# Patient Record
Sex: FEMALE | Race: WHITE | NOT HISPANIC OR LATINO | Employment: OTHER | URBAN - METROPOLITAN AREA
[De-identification: names, ages, dates, MRNs, and addresses within clinical notes are randomized per-mention and may not be internally consistent; named-entity substitution may affect disease eponyms.]

---

## 2017-01-04 RX ORDER — SULFAMETHOXAZOLE AND TRIMETHOPRIM 800; 160 MG/1; MG/1
1 TABLET ORAL 2 TIMES DAILY
Qty: 28 TAB | Refills: 0 | Status: SHIPPED | OUTPATIENT
Start: 2017-01-04 | End: 2017-02-08 | Stop reason: SDUPTHER

## 2017-01-11 RX ORDER — FLUOXETINE HYDROCHLORIDE 20 MG/1
20 CAPSULE ORAL
Qty: 90 CAP | Refills: 0 | Status: SHIPPED | OUTPATIENT
Start: 2017-01-11 | End: 2017-06-28 | Stop reason: SDUPTHER

## 2017-01-11 RX ORDER — ALPRAZOLAM 1 MG/1
TABLET ORAL
Qty: 90 TAB | Refills: 0 | Status: SHIPPED | OUTPATIENT
Start: 2017-01-11 | End: 2017-02-09 | Stop reason: SDUPTHER

## 2017-01-16 DIAGNOSIS — R21 RASH: ICD-10-CM

## 2017-01-16 RX ORDER — TRIAMCINOLONE ACETONIDE 1 MG/G
CREAM TOPICAL
Qty: 1 TUBE | OUTPATIENT
Start: 2017-01-16

## 2017-01-16 RX ORDER — NYSTATIN 100000 [USP'U]/G
100000 POWDER TOPICAL EVERY 12 HOURS
Qty: 60 G | Refills: 3 | OUTPATIENT
Start: 2017-01-16

## 2017-01-17 NOTE — TELEPHONE ENCOUNTER
Please advise patient to go to urgent care, need to be evaluated by physician before given medications.

## 2017-01-17 NOTE — TELEPHONE ENCOUNTER
Patient called still having pain in stomach and severe pain in vaginal area, feels like knives are cutting her vagina area, similar to a UTI. When bend over vomiting acid, eating only fruit and greek yogurt and coconut water.  Please advise.. Would like to know if there is any thing better to take for bowel movement than ty

## 2017-01-18 DIAGNOSIS — R21 RASH: ICD-10-CM

## 2017-01-18 RX ORDER — TRIAMCINOLONE ACETONIDE 1 MG/G
CREAM TOPICAL
Qty: 1 TUBE | Refills: 1 | Status: SHIPPED | OUTPATIENT
Start: 2017-01-18 | End: 2017-06-30 | Stop reason: SDUPTHER

## 2017-01-18 RX ORDER — KETOCONAZOLE 20 MG/G
CREAM TOPICAL
Qty: 60 G | Refills: 3 | Status: SHIPPED | OUTPATIENT
Start: 2017-01-18 | End: 2017-04-01 | Stop reason: SDUPTHER

## 2017-01-23 DIAGNOSIS — R60.9 EDEMA, UNSPECIFIED TYPE: ICD-10-CM

## 2017-01-23 RX ORDER — FUROSEMIDE 20 MG/1
20 TABLET ORAL
Qty: 90 TAB | Refills: 0 | Status: SHIPPED | OUTPATIENT
Start: 2017-01-23 | End: 2017-04-12 | Stop reason: SDUPTHER

## 2017-02-06 RX ORDER — NYSTATIN 100000 [USP'U]/G
POWDER TOPICAL
Qty: 60 G | Refills: 0 | Status: SHIPPED | OUTPATIENT
Start: 2017-02-06 | End: 2017-02-20 | Stop reason: SDUPTHER

## 2017-02-09 NOTE — TELEPHONE ENCOUNTER
scanned in to media    Was the patient seen in the last year in this department? Yes     Does patient have an active prescription for medications requested? No     Received Request Via: Pharmacy

## 2017-02-10 RX ORDER — ALPRAZOLAM 1 MG/1
TABLET ORAL
Qty: 90 TAB | Refills: 0 | Status: SHIPPED
Start: 2017-02-10 | End: 2017-02-27 | Stop reason: SDUPTHER

## 2017-02-10 RX ORDER — SULFAMETHOXAZOLE AND TRIMETHOPRIM 800; 160 MG/1; MG/1
TABLET ORAL
Qty: 28 TAB | Refills: 0 | Status: SHIPPED | OUTPATIENT
Start: 2017-02-10 | End: 2017-03-13 | Stop reason: SDUPTHER

## 2017-02-10 NOTE — TELEPHONE ENCOUNTER
Was the patient seen in the last year in this department? Yes     Does patient have an active prescription for medications requested? No     Received Request Via: Pharmacy      \

## 2017-02-22 RX ORDER — NYSTATIN 100000 [USP'U]/G
POWDER TOPICAL
Qty: 60 G | Refills: 0 | Status: SHIPPED | OUTPATIENT
Start: 2017-02-22 | End: 2017-03-13 | Stop reason: SDUPTHER

## 2017-03-01 RX ORDER — ALPRAZOLAM 1 MG/1
TABLET ORAL
Qty: 90 TAB | Refills: 0 | Status: SHIPPED | OUTPATIENT
Start: 2017-03-01 | End: 2017-04-06 | Stop reason: SDUPTHER

## 2017-03-15 RX ORDER — NYSTATIN 100000 [USP'U]/G
POWDER TOPICAL
Qty: 60 G | Refills: 2 | Status: SHIPPED | OUTPATIENT
Start: 2017-03-15 | End: 2017-04-29 | Stop reason: SDUPTHER

## 2017-03-15 RX ORDER — SULFAMETHOXAZOLE AND TRIMETHOPRIM 800; 160 MG/1; MG/1
TABLET ORAL
Qty: 28 TAB | Refills: 2 | Status: SHIPPED | OUTPATIENT
Start: 2017-03-15 | End: 2017-06-06 | Stop reason: SDUPTHER

## 2017-04-04 RX ORDER — KETOCONAZOLE 20 MG/G
CREAM TOPICAL
Qty: 60 G | Refills: 3 | Status: SHIPPED | OUTPATIENT
Start: 2017-04-04 | End: 2017-06-16 | Stop reason: SDUPTHER

## 2017-04-07 RX ORDER — ALPRAZOLAM 1 MG/1
TABLET ORAL
Qty: 90 TAB | Refills: 0 | Status: SHIPPED
Start: 2017-04-07 | End: 2017-05-05 | Stop reason: SDUPTHER

## 2017-04-12 DIAGNOSIS — R60.9 EDEMA, UNSPECIFIED TYPE: ICD-10-CM

## 2017-04-12 DIAGNOSIS — Z79.899 MEDICATION MANAGEMENT: ICD-10-CM

## 2017-04-12 RX ORDER — AMOXICILLIN 500 MG/1
500 CAPSULE ORAL 4 TIMES DAILY
Qty: 40 CAP | Refills: 0 | Status: SHIPPED | OUTPATIENT
Start: 2017-04-12 | End: 2017-04-22

## 2017-04-12 RX ORDER — FUROSEMIDE 20 MG/1
20 TABLET ORAL
Qty: 90 TAB | Refills: 0 | Status: SHIPPED | OUTPATIENT
Start: 2017-04-12 | End: 2017-06-07 | Stop reason: SDUPTHER

## 2017-04-24 DIAGNOSIS — K59.00 CONSTIPATION, UNSPECIFIED CONSTIPATION TYPE: ICD-10-CM

## 2017-04-24 RX ORDER — SENNOSIDES 8.6 MG
1 TABLET ORAL DAILY
Qty: 90 TAB | Refills: 0 | Status: SHIPPED | OUTPATIENT
Start: 2017-04-24 | End: 2017-06-19 | Stop reason: SDUPTHER

## 2017-04-29 DIAGNOSIS — L08.9 SKIN INFECTION: ICD-10-CM

## 2017-05-01 RX ORDER — NYSTATIN 100000 [USP'U]/G
POWDER TOPICAL
Qty: 60 G | Refills: 2 | Status: SHIPPED | OUTPATIENT
Start: 2017-05-01 | End: 2017-06-16 | Stop reason: SDUPTHER

## 2017-05-05 RX ORDER — ALPRAZOLAM 1 MG/1
TABLET ORAL
Qty: 90 TAB | Refills: 0 | Status: SHIPPED
Start: 2017-05-05 | End: 2017-06-02 | Stop reason: SDUPTHER

## 2017-06-02 RX ORDER — ALPRAZOLAM 1 MG/1
TABLET ORAL
Qty: 90 TAB | Refills: 0 | Status: SHIPPED | OUTPATIENT
Start: 2017-06-02 | End: 2017-06-30 | Stop reason: SDUPTHER

## 2017-06-07 DIAGNOSIS — R60.9 EDEMA, UNSPECIFIED TYPE: ICD-10-CM

## 2017-06-07 RX ORDER — FUROSEMIDE 20 MG/1
20 TABLET ORAL
Qty: 90 TAB | Refills: 0 | Status: SHIPPED | OUTPATIENT
Start: 2017-06-07 | End: 2017-09-11 | Stop reason: SDUPTHER

## 2017-06-07 RX ORDER — SULFAMETHOXAZOLE AND TRIMETHOPRIM 800; 160 MG/1; MG/1
TABLET ORAL
Qty: 28 TAB | Refills: 0 | Status: SHIPPED | OUTPATIENT
Start: 2017-06-07 | End: 2017-06-30 | Stop reason: SDUPTHER

## 2017-06-16 DIAGNOSIS — K59.00 CONSTIPATION, UNSPECIFIED CONSTIPATION TYPE: ICD-10-CM

## 2017-06-19 DIAGNOSIS — J44.9 CHRONIC OBSTRUCTIVE PULMONARY DISEASE, UNSPECIFIED COPD TYPE (HCC): ICD-10-CM

## 2017-06-19 RX ORDER — ALBUTEROL SULFATE 90 UG/1
2 AEROSOL, METERED RESPIRATORY (INHALATION) EVERY 6 HOURS PRN
Qty: 3 INHALER | Refills: 0 | Status: SHIPPED | OUTPATIENT
Start: 2017-06-19 | End: 2017-09-30

## 2017-06-19 RX ORDER — NYSTATIN 100000 [USP'U]/G
POWDER TOPICAL
Qty: 60 G | Refills: 0 | Status: SHIPPED | OUTPATIENT
Start: 2017-06-19 | End: 2017-06-27 | Stop reason: SDUPTHER

## 2017-06-19 RX ORDER — SENNOSIDES 8.6 MG
1 TABLET ORAL DAILY
Qty: 90 TAB | Refills: 0 | Status: SHIPPED | OUTPATIENT
Start: 2017-06-19 | End: 2017-08-21 | Stop reason: SDUPTHER

## 2017-06-19 RX ORDER — KETOCONAZOLE 20 MG/G
CREAM TOPICAL
Qty: 60 G | Refills: 0 | Status: SHIPPED | OUTPATIENT
Start: 2017-06-19 | End: 2017-06-30 | Stop reason: SDUPTHER

## 2017-06-28 RX ORDER — NYSTATIN 100000 [USP'U]/G
POWDER TOPICAL
Qty: 60 G | Refills: 0 | Status: SHIPPED | OUTPATIENT
Start: 2017-06-28 | End: 2017-07-12 | Stop reason: SDUPTHER

## 2017-06-28 RX ORDER — FLUOXETINE HYDROCHLORIDE 20 MG/1
20 CAPSULE ORAL
Qty: 90 CAP | Refills: 0 | Status: SHIPPED | OUTPATIENT
Start: 2017-06-28 | End: 2017-09-23 | Stop reason: SDUPTHER

## 2017-06-30 RX ORDER — TRIAMCINOLONE ACETONIDE 1 MG/G
CREAM TOPICAL
Qty: 15 G | Refills: 0 | Status: SHIPPED | OUTPATIENT
Start: 2017-06-30 | End: 2017-07-25 | Stop reason: SDUPTHER

## 2017-06-30 RX ORDER — ALPRAZOLAM 1 MG/1
TABLET ORAL
Qty: 90 TAB | Refills: 0 | Status: SHIPPED
Start: 2017-06-30 | End: 2017-07-31 | Stop reason: SDUPTHER

## 2017-06-30 RX ORDER — SULFAMETHOXAZOLE AND TRIMETHOPRIM 800; 160 MG/1; MG/1
TABLET ORAL
Qty: 28 TAB | Refills: 0 | Status: SHIPPED | OUTPATIENT
Start: 2017-06-30 | End: 2017-08-11 | Stop reason: SDUPTHER

## 2017-06-30 RX ORDER — KETOCONAZOLE 20 MG/G
CREAM TOPICAL
Qty: 60 G | Refills: 0 | Status: SHIPPED | OUTPATIENT
Start: 2017-06-30 | End: 2017-07-25 | Stop reason: SDUPTHER

## 2017-07-12 RX ORDER — NYSTATIN 100000 [USP'U]/G
100000 POWDER TOPICAL EVERY 12 HOURS PRN
Qty: 60 G | Refills: 0 | Status: SHIPPED | OUTPATIENT
Start: 2017-07-12 | End: 2017-07-25 | Stop reason: SDUPTHER

## 2017-07-26 RX ORDER — NYSTATIN 100000 [USP'U]/G
POWDER TOPICAL
Qty: 60 G | Refills: 0 | Status: SHIPPED | OUTPATIENT
Start: 2017-07-26 | End: 2017-08-21 | Stop reason: SDUPTHER

## 2017-07-26 RX ORDER — TRIAMCINOLONE ACETONIDE 1 MG/G
CREAM TOPICAL
Qty: 15 G | Refills: 0 | Status: SHIPPED | OUTPATIENT
Start: 2017-07-26 | End: 2017-09-12 | Stop reason: SDUPTHER

## 2017-07-26 RX ORDER — KETOCONAZOLE 20 MG/G
CREAM TOPICAL
Qty: 60 G | Refills: 0 | Status: SHIPPED | OUTPATIENT
Start: 2017-07-26 | End: 2017-08-21 | Stop reason: SDUPTHER

## 2017-07-31 RX ORDER — ALPRAZOLAM 1 MG/1
TABLET ORAL
Qty: 30 TAB | Refills: 0 | Status: SHIPPED | OUTPATIENT
Start: 2017-07-31 | End: 2017-08-30 | Stop reason: SDUPTHER

## 2017-08-10 ENCOUNTER — TELEPHONE (OUTPATIENT)
Dept: MEDICAL GROUP | Facility: PHYSICIAN GROUP | Age: 62
End: 2017-08-10

## 2017-08-10 NOTE — TELEPHONE ENCOUNTER
Patient called and left a 25 min message and you could not really understand what she was wanting, PLEASE CALL

## 2017-08-11 RX ORDER — SULFAMETHOXAZOLE AND TRIMETHOPRIM 800; 160 MG/1; MG/1
1 TABLET ORAL 2 TIMES DAILY
Qty: 28 TAB | Refills: 0 | Status: SHIPPED | OUTPATIENT
Start: 2017-08-11 | End: 2017-09-11 | Stop reason: SDUPTHER

## 2017-08-11 RX ORDER — SULFAMETHOXAZOLE AND TRIMETHOPRIM 800; 160 MG/1; MG/1
TABLET ORAL
Refills: 0 | OUTPATIENT
Start: 2017-08-11

## 2017-08-11 NOTE — TELEPHONE ENCOUNTER
Patient was requesting antibiotic for skin infection, already Bactrim was forwarded to her pharmacy.

## 2017-08-11 NOTE — TELEPHONE ENCOUNTER
Was the patient seen in the last year in this department? Yes     Does patient have an active prescription for medications requested? No     Received Request Via: Patient     Patient has not been seen over a year and stated she has an infection in her legs, I offered to get her in today and patient declined because had no transportation  Please advice

## 2017-08-16 ENCOUNTER — HOSPITAL ENCOUNTER (OUTPATIENT)
Dept: LAB | Facility: MEDICAL CENTER | Age: 62
End: 2017-08-16
Attending: SURGERY
Payer: MEDICARE

## 2017-08-16 LAB
BUN SERPL-MCNC: 8 MG/DL (ref 8–22)
CREAT SERPL-MCNC: 0.82 MG/DL (ref 0.5–1.4)
GFR SERPL CREATININE-BSD FRML MDRD: >60 ML/MIN/1.73 M 2

## 2017-08-16 PROCEDURE — 84520 ASSAY OF UREA NITROGEN: CPT

## 2017-08-16 PROCEDURE — 36415 COLL VENOUS BLD VENIPUNCTURE: CPT

## 2017-08-16 PROCEDURE — 82565 ASSAY OF CREATININE: CPT

## 2017-08-21 ENCOUNTER — OFFICE VISIT (OUTPATIENT)
Dept: MEDICAL GROUP | Facility: PHYSICIAN GROUP | Age: 62
End: 2017-08-21
Payer: MEDICARE

## 2017-08-21 VITALS
RESPIRATION RATE: 16 BRPM | WEIGHT: 280 LBS | OXYGEN SATURATION: 91 % | BODY MASS INDEX: 42.44 KG/M2 | DIASTOLIC BLOOD PRESSURE: 70 MMHG | HEIGHT: 68 IN | HEART RATE: 77 BPM | TEMPERATURE: 96 F | SYSTOLIC BLOOD PRESSURE: 124 MMHG

## 2017-08-21 DIAGNOSIS — B49 FUNGAL INFECTION: ICD-10-CM

## 2017-08-21 DIAGNOSIS — J01.01 ACUTE RECURRENT MAXILLARY SINUSITIS: ICD-10-CM

## 2017-08-21 DIAGNOSIS — E66.01 MORBID OBESITY DUE TO EXCESS CALORIES (HCC): ICD-10-CM

## 2017-08-21 DIAGNOSIS — K59.00 CONSTIPATION, UNSPECIFIED CONSTIPATION TYPE: ICD-10-CM

## 2017-08-21 PROCEDURE — 99213 OFFICE O/P EST LOW 20 MIN: CPT | Performed by: INTERNAL MEDICINE

## 2017-08-21 RX ORDER — SENNOSIDES 8.6 MG
1 TABLET ORAL DAILY
Qty: 60 TAB | Refills: 6 | Status: SHIPPED | OUTPATIENT
Start: 2017-08-21 | End: 2017-09-30

## 2017-08-21 RX ORDER — KETOCONAZOLE 20 MG/G
CREAM TOPICAL
Qty: 60 G | Refills: 3 | Status: SHIPPED | OUTPATIENT
Start: 2017-08-21 | End: 2017-09-30

## 2017-08-21 RX ORDER — NYSTATIN 100000 [USP'U]/G
POWDER TOPICAL
Qty: 60 G | Refills: 0 | Status: SHIPPED | OUTPATIENT
Start: 2017-08-21 | End: 2017-09-11 | Stop reason: SDUPTHER

## 2017-08-21 ASSESSMENT — PATIENT HEALTH QUESTIONNAIRE - PHQ9: CLINICAL INTERPRETATION OF PHQ2 SCORE: 0

## 2017-08-21 NOTE — MR AVS SNAPSHOT
"        Ashleigh Hidalgoworth   2017 3:20 PM   Office Visit   MRN: [id number]    Department:  [facility]   Dept Phone:  [phone]    Description:  Female : 1955   Provider:  Sudha Lam M.D.           Reason for Visit     Follow-Up sinus infection      Allergies as of 2017     Allergen Noted Reactions    Contrast Media With Iodine [Iodine] 04/15/2011   Anaphylaxis    Naproxen 2008       Asa [Aspirin] 2009       Cephalosporins 2009       Compazine 2008       Doxycycline 2008       Macrolides 2009       Other Drug 2009       ANTIBIOTICS     Phenothiazines 2009       Sympathomimetics 2009       Toradol 07/15/2013   Anaphylaxis      You were diagnosed with     Fungal infection   [159510]       Constipation, unspecified constipation type   [6760922]       Acute recurrent maxillary sinusitis   [597695]       Morbid obesity due to excess calories (CMS-[facility])   [0686734]         Vital Signs     Blood Pressure Pulse Temperature Respirations Height Weight    124/70 mmHg 77 35.6 °C (96 °F) 16 1.727 m (5' 7.99\") 127.007 kg (280 lb)    Body Mass Index Oxygen Saturation Last Menstrual Period Smoking Status          42.58 kg/m2 91% 1988 Current Every Day Smoker        Basic Information     Date Of Birth Sex Race Ethnicity Preferred Language    1955 Female White Non- English      Your appointments     Aug 24, 2017  2:30 PM   CT BODY WITH with [address]   [facility] IMAGING - CT - [address]    [address]  [place] [id number] [phone]           Some exams require specific prep instructions that would have been given to you at time of scheduling. If you have any additional questions about the prep instructions, please call Imaging Scheduling at [phone] and press #2.              Problem List              ICD-10-CM Priority Class Noted - Resolved    Sinusitis J32.9   2009 - Present    Chronic low back pain M54.5, " G89.29   Unknown - Present    Bloating symptom R14.0   1/8/2010 - Present    Dyslipidemia E78.5   1/8/2010 - Present    Hypothyroidism E03.9   1/8/2010 - Present    UTI (urinary tract infection) N39.0   2/12/2010 - Present    Constipation    2/12/2010 - Present    Abdominal pain, lower R10.30   2/12/2010 - Present    GERD (gastroesophageal reflux disease) K21.9   Unknown - Present    Trochanteric bursitis of right hip M70.61   10/4/2011 - Present    Trochanteric bursitis of left hip M70.62   10/4/2011 - Present    Leg swelling M79.89 Medium  9/15/2012 - Present    Cellulitis and abscess of leg L02.419, L03.119 High  9/15/2012 - Present    Right arm weakness R29.898   9/5/2013 - Present    SOB (shortness of breath) R06.02 Medium  6/12/2015 - Present      Health Maintenance        Date Due Completion Dates    IMM DTaP/Tdap/Td Vaccine (1 - Tdap) 6/9/1973 ---    PAP SMEAR 6/9/1975 ---    COLONOSCOPY 6/9/2004 ---    MAMMOGRAM 4/10/2014 4/10/2013    IMM ZOSTER VACCINE 6/9/2014 ---    IMM INFLUENZA (1) 9/1/2017 1/1/2007            Current Immunizations     Influenza TIV (IM) 1/1/2007    Pneumococcal Vaccine (UF)Historical Data 1/1/2007      Below and/or attached are the medications your provider expects you to take. Review all of your home medications and newly ordered medications with your provider and/or pharmacist. Follow medication instructions as directed by your provider and/or pharmacist. Please keep your medication list with you and share with your provider. Update the information when medications are discontinued, doses are changed, or new medications (including over-the-counter products) are added; and carry medication information at all times in the event of emergency situations     Allergies:  CONTRAST MEDIA WITH IODINE - Anaphylaxis     NAPROXEN - (reactions not documented)     ASA - (reactions not documented)     CEPHALOSPORINS - (reactions not documented)     COMPAZINE - (reactions not documented)      DOXYCYCLINE - (reactions not documented)     MACROLIDES - (reactions not documented)     OTHER DRUG - (reactions not documented)     PHENOTHIAZINES - (reactions not documented)     SYMPATHOMIMETICS - (reactions not documented)     TORADOL - Anaphylaxis               Medications  Valid as of: August 21, 2017 -  4:46 PM    Generic Name Brand Name Tablet Size Instructions for use    Albuterol Sulfate (Aero Soln) albuterol 108 (90 Base) MCG/ACT Inhale 2 Puffs by mouth every 6 hours as needed for Shortness of Breath.        ALPRAZolam (Tab) XANAX 1 MG TAKE 1 TABLET BY MOUTH THREE TIMES DAILY AS NEEDED        Cetirizine HCl (Tab) ZYRTEC 10 MG Take 1 Tab by mouth every day.        Clindamycin HCl (Cap) CLEOCIN 300 MG Take 1 Cap by mouth 4 times a day.        DiazePAM (Tab) VALIUM 10 MG Take 1 Tab by mouth every 8 hours as needed for Anxiety.        Estradiol (PATCH WEEKLY) CLIMARA 0.025 MG/24HR Apply 1 Patch to skin as directed every 7 days.        Estradiol (Tab) ESTRACE 0.5 MG Take 1 Tab by mouth every day.        FLUoxetine HCl (Cap) PROZAC 20 MG Take 20 mg by mouth every 48 hours.        FLUoxetine HCl (Cap) PROZAC 20 MG Take 1 Cap by mouth every day.        Furosemide (Tab) LASIX 20 MG Take 1 Tab by mouth every day.        HYDROmorphone HCl (Tab) DILAUDID 4 MG Take 4 mg by mouth every 3 hours as needed for Severe Pain.        Ketoconazole (Cream) NIZORAL 2 % APPLY TO AFFECTED AREA TWICE A DAY AS DIRECTED        Levothyroxine Sodium (Tab) SYNTHROID 75 MCG Take 1 Tab by mouth every day.        Morphine Sulfate (Tab CR) MS CONTIN 60 MG Take 120 mg by mouth every 12 hours.        Mupirocin (Ointment) BACTROBAN 2 % APPLY TO AFFECTED AREA AS DIRECTED        Nystatin (Powder) MYCOSTATIN 793628 UNIT/GM Apply to breast rash twice a day        Nystatin (Powder) MYCOSTATIN 028057 UNIT/GM Apply q 12 hours as needed        Omeprazole (CAPSULE DELAYED RELEASE) PRILOSEC 20 MG Take 1 Cap by mouth every day.        OxyCODONE HCl (Tab)  OXY-IR 30 MG Take 30 mg by mouth every four hours as needed for Moderate Pain.        OxyMORphone HCl (Tablet Extended Release 12 hour Abuse-Deterrent) OxyMORphone HCl ER 30 MG Take  by mouth.        Polyethylene Glycol 3350 (Pack) MIRALAX  Take 1 Packet by mouth every day.        Sennosides (Tab) Senna 8.6 MG Take 1 Tab by mouth every day.        Sulfamethoxazole-Trimethoprim (Tab) BACTRIM -160 MG Take 1 Tab by mouth 2 times a day. TAKE 1 TABLET BY MOUTH TWICE A DAY        Triamcinolone Acetonide (Cream) KENALOG 0.1 % APPLY TO AFFECTED AREA EVERY 12 HOURS        .                 Medicines prescribed today were sent to:     [facility]/PH[facility]Y #[id number] - [place], [place] - [address]    [address] [address]    Phone: [phone] Fax: [phone]    Open 24 Hours?: No    [facility] [address] [place] [address] [place], [place] - [address] [facility] [address]    [address] [address]    Phone: [phone] Fax: [phone]    Open 24 Hours?: No      Medication refill instructions:       If your prescription bottle indicates you have medication refills left, it is not necessary to call your provider’s office. Please contact your pharmacy and they will refill your medication.    If your prescription bottle indicates you do not have any refills left, you may request refills at any time through one of the following ways: The online [employer] system (except Urgent Care), by calling your provider’s office, or by asking your pharmacy to contact your provider’s office with a refill request. Medication refills are processed only during regular business hours and may not be available until the next business day. Your provider may request additional information or to have a follow-up visit with you prior to refilling your medication.   *Please Note: Medication refills are assigned a new Rx number when refilled electronically. Your pharmacy may indicate that no refills were authorized even though a new  prescription for the same medication is available at the pharmacy. Please request the medicine by name with the pharmacy before contacting your provider for a refill.        Instructions    Patient is instructed regarding acute on chronic issues.          PostRank Access Code: [email or web address]  Expires: 9/5/2017  4:04 AM    PostRank  A secure, online tool to manage your health information     [employer] PostRank® is a secure, online tool that connects you to your personalized health information from the privacy of your home -- day or night - making it very easy for you to manage your healthcare. Once the activation process is completed, you can even access your medical information using the PostRank yanni, which is available for free in the Apple Yanni store or Google Play store.     PostRank provides the following levels of access (as shown below):   My Chart Features   [facility] Primary [facility] Doctor [facility]  Specialists [facility]  Urgent  Care Non-[facility]  Primary Care  Doctor   Email your healthcare team securely and privately 24/7 X X X    Manage appointments: schedule your next appointment; view details of past/upcoming appointments X      Request prescription refills. X      View recent personal medical records, including lab and immunizations X X X X   View health record, including health history, allergies, medications X X X X   Read reports about your outpatient visits, procedures, consult and ER notes X X X X   See your discharge summary, which is a recap of your hospital and/or ER visit that includes your diagnosis, lab results, and care plan. X X       How to register for PostRank:  1. Go to  [email or web address].  2. Click on the Sign Up Now box, which takes you to the New Member Sign Up page. You will need to provide the following information:  a. Enter your PostRank Access Code exactly as it appears at the top of this page. (You will not need to use this code after you’ve completed the sign-up process. If you  do not sign up before the expiration date, you must request a new code.)   b. Enter your date of birth.   c. Enter your home email address.   d. Click Submit, and follow the next screen’s instructions.  3. Create a Click & Grow ID. This will be your Click & Grow login ID and cannot be changed, so think of one that is secure and easy to remember.  4. Create a Click & Grow password. You can change your password at any time.  5. Enter your Password Reset Question and Answer. This can be used at a later time if you forget your password.   6. Enter your e-mail address. This allows you to receive e-mail notifications when new information is available in Click & Grow.  7. Click Sign Up. You can now view your health information.    For assistance activating your Click & Grow account, call [phone]        Quit Tobacco Information     Do you want to quit using tobacco?    Quitting tobacco decreases risks of cancer, heart and lung disease, increases life expectancy, improves sense of taste and smell, and increases spending money, among other benefits.    If you are thinking about quitting, we can help.  • [facility] Quit Tobacco Program: 928.700.8295  o Program occurs weekly for four weeks and includes pharmacist consultation on products to support quitting smoking or chewing tobacco. A provider referral is needed for pharmacist consultation.  • Tobacco Users Help Hotline: 4-800-QUIT-NOW (404-8134) or https://[place].quitlogix.org/  o Free, confidential telephone and online coaching for [place] residents. Sessions are designed on a schedule that is convenient for you. Eligible clients receive free nicotine replacement therapy.  • Nationally: www.smokefree.gov  o Information and professional assistance to support both immediate and long-term needs as you become, and remain, a non-smoker. Smokefree.gov allows you to choose the help that best fits your needs.

## 2017-08-21 NOTE — PROGRESS NOTES
Subjective:  Ashleigh is a 62 y.o. female with the following   Past Medical History   Diagnosis Date   • Bipolar disorder (CMS-[facility])    • Sinusitis; chronic    • Obesity    • Dyslipidemia    • Migraine    • ASTHMA    • GERD (gastroesophageal reflux disease)    • Hypothyroid    • Congestive heart failure (CMS-[facility])    • Chronic low back pain    • Arthritis    • Bursitis    • Unspecified urinary incontinence    • Fall      last fall a month ago      Family History   Problem Relation Age of Onset   • Cancer Mother      unknown   • Hypertension Father    • Diabetes Father    • Stroke Father    • Diabetes Paternal Grandmother      The patient is on the following medications:   Current outpatient prescriptions:   •  ketoconazole (NIZORAL) 2 % Cream, APPLY TO AFFECTED AREA TWICE A DAY AS DIRECTED, Disp: 60 g, Rfl: 3  •  NYSTOP (MYCOSTATIN) 804524 UNIT/GM Powder, Apply q 12 hours as needed, Disp: 60 g, Rfl: 0  •  Sennosides (SENNA) 8.6 MG Tab, Take 1 Tab by mouth every day., Disp: 60 Tab, Rfl: 6  •  sulfamethoxazole-trimethoprim (BACTRIM DS) 800-160 MG tablet, Take 1 Tab by mouth 2 times a day. TAKE 1 TABLET BY MOUTH TWICE A DAY, Disp: 28 Tab, Rfl: 0  •  alprazolam (XANAX) 1 MG Tab, TAKE 1 TABLET BY MOUTH THREE TIMES DAILY AS NEEDED, Disp: 30 Tab, Rfl: 0  •  triamcinolone acetonide (KENALOG) 0.1 % Cream, APPLY TO AFFECTED AREA EVERY 12 HOURS, Disp: 15 g, Rfl: 0  •  fluoxetine (PROZAC) 20 MG Cap, Take 1 Cap by mouth every day., Disp: 90 Cap, Rfl: 0  •  mupirocin (BACTROBAN) 2 % Ointment, APPLY TO AFFECTED AREA AS DIRECTED, Disp: 22 g, Rfl: 0  •  albuterol (PROAIR HFA) 108 (90 BASE) MCG/ACT Aero Soln inhalation aerosol, Inhale 2 Puffs by mouth every 6 hours as needed for Shortness of Breath., Disp: 3 Inhaler, Rfl: 0  •  furosemide (LASIX) 20 MG Tab, Take 1 Tab by mouth every day., Disp: 90 Tab, Rfl: 0  •  omeprazole (PRILOSEC) 20 MG delayed-release capsule, Take 1 Cap by mouth every day., Disp: 90 Cap, Rfl: 3  •  estradiol  "(ESTRACE) 0.5 MG tablet, Take 1 Tab by mouth every day., Disp: 90 Tab, Rfl: 3  •  estradiol (CLIMARA) 0.025 MG/24HR PATCH WEEKLY, Apply 1 Patch to skin as directed every 7 days., Disp: 12 Patch, Rfl: 3  •  nystatin (MYCOSTATIN) powder, Apply to breast rash twice a day, Disp: 15 g, Rfl: 1  •  HYDROmorphone (DILAUDID) 4 MG Tab, Take 4 mg by mouth every 3 hours as needed for Severe Pain., Disp: , Rfl:   •  oxycodone (OXY-IR) 30 MG immediate release tablet, Take 30 mg by mouth every four hours as needed for Moderate Pain., Disp: , Rfl:   •  Oxymorphone HCl ER 30 MG Tablet Extended Release 12 hour Abuse-Deterrent, Take  by mouth., Disp: , Rfl:   •  clindamycin (CLEOCIN) 300 MG Cap, Take 1 Cap by mouth 4 times a day., Disp: 40 Cap, Rfl: 0  •  levothyroxine (SYNTHROID) 75 MCG Tab, Take 1 Tab by mouth every day., Disp: 90 Tab, Rfl: 3  •  cetirizine (ZYRTEC) 10 MG Tab, Take 1 Tab by mouth every day., Disp: 30 Tab, Rfl: 3  •  polyethylene glycol/lytes (MIRALAX) Pack, Take 1 Packet by mouth every day., Disp: 10 Each, Rfl: 3  •  diazepam (VALIUM) 10 MG tablet, Take 1 Tab by mouth every 8 hours as needed for Anxiety., Disp: 90 Tab, Rfl: 0  •  fluoxetine (PROZAC) 20 MG CAPS, Take 20 mg by mouth every 48 hours., Disp: , Rfl:   •  morphine SR (MS CONTIN) 60 MG TBCR tablet, Take 120 mg by mouth every 12 hours., Disp: , Rfl:     HPI; patient is here to take complaining of bilateral maxillary sinus pain, purulent nasal discharge, for last 2 weeks also requesting refill of topical antifungals for recurrent groin rash, senna for constipation, denies having fever, chills, abdominal pain or GI bleed..   ROS:  See HPI    Blood pressure 124/70, pulse 77, temperature 35.6 °C (96 °F), resp. rate 16, height 1.727 m (5' 7.99\"), weight 127.007 kg (280 lb), last menstrual period 01/01/1988, SpO2 91 %.on RA  Objective:  Patient is well appearing and in no acute distress.  Pharynx is clear. Bilateral maxillary sinus tenderness on percussion, Neck " is soft and supple with no cervical or supraclavicular lymphadenopathy, thyromegaly or masses, no JVD.  Lungs clear to auscultation bilaterally with normal respiratory effort. Abdomen soft, non-tender on palpation,not distended. Heart regular rate and rhythm without murmur. Extremities without any clubbing, cyanosis, or edema. Groin rash    Assessment and Plan:  1. Sinusitis ; ×2 weeks, in the past has responded to Bactrim antibiotic , prescription was sent to her pharmacy about 10 days ago, per patient medication was denied by pharmacy.     2. Recurrent skin rash/ fungal infection; response to nystatin and ketoconazole as needed.      3. Increased BMI      4. Constipation; requesting refill of senna. Denies having abdominal pain or GI bleed.       Patient is advised on preventive and supportive care, diet and lifestyle modification, daily walking ,exercise and weight loss. Refilled medications, If symptoms not improved or worsen return for evaluation.Please note that this dictation was created using voice recognition software. I have worked with consultants from the vendor as well as technical experts from [facility] [employer] to optimize the interface. I have made every reasonable attempt to correct obvious errors, but I expect that there are errors of grammar and possibly content that I did not discover before finalizing the note.

## 2017-08-25 RX ORDER — ALPRAZOLAM 1 MG/1
TABLET ORAL
Qty: 30 TAB | Refills: 0
Start: 2017-08-25

## 2017-08-25 NOTE — TELEPHONE ENCOUNTER
Was the patient seen in the last year in this department? Yes   Last appt 8/21/17    Does patient have an active prescription for medications requested? No   Last filled 7/31/17    Received Request Via: Pharmacy       states last filled #90 on 7/31/17 - too early for refill

## 2017-08-29 NOTE — TELEPHONE ENCOUNTER
"Patient has left several messages requesting a refill on her Xanax.  She states several times that she \"needs these\".  Messages are very lengthy.   States she doesn't lie about taking or getting this prescribed by other providers.   Patient had appointment with  on 8/21 however anxiety and use of Xanax was not discussed.  According to  patient has been getting Xanax from her pain management doctor Dr.Richard Santa - filled on 7/31/17 - #90.  I was not able to speak with  @ Pain Management as they are closed.    "

## 2017-08-30 RX ORDER — ALPRAZOLAM 1 MG/1
1 TABLET ORAL 3 TIMES DAILY PRN
Qty: 90 TAB | Refills: 0 | Status: SHIPPED
Start: 2017-08-30 | End: 2017-09-08 | Stop reason: SDUPTHER

## 2017-08-30 NOTE — TELEPHONE ENCOUNTER
I spoke with Nicole Michelle' office and they confirmed they will NOT be refilling patient's Xanax.  According to Nicole they filled on 7/31/17 because patient could not reach .  Patient should have an appt prior to next refill as she has not had a UDS and use of Xanax was not discussed at [facility].  I can schedule and appt with patient prior to next refill date.      Was the patient seen in the last year in this department? Yes     Does patient have an active prescription for medications requested? No     Received Request Via: Patient

## 2017-09-01 ENCOUNTER — APPOINTMENT (OUTPATIENT)
Dept: RADIOLOGY | Facility: MEDICAL CENTER | Age: 62
End: 2017-09-01
Attending: SURGERY
Payer: MEDICARE

## 2017-09-08 ENCOUNTER — HOSPITAL ENCOUNTER (OUTPATIENT)
Dept: RADIOLOGY | Facility: MEDICAL CENTER | Age: 62
End: 2017-09-08
Attending: SURGERY
Payer: MEDICARE

## 2017-09-08 DIAGNOSIS — R10.9 ABDOMINAL PAIN, UNSPECIFIED SITE: ICD-10-CM

## 2017-09-08 PROCEDURE — 74176 CT ABD & PELVIS W/O CONTRAST: CPT

## 2017-09-08 RX ORDER — ALPRAZOLAM 1 MG/1
1 TABLET ORAL 3 TIMES DAILY PRN
Qty: 90 TAB | Refills: 0 | Status: ON HOLD
Start: 2017-09-08 | End: 2017-10-11

## 2017-09-08 NOTE — TELEPHONE ENCOUNTER
Patient LVM stating that she wants us to write Rx for Xanax for #90 as she can not go to the pharmacy every 10 days.      I spoke with [facility] pharmacist and the Rx we sent them on 7/31/17 with a qty of 30 was not filled until 8/29/17 because patient received #90 from another doctor on 7/31/17.  Patient is requesting a new Rx for #90.    Last filled 8/29/17 for #30.   in media.

## 2017-09-11 DIAGNOSIS — R60.9 EDEMA, UNSPECIFIED TYPE: ICD-10-CM

## 2017-09-11 DIAGNOSIS — E03.9 HYPOTHYROIDISM: ICD-10-CM

## 2017-09-11 RX ORDER — FUROSEMIDE 20 MG/1
20 TABLET ORAL
Qty: 90 TAB | Refills: 0 | Status: SHIPPED | OUTPATIENT
Start: 2017-09-11 | End: 2017-11-10 | Stop reason: SDUPTHER

## 2017-09-13 RX ORDER — TRIAMCINOLONE ACETONIDE 1 MG/G
CREAM TOPICAL
Qty: 15 G | Refills: 0 | Status: SHIPPED | OUTPATIENT
Start: 2017-09-13 | End: 2017-09-30

## 2017-09-13 RX ORDER — SULFAMETHOXAZOLE AND TRIMETHOPRIM 800; 160 MG/1; MG/1
TABLET ORAL
Qty: 28 TAB | Refills: 0 | Status: ON HOLD | OUTPATIENT
Start: 2017-09-13 | End: 2017-10-11

## 2017-09-13 RX ORDER — NYSTATIN 100000 [USP'U]/G
POWDER TOPICAL
Qty: 60 G | Refills: 0 | Status: SHIPPED | OUTPATIENT
Start: 2017-09-13 | End: 2017-09-30

## 2017-09-13 RX ORDER — LEVOTHYROXINE SODIUM 75 MCG
TABLET ORAL
Qty: 90 TAB | Refills: 3 | Status: SHIPPED | OUTPATIENT
Start: 2017-09-13 | End: 2017-10-02

## 2017-09-25 RX ORDER — FLUOXETINE HYDROCHLORIDE 20 MG/1
CAPSULE ORAL
Qty: 90 CAP | Refills: 0 | Status: SHIPPED | OUTPATIENT
Start: 2017-09-25 | End: 2017-09-30

## 2017-09-30 ENCOUNTER — APPOINTMENT (OUTPATIENT)
Dept: RADIOLOGY | Facility: MEDICAL CENTER | Age: 62
DRG: 280 | End: 2017-09-30
Payer: MEDICARE

## 2017-09-30 ENCOUNTER — HOSPITAL ENCOUNTER (INPATIENT)
Facility: MEDICAL CENTER | Age: 62
LOS: 1 days | DRG: 280 | End: 2017-10-01
Attending: EMERGENCY MEDICINE | Admitting: INTERNAL MEDICINE
Payer: MEDICARE

## 2017-09-30 ENCOUNTER — APPOINTMENT (OUTPATIENT)
Dept: RADIOLOGY | Facility: MEDICAL CENTER | Age: 62
DRG: 280 | End: 2017-09-30
Attending: EMERGENCY MEDICINE
Payer: MEDICARE

## 2017-09-30 PROBLEM — I21.4 NSTEMI (NON-ST ELEVATED MYOCARDIAL INFARCTION) (HCC): Status: ACTIVE | Noted: 2017-09-30

## 2017-09-30 PROBLEM — J44.1 COPD EXACERBATION (HCC): Status: ACTIVE | Noted: 2017-09-30

## 2017-09-30 PROBLEM — I50.9 ACUTE EXACERBATION OF CONGESTIVE HEART FAILURE (HCC): Status: ACTIVE | Noted: 2017-09-30

## 2017-09-30 LAB
ALBUMIN SERPL BCP-MCNC: 3.7 G/DL (ref 3.2–4.9)
ALBUMIN/GLOB SERPL: 1.2 G/DL
ALP SERPL-CCNC: 87 U/L (ref 30–99)
ALT SERPL-CCNC: 54 U/L (ref 2–50)
ANION GAP SERPL CALC-SCNC: 8 MMOL/L (ref 0–11.9)
APPEARANCE UR: ABNORMAL
AST SERPL-CCNC: 73 U/L (ref 12–45)
BACTERIA #/AREA URNS HPF: ABNORMAL /HPF
BASE EXCESS BLDA CALC-SCNC: -1 MMOL/L (ref -4–3)
BASOPHILS # BLD AUTO: 0.4 % (ref 0–1.8)
BASOPHILS # BLD: 0.05 K/UL (ref 0–0.12)
BILIRUB SERPL-MCNC: 0.9 MG/DL (ref 0.1–1.5)
BILIRUB UR QL STRIP.AUTO: ABNORMAL
BNP SERPL-MCNC: 608 PG/ML (ref 0–100)
BODY TEMPERATURE: ABNORMAL CENTIGRADE
BUN SERPL-MCNC: 11 MG/DL (ref 8–22)
CALCIUM SERPL-MCNC: 8.9 MG/DL (ref 8.5–10.5)
CHLORIDE SERPL-SCNC: 103 MMOL/L (ref 96–112)
CO2 SERPL-SCNC: 24 MMOL/L (ref 20–33)
COLOR UR: ABNORMAL
CREAT SERPL-MCNC: 0.82 MG/DL (ref 0.5–1.4)
EOSINOPHIL # BLD AUTO: 0.13 K/UL (ref 0–0.51)
EOSINOPHIL NFR BLD: 0.9 % (ref 0–6.9)
EPI CELLS #/AREA URNS HPF: ABNORMAL /HPF
ERYTHROCYTE [DISTWIDTH] IN BLOOD BY AUTOMATED COUNT: 50.6 FL (ref 35.9–50)
GFR SERPL CREATININE-BSD FRML MDRD: >60 ML/MIN/1.73 M 2
GLOBULIN SER CALC-MCNC: 3 G/DL (ref 1.9–3.5)
GLUCOSE SERPL-MCNC: 120 MG/DL (ref 65–99)
GLUCOSE UR STRIP.AUTO-MCNC: NEGATIVE MG/DL
HCO3 BLDA-SCNC: 27 MMOL/L (ref 17–25)
HCT VFR BLD AUTO: 46.8 % (ref 37–47)
HGB BLD-MCNC: 14.7 G/DL (ref 12–16)
HYALINE CASTS #/AREA URNS LPF: ABNORMAL /LPF
IMM GRANULOCYTES # BLD AUTO: 0.07 K/UL (ref 0–0.11)
IMM GRANULOCYTES NFR BLD AUTO: 0.5 % (ref 0–0.9)
KETONES UR STRIP.AUTO-MCNC: NEGATIVE MG/DL
LACTATE BLD-SCNC: 0.8 MMOL/L (ref 0.5–2)
LACTATE BLD-SCNC: 0.8 MMOL/L (ref 0.5–2)
LEUKOCYTE ESTERASE UR QL STRIP.AUTO: ABNORMAL
LYMPHOCYTES # BLD AUTO: 2.38 K/UL (ref 1–4.8)
LYMPHOCYTES NFR BLD: 17.3 % (ref 22–41)
MAGNESIUM SERPL-MCNC: 2 MG/DL (ref 1.5–2.5)
MCH RBC QN AUTO: 28.5 PG (ref 27–33)
MCHC RBC AUTO-ENTMCNC: 31.4 G/DL (ref 33.6–35)
MCV RBC AUTO: 90.9 FL (ref 81.4–97.8)
MICRO URNS: ABNORMAL
MONOCYTES # BLD AUTO: 0.96 K/UL (ref 0–0.85)
MONOCYTES NFR BLD AUTO: 7 % (ref 0–13.4)
NEUTROPHILS # BLD AUTO: 10.13 K/UL (ref 2–7.15)
NEUTROPHILS NFR BLD: 73.9 % (ref 44–72)
NITRITE UR QL STRIP.AUTO: POSITIVE
NRBC # BLD AUTO: 0.04 K/UL
NRBC BLD AUTO-RTO: 0.3 /100 WBC
PCO2 BLDA: 57.9 MMHG (ref 26–37)
PH BLDA: 7.29 [PH] (ref 7.4–7.5)
PH UR STRIP.AUTO: 6 [PH]
PLATELET # BLD AUTO: 167 K/UL (ref 164–446)
PMV BLD AUTO: 10.8 FL (ref 9–12.9)
PO2 BLDA: 79 MMHG (ref 64–87)
POTASSIUM SERPL-SCNC: 4.3 MMOL/L (ref 3.6–5.5)
PROT SERPL-MCNC: 6.7 G/DL (ref 6–8.2)
PROT UR QL STRIP: 100 MG/DL
RBC # BLD AUTO: 5.15 M/UL (ref 4.2–5.4)
RBC # URNS HPF: ABNORMAL /HPF
RBC UR QL AUTO: ABNORMAL
SAO2 % BLDA: 94.1 % (ref 93–99)
SODIUM SERPL-SCNC: 135 MMOL/L (ref 135–145)
SP GR UR STRIP.AUTO: 1.02
TROPONIN I SERPL-MCNC: 0.35 NG/ML (ref 0–0.04)
TROPONIN I SERPL-MCNC: 0.4 NG/ML (ref 0–0.04)
TSH SERPL DL<=0.005 MIU/L-ACNC: 0.83 UIU/ML (ref 0.3–3.7)
UROBILINOGEN UR STRIP.AUTO-MCNC: 4 MG/DL
WBC # BLD AUTO: 13.7 K/UL (ref 4.8–10.8)
WBC #/AREA URNS HPF: ABNORMAL /HPF

## 2017-09-30 PROCEDURE — 83735 ASSAY OF MAGNESIUM: CPT

## 2017-09-30 PROCEDURE — 94760 N-INVAS EAR/PLS OXIMETRY 1: CPT

## 2017-09-30 PROCEDURE — 84443 ASSAY THYROID STIM HORMONE: CPT

## 2017-09-30 PROCEDURE — 93010 ELECTROCARDIOGRAM REPORT: CPT | Performed by: INTERNAL MEDICINE

## 2017-09-30 PROCEDURE — 700101 HCHG RX REV CODE 250: Performed by: EMERGENCY MEDICINE

## 2017-09-30 PROCEDURE — 84484 ASSAY OF TROPONIN QUANT: CPT

## 2017-09-30 PROCEDURE — 94640 AIRWAY INHALATION TREATMENT: CPT

## 2017-09-30 PROCEDURE — 87086 URINE CULTURE/COLONY COUNT: CPT

## 2017-09-30 PROCEDURE — 81001 URINALYSIS AUTO W/SCOPE: CPT

## 2017-09-30 PROCEDURE — 304562 HCHG STAT O2 MASK/CANNULA

## 2017-09-30 PROCEDURE — 83605 ASSAY OF LACTIC ACID: CPT

## 2017-09-30 PROCEDURE — 80053 COMPREHEN METABOLIC PANEL: CPT

## 2017-09-30 PROCEDURE — 96365 THER/PROPH/DIAG IV INF INIT: CPT

## 2017-09-30 PROCEDURE — 83880 ASSAY OF NATRIURETIC PEPTIDE: CPT

## 2017-09-30 PROCEDURE — 80307 DRUG TEST PRSMV CHEM ANLYZR: CPT

## 2017-09-30 PROCEDURE — 700111 HCHG RX REV CODE 636 W/ 250 OVERRIDE (IP): Performed by: EMERGENCY MEDICINE

## 2017-09-30 PROCEDURE — 87040 BLOOD CULTURE FOR BACTERIA: CPT | Mod: 91

## 2017-09-30 PROCEDURE — 93005 ELECTROCARDIOGRAM TRACING: CPT | Performed by: INTERNAL MEDICINE

## 2017-09-30 PROCEDURE — 93005 ELECTROCARDIOGRAM TRACING: CPT | Performed by: EMERGENCY MEDICINE

## 2017-09-30 PROCEDURE — 99291 CRITICAL CARE FIRST HOUR: CPT

## 2017-09-30 PROCEDURE — 96375 TX/PRO/DX INJ NEW DRUG ADDON: CPT

## 2017-09-30 PROCEDURE — 700111 HCHG RX REV CODE 636 W/ 250 OVERRIDE (IP): Performed by: INTERNAL MEDICINE

## 2017-09-30 PROCEDURE — 85025 COMPLETE CBC W/AUTO DIFF WBC: CPT

## 2017-09-30 PROCEDURE — 770022 HCHG ROOM/CARE - ICU (200)

## 2017-09-30 PROCEDURE — 71010 DX-CHEST-PORTABLE (1 VIEW): CPT

## 2017-09-30 PROCEDURE — 82803 BLOOD GASES ANY COMBINATION: CPT

## 2017-09-30 PROCEDURE — 83036 HEMOGLOBIN GLYCOSYLATED A1C: CPT

## 2017-09-30 PROCEDURE — 304561 HCHG STAT O2

## 2017-09-30 RX ORDER — PROMETHAZINE HYDROCHLORIDE 25 MG/1
12.5-25 SUPPOSITORY RECTAL EVERY 4 HOURS PRN
Status: DISCONTINUED | OUTPATIENT
Start: 2017-09-30 | End: 2017-10-01 | Stop reason: HOSPADM

## 2017-09-30 RX ORDER — AMOXICILLIN 250 MG
2 CAPSULE ORAL 2 TIMES DAILY
Status: DISCONTINUED | OUTPATIENT
Start: 2017-09-30 | End: 2017-10-01 | Stop reason: HOSPADM

## 2017-09-30 RX ORDER — ONDANSETRON 4 MG/1
4 TABLET, ORALLY DISINTEGRATING ORAL EVERY 4 HOURS PRN
Status: DISCONTINUED | OUTPATIENT
Start: 2017-09-30 | End: 2017-10-01 | Stop reason: HOSPADM

## 2017-09-30 RX ORDER — IPRATROPIUM BROMIDE AND ALBUTEROL SULFATE 2.5; .5 MG/3ML; MG/3ML
6 SOLUTION RESPIRATORY (INHALATION)
Status: COMPLETED | OUTPATIENT
Start: 2017-09-30 | End: 2017-09-30

## 2017-09-30 RX ORDER — POLYETHYLENE GLYCOL 3350 17 G/17G
1 POWDER, FOR SOLUTION ORAL DAILY
Status: DISCONTINUED | OUTPATIENT
Start: 2017-10-01 | End: 2017-09-30

## 2017-09-30 RX ORDER — POLYETHYLENE GLYCOL 3350 17 G/17G
1 POWDER, FOR SOLUTION ORAL
Status: DISCONTINUED | OUTPATIENT
Start: 2017-09-30 | End: 2017-10-01 | Stop reason: HOSPADM

## 2017-09-30 RX ORDER — ASPIRIN 300 MG/1
300 SUPPOSITORY RECTAL DAILY
Status: DISCONTINUED | OUTPATIENT
Start: 2017-10-01 | End: 2017-10-01

## 2017-09-30 RX ORDER — ONDANSETRON 2 MG/ML
4 INJECTION INTRAMUSCULAR; INTRAVENOUS EVERY 4 HOURS PRN
Status: DISCONTINUED | OUTPATIENT
Start: 2017-09-30 | End: 2017-10-01 | Stop reason: HOSPADM

## 2017-09-30 RX ORDER — METHYLPREDNISOLONE SODIUM SUCCINATE 125 MG/2ML
125 INJECTION, POWDER, LYOPHILIZED, FOR SOLUTION INTRAMUSCULAR; INTRAVENOUS ONCE
Status: COMPLETED | OUTPATIENT
Start: 2017-09-30 | End: 2017-09-30

## 2017-09-30 RX ORDER — MORPHINE SULFATE 15 MG/1
120 TABLET, FILM COATED, EXTENDED RELEASE ORAL EVERY 12 HOURS
Status: DISCONTINUED | OUTPATIENT
Start: 2017-09-30 | End: 2017-09-30

## 2017-09-30 RX ORDER — HEPARIN SODIUM 1000 [USP'U]/ML
3200 INJECTION, SOLUTION INTRAVENOUS; SUBCUTANEOUS PRN
Status: DISCONTINUED | OUTPATIENT
Start: 2017-09-30 | End: 2017-10-01 | Stop reason: HOSPADM

## 2017-09-30 RX ORDER — CETIRIZINE HYDROCHLORIDE 10 MG/1
10 TABLET ORAL DAILY
Status: DISCONTINUED | OUTPATIENT
Start: 2017-10-01 | End: 2017-10-01 | Stop reason: HOSPADM

## 2017-09-30 RX ORDER — ATORVASTATIN CALCIUM 80 MG/1
80 TABLET, FILM COATED ORAL
Status: DISCONTINUED | OUTPATIENT
Start: 2017-09-30 | End: 2017-10-01 | Stop reason: HOSPADM

## 2017-09-30 RX ORDER — PROMETHAZINE HYDROCHLORIDE 25 MG/1
12.5-25 TABLET ORAL EVERY 4 HOURS PRN
Status: DISCONTINUED | OUTPATIENT
Start: 2017-09-30 | End: 2017-10-01 | Stop reason: HOSPADM

## 2017-09-30 RX ORDER — LABETALOL HYDROCHLORIDE 5 MG/ML
10 INJECTION, SOLUTION INTRAVENOUS EVERY 4 HOURS PRN
Status: DISCONTINUED | OUTPATIENT
Start: 2017-09-30 | End: 2017-10-01 | Stop reason: HOSPADM

## 2017-09-30 RX ORDER — FLUOXETINE HYDROCHLORIDE 20 MG/1
20 CAPSULE ORAL
Status: DISCONTINUED | OUTPATIENT
Start: 2017-09-30 | End: 2017-09-30

## 2017-09-30 RX ORDER — ALBUTEROL SULFATE 90 UG/1
2 AEROSOL, METERED RESPIRATORY (INHALATION) EVERY 6 HOURS PRN
Status: DISCONTINUED | OUTPATIENT
Start: 2017-09-30 | End: 2017-10-01 | Stop reason: HOSPADM

## 2017-09-30 RX ORDER — ASPIRIN 325 MG
325 TABLET ORAL ONCE
Status: DISCONTINUED | OUTPATIENT
Start: 2017-09-30 | End: 2017-10-01 | Stop reason: HOSPADM

## 2017-09-30 RX ORDER — HEPARIN SODIUM 1000 [USP'U]/ML
3200 INJECTION, SOLUTION INTRAVENOUS; SUBCUTANEOUS PRN
Status: DISCONTINUED | OUTPATIENT
Start: 2017-09-30 | End: 2017-09-30

## 2017-09-30 RX ORDER — OMEPRAZOLE 20 MG/1
20 CAPSULE, DELAYED RELEASE ORAL DAILY
Status: DISCONTINUED | OUTPATIENT
Start: 2017-10-01 | End: 2017-10-01 | Stop reason: HOSPADM

## 2017-09-30 RX ORDER — SODIUM CHLORIDE 9 MG/ML
30 INJECTION, SOLUTION INTRAVENOUS
Status: DISCONTINUED | OUTPATIENT
Start: 2017-09-30 | End: 2017-10-01 | Stop reason: HOSPADM

## 2017-09-30 RX ORDER — OXYCODONE HYDROCHLORIDE 30 MG/1
30 TABLET ORAL EVERY 4 HOURS PRN
Status: DISCONTINUED | OUTPATIENT
Start: 2017-09-30 | End: 2017-09-30

## 2017-09-30 RX ORDER — ASPIRIN 81 MG/1
324 TABLET, CHEWABLE ORAL DAILY
Status: DISCONTINUED | OUTPATIENT
Start: 2017-10-01 | End: 2017-10-01

## 2017-09-30 RX ORDER — MORPHINE SULFATE 60 MG/1
120 TABLET, FILM COATED, EXTENDED RELEASE ORAL EVERY 12 HOURS
Status: DISCONTINUED | OUTPATIENT
Start: 2017-09-30 | End: 2017-10-01 | Stop reason: HOSPADM

## 2017-09-30 RX ORDER — BISACODYL 10 MG
10 SUPPOSITORY, RECTAL RECTAL
Status: DISCONTINUED | OUTPATIENT
Start: 2017-09-30 | End: 2017-10-01 | Stop reason: HOSPADM

## 2017-09-30 RX ORDER — LEVOFLOXACIN 5 MG/ML
750 INJECTION, SOLUTION INTRAVENOUS EVERY 24 HOURS
Status: DISCONTINUED | OUTPATIENT
Start: 2017-09-30 | End: 2017-10-01 | Stop reason: HOSPADM

## 2017-09-30 RX ORDER — HEPARIN SODIUM 1000 [USP'U]/ML
6000 INJECTION, SOLUTION INTRAVENOUS; SUBCUTANEOUS ONCE
Status: COMPLETED | OUTPATIENT
Start: 2017-09-30 | End: 2017-09-30

## 2017-09-30 RX ORDER — METHYLPREDNISOLONE SODIUM SUCCINATE 40 MG/ML
40 INJECTION, POWDER, LYOPHILIZED, FOR SOLUTION INTRAMUSCULAR; INTRAVENOUS EVERY 6 HOURS
Status: DISCONTINUED | OUTPATIENT
Start: 2017-10-01 | End: 2017-10-01 | Stop reason: HOSPADM

## 2017-09-30 RX ORDER — LEVOTHYROXINE SODIUM 0.07 MG/1
75 TABLET ORAL
Status: DISCONTINUED | OUTPATIENT
Start: 2017-10-01 | End: 2017-10-01 | Stop reason: HOSPADM

## 2017-09-30 RX ORDER — ASPIRIN 325 MG
325 TABLET ORAL DAILY
Status: DISCONTINUED | OUTPATIENT
Start: 2017-10-01 | End: 2017-10-01

## 2017-09-30 RX ADMIN — METHYLPREDNISOLONE SODIUM SUCCINATE 125 MG: 125 INJECTION, POWDER, FOR SOLUTION INTRAMUSCULAR; INTRAVENOUS at 20:15

## 2017-09-30 RX ADMIN — LEVOFLOXACIN 750 MG: 5 INJECTION, SOLUTION INTRAVENOUS at 20:57

## 2017-09-30 RX ADMIN — HEPARIN SODIUM 1200 UNITS/HR: 5000 INJECTION, SOLUTION INTRAVENOUS at 22:57

## 2017-09-30 RX ADMIN — HEPARIN SODIUM 6000 UNITS: 1000 INJECTION, SOLUTION INTRAVENOUS; SUBCUTANEOUS at 22:55

## 2017-09-30 RX ADMIN — IPRATROPIUM BROMIDE AND ALBUTEROL SULFATE 6 ML: .5; 3 SOLUTION RESPIRATORY (INHALATION) at 19:31

## 2017-09-30 ASSESSMENT — ENCOUNTER SYMPTOMS
ABDOMINAL PAIN: 1
DOUBLE VISION: 0
MYALGIAS: 0
NAUSEA: 1
BLOOD IN STOOL: 0
WEAKNESS: 1
WEIGHT LOSS: 0
CONSTIPATION: 1
NECK PAIN: 1
CLAUDICATION: 0
PALPITATIONS: 0
NECK PAIN: 0
HEARTBURN: 0
BACK PAIN: 1
SHORTNESS OF BREATH: 1
NAUSEA: 0
HEADACHES: 1
HEMATOCHEZIA: 0
HEMATEMESIS: 0
CHILLS: 0
DIZZINESS: 0
FOCAL WEAKNESS: 0
SPUTUM PRODUCTION: 1
COUGH: 1
FEVER: 0
BLURRED VISION: 0
WHEEZING: 1
RIGHT EYE: 0
VOMITING: 0
HEMOPTYSIS: 0
FEVER: 1
LEFT EYE: 0
ORTHOPNEA: 0
TINGLING: 0
DEPRESSION: 0

## 2017-09-30 ASSESSMENT — PAIN SCALES - GENERAL: PAINLEVEL_OUTOF10: 10

## 2017-10-01 ENCOUNTER — APPOINTMENT (OUTPATIENT)
Dept: RADIOLOGY | Facility: MEDICAL CENTER | Age: 62
DRG: 280 | End: 2017-10-01
Attending: INTERNAL MEDICINE
Payer: MEDICARE

## 2017-10-01 VITALS
RESPIRATION RATE: 18 BRPM | DIASTOLIC BLOOD PRESSURE: 60 MMHG | SYSTOLIC BLOOD PRESSURE: 98 MMHG | HEART RATE: 86 BPM | OXYGEN SATURATION: 88 % | HEIGHT: 69 IN | TEMPERATURE: 98.1 F | WEIGHT: 240 LBS | BODY MASS INDEX: 35.55 KG/M2

## 2017-10-01 LAB
AMPHET UR QL SCN: NEGATIVE
APTT PPP: 51.9 SEC (ref 24.7–36)
BARBITURATES UR QL SCN: NEGATIVE
BASOPHILS # BLD AUTO: 0.3 % (ref 0–1.8)
BASOPHILS # BLD: 0.03 K/UL (ref 0–0.12)
BENZODIAZ UR QL SCN: POSITIVE
BZE UR QL SCN: NEGATIVE
CANNABINOIDS UR QL SCN: NEGATIVE
EKG IMPRESSION: NORMAL
EOSINOPHIL # BLD AUTO: 0.01 K/UL (ref 0–0.51)
EOSINOPHIL NFR BLD: 0.1 % (ref 0–6.9)
ERYTHROCYTE [DISTWIDTH] IN BLOOD BY AUTOMATED COUNT: 52.1 FL (ref 35.9–50)
EST. AVERAGE GLUCOSE BLD GHB EST-MCNC: 123 MG/DL
HBA1C MFR BLD: 5.9 % (ref 0–5.6)
HCT VFR BLD AUTO: 46.4 % (ref 37–47)
HGB BLD-MCNC: 14.6 G/DL (ref 12–16)
IMM GRANULOCYTES # BLD AUTO: 0.09 K/UL (ref 0–0.11)
IMM GRANULOCYTES NFR BLD AUTO: 0.9 % (ref 0–0.9)
LV EJECT FRACT  99904: 65
LV EJECT FRACT MOD 2C 99903: 69.88
LV EJECT FRACT MOD 4C 99902: 50.22
LV EJECT FRACT MOD BP 99901: 59.97
LYMPHOCYTES # BLD AUTO: 0.94 K/UL (ref 1–4.8)
LYMPHOCYTES NFR BLD: 8.9 % (ref 22–41)
MCH RBC QN AUTO: 28.7 PG (ref 27–33)
MCHC RBC AUTO-ENTMCNC: 31.5 G/DL (ref 33.6–35)
MCV RBC AUTO: 91.3 FL (ref 81.4–97.8)
METHADONE UR QL SCN: NEGATIVE
MONOCYTES # BLD AUTO: 0.17 K/UL (ref 0–0.85)
MONOCYTES NFR BLD AUTO: 1.6 % (ref 0–13.4)
NEUTROPHILS # BLD AUTO: 9.32 K/UL (ref 2–7.15)
NEUTROPHILS NFR BLD: 88.2 % (ref 44–72)
NRBC # BLD AUTO: 0.02 K/UL
NRBC BLD AUTO-RTO: 0.2 /100 WBC
OPIATES UR QL SCN: POSITIVE
OXYCODONE UR QL SCN: POSITIVE
PCP UR QL SCN: NEGATIVE
PLATELET # BLD AUTO: 153 K/UL (ref 164–446)
PMV BLD AUTO: 11.6 FL (ref 9–12.9)
PROPOXYPH UR QL SCN: NEGATIVE
RBC # BLD AUTO: 5.08 M/UL (ref 4.2–5.4)
WBC # BLD AUTO: 10.6 K/UL (ref 4.8–10.8)

## 2017-10-01 PROCEDURE — 94002 VENT MGMT INPAT INIT DAY: CPT

## 2017-10-01 PROCEDURE — 700111 HCHG RX REV CODE 636 W/ 250 OVERRIDE (IP): Performed by: EMERGENCY MEDICINE

## 2017-10-01 PROCEDURE — 700102 HCHG RX REV CODE 250 W/ 637 OVERRIDE(OP): Performed by: INTERNAL MEDICINE

## 2017-10-01 PROCEDURE — 93306 TTE W/DOPPLER COMPLETE: CPT

## 2017-10-01 PROCEDURE — 94640 AIRWAY INHALATION TREATMENT: CPT

## 2017-10-01 PROCEDURE — 93005 ELECTROCARDIOGRAM TRACING: CPT | Performed by: INTERNAL MEDICINE

## 2017-10-01 PROCEDURE — 85025 COMPLETE CBC W/AUTO DIFF WBC: CPT

## 2017-10-01 PROCEDURE — 93306 TTE W/DOPPLER COMPLETE: CPT | Mod: 26 | Performed by: INTERNAL MEDICINE

## 2017-10-01 PROCEDURE — 700101 HCHG RX REV CODE 250: Performed by: INTERNAL MEDICINE

## 2017-10-01 PROCEDURE — A9270 NON-COVERED ITEM OR SERVICE: HCPCS | Performed by: HOSPITALIST

## 2017-10-01 PROCEDURE — 700111 HCHG RX REV CODE 636 W/ 250 OVERRIDE (IP): Performed by: INTERNAL MEDICINE

## 2017-10-01 PROCEDURE — 85730 THROMBOPLASTIN TIME PARTIAL: CPT

## 2017-10-01 PROCEDURE — 93010 ELECTROCARDIOGRAM REPORT: CPT | Mod: 76 | Performed by: INTERNAL MEDICINE

## 2017-10-01 PROCEDURE — 700102 HCHG RX REV CODE 250 W/ 637 OVERRIDE(OP): Performed by: HOSPITALIST

## 2017-10-01 PROCEDURE — A9270 NON-COVERED ITEM OR SERVICE: HCPCS | Performed by: INTERNAL MEDICINE

## 2017-10-01 RX ORDER — IPRATROPIUM BROMIDE AND ALBUTEROL SULFATE 2.5; .5 MG/3ML; MG/3ML
3 SOLUTION RESPIRATORY (INHALATION)
Status: DISCONTINUED | OUTPATIENT
Start: 2017-10-01 | End: 2017-10-01 | Stop reason: HOSPADM

## 2017-10-01 RX ORDER — MONTELUKAST SODIUM 10 MG/1
10 TABLET ORAL 2 TIMES DAILY
Status: DISCONTINUED | OUTPATIENT
Start: 2017-10-01 | End: 2017-10-01 | Stop reason: HOSPADM

## 2017-10-01 RX ORDER — MORPHINE SULFATE 15 MG/1
120 TABLET ORAL ONCE
Status: DISCONTINUED | OUTPATIENT
Start: 2017-10-01 | End: 2017-10-01

## 2017-10-01 RX ORDER — FUROSEMIDE 10 MG/ML
20 INJECTION INTRAMUSCULAR; INTRAVENOUS
Status: DISCONTINUED | OUTPATIENT
Start: 2017-10-01 | End: 2017-10-01 | Stop reason: HOSPADM

## 2017-10-01 RX ORDER — IPRATROPIUM BROMIDE AND ALBUTEROL SULFATE 2.5; .5 MG/3ML; MG/3ML
3 SOLUTION RESPIRATORY (INHALATION)
Status: DISCONTINUED | OUTPATIENT
Start: 2017-10-01 | End: 2017-10-01

## 2017-10-01 RX ORDER — FUROSEMIDE 10 MG/ML
20 INJECTION INTRAMUSCULAR; INTRAVENOUS ONCE
Status: COMPLETED | OUTPATIENT
Start: 2017-10-01 | End: 2017-10-01

## 2017-10-01 RX ORDER — CLOPIDOGREL BISULFATE 75 MG/1
75 TABLET ORAL DAILY
Status: DISCONTINUED | OUTPATIENT
Start: 2017-10-01 | End: 2017-10-01 | Stop reason: HOSPADM

## 2017-10-01 RX ORDER — LORAZEPAM 1 MG/1
0.5 TABLET ORAL EVERY 4 HOURS PRN
Status: DISCONTINUED | OUTPATIENT
Start: 2017-10-01 | End: 2017-10-01 | Stop reason: HOSPADM

## 2017-10-01 RX ORDER — OXYCODONE HYDROCHLORIDE 5 MG/1
5 TABLET ORAL EVERY 4 HOURS PRN
Status: DISCONTINUED | OUTPATIENT
Start: 2017-10-01 | End: 2017-10-01 | Stop reason: HOSPADM

## 2017-10-01 RX ADMIN — IPRATROPIUM BROMIDE AND ALBUTEROL SULFATE 3 ML: .5; 3 SOLUTION RESPIRATORY (INHALATION) at 08:36

## 2017-10-01 RX ADMIN — CLOPIDOGREL 75 MG: 75 TABLET, FILM COATED ORAL at 09:35

## 2017-10-01 RX ADMIN — METHYLPREDNISOLONE SODIUM SUCCINATE 40 MG: 40 INJECTION, POWDER, FOR SOLUTION INTRAMUSCULAR; INTRAVENOUS at 02:28

## 2017-10-01 RX ADMIN — IPRATROPIUM BROMIDE AND ALBUTEROL SULFATE 3 ML: .5; 3 SOLUTION RESPIRATORY (INHALATION) at 02:19

## 2017-10-01 RX ADMIN — IPRATROPIUM BROMIDE AND ALBUTEROL SULFATE 3 ML: .5; 3 SOLUTION RESPIRATORY (INHALATION) at 12:06

## 2017-10-01 RX ADMIN — OXYCODONE HYDROCHLORIDE 5 MG: 5 TABLET ORAL at 05:05

## 2017-10-01 RX ADMIN — MONTELUKAST SODIUM 10 MG: 10 TABLET, FILM COATED ORAL at 09:34

## 2017-10-01 RX ADMIN — HEPARIN SODIUM 3200 UNITS: 1000 INJECTION, SOLUTION INTRAVENOUS; SUBCUTANEOUS at 06:59

## 2017-10-01 RX ADMIN — METHYLPREDNISOLONE SODIUM SUCCINATE 40 MG: 40 INJECTION, POWDER, FOR SOLUTION INTRAMUSCULAR; INTRAVENOUS at 05:08

## 2017-10-01 RX ADMIN — OMEPRAZOLE 20 MG: 20 CAPSULE, DELAYED RELEASE ORAL at 09:34

## 2017-10-01 RX ADMIN — LEVOTHYROXINE SODIUM 75 MCG: 75 TABLET ORAL at 09:34

## 2017-10-01 RX ADMIN — LEVOFLOXACIN 750 MG: 5 INJECTION, SOLUTION INTRAVENOUS at 09:35

## 2017-10-01 RX ADMIN — LORAZEPAM 0.5 MG: 1 TABLET ORAL at 05:04

## 2017-10-01 RX ADMIN — MORPHINE SULFATE 60 MG: 60 TABLET, EXTENDED RELEASE ORAL at 07:48

## 2017-10-01 RX ADMIN — FUROSEMIDE 20 MG: 10 INJECTION, SOLUTION INTRAMUSCULAR; INTRAVENOUS at 02:28

## 2017-10-01 RX ADMIN — FUROSEMIDE 20 MG: 10 INJECTION, SOLUTION INTRAVENOUS at 09:35

## 2017-10-01 RX ADMIN — METHYLPREDNISOLONE SODIUM SUCCINATE 40 MG: 40 INJECTION, POWDER, FOR SOLUTION INTRAMUSCULAR; INTRAVENOUS at 12:05

## 2017-10-01 ASSESSMENT — PAIN SCALES - GENERAL
PAINLEVEL_OUTOF10: 2
PAINLEVEL_OUTOF10: 10
PAINLEVEL_OUTOF10: 8
PAINLEVEL_OUTOF10: 10
PAINLEVEL_OUTOF10: 2

## 2017-10-01 ASSESSMENT — ENCOUNTER SYMPTOMS
HEADACHES: 1
HALLUCINATIONS: 1
ABDOMINAL PAIN: 1
MYALGIAS: 1

## 2017-10-01 ASSESSMENT — PULMONARY FUNCTION TESTS
EPAP_CMH2O: 6
EPAP_CMH2O: 6

## 2017-10-01 NOTE — PROGRESS NOTES
Date of Service: 9/30/2017    [facility] Gold Team Attending Note  (see full Resident note dictated in EPIC)    Assessment:  Acute Hypoxic Hypercarbic Respiratory Failure   Encephalopathy   Urinary Tract Infection  Leukocytosis with Left shift  Elevated Transaminases  Elevated Troponin  Elevated BNP  U tox + for benzo, opiate, oxycodone  Morbid Obesity  Chronic Pain on numerous opiates  Chronic Hypothyroidism on replacement    Plan:  Bipap - if deterioration in resp status or mental status will need intubation  Echo  Serial trop  Hep gtt per cardiology  Empiric abx for uti + possible pulmonary source  Blood/Urine/Sputum Cultures  Procalcitonin   Minimize narcotics until patient more awake      I have seen and examined the patient today.  I have reviewed the medical record, laboratory data, imaging and all relevant studies.  I have discussed the plan of care with the Internal Medicine Resident and agree with the note and plan as documented.         Total critical care time, not including billable procedures 40 minutes.

## 2017-10-01 NOTE — ED NOTES
Med rec completed per pt at bedside  Allergies reviewed  Pt finished a 14 day course of Bactrim on 9/27/17

## 2017-10-01 NOTE — PROGRESS NOTES
Cardiology Progress Note               Author: Mariusz Fragoso Date & Time created: 10/1/2017  9:35 AM     ID: 62-year-old woman admitted with a COPD exacerbation found to have deep symmetric anterior lateral T-wave inversion and a mild troponin elevation consistent with likely obstructive coronary artery disease with a probable proximal LAD lesion. She is allergic to aspirin and his many other medications.    Interval History:   -No acute event since having been moved to the ICU    Review of Systems   Cardiovascular: Positive for chest pain.   Neurological: Positive for headaches.   Psychiatric/Behavioral: Positive for hallucinations.   All other systems reviewed and are negative.      Physical Exam   Constitutional: No distress.   HENT:   Head: Normocephalic and atraumatic.   Eyes: EOM are normal. Pupils are equal, round, and reactive to light.   Neck: No JVD present. No tracheal deviation present.   Cardiovascular: Normal rate and regular rhythm.  Exam reveals no gallop and no friction rub.    No murmur heard.  Pulmonary/Chest: Effort normal. No stridor. No respiratory distress. She has wheezes. She has no rales. She exhibits no tenderness.   Abdominal: Soft. Bowel sounds are normal. She exhibits no distension.   Musculoskeletal: She exhibits edema (1+ bilateral lower extremity edema).   Neurological: She is alert.   Response to questions, and difficult to interview, tangential   Skin: Skin is warm and dry. No rash noted. She is not diaphoretic. No erythema.   Psychiatric:   Tangential thinking   Nursing note and vitals reviewed.      Hemodynamics:  Temp (24hrs), Av.3 °C (97.4 °F), Min:36 °C (96.8 °F), Max:36.8 °C (98.2 °F)  Temperature: 36.7 °C (98 °F)  Pulse  Av.1  Min: 60  Max: 80Heart Rate (Monitored): 67  Blood Pressure: (!) 98/60, NIBP: (!) 98/48     Respiratory:    Respiration: 20, Pulse Oximetry: 95 %, O2 Daily Delivery Respiratory :  (BIPAP)     Given By:: Mask, Work Of Breathing / Effort:  Moderate  RUL Breath Sounds: Rhonchi, RML Breath Sounds: Rhonchi, RLL Breath Sounds: Rhonchi, CHAYO Breath Sounds: Rhonchi, LLL Breath Sounds: Rhonchi  Fluids:     Weight: 108.9 kg (240 lb)  GI/Nutrition:  Orders Placed This Encounter   Procedures   • Diet Order     Standing Status:   Standing     Number of Occurrences:   1     Order Specific Question:   Diet:     Answer:   Cardiac [6]     Order Specific Question:   Electrolyte modifications:     Answer:   1.5 g Sodium [1]     Lab Results:  Recent Labs      09/30/17   1915  10/01/17   0518   WBC  13.7*  10.6   RBC  5.15  5.08   HEMOGLOBIN  14.7  14.6   HEMATOCRIT  46.8  46.4   MCV  90.9  91.3   MCH  28.5  28.7   MCHC  31.4*  31.5*   RDW  50.6*  52.1*   PLATELETCT  167  153*   MPV  10.8  11.6     Recent Labs      09/30/17 1915   SODIUM  135   POTASSIUM  4.3   CHLORIDE  103   CO2  24   GLUCOSE  120*   BUN  11   CREATININE  0.82   CALCIUM  8.9     Recent Labs      10/01/17   0518   APTT  51.9*     Recent Labs      09/30/17 1915   BNPBTYPENAT  608*     Recent Labs      09/30/17 1915 09/30/17   2231   TROPONINI  0.40*  0.35*   BNPBTYPENAT  608*   --              Medical Decision Making, by Problem:  Active Hospital Problems    Diagnosis   • *SOB (shortness of breath) [R06.02]   • COPD exacerbation (CMS-HCC) [J44.1]   • NSTEMI (non-ST elevated myocardial infarction) (CMS-HCC) [I21.4]   • Acute exacerbation of congestive heart failure (CMS-HCC) [I50.9]   • UTI (urinary tract infection) [N39.0]   • Hypothyroidism [E03.9]   • Chronic low back pain [M54.5, G89.29]       Plan:    Non-ST elevation myocardial infarction: Given the trend of her troponins I cannot be sure that this is not demand ischemia versus a plaque rupture event. However, I do think that she very likely has an unstable proximal LAD lesion and should be treated as if she is having a plaque rupture event.   - Continue heparin drip for 48 hours    - Continue atorvastatin, the importance of this medication  was discussed with the patient   - Desensitization to aspirin initiated with Singulair 10 mg by mouth twice a day, and plans for aspirin to be given tomorrow morning at 8 AM, 40.5 mg, then 11 AM 81 mg, then at 2  mg. Desensitization therapy was discussed with pharmacy and I greatly appreciate their assistance   - Plavix held with concern for multivessel disease and given normal left ventricular ejection fraction in stable troponins with a low level elevation   - Invasive strategy likely deferred again in the setting of normal left ventricular ejection fraction, psychiatric disease, and high likelihood of nonadherence to medical therapy, we will continue to evaluate that   - Beta blocker contraindicated in the setting of lung disease   - ACE inhibitor relatively contraindicated in the setting of low blood pressures      Reviewed items::  EKG reviewed, Radiology images reviewed, Labs reviewed and Medications reviewed      Mariusz Fragoso MD  Cardiologist, [facility]

## 2017-10-01 NOTE — ASSESSMENT & PLAN NOTE
- SOB  - BNP > 600  - Crackles   - Pulmonary infiltrates on CXR  - Echo report awaited  - Lasix 40 mg IV daily  - Daily weight   - I/O  - No BB and ACEI at this time

## 2017-10-01 NOTE — DISCHARGE PLANNING
Medical Social Work    Referral: social work consult    Intervention: SW attempted to meet with pt at bedside to gather more information about pt's living arrangements etc, and she was not willing to speak with SW at this time.    Plan: Unit SW to follow up with social work consult to assess pt's living situation and substance use hx.

## 2017-10-01 NOTE — CARE PLAN
Problem: Respiratory:  Goal: Respiratory status will improve  Collaborated with RT and PMA when discussing POC for the shift, Pt switched from oxymask to BiPAP, mental status and ability to remove mask assessed.     Problem: Fluid Volume:  Goal: Will maintain balanced intake and output  Calles catheter inserted to assess and accurately document strict I&Os for sepsis patient, fluid status addressed and diuresis performed during this shift.      Problem: Skin Integrity  Goal: Risk for impaired skin integrity will decrease  Sores on pt extremities and moisture fissures documented and photographed, wound consult has been ordered.

## 2017-10-01 NOTE — ED NOTES
Lab called reporting PH of 7.29 and CO2 of 55.4. Cardiologist Dr. Moseley at BS. Informed of results.
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
## 2017-10-01 NOTE — ASSESSMENT & PLAN NOTE
- contributed by COPD exacerbation, acute on chronic CHF and pneumonia.   - At this time less likely the patient has PE, but we will consider that if there is no improvement in her symptoms, however we started with heparin drip for NSTEMI  - was on BiPAP overnight  - now on 12 Lts of oxygen via mask  - on Methyl prednisolone, inhalers, Levofloxacin, Lasix

## 2017-10-01 NOTE — PROGRESS NOTES
Pt more AOx4 with stimulation, RASS -2, Remains on Bipap, able to demonstrate how to remove mask when prompted. PMA aware.

## 2017-10-01 NOTE — H&P
[facility] Admitting History and Physical    Name Ashleigh Garces 1955   Age/Sex 62 y.o. female   MRN [id number]   Code Status FULL     After 5PM or if no immediate response to page, please call for cross-coverage  Attending/Team: Gold team  Call [phone] to page   1st Call - Day Intern (R1):    2nd Call - Day Sr. Resident (R2/R3):          Chief Complaint:  SOB and weakness     HPI:  62 year old female  with hx of hypothyroidism and chronic low back pain, came to the hospital due to weakness and SOB.  The patient is weak and is no able to give us full story, but she states she has been sick and weak more than 1 month with coughing and SOB and chest pain when she is waling or any activities, today we found sat 72% RA.   The patient has hx of chronic pain and she is on dilaudid and Morphine by mouth, she does not use O2 at home but uses inhalers  and she states her Temp was 104 at home, the patient smokes around 1 Pack a day for years and no drinking or drugs and she lives alone, walking with railing and no recent fall.     In the ED:   The patient is very lethargic and weak, Sat 98% with mask O2 15 L and she deneis any chest pain at this time.   Chest X ray infiltration on both sides with crackles and wheezing and EKG showed T inversion with trop 0.4. The patient was evaluated by cardiologist and heparin drip was started, the patient will be admitted to the ICU for NSTEMI, CHF, and COPD exacerbation due to pneumonia.         Review of Systems   Constitutional: Positive for malaise/fatigue. Negative for chills, fever and weight loss.   HENT: Negative for ear pain, hearing loss and tinnitus.    Eyes: Negative for blurred vision and double vision.   Respiratory: Positive for cough, sputum production, shortness of breath and wheezing. Negative for hemoptysis.    Cardiovascular: Positive for chest pain. Negative for palpitations, orthopnea, claudication and leg swelling.    Gastrointestinal: Positive for constipation. Negative for blood in stool, heartburn, melena, nausea and vomiting.   Genitourinary: Positive for dysuria, frequency and urgency.   Musculoskeletal: Negative for myalgias and neck pain.   Skin: Negative for rash.   Neurological: Positive for weakness and headaches. Negative for dizziness and tingling.   Psychiatric/Behavioral: Negative for depression.             Past Medical History:   Past Medical History:   Diagnosis Date   • Arthritis    • ASTHMA    • Bipolar disorder (CMS-[facility])    • Bursitis    • Chronic low back pain    • Congestive heart failure (CMS-[facility])    • Dyslipidemia    • Fall     last fall a month ago   • GERD (gastroesophageal reflux disease)    • Hypothyroid    • Migraine    • Obesity    • Sinusitis; chronic    • Unspecified urinary incontinence        Past Surgical History:  Past Surgical History:   Procedure Laterality Date   • ABDOMINAL HYSTERECTOMY TOTAL      age 19's    • APPENDECTOMY     • CHOLECYSTECTOMY     • GYN SURGERY     • OTHER ABDOMINAL SURGERY     • OTHER ORTHOPEDIC SURGERY      back surgery       Current Outpatient Medications:  Home Medications     Reviewed by Daniel Aparicio (Pharmacy Tech) on 09/30/17 at 2145  Med List Status: Complete   Medication Last Dose Status   alprazolam (XANAX) 1 MG Tab 9/30/2017 Active   estradiol (ESTRACE) 0.5 MG tablet 9/30/2017 Active   furosemide (LASIX) 20 MG Tab 9/30/2017 Active   HYDROmorphone (DILAUDID) 4 MG Tab 9/30/2017 Active   morphine SR (MS CONTIN) 60 MG TBCR tablet 9/30/2017 Active   omeprazole (PRILOSEC) 20 MG delayed-release capsule 9/30/2017 Active   sulfamethoxazole-trimethoprim (BACTRIM DS) 800-160 MG tablet 9/27/2017 Active   SYNTHROID 75 MCG Tab 9/30/2017 Active                Medication Allergy/Sensitivities:  Allergies   Allergen Reactions   • Contrast Media With Iodine [Iodine] Anaphylaxis   • Naproxen    • Asa [Aspirin]    • Cephalosporins    • Compazine    • Doxycycline    •  "Macrolides    • Phenothiazines    • Sympathomimetics    • Toradol Anaphylaxis         Family History:  Family History   Problem Relation Age of Onset   • Cancer Mother      unknown   • Hypertension Father    • Diabetes Father    • Stroke Father    • Diabetes Paternal Grandmother        Social History:  Social History     Social History   • Marital status: Single     Spouse name: N/A   • Number of children: N/A   • Years of education: N/A     Occupational History   • Not on file.     Social History Main Topics   • Smoking status: Current Every Day Smoker     Packs/day: 0.50     Years: 30.00     Types: Cigarettes   • Smokeless tobacco: Never Used      Comment: 1/2 pack per day   • Alcohol use No   • Drug use: No   • Sexual activity: Not on file     Other Topics Concern   • Not on file     Social History Narrative    ** Merged History Encounter **          Living situation: alone  PCP : Sudha Lam M.D.        Physical Exam     Vitals:    09/30/17 2300 09/30/17 2315 10/01/17 0000 10/01/17 0043   BP:       Pulse: 70 71 65 60   Resp:   19 18   Temp:  36 °C (96.8 °F) 36 °C (96.8 °F)    SpO2: 94% 94% 93% 96%   Weight:       Height:         Body mass index is 35.44 kg/m².  BP (!) 98/60   Pulse 60   Temp 36 °C (96.8 °F)   Resp 18   Ht 1.753 m (5' 9\")   Wt 108.9 kg (240 lb)   LMP 01/01/1988   SpO2 96%   BMI 35.44 kg/m²   O2 therapy: Pulse Oximetry: 96 %, O2 (LPM): 15, FIO2%: 60, O2 Delivery: BIPAP    Physical Exam   Constitutional: She is oriented to person, place, and time. She appears distressed.   Neck: Normal range of motion. No JVD present. No tracheal deviation present.   Cardiovascular: Normal rate.    No murmur heard.  Pulmonary/Chest: She has wheezes. She has rales.   Abdominal: Soft. She exhibits no distension. There is no tenderness. There is no rebound.   Musculoskeletal: She exhibits no edema.   Neurological: She is alert and oriented to person, place, and time. No cranial nerve deficit. Coordination " normal.   Skin: Skin is warm. No erythema.             Data Review       Old Records Request:   Completed  Current Records review and summary: Completed    Lab Data Review:  Recent Results (from the past 24 hour(s))   Lactic acid (lactate)    Collection Time: 09/30/17  7:15 PM   Result Value Ref Range    Lactic Acid 0.8 0.5 - 2.0 mmol/L   CBC WITH DIFFERENTIAL    Collection Time: 09/30/17  7:15 PM   Result Value Ref Range    WBC 13.7 (H) 4.8 - 10.8 K/uL    RBC 5.15 4.20 - 5.40 M/uL    Hemoglobin 14.7 12.0 - 16.0 g/dL    Hematocrit 46.8 37.0 - 47.0 %    MCV 90.9 81.4 - 97.8 fL    MCH 28.5 27.0 - 33.0 pg    MCHC 31.4 (L) 33.6 - 35.0 g/dL    RDW 50.6 (H) 35.9 - 50.0 fL    Platelet Count 167 164 - 446 K/uL    MPV 10.8 9.0 - 12.9 fL    Neutrophils-Polys 73.90 (H) 44.00 - 72.00 %    Lymphocytes 17.30 (L) 22.00 - 41.00 %    Monocytes 7.00 0.00 - 13.40 %    Eosinophils 0.90 0.00 - 6.90 %    Basophils 0.40 0.00 - 1.80 %    Immature Granulocytes 0.50 0.00 - 0.90 %    Nucleated RBC 0.30 /100 WBC    Neutrophils (Absolute) 10.13 (H) 2.00 - 7.15 K/uL    Lymphs (Absolute) 2.38 1.00 - 4.80 K/uL    Monos (Absolute) 0.96 (H) 0.00 - 0.85 K/uL    Eos (Absolute) 0.13 0.00 - 0.51 K/uL    Baso (Absolute) 0.05 0.00 - 0.12 K/uL    Immature Granulocytes (abs) 0.07 0.00 - 0.11 K/uL    NRBC (Absolute) 0.04 K/uL   COMP METABOLIC PANEL    Collection Time: 09/30/17  7:15 PM   Result Value Ref Range    Sodium 135 135 - 145 mmol/L    Potassium 4.3 3.6 - 5.5 mmol/L    Chloride 103 96 - 112 mmol/L    Co2 24 20 - 33 mmol/L    Anion Gap 8.0 0.0 - 11.9    Glucose 120 (H) 65 - 99 mg/dL    Bun 11 8 - 22 mg/dL    Creatinine 0.82 0.50 - 1.40 mg/dL    Calcium 8.9 8.5 - 10.5 mg/dL    AST(SGOT) 73 (H) 12 - 45 U/L    ALT(SGPT) 54 (H) 2 - 50 U/L    Alkaline Phosphatase 87 30 - 99 U/L    Total Bilirubin 0.9 0.1 - 1.5 mg/dL    Albumin 3.7 3.2 - 4.9 g/dL    Total Protein 6.7 6.0 - 8.2 g/dL    Globulin 3.0 1.9 - 3.5 g/dL    A-G Ratio 1.2 g/dL   Btype Natriuretic  Peptide    Collection Time: 17  7:15 PM   Result Value Ref Range    B Natriuretic Peptide 608 (H) 0 - 100 pg/mL   Troponin STAT    Collection Time: 17  7:15 PM   Result Value Ref Range    Troponin I 0.40 (H) 0.00 - 0.04 ng/mL   ESTIMATED GFR    Collection Time: 17  7:15 PM   Result Value Ref Range    GFR If African American >60 >60 mL/min/1.73 m 2    GFR If Non African American >60 >60 mL/min/1.73 m 2   TSH (Thyroid Stimulating Hormone)    Collection Time: 17  7:15 PM   Result Value Ref Range    TSH 0.830 0.300 - 3.700 uIU/mL   MAGNESIUM    Collection Time: 17  7:15 PM   Result Value Ref Range    Magnesium 2.0 1.5 - 2.5 mg/dL   EKG (NOW)    Collection Time: 17  8:07 PM   Result Value Ref Range    Report       [facility] Emergency Dept.    Test Date:  2017  Pt Name:    JESSIKA MORALES            Department: ER  MRN:        [id number]                      Room:       [facility]  Gender:     F                            Technician: [id number]  :        1955                   Requested By:SID MESSINA  Order #:    [id number]                    Reading MD:    Measurements  Intervals                                Axis  Rate:       71                           P:          41  ND:         168                          QRS:        63  QRSD:       82                           T:          -72  QT:         456  QTc:        496    Interpretive Statements  SINUS RHYTHM  ABNORMAL T, PROBABLE ISCHEMIA, WIDESPREAD  BORDERLINE PROLONGED QT INTERVAL  Compared to ECG 2015 08:32:08  Possible ischemia now present  T-wave abnormality still present     ABG - LAB    Collection Time: 17 10:31 PM   Result Value Ref Range    Ph 7.29 (LL) 7.40 - 7.50    Pco2 57.9 (HH) 26.0 - 37.0 mmHg    Po2 79.0 64.0 - 87.0 mmHg    O2 Saturation 94.1 93.0 - 99.0 %    Hco3 27 (H) 17 - 25 mmol/L    Base Excess -1 -4 - 3 mmol/L    Body Temp see below Centigrade   Troponin - STAT Once     Collection Time: 17 10:31 PM   Result Value Ref Range    Troponin I 0.35 (H) 0.00 - 0.04 ng/mL   LACTIC ACID    Collection Time: 17 10:31 PM   Result Value Ref Range    Lactic Acid 0.8 0.5 - 2.0 mmol/L   URINALYSIS    Collection Time: 17 11:36 PM   Result Value Ref Range    Micro Urine Req Microscopic     Color Marita     Character Sl Cloudy (A)     Specific Gravity 1.025 <1.035    Ph 6.0 5.0 - 8.0    Glucose Negative Negative mg/dL    Ketones Negative Negative mg/dL    Protein 100 (A) Negative mg/dL    Bilirubin Small (A) Negative    Urobilinogen, Urine 4.0 Negative    Nitrite Positive (A) Negative    Leukocyte Esterase Large (A) Negative    Occult Blood Trace (A) Negative   URINE DRUG SCREEN    Collection Time: 17 11:36 PM   Result Value Ref Range    Amphetamines Urine Negative Negative    Barbiturates Negative Negative    Benzodiazepines Positive (A) Negative    Cocaine Metabolite Negative Negative    Methadone Negative Negative    Opiates Positive (A) Negative    Oxycodone Positive (A) Negative    Phencyclidine -Pcp Negative Negative    Propoxyphene Negative Negative    Cannabinoid Metab Negative Negative   URINE MICROSCOPIC (W/UA)    Collection Time: 17 11:36 PM   Result Value Ref Range    -150 (A) /hpf    RBC 5-10 (A) /hpf    Bacteria Many (A) None /hpf    Epithelial Cells Few /hpf    Hyaline Cast 0-2 /lpf   EKG    Collection Time: 17 11:47 PM   Result Value Ref Range    Report       [facility]    Test Date:  2017  Pt Name:    JESSIKA MORALES            Department:   MRN:        [id number]                      Room:       09  Gender:     F                            Technician: SAMMIE  :        1955                   Requested By:JUVENTINO ALVES  Order #:    [id number]                    Dixon MD:    Measurements  Intervals                                Axis  Rate:       68                           P:          39  RI:         176                           QRS:        43  QRSD:       94                           T:          -29  QT:         472  QTc:        503    Interpretive Statements  SINUS RHYTHM  LATE PRECORDIAL R/S TRANSITION  ABNORMAL T, PROBABLE ISCHEMIA, WIDESPREAD  BORDERLINE PROLONGED QT INTERVAL  Compared to ECG 2017 20:07:09  No significant changes     EKG in four (4) hours    Collection Time: 10/01/17  1:25 AM   Result Value Ref Range    Report       [facility]    Test Date:  2017-10-01  Pt Name:    JESSIKA MORALES            Department: 161  MRN:        [id number]                      Room:       [facility]  Gender:     F                            Technician: SAMMIE  :        1955                   Requested By:JUVENTINO ALVES  Order #:    [id number]                    Reading MD:    Measurements  Intervals                                Axis  Rate:       64                           P:          36  OH:         176                          QRS:        45  QRSD:       94                           T:          -19  QT:         484  QTc:        500    Interpretive Statements  SINUS RHYTHM  LATE PRECORDIAL R/S TRANSITION  ABNORMAL T, PROBABLE ISCHEMIA, ANT-LAT LEADS  BORDERLINE PROLONGED QT INTERVAL  Compared to ECG 2017 23:47:03  No significant changes         Imaging/Procedures Review:    ndependant Imaging Review: Completed  DX-CHEST-PORTABLE (1 VIEW)   Final Result      Interstitial and alveolar airspace opacities may be related to edema or multifocal pneumonia.      Trace pleural effusions not excluded.      Cardiomegaly.      Mild elevation of the right hemidiaphragm.      ECHOCARDIOGRAM COMP W/O CONT    (Results Pending)   DX-CHEST-2 VIEWS    (Results Pending)            EKG:   EKG Independant Review: Completed  QTc:496, HR: 71, Normal Sinus Rhythm, ST/T changes    (X) Records reviewed and summarized in current documentation             Assessment/Plan     * SOB (shortness of breath)- (present on admission)    Assessment & Plan    contribuated to COPD, CHF and pneumonia.   At this time less likely the patient has PE, but we will consider that if there is no improvement in her symptoms, however we started with heparin drip for NSTEMI        Acute exacerbation of congestive heart failure (CMS-HCC)- (present on admission)   Assessment & Plan    SOB.   Crackles and infiltration on the x ray.  BNP: 600  Echo is pending  Lasix 40 mg IV daily.   Daily weight and I/O  No BB and ACEI at this time.           NSTEMI (non-ST elevated myocardial infarction) (CMS-HCC)- (present on admission)   Assessment & Plan    No hx of CAD.  Chest pain on exercises.   T inversion and trop: 0.4  We will start with heparin drip.  atorvastatin and ASA.  Echo and cardiology consult.   Trend trop and EKG.   Due to heart failure and crackles we will not start with BB or ACEI at this time.            COPD exacerbation (CMS-HCC)- (present on admission)   Assessment & Plan    Hx of smoking for 30 years.  On inhalers but no O2 at home.   Sat 90% on 15L mask.   Lactic acid 2  Co2: 54  Wheezing and crackles and fever and leukocytosis.   COPD exacerbation by pneumonia.  Solumedrol, albuterol and levo.   O2 therapy and CPAP.  ICU admission.  The patient is full code and accepts intubation if needed.             UTI (urinary tract infection)- (present on admission)   Assessment & Plan    UA: nitrite positive and high WBC  Lower abd pain and dysuria.   SIRS: 1/4 (leukocutosis) and qSOFA 0/3  On levofloxacin.  Urine and blood culture.           Hypothyroidism- (present on admission)   Assessment & Plan    Recheck TSH and continue home med levothyroxine 75.   Last TSH was normal.            Chronic low back pain- (present on admission)   Assessment & Plan    On morphine  mg BID and dilaudid and xanax.   UDS: pos for opioid and Benzo.   continue morphine and d/c dilaudid and xanax and monitor her mental status.   Try to use other methods to control her pain  and try to decrease morphine dose.             Anticipated Hospital stay:  >2 midnights        Quality Measures    Reviewed items::  EKG reviewed, Labs reviewed, Medications reviewed and Radiology images reviewed  Calles catheter::  No Calles  DVT prophylaxis pharmacological::  Heparin

## 2017-10-01 NOTE — PROGRESS NOTES
Pulmonary Critical Care Progress Note        DOS:  10/1/2017    History of Present Illness:   62 year old female  with hx of hypothyroidism and chronic low back pain, came to the hospital due to weakness and SOB.  The patient is weak and is no able to give us full story, but she states she has been sick and weak more than 1 month with coughing and SOB and chest pain when she is waling or any activities, today we found sat 72% RA. The patient has hx of chronic pain and she is on dilaudid and Morphine by mouth, she does not use O2 at home but uses inhalers  and she states her Temp was 104 at home, the patient smokes around 1 Pack a day for years and no drinking or drugs and she lives alone, walking with railing and no recent fall.  In the ED: The patient is very lethargic and weak, Sat 98% with mask O2 15 L and she deneis any chest pain at this time. Chest X ray infiltration on both sides with crackles and wheezing and EKG showed T inversion with trop 0.4. The patient was evaluated by cardiologist and heparin drip was started, the patient will be admitted to the ICU for NSTEMI, CHF, and COPD exacerbation due to pneumonia.     ROS:    Respiratory: positive cough, negative pleuritic chest pain, positive shortness of breath, negative sputum production and positive wheezing,   Cardiac: negative chest pain, negative palpitations, positive orthopnea and positive PND,   GI: negative nausea, negative vomiting and negative abdominal pain.      Interval Events:  24 hour interval history reviewed     Alert and follows now  Anxious  Refused Pain meds 120 mg MS - took 60 -> now wants more  Trop   Heparin per Cards - 1450  ECHO noted  SB/SR - SBp 90-110s  CXR - now film today  BiPAP 14/6 - 60%  OxyMask 12 lpm  Refusing ABGs/coag studies  Calles  Afebrile    Later in day patient screaming to leave - family - sister and grand-daughter along wht residients, RN and myself tried to reason with her to stay and complete therapy    Willing  to stay for pain meds - had refused pain meds in the AM????  Yanked off oxygen and refused to wear it    Very high risk for M&M if she left - reviewed at length with patient and family  She had capacity to make her own decisions    PFSH:  No change.    Respiratory:     Pulse Oximetry: 95 %  BiPAP last night - off now          Exam: labored breathing, mild   Yelling in full sentences   Diminished BS throughout - few bilateral exp wheezes and rales  ImagingAvailable data reviewed   Recent Labs      09/30/17   2231   [id number]  7.29*   [id number]  57.9*   [id number]  79.0   [id number]  94.1   ARTHCO3  27*   ARTBE  -1       HemoDynamics:  Pulse: 60, Heart Rate (Monitored): (!) 59  Blood Pressure: (!) 98/60, NIBP: (!) 98/48       Exam: regular rhythm (Sinus), no ectopy, sinus bradycardia, 2+ edema, ok pulses/cap refill  Imaging: Available data reviewed     ECHO 10/1  CONCLUSIONS  Normal left ventricular size, wall thickness, and systolic function.  Left ventricular ejection fraction is visually estimated to be 65%.  Normal regional wall motion.  Structurally normal aortic valve without significant stenosis or regurgitation.  Trace tricuspid regurgitation.  Estimated right ventricular systolic pressure  is 30 mmHg.  Vena cava is not well visualized.  Mildly dilated right ventricle.  Normal right ventricular systolic function.    Recent Labs      09/30/17 1915 09/30/17 2231   TROPONINI  0.40*  0.35*   BNPBTYPENAT  608*   --        Neuro:  GCS Total Manson Coma Score: 14       Exam: no focal deficits noted mental status intact, agitated  Imaging: Available data reviewed    Fluids:  Intake/Output       09/29/17 0700 - 09/30/17 0659 (Not Admitted) 09/30/17 0700 - 10/01/17 0659 10/01/17 0700 - 10/02/17 0659      [id number] 3136-2976 Total 7018-6366 2842-9600 Total 6088-7905 1685-3512 Total       Intake    P.O.  --  -- --  --  0 0  --  -- --    P.O. -- -- -- -- 0 0 -- -- --    I.V.  --  -- --  --  127.2 127.2  --  -- --     Heparin Volume -- -- -- -- 127.2 127.2 -- -- --    Total Intake -- -- -- -- 127.2 127.2 -- -- --       Output    Urine  --  -- --  --  305 305  --  -- --    Indwelling Cathether -- -- -- -- 305 305 -- -- --    Void (ml) -- -- -- -- 0 0 -- -- --    Stool/Urine  --  -- --  --  0 0  --  -- --    Measurable Stool (ml) -- -- -- -- 0 0 -- -- --    Stool  --  -- --  --  -- --  --  -- --    Number of Times Stooled -- -- -- -- 0 x 0 x -- -- --    Total Output -- -- -- -- 305 305 -- -- --       Net I/O     -- -- -- -- -177.8 -177.8 -- -- --        Weight: 108.9 kg (240 lb)  Recent Labs      17   SODIUM  135   POTASSIUM  4.3   CHLORIDE  103   CO2  24   BUN  11   CREATININE  0.82   MAGNESIUM  2.0   CALCIUM  8.9       GI/Nutrition:  Exam: obese, abdomen is soft and non-tender, normal active bowel sounds  Imaging: Available data reviewed  taking PO  Liver Function  Recent Labs      17   ALTSGPT  54*   ASTSGOT  73*   ALKPHOSPHAT  87   TBILIRUBIN  0.9   GLUCOSE  120*       Heme:  Recent Labs      17   RBC  5.15   HEMOGLOBIN  14.7   HEMATOCRIT  46.8   PLATELETCT  167       Infectious Disease:  Temp  Av.2 °C (97.2 °F)  Min: 36 °C (96.8 °F)  Max: 36.8 °C (98.2 °F)  Micro: reviewed  Recent Labs      17   WBC  13.7*   NEUTSPOLYS  73.90*   LYMPHOCYTES  17.30*   MONOCYTES  7.00   EOSINOPHILS  0.90   BASOPHILS  0.40   ASTSGOT  73*   ALTSGPT  54*   ALKPHOSPHAT  87   TBILIRUBIN  0.9     Current Facility-Administered Medications   Medication Dose Frequency Provider Last Rate Last Dose   • ipratropium-albuterol (DUONEB) nebulizer solution 3 mL  3 mL Q4HRS (RT) Venkatesh Tovar M.D.   3 mL at 10/01/17 2926   • furosemide (LASIX) injection 20 mg  20 mg Q DAY Sydney Rubalcava M.D.       • clopidogrel (PLAVIX) tablet 75 mg  75 mg DAILY Sydney Rubalcava M.D.       • lorazepam (ATIVAN) tablet 0.5 mg  0.5 mg Q4HRS PRN Dc Lees D.OYaneth   0.5 mg at 10/01/17 7600   • oxycodone  immediate-release (ROXICODONE) tablet 5 mg  5 mg Q4HRS PRN Dc Lees D.O.   5 mg at 10/01/17 0505   • levofloxacin in D5W (LEVAQUIN) IVPB 750 mg  750 mg Q24HRS Oskar Merchant M.D.   Stopped at 09/30/17 2227   • NS (BOLUS) infusion 3,267 mL  30 mL/kg Once PRN Oskar Mercahnt M.D.       • aspirin (ASA) tablet 325 mg  325 mg Once Oskar Merchant M.D.       • albuterol inhaler 2 Puff  2 Puff Q6HRS PRN Sydney Rubalcava M.D.       • cetirizine (ZYRTEC) tablet 10 mg  10 mg DAILY Sydney Rubalcava M.D.       • omeprazole (PRILOSEC) capsule 20 mg  20 mg DAILY Sydney Rubalcava M.D.       • levothyroxine (SYNTHROID) tablet 75 mcg  75 mcg QDAY Sydney Rubalcava M.D.       • Respiratory Care per Protocol   Continuous RT Sydney Rubalcava M.D.       • senna-docusate (PERICOLACE or SENOKOT S) 8.6-50 MG per tablet 2 Tab  2 Tab BID Sydney Rubalcava M.D.   Stopped at 09/30/17 2200    And   • polyethylene glycol/lytes (MIRALAX) PACKET 1 Packet  1 Packet QDAY PRN Sydney Rubalcava M.D.        And   • magnesium hydroxide (MILK OF MAGNESIA) suspension 30 mL  30 mL QDAY PRN Sydney Rubalcava M.D.        And   • bisacodyl (DULCOLAX) suppository 10 mg  10 mg QDAY PRN Sydney Rubalcava M.D.       • labetalol (NORMODYNE,TRANDATE) injection 10 mg  10 mg Q4HRS PRN Sydney Rubalcava M.D.       • ondansetron (ZOFRAN) syringe/vial injection 4 mg  4 mg Q4HRS PRN Sydney Rubalcava M.D.       • ondansetron (ZOFRAN ODT) dispertab 4 mg  4 mg Q4HRS PRN Sydney Rubalcava M.D.       • promethazine (PHENERGAN) tablet 12.5-25 mg  12.5-25 mg Q4HRS PRVANDANA Rubalcava M.D.       • promethazine (PHENERGAN) suppository 12.5-25 mg  12.5-25 mg Q4HRS PRN Sydney Rubalcava M.D.       • prochlorperazine (COMPAZINE) injection 5-10 mg  5-10 mg Q4HRS PRN Sydney Rubalcava M.D.       • methylPREDNISolone (SOLU-MEDROL) 40 MG injection 40 mg  40 mg Q6HRS Sydney Rubalcava M.D.   40 mg at 10/01/17 0508   • atorvastatin (LIPITOR) tablet 80 mg  80  mg QHS Sydney Rubalcava M.D.   Stopped at 09/30/17 2200   • morphine ER (MS CONTIN) tablet 120 mg  120 mg Q12HRS Sydney Rubalcava M.D.   Stopped at 09/30/17 2215   • heparin 1000 units/mL injection 3,200 Units  3,200 Units PRN Sydney Rubalcava M.D.        And   • heparin infusion 25,000 units in 500 ml 0.45% nacl   Continuous Sydney Rubalcava M.D. 24 mL/hr at 09/30/17 2257 1,200 Units/hr at 09/30/17 2257     Last reviewed on 9/30/2017  9:45 PM by Daniel Aparicio    Quality  Measures:  EKG reviewed, Radiology images reviewed, Labs reviewed and Medications reviewed                      Problems/plan:    Assessment:  Acute Hypoxic Hypercarbic Respiratory Failure - improved with use of BiPAP  Encephalopathy - resolved  Urinary Tract Infection  Leukocytosis with Left shift  Elevated Transaminases  Elevated Troponin - NSTEMI?  Elevated BNP  U tox + for benzo, opiate, oxycodone - denies substance abuse  Morbid Obesity - LIANNA/OHS?  Chronic Pain on numerous opiates  Chronic Hypothyroidism on replacement     Plan:  Bipap - if deterioration in resp status or mental status will need intubation  Echo noted  Serial trop - borderline positive and trending down  Hep gtt per cardiology -> convert to full dose Lovenox since she is refusing labs  Empiric abx for uti + possible pulmonary source - await cultures  Blood/Urine/Sputum Cultures  Lactic acid normal x 2  Minimize narcotics until patient more awake  Desensitize to ASA  Keep in ICU - significant risk of resp/cardiac deterioration and need for CC intervention  Pain management with caution    Attempted to reason with patient, enlisted family who agreed to try to get her to not go AMA at great length  Family-Grand-daughter removed her meds from her purse to help her  Patient adamant re calling a cab to go home since family refused to take her     Discussed patient condition and risk of morbidity and/or mortality with RN, RT, Pharmacy, [facility] resident and Charge  nurse / hot rounds.    The patient remains critically ill.  Critical care time = 60 minutes in directly providing and coordinating critical care and extensive data review.  No time overlap and excludes procedures.

## 2017-10-01 NOTE — PROGRESS NOTES
"       Internal Medicine Interval Note    Name Ashleigh Garces       1955   Age/Sex 62 y.o. female   MRN [id number]   Code Status FULL CODE     After 5PM or if no immediate response to page, please call for cross-coverage  Attending/Team: Dr. Ervin / KP Marin See Patient List for primary contact information  Call [phone] to page    1st Call - Day Intern (R1):   Dr. Garcia 2nd Call - Day Sr. Resident (R2/R3):   Dr. Moreno         Reason for interval visit  (Principal Problem)   SOB (shortness of breath)   Acute exacerbation of COPD, Pneumonia  Acute on chronic CHF HFpEF likely  UTI    Interval Problem Daily Status Update  (24 hours)     This 62 year old lady with a PMHx of COPD, bipolar disorder, congestive heart failure, dyslipidemia, chronic low back pain, recurrent UTIs ? and hypothyroidism, was admitted with worsening SOB, generalized weakness, worsening lower abdominal / vaginal pain and likely AMS. Unable to get much history from the patient, as she gave very tangential information, angry that her long-standing UTIs and vaginal pain have not been cured and has asked for surgical consultations with a view to performing surgery on her lower abdomen & vagina, and fixing the problem.     Upon requesting information to contact her PCP, initially she said she doesn't have one, later she said that she doesn't want to give any information as she wants the doctors here to investigate and find out what her medical problems and treat them.     She then talked about how she saw a patient was a dying in this hospital, and how the security guards threw her out of the hospital/shannon.    She has pain in her chest, pain in her limbs, her chest and legs have been going numb. She complained of pain in her left arm when the BP cuff was inflating and threatened to remove it.     When asked about if she lived alone / with someone else, she said \"I live with my boy and I manage just fine\". She talked about her " cat, one of which  few years ago. The nursing staff tried to establish contact with her daughter / family on the contact number provided, but it turns out that it was an incorrect number.     Medical Issues Current:     NSTEMI: CXR revealed B/L interstitial opacities. EKG revealed T wave abnormality, BNP was >600, Trop was 0.4 --> 0.35.  She was seen by Cardiology and was started on Atorvastatin, Clopidogrel, Heparin infusion and 20 mg Lasix IV once   daily for acute on chronic HF. An Echo is pending, and a repeat EKG and Trop are awaited. For aspirin desensitization.     COPD exacerbation and PNA: She needed BiPAP, is on DUONEBS and Methyl Prednisolone for acute COPD exacerbation, as well as Levofloxacin for possible Pneumonia.     UTI - Recurrent +/- Interstitial Cystitis: Her UA was positive for nitrites, leucocyte esterase with  WBC. She is being treated with Levofloxacin for a possible UTI. Will need O/P Urology evalutaion    Pain: She is on MS Contin 120 mg BID, Dilaudid and Xanax at home. UDS +ve for opiates and BZDPs. Will need to start weaning her, will need PCP to follow-up on weaning narcotics      Review of Systems   Constitutional: Positive for malaise/fatigue.        Unable to do a proper ROS, as patient would not co-operate, angry and frustrated that she had to put up with the pains and numbness for a long time, doesn't want to talk to us for further information and would like to see the surgeons to fix her   Cardiovascular: Positive for chest pain.        Long standing pain in her chest, legs. Unable to obtain any further information   Gastrointestinal: Positive for abdominal pain.   Genitourinary:        Vaginal and suprapubic pain - long standing   Musculoskeletal: Positive for myalgias.        Pain B/L LE       Consultants/Specialty  Cardiology - Dr. Wing Fragoso      Disposition  ICU    Quality Measures    Reviewed items::  EKG reviewed, Labs reviewed, Medications reviewed and Radiology  images reviewed  Calles catheter::  No Calles  DVT prophylaxis pharmacological::  Heparin (On Heparin infusion for NTEMI)  DVT prophylaxis - mechanical:  SCDs  Ulcer Prophylaxis::  Yes  Antibiotics:  Treating active infection/contamination beyond 24 hours perioperative coverage          Physical Exam       Vitals:    10/01/17 0200 10/01/17 0211 10/01/17 0400 10/01/17 0600   BP:       Pulse: 60      Resp: 12      Temp: 36.1 °C (97 °F)  36.6 °C (97.8 °F) 36.7 °C (98 °F)   SpO2: 94% 95%     Weight:       Height:         Body mass index is 35.44 kg/m². Weight: 108.9 kg (240 lb)  Oxygen Therapy:  Pulse Oximetry: 95 %, O2 (LPM): 15, FIO2%: 60, O2 Delivery: BIPAP    Physical Exam   Constitutional: She is well-developed, well-nourished, and in no distress. No distress.   Patient refused examination, she has other important things / phone calls / bills to take care of, has already been examined few times by few doctors before and hasn't given permission for me to examine her   HENT:   Head: Normocephalic and atraumatic.   Cardiovascular: Normal rate and regular rhythm.    Neurological: She is alert.   Skin: She is not diaphoretic.   Nursing note and vitals reviewed.        Lab Data Review:         10/1/2017  8:10 AM    Recent Labs      09/30/17 1915   SODIUM  135   POTASSIUM  4.3   CHLORIDE  103   CO2  24   BUN  11   CREATININE  0.82   MAGNESIUM  2.0   CALCIUM  8.9       Recent Labs      09/30/17 1915   ALTSGPT  54*   ASTSGOT  73*   ALKPHOSPHAT  87   TBILIRUBIN  0.9   GLUCOSE  120*       Recent Labs      09/30/17   1915  10/01/17   0518   RBC  5.15  5.08   HEMOGLOBIN  14.7  14.6   HEMATOCRIT  46.8  46.4   PLATELETCT  167  153*   APTT   --   51.9*       Recent Labs      09/30/17   1915  10/01/17   0518   WBC  13.7*  10.6   NEUTSPOLYS  73.90*  88.20*   LYMPHOCYTES  17.30*  8.90*   MONOCYTES  7.00  1.60   EOSINOPHILS  0.90  0.10   BASOPHILS  0.40  0.30   ASTSGOT  73*   --    ALTSGPT  54*   --    ALKPHOSPHAT  87   --     TBILIRUBIN  0.9   --            Assessment/Plan       NSTEMI (non-ST elevated myocardial infarction) (CMS-HCC)  - No hx of CAD.  - Chest pain on exercises - as per NF info  - T inversion   - Trop: 0.4 --> 0.35  - Reviwed by cardiology  - Had Echo - report awaited  Plan  - Continue Heparin drip  - Aspirin densensitization  - Clopidogrel  - Statin  - await Echo report  - Trend trop and EKG.   - Not for BB or ACEI at present due to acute on chronic HF and low BP systolic < 100  - May need cath if she has ongoing angina, but she doesn't seem to at present, difficult to elicit given she's unco-operative      Acute exacerbation of congestive heart failure (CMS-HCC)  - SOB  - BNP > 600  - Crackles   - Pulmonary infiltrates on CXR  - Echo report awaited  - Lasix 40 mg IV daily  - Daily weight   - I/O  - No BB and ACEI at this time      COPD exacerbation (CMS-HCC)  - Hx of smoking for 30 years  - On inhalers but no home oxygen    - Was on BiPAP overnight, stopped at 5.30am  - now on 12 lts oxygen via mask, sats 95-98%  - Lactic acid 2  - hypercarbic, hypoxic respiratory failure on admisison  - Wheezing, crackles, fever and leukocytosis on admission  - WBC trended down   - COPD exacerbation by pneumonia.  Plan  - IV Methyl prednisolone  - albuterol   - levofloxacin  - O2 therapy   - stay on ICU  The patient is full code and accepts intubation if needed.       SOB (shortness of breath)  - contributed by COPD exacerbation, acute on chronic CHF and pneumonia.   - At this time less likely the patient has PE, but we will consider that if there is no improvement in her symptoms, however we started with heparin drip for NSTEMI  - was on BiPAP overnight  - now on 12 Lts of oxygen via mask  - on Methyl prednisolone, inhalers, Levofloxacin, Lasix      UTI (urinary tract infection)  - suprapubic / lower abdominal pain with dysuria ++  - completed a course of antibiotics on 9/27  - UA: nitrite and leucocyte esterase positive   - high  WBC --> trending down  - SIRS: 1/4 (leukocutosis) --> trending down  - qSOFA 0/3  - Continue levofloxacin.  - Urine culture  - Blood culture if temp spike      Chronic low back pain  - On morphine  mg BID, Dilaudid and Xanax.   - UDS: positive for opioid and Benzo.   - continue morphine and d/c dilaudid and xanax   - Roxicodone  - monitor her mental status  - allergic too toradol  - Aspirin sensitivity - facial swelling   - Try to use other methods to control her pain and try to decrease morphine dose.       Dyslipidemia  - Atorvastatin      Hypothyroidism  - TSH 9/30/17 wnl  - continue home med levothyroxine 75

## 2017-10-01 NOTE — ASSESSMENT & PLAN NOTE
- No hx of CAD.  - Chest pain on exercises - as per NF info  - T inversion   - Trop: 0.4 --> 0.35  - Reviwed by cardiology  - Had Echo - report awaited  Plan  - Continue Heparin drip  - Aspirin densensitization  - Clopidogrel  - Statin  - await Echo report  - Trend trop and EKG.   - Not for BB or ACEI at present due to acute on chronic HF and low BP systolic < 100  - May need cath if she has ongoing angina, but she doesn't seem to at present, difficult to elicit given she's unco-operative

## 2017-10-01 NOTE — PROGRESS NOTES
"After extensive conversation with Dr Ervin at bedside, pt continues to refuse all medical interventions, stating \"I hate it here, let me go home\".    "

## 2017-10-01 NOTE — PROGRESS NOTES
Pt arrived on to unit 2315, pt alert and awake AOx3 disoriented to location, UA sent, Heparin gtt initiated in ED,     Pt now lethargic, on BiPAP for 15 mins, La BELL at BS. Will page PMA if pt continues to be lethargic.

## 2017-10-01 NOTE — PROGRESS NOTES
"Pt refuses lab draw from RN, lab techs called in to help, pt still refused. Pt explained the rational, pt continues to scream \"I hate this hospital, all the doctors here are killers, I 'm going home..\" Charge RN is aware and at bedside intermittently.  "

## 2017-10-01 NOTE — ASSESSMENT & PLAN NOTE
- On morphine  mg BID, Dilaudid and Xanax.   - UDS: positive for opioid and Benzo.   - continue morphine and d/c dilaudid and xanax   - Roxicodone  - monitor her mental status  - allergic too toradol  - Aspirin sensitivity - facial swelling   - Try to use other methods to control her pain and try to decrease morphine dose.

## 2017-10-01 NOTE — ED PROVIDER NOTES
ED Provider Note    ED Provider Note    Primary care provider: Sudha Lam M.D.  Means of arrival: POV  History obtained from: Patient    CHIEF COMPLAINT  Chief Complaint   Patient presents with   • Pain     Seen at 6:57 PM.   HPI  Ashleigh Garces is a 62 y.o. female who presents to the Emergency DepartmentWith generalized malaise, shortness of breath and weakness that has been progressive over the past 1.5 months. She was extricated from the car because she was extremely lethargic with a oxygen saturation of 72% on room air.    The patient is minimally ambulatory at baseline, she reports transferring and walking with railings and wall assistance for all of her activities of daily living. She notes her weakness has gradually worsened over the past few months. She also reports diffuse body pain that has been chronic in nature. She takes Dilaudid and morphine by mouth for this.    She endorses fevers at home with a T-max of 104. She does report tobacco abuse. Approximate one half pack a day.  Review of systems is overwhelmingly positive. The patient reports diffuse body pain to include headache, neck pain, back pain, chest pain, abdominal pain, leg pain. This is chronic. She reports shortness of breath, cough, nausea. She denies vomiting, diarrhea. She does report decreased by mouth intake.    REVIEW OF SYSTEMS  See HPI,   Remainder of ROS negative.     PAST MEDICAL HISTORY   has a past medical history of Arthritis; ASTHMA; Bipolar disorder (CMS-[facility]); Bursitis; Chronic low back pain; Congestive heart failure (CMS-HCC); Dyslipidemia; Fall; GERD (gastroesophageal reflux disease); Hypothyroid; Migraine; Obesity; Sinusitis; chronic; and Unspecified urinary incontinence.    SURGICAL HISTORY   has a past surgical history that includes other abdominal surgery; gyn surgery; abdominal hysterectomy total; other orthopedic surgery; appendectomy; and cholecystectomy.    SOCIAL HISTORY  Social History   Substance Use Topics  "  • Smoking status: Current Every Day Smoker     Packs/day: 0.50     Years: 30.00     Types: Cigarettes   • Smokeless tobacco: Never Used      Comment: 1/2 pack per day   • Alcohol use No      History   Drug Use No       FAMILY HISTORY  Family History   Problem Relation Age of Onset   • Cancer Mother      unknown   • Hypertension Father    • Diabetes Father    • Stroke Father    • Diabetes Paternal Grandmother        CURRENT MEDICATIONS  Reviewed.  See Encounter Summary.     ALLERGIES  Allergies   Allergen Reactions   • Contrast Media With Iodine [Iodine] Anaphylaxis   • Naproxen    • Asa [Aspirin]    • Cephalosporins    • Compazine    • Doxycycline    • Macrolides    • Phenothiazines    • Sympathomimetics    • Toradol Anaphylaxis       PHYSICAL EXAM  VITAL SIGNS: BP (!) 98/60   Pulse 60   Temp 36.8 °C (98.2 °F)   Resp 18   Ht 1.753 m (5' 9\")   Wt 108.9 kg (240 lb)   LMP 01/01/1988   SpO2 96%   BMI 35.44 kg/m²   Constitutional: Awake, alert in no apparent distress.Foul-smelling, morbidly obese and disheveled.  HENT: Normocephalic, Bilateral external ears normal. Nose normal.   Eyes: Conjunctiva normal, non-icteric, EOMI.    Thorax & Lungs: Diffuse wheezes and coarse breath sounds throughout.  Cardiovascular: Regular rate, Regular rhythm, No murmurs, rubs or gallops.  Abdomen:  Soft, mild tenderness diffusely with deep palpation., nondistended, normal active bowel sounds.   :    Skin: Visualized skin is  Dry, multiple scabbed lesions in the bilateral lower extremities, right greater than left. These also appear chronic.  Musculoskeletal:   No cyanosis, clubbing or edema.  Neurologic: Alert, Grossly non-focal. Patient gives very minimal effort with lifting legs off the bed. She appears poorly conditioned.  Psychiatric: Normal affect, Normal mood  Lymphatic:  No cervical LAD    EKG   12 lead Interpreted by me  Rhythm:  Normal sinus rhythm   Rate:71  Axis: normal  Ectopy: none  Conduction: normal  ST Segments: " No ST elevation.  T Waves:  Diffuse T-wave inversions in the precordial leads, probable Wellens sign in V2, V3   Clinical Impression: New T-wave changes compared to last EKG 2015, concerning for LAD lesion.     RADIOLOGY  DX-CHEST-PORTABLE (1 VIEW)   Final Result      Interstitial and alveolar airspace opacities may be related to edema or multifocal pneumonia.      Trace pleural effusions not excluded.      Cardiomegaly.      Mild elevation of the right hemidiaphragm.      ECHOCARDIOGRAM COMP W/O CONT    (Results Pending)   DX-CHEST-2 VIEWS    (Results Pending)     The radiologist's interpretation of all radiological studies have been reviewed by me.    COURSE & MEDICAL DECISION MAKING  Pertinent Labs & Imaging studies reviewed. (See chart for details)    Differential diagnoses include but are not limited to:Sepsis, COPD, pneumonia, CHF, deconditioning, opiate dependence, opiate overdose.    6:57 PM - Patient seen and examined at bedside.Medical record reviewed.    7:39 PM- moderate leukocytosis, chest x-ray reveals diffuse infiltrates concerning for possible infectious process. The patient has multiple drug allergies. She was written for Levaquin.    8:05 PM- troponin elevated, still awaiting EKG. ASA ordered.     8:16 PM- cardiology paged. Patient refusing ASA stating she is allergic.    8:22 PM- heparin gtt, echo per Dr. Moseley    8:58 PM- Case d/w [facility].     9:30 PM- reviewed lab results with the internal medicine team. At this time I do not advise giving Narcan to the patient. She is alert and responds to questions properly. Because Narcan is noted cause pulmonary edema do not feel the patient's respiratory status will tolerate this. We'll continue supportive care and hold any additional sedatives.    Decision Making:  This is a 62 y.o. year old female who presents withHypoxia and hypotension along with new EKG changes. The patient will be treated for sepsis, she has a leukocytosis, tachycardia and mild  hypotension. Lactic acidosis is not found. The source is presumed pulmonary, though she also has a nitrate positive urinalysis. The patient reports allergies to essentially every antibiotic. She did receive Levaquin without difficulty though the patient was also stating she is allergic to this as well. Unfortunately this is not great coverage for urinary tract infection. This will need to be broadened out as an inpatient.    In addition, EKG shows significant ST depressions with probable Wellens sign is concerning for ischemic heart disease, possible LAD occlusion. I discussed the case with cardiology who performed a 2-D echo at bedside. The patient refused aspirin therapy stating she is allergic. She will be started on a heparin drip.    The overall etiology of the patient's presentation is not entirely clear. Further workup is required to determine whether this is fluid overload/CHF versus sepsis. Because of the leukocytosis, positive nitrate and abnormalities on chest x-ray she'll be treated with broad-spectrum antibiotics. We will slowly give IV fluids and assess response to treatment. At this time a 30 mL/kg bolus would be excessive.    This is a critical patient. 40 minutes were spent evaluating the patient, reviewing the medical records, interviewed and test results, discussing with consultants.     Discharge Medications:  Current Discharge Medication List          The patient will be admitted to the ICU in critical condition.    FINAL IMPRESSION    Elevated troponin, leukocytosis, pulmonary edema, opiate dependence, nitrate positive UTI, morbid obesity

## 2017-10-01 NOTE — ASSESSMENT & PLAN NOTE
- Hx of smoking for 30 years  - On inhalers but no home oxygen    - Was on BiPAP overnight, stopped at 5.30am  - now on 12 lts oxygen via mask, sats 95-98%  - Lactic acid 2  - hypercarbic, hypoxic respiratory failure on admisison  - Wheezing, crackles, fever and leukocytosis on admission  - WBC trended down   - COPD exacerbation by pneumonia.  Plan  - IV Methyl prednisolone  - albuterol   - levofloxacin  - O2 therapy   - stay on ICU  The patient is full code and accepts intubation if needed.

## 2017-10-01 NOTE — PROGRESS NOTES
Attempted to call daughter with updates on the pt with the number that was provided. Attempts unsuccessful. Pt very agitated and upset regarding leg and abdominal pain. MD walsh paged and orders placed.

## 2017-10-01 NOTE — DISCHARGE SUMMARY
CHIEF COMPLAINT ON ADMISSION  Chief Complaint   Patient presents with   • Pain     Patient left against medical advise (AMA)    CODE STATUS  Full Code    HPI & HOSPITAL COURSE    This 62 year old lady with a PMHx of COPD, bipolar disorder, congestive heart failure, dyslipidemia and chronic low back pain, was admitted COPD exacerbation and AMS. She was managed initially with BiPAP and oxygen and RT later on. She was found to have deep symmetric anterior lateral T-wave inversion and a mild troponin elevation consistent with likely obstructive coronary artery disease with a probable proximal LAD lesion, started on heparin drip, started aspirin desensitization due history of allergy and plavix was held in anticipation of possible surgical intervention. Patient was difficult to deal with and kept asking for pain meds and refusing treatment at same time. She then decided to leave against medical advise despite unsuccessful attempt by primary team and family member to convince her to stay. Primary team educated patient about her condition and serious consequences of leaving AMA including death, patient sounded understanding but still elected to leave AMA.    She left AMA in guarded condition.    DISCHARGE PROBLEM LIST  Principal Problem:    SOB (shortness of breath) POA: Yes  Active Problems:    COPD exacerbation (CMS-[facility]) POA: Yes    NSTEMI (non-ST elevated myocardial infarction) (CMS-[facility]) POA: Yes    Acute exacerbation of congestive heart failure (CMS-[facility]) POA: Yes    UTI (urinary tract infection) POA: Yes    Chronic low back pain (Chronic) POA: Yes    Hypothyroidism (Chronic) POA: Yes    Dyslipidemia POA: Yes  Resolved Problems:    * No resolved hospital problems. *      FOLLOW UP  No future appointments.  No follow-up provider specified.    MEDICATIONS ON DISCHARGE   Ashleigh Garces   Home Medication Instructions SUNSHINE:[id number]    Printed on:10/01/17 1745   Medication Information                      alprazolam  (XANAX) 1 MG Tab  Take 1 Tab by mouth 3 times a day as needed.             estradiol (ESTRACE) 0.5 MG tablet  Take 1 Tab by mouth every day.             furosemide (LASIX) 20 MG Tab  TAKE 1 TAB BY MOUTH EVERY DAY.             HYDROmorphone (DILAUDID) 4 MG Tab  Take 4 mg by mouth 2 Times a Day.             morphine SR (MS CONTIN) 60 MG TBCR tablet  Take 120 mg by mouth every 12 hours.             omeprazole (PRILOSEC) 20 MG delayed-release capsule  Take 1 Cap by mouth every day.             sulfamethoxazole-trimethoprim (BACTRIM DS) 800-160 MG tablet  TAKE 1 TABLET BY MOUTH TWICE A DAY             SYNTHROID 75 MCG Tab  TAKE ONE TABLET BY MOUTH EVERY DAY                 DIET  Orders Placed This Encounter   Procedures   • Diet Order     Standing Status:   Standing     Number of Occurrences:   1     Order Specific Question:   Diet:     Answer:   Cardiac [6]     Order Specific Question:   Electrolyte modifications:     Answer:   1.5 g Sodium [1]     CONSULTATIONS  PMA  Cardiology     LABORATORY  Lab Results   Component Value Date/Time    SODIUM 135 09/30/2017 07:15 PM    POTASSIUM 4.3 09/30/2017 07:15 PM    CHLORIDE 103 09/30/2017 07:15 PM    CO2 24 09/30/2017 07:15 PM    GLUCOSE 120 (H) 09/30/2017 07:15 PM    BUN 11 09/30/2017 07:15 PM    CREATININE 0.82 09/30/2017 07:15 PM    CREATININE 1.0 04/27/2009 11:51 AM        Lab Results   Component Value Date/Time    WBC 10.6 10/01/2017 05:18 AM    HEMOGLOBIN 14.6 10/01/2017 05:18 AM    HEMATOCRIT 46.4 10/01/2017 05:18 AM    PLATELETCT 153 (L) 10/01/2017 05:18 AM        Total time of the discharge process exceeds 31 minutes

## 2017-10-01 NOTE — CARE PLAN
Problem: Oxygenation:  Goal: Maintain adequate oxygenation dependent on patient condition  Adult Noninvasive Ventilation    IPAP (cmH2O): 14 (10/01/17 0211)  EPAP (cm H2O): 6 (10/01/17 0211)  FiO2: 30 (10/01/17 0211)    #SVN Performed: Yes (09/30/17 1935)       Events/Summary/Plan: Patient used BIPAP 14/6 60 percent most of the night. Patient recently refused BIPAP and currently on a oxymask.

## 2017-10-01 NOTE — PROGRESS NOTES
"0800 - Report received, pt care assumed. Pt c/o pains in her back and requesting pain medications, -given. Pt states she only wanted half of that dose, - accommodated. Pt turns self.    Breakfast ordered and delivered. Pt refused stating \"I feed my dogs better food\". Pt was offered to deliver breakfast she prefers from the kitchen consistent with her cardiac 2 g NA diet, pt became increasingly agitated and stated \"I cant even eat proper food here, what kind of hospital is this?\".          "

## 2017-10-01 NOTE — PROGRESS NOTES
"SHELBIE called and aware of the situation.    Family agrees with MD and tried to convince pt to stay at least for a day. However, pt states \"you are not going to hold me here\". Pt remains AOx4.    Pt is off the floor with all present belongings in the room.      "

## 2017-10-01 NOTE — CONSULTS
Cardiology Initial Consult Note    Date of note:    9/30/2017      Consulting Physician: Oskar Merchant M.D.    Patient ID:    Name:   Ashleigh Garces     YOB: 1955  Age:   62 y.o.  female   MRN:   [id number]      Reason for Consultation: NSTEMI    HPI:  Ashleigh Garces is a 62 y.o.-year-old female with a history of COPD, bipolar disorder, congestive heart failure, dyslipidemia, and hypothyroidism, who presents with generalized malaise, shortness of breath, and weakness.  She is a poor historian secondary to altered mental status.  She does not remember much of her cardiac history, but she does state she takes lasix at home for swelling.    She reports over the last couple months, she has had progressive SOB and diffuse weakness.  More recently she has had a cough, worsening SOB, and fevers.  She has chronic pain for which she takes dilaudid and morphine.    She did report a 7/10 chest pain associated with her cough and SOB, this has since resolved.  She denies prior heart attack.  Of note, she reports facial swelling to aspirin.  She is still smoking half a pack of cigarettes/day.     Review of Systems   Constitution: Positive for fever, weakness and malaise/fatigue.   HENT: Positive for headaches.    Eyes: Negative for vision loss in left eye and vision loss in right eye.   Cardiovascular: Positive for chest pain and leg swelling.   Respiratory: Positive for cough, shortness of breath and wheezing.    Skin: Positive for rash (progressive scabs on arms and legs. ).   Musculoskeletal: Positive for back pain and neck pain.   Gastrointestinal: Positive for abdominal pain and nausea. Negative for hematemesis, hematochezia, melena and vomiting.   Neurological: Negative for focal weakness.        All others reviewed and negative.      Past Medical History:   Diagnosis Date   • Arthritis    • ASTHMA    • Bipolar disorder (CMS-HCC)    • Bursitis    • Chronic low back pain    • Congestive heart failure  (CMS-[facility])    • Dyslipidemia    • Fall     last fall a month ago   • GERD (gastroesophageal reflux disease)    • Hypothyroid    • Migraine    • Obesity    • Sinusitis; chronic    • Unspecified urinary incontinence        Past Surgical History:   Procedure Laterality Date   • ABDOMINAL HYSTERECTOMY TOTAL      age 19's    • APPENDECTOMY     • CHOLECYSTECTOMY     • GYN SURGERY     • OTHER ABDOMINAL SURGERY     • OTHER ORTHOPEDIC SURGERY      back surgery         Prescriptions Prior to Admission   Medication Sig Dispense Refill Last Dose   • SYNTHROID 75 MCG Tab TAKE ONE TABLET BY MOUTH EVERY DAY 90 Tab 3 9/30/2017 at am   • sulfamethoxazole-trimethoprim (BACTRIM DS) 800-160 MG tablet TAKE 1 TABLET BY MOUTH TWICE A DAY 28 Tab 0 9/27/2017 at FINISHED   • furosemide (LASIX) 20 MG Tab TAKE 1 TAB BY MOUTH EVERY DAY. 90 Tab 0 9/30/2017 at am   • alprazolam (XANAX) 1 MG Tab Take 1 Tab by mouth 3 times a day as needed. 90 Tab 0 9/30/2017 at am   • omeprazole (PRILOSEC) 20 MG delayed-release capsule Take 1 Cap by mouth every day. 90 Cap 3 9/30/2017 at am   • estradiol (ESTRACE) 0.5 MG tablet Take 1 Tab by mouth every day. 90 Tab 3 9/30/2017 at am   • HYDROmorphone (DILAUDID) 4 MG Tab Take 4 mg by mouth 2 Times a Day.   9/30/2017 at am   • morphine SR (MS CONTIN) 60 MG TBCR tablet Take 120 mg by mouth every 12 hours.   9/30/2017 at am         Allergies   Allergen Reactions   • Contrast Media With Iodine [Iodine] Anaphylaxis   • Naproxen    • Asa [Aspirin]    • Cephalosporins    • Compazine    • Doxycycline    • Macrolides    • Phenothiazines    • Sympathomimetics    • Toradol Anaphylaxis         Family History   Problem Relation Age of Onset   • Cancer Mother      unknown   • Hypertension Father    • Diabetes Father    • Stroke Father    • Diabetes Paternal Grandmother          Social History     Social History   • Marital status: Single     Spouse name: N/A   • Number of children: N/A   • Years of education: N/A     Occupational  "History   • Not on file.     Social History Main Topics   • Smoking status: Current Every Day Smoker     Packs/day: 0.50     Years: 30.00     Types: Cigarettes   • Smokeless tobacco: Never Used      Comment: 1/2 pack per day   • Alcohol use No   • Drug use: No   • Sexual activity: Not on file     Other Topics Concern   • Not on file     Social History Narrative    ** Merged History Encounter **              Physical Exam  Body mass index is 35.44 kg/m².  Blood pressure (!) 98/60, pulse 73, temperature 36.8 °C (98.2 °F), resp. rate 16, height 1.753 m (5' 9\"), weight 108.9 kg (240 lb), last menstrual period 01/01/1988, SpO2 94 %.  Vitals:    09/30/17 1942 09/30/17 2000 09/30/17 2100 09/30/17 2200   BP:       Pulse: 69 72 68 73   Resp:  16 16 16   Temp:       SpO2: 94% 95% 94% 94%   Weight:       Height:         Oxygen Therapy:  Pulse Oximetry: 94 %, O2 (LPM): 15, O2 Delivery: Non-Rebreather Mask    General: Drowsy in respiratory distress  Eyes: nl conjunctiva  ENT: OP clear  Neck: JVP indeterminate  Lungs: Increased WOB, diffuse end-expiratory wheezes  Heart: RRR, distant heart sounds obscured by lung sounds, but no audible murmurs, no rubs or gallops, 1+ edema bilateral lower extremities. No LV/RV heave on cardiac palpatation. 2+ bilateral radial pulses.  2+ bilateral dp pulses.   Abdomen: obese, soft, non tender, non distended, no masses, normal bowel sounds.  No HSM.  Extremities/MSK: no clubbing, no cyanosis  Neurological: No focal sensory deficits  Psychiatric: drowsy, partially alert, oriented with great effort ask questions  Skin: Warm extremities, scattered scabs over upper and lower extremities  Neuro: CN 2-12 intact, 4/5 symmetric strength with hand grasp and pateller flexion, EOMI        Labs (personally reviewed and notable for):   WBC 13.7  Trop 0.4    TSH 0.8        Cardiac Imaging and Procedures Review:    EKG dated today: My personal interpretation is NSR, diffuse T wave inversion in inferior " and anteroseptal leads, probable ischemia.  New from prior.rolonged Qtc.     Echo dated 9/2012:   FINDINGS  Left Ventricle  Normal left ventricular size, thickness, systolic function, and   diastolic function. Left ventricular ejection fraction is 65% to 70%.    Right Ventricle  Normal right ventricular size and function.    Right Atrium  Normal right atrial size.     Left Atrium  Normal left atrial size.     Mitral Valve  Structurally normal mitral valve without significant stenosis or   regurgitation.     Aortic Valve  Structurally normal aortic valve without significant flow   abnormalities. No stenosis or regurgitation seen.     Tricuspid Valve  The tricuspid valve is not well visualized. Mild tricuspid   regurgitation. Right ventricular systolic pressure is estimated to be   25-30 mmHg.    Pulmonic Valve  The pulmonic valve is not well visualized. No stenosis or regurgitation   seen.     Pericardium  No pericardial effusion seen.     Aorta  Normal aortic root for body surface area.     CONCLUSIONS  Structurally normal heart by surface echocardiogram.   No comparison available.      Radiology test Review:  CXR:   Interstitial and alveolar airspace opacities may be related to edema or multifocal pneumonia.    Trace pleural effusions not excluded.    Cardiomegaly.    Mild elevation of the right hemidiaphragm.         Impression and Medical Decision Making:  #  Acute NSTEMI with significant TW changes and elevated troponin, currently asymptomatic.  Allergic to aspirin  # COPD exacerbation, severe with hypercarbic respiratory failure  # PNA  # Acute on chronic decompensated heart failure with preserved ejection fraction (based on prior echocardiogram)    Recommendations:  # heparin gtt for NSTEMI  # avoid aspirin for now given severe allergy, if cardiac symptoms worsen, will need to dicsuss densensitization and start plavix.   # aggressive COPD/PNA treatment given impending hypercarbic respiratory failure  # gentle  diuresis as tolerated to help improve oxygenation, lactate not elevated x 2.  Would start with lasix 20mg IV x 1, and goal net negative 1L  # start lipitor 20mg daily   # trend EKG/troponin  # repeat echocardiogram given extent of TW changes.  PE and intracerebral pathology on differential.       Thank you for allowing me to participate in the care of this patient, I will continue to follow.  Please contact me with any questions.    Dale Moseley MD  Cardiologist, [facility] [place]   [phone]

## 2017-10-02 ENCOUNTER — APPOINTMENT (OUTPATIENT)
Dept: RADIOLOGY | Facility: MEDICAL CENTER | Age: 62
DRG: 190 | End: 2017-10-02
Payer: MEDICARE

## 2017-10-02 ENCOUNTER — HOSPITAL ENCOUNTER (INPATIENT)
Facility: MEDICAL CENTER | Age: 62
LOS: 9 days | DRG: 190 | End: 2017-10-11
Attending: EMERGENCY MEDICINE | Admitting: INTERNAL MEDICINE
Payer: MEDICARE

## 2017-10-02 ENCOUNTER — RESOLUTE PROFESSIONAL BILLING HOSPITAL PROF FEE (OUTPATIENT)
Dept: HOSPITALIST | Facility: MEDICAL CENTER | Age: 62
End: 2017-10-02
Payer: MEDICARE

## 2017-10-02 ENCOUNTER — TELEPHONE (OUTPATIENT)
Dept: MEDICAL GROUP | Facility: PHYSICIAN GROUP | Age: 62
End: 2017-10-02

## 2017-10-02 DIAGNOSIS — J44.1 ACUTE EXACERBATION OF CHRONIC OBSTRUCTIVE PULMONARY DISEASE (COPD) (HCC): ICD-10-CM

## 2017-10-02 DIAGNOSIS — I24.9 ACUTE CORONARY SYNDROME (HCC): ICD-10-CM

## 2017-10-02 DIAGNOSIS — G89.4 CHRONIC PAIN SYNDROME: ICD-10-CM

## 2017-10-02 DIAGNOSIS — F29 PSYCHOSIS, UNSPECIFIED PSYCHOSIS TYPE (HCC): ICD-10-CM

## 2017-10-02 DIAGNOSIS — R79.89 ELEVATED TROPONIN: ICD-10-CM

## 2017-10-02 PROBLEM — J18.9 PNEUMONIA: Status: ACTIVE | Noted: 2017-10-02

## 2017-10-02 PROBLEM — R07.9 CHEST PAIN: Status: ACTIVE | Noted: 2017-10-02

## 2017-10-02 LAB
ALBUMIN SERPL BCP-MCNC: 3.7 G/DL (ref 3.2–4.9)
ALBUMIN/GLOB SERPL: 1.2 G/DL
ALP SERPL-CCNC: 82 U/L (ref 30–99)
ALT SERPL-CCNC: 38 U/L (ref 2–50)
ANION GAP SERPL CALC-SCNC: 9 MMOL/L (ref 0–11.9)
APTT PPP: 28.1 SEC (ref 24.7–36)
AST SERPL-CCNC: 46 U/L (ref 12–45)
BASOPHILS # BLD AUTO: 0.1 % (ref 0–1.8)
BASOPHILS # BLD: 0.02 K/UL (ref 0–0.12)
BILIRUB SERPL-MCNC: 0.4 MG/DL (ref 0.1–1.5)
BNP SERPL-MCNC: 241 PG/ML (ref 0–100)
BUN SERPL-MCNC: 22 MG/DL (ref 8–22)
CALCIUM SERPL-MCNC: 9.3 MG/DL (ref 8.5–10.5)
CHLORIDE SERPL-SCNC: 104 MMOL/L (ref 96–112)
CK SERPL-CCNC: 230 U/L (ref 0–154)
CO2 SERPL-SCNC: 25 MMOL/L (ref 20–33)
CREAT SERPL-MCNC: 0.82 MG/DL (ref 0.5–1.4)
EKG IMPRESSION: NORMAL
EOSINOPHIL # BLD AUTO: 0 K/UL (ref 0–0.51)
EOSINOPHIL NFR BLD: 0 % (ref 0–6.9)
ERYTHROCYTE [DISTWIDTH] IN BLOOD BY AUTOMATED COUNT: 50.2 FL (ref 35.9–50)
GFR SERPL CREATININE-BSD FRML MDRD: >60 ML/MIN/1.73 M 2
GLOBULIN SER CALC-MCNC: 3 G/DL (ref 1.9–3.5)
GLUCOSE BLD-MCNC: 104 MG/DL (ref 65–99)
GLUCOSE SERPL-MCNC: 104 MG/DL (ref 65–99)
HCT VFR BLD AUTO: 46.6 % (ref 37–47)
HGB BLD-MCNC: 15.3 G/DL (ref 12–16)
IMM GRANULOCYTES # BLD AUTO: 0.11 K/UL (ref 0–0.11)
IMM GRANULOCYTES NFR BLD AUTO: 0.7 % (ref 0–0.9)
INR PPP: 1.01 (ref 0.87–1.13)
LACTATE BLD-SCNC: 1.3 MMOL/L (ref 0.5–2)
LIPASE SERPL-CCNC: 14 U/L (ref 11–82)
LYMPHOCYTES # BLD AUTO: 1.74 K/UL (ref 1–4.8)
LYMPHOCYTES NFR BLD: 10.3 % (ref 22–41)
MCH RBC QN AUTO: 29.5 PG (ref 27–33)
MCHC RBC AUTO-ENTMCNC: 32.8 G/DL (ref 33.6–35)
MCV RBC AUTO: 89.8 FL (ref 81.4–97.8)
MONOCYTES # BLD AUTO: 0.94 K/UL (ref 0–0.85)
MONOCYTES NFR BLD AUTO: 5.6 % (ref 0–13.4)
NEUTROPHILS # BLD AUTO: 14.05 K/UL (ref 2–7.15)
NEUTROPHILS NFR BLD: 83.3 % (ref 44–72)
NRBC # BLD AUTO: 0 K/UL
NRBC BLD AUTO-RTO: 0 /100 WBC
PLATELET # BLD AUTO: 178 K/UL (ref 164–446)
PMV BLD AUTO: 11.3 FL (ref 9–12.9)
POTASSIUM SERPL-SCNC: 4.2 MMOL/L (ref 3.6–5.5)
PROCALCITONIN SERPL-MCNC: 0.06 NG/ML
PROT SERPL-MCNC: 6.7 G/DL (ref 6–8.2)
PROTHROMBIN TIME: 13.6 SEC (ref 12–14.6)
RBC # BLD AUTO: 5.19 M/UL (ref 4.2–5.4)
SODIUM SERPL-SCNC: 138 MMOL/L (ref 135–145)
TROPONIN I SERPL-MCNC: 0.07 NG/ML (ref 0–0.04)
WBC # BLD AUTO: 16.9 K/UL (ref 4.8–10.8)

## 2017-10-02 PROCEDURE — 93005 ELECTROCARDIOGRAM TRACING: CPT | Performed by: EMERGENCY MEDICINE

## 2017-10-02 PROCEDURE — A9270 NON-COVERED ITEM OR SERVICE: HCPCS | Performed by: INTERNAL MEDICINE

## 2017-10-02 PROCEDURE — 71010 DX-CHEST-LIMITED (1 VIEW): CPT

## 2017-10-02 PROCEDURE — 80053 COMPREHEN METABOLIC PANEL: CPT

## 2017-10-02 PROCEDURE — 700102 HCHG RX REV CODE 250 W/ 637 OVERRIDE(OP): Performed by: INTERNAL MEDICINE

## 2017-10-02 PROCEDURE — 82962 GLUCOSE BLOOD TEST: CPT

## 2017-10-02 PROCEDURE — 36415 COLL VENOUS BLD VENIPUNCTURE: CPT

## 2017-10-02 PROCEDURE — 84484 ASSAY OF TROPONIN QUANT: CPT

## 2017-10-02 PROCEDURE — 87040 BLOOD CULTURE FOR BACTERIA: CPT

## 2017-10-02 PROCEDURE — 83880 ASSAY OF NATRIURETIC PEPTIDE: CPT

## 2017-10-02 PROCEDURE — 93005 ELECTROCARDIOGRAM TRACING: CPT

## 2017-10-02 PROCEDURE — 83690 ASSAY OF LIPASE: CPT

## 2017-10-02 PROCEDURE — 85730 THROMBOPLASTIN TIME PARTIAL: CPT

## 2017-10-02 PROCEDURE — 82550 ASSAY OF CK (CPK): CPT

## 2017-10-02 PROCEDURE — 99285 EMERGENCY DEPT VISIT HI MDM: CPT

## 2017-10-02 PROCEDURE — 83605 ASSAY OF LACTIC ACID: CPT

## 2017-10-02 PROCEDURE — 770020 HCHG ROOM/CARE - TELE (206)

## 2017-10-02 PROCEDURE — 85610 PROTHROMBIN TIME: CPT

## 2017-10-02 PROCEDURE — 84145 PROCALCITONIN (PCT): CPT

## 2017-10-02 PROCEDURE — 99223 1ST HOSP IP/OBS HIGH 75: CPT | Performed by: INTERNAL MEDICINE

## 2017-10-02 PROCEDURE — 96374 THER/PROPH/DIAG INJ IV PUSH: CPT

## 2017-10-02 PROCEDURE — 700111 HCHG RX REV CODE 636 W/ 250 OVERRIDE (IP): Performed by: INTERNAL MEDICINE

## 2017-10-02 PROCEDURE — 83735 ASSAY OF MAGNESIUM: CPT

## 2017-10-02 PROCEDURE — 304561 HCHG STAT O2

## 2017-10-02 PROCEDURE — 700105 HCHG RX REV CODE 258: Performed by: INTERNAL MEDICINE

## 2017-10-02 PROCEDURE — 85025 COMPLETE CBC W/AUTO DIFF WBC: CPT

## 2017-10-02 RX ORDER — DIPHENHYDRAMINE HCL 25 MG
25 TABLET ORAL EVERY 6 HOURS PRN
Status: DISCONTINUED | OUTPATIENT
Start: 2017-10-02 | End: 2017-10-11 | Stop reason: HOSPADM

## 2017-10-02 RX ORDER — PREDNISONE 10 MG/1
50 TABLET ORAL DAILY
Status: DISCONTINUED | OUTPATIENT
Start: 2017-10-03 | End: 2017-10-11 | Stop reason: HOSPADM

## 2017-10-02 RX ORDER — OXYCODONE HYDROCHLORIDE 30 MG/1
30 TABLET ORAL EVERY 4 HOURS PRN
Status: ON HOLD | COMMUNITY
End: 2017-10-11

## 2017-10-02 RX ORDER — SODIUM CHLORIDE 9 MG/ML
INJECTION, SOLUTION INTRAVENOUS CONTINUOUS
Status: DISCONTINUED | OUTPATIENT
Start: 2017-10-02 | End: 2017-10-03

## 2017-10-02 RX ORDER — NYSTATIN 100000 [USP'U]/G
1 POWDER TOPICAL
COMMUNITY
End: 2019-01-09 | Stop reason: SDUPTHER

## 2017-10-02 RX ORDER — ACETAMINOPHEN 325 MG/1
650 TABLET ORAL EVERY 6 HOURS PRN
Status: DISCONTINUED | OUTPATIENT
Start: 2017-10-02 | End: 2017-10-11 | Stop reason: HOSPADM

## 2017-10-02 RX ORDER — DIPHENHYDRAMINE HCL 25 MG
25 TABLET ORAL EVERY 6 HOURS PRN
COMMUNITY
End: 2023-08-10

## 2017-10-02 RX ORDER — OMEPRAZOLE 20 MG/1
20 CAPSULE, DELAYED RELEASE ORAL DAILY
Status: DISCONTINUED | OUTPATIENT
Start: 2017-10-03 | End: 2017-10-11 | Stop reason: HOSPADM

## 2017-10-02 RX ORDER — NICOTINE 21 MG/24HR
14 PATCH, TRANSDERMAL 24 HOURS TRANSDERMAL
Status: DISCONTINUED | OUTPATIENT
Start: 2017-10-03 | End: 2017-10-11 | Stop reason: HOSPADM

## 2017-10-02 RX ORDER — AMOXICILLIN 250 MG
2 CAPSULE ORAL 2 TIMES DAILY
Status: DISCONTINUED | OUTPATIENT
Start: 2017-10-02 | End: 2017-10-11 | Stop reason: HOSPADM

## 2017-10-02 RX ORDER — MORPHINE SULFATE 60 MG/1
120 TABLET, FILM COATED, EXTENDED RELEASE ORAL EVERY 12 HOURS
Status: DISCONTINUED | OUTPATIENT
Start: 2017-10-02 | End: 2017-10-05

## 2017-10-02 RX ORDER — ONDANSETRON 2 MG/ML
4 INJECTION INTRAMUSCULAR; INTRAVENOUS EVERY 4 HOURS PRN
Status: DISCONTINUED | OUTPATIENT
Start: 2017-10-02 | End: 2017-10-11 | Stop reason: HOSPADM

## 2017-10-02 RX ORDER — ALPRAZOLAM 0.25 MG/1
1 TABLET ORAL ONCE
Status: COMPLETED | OUTPATIENT
Start: 2017-10-02 | End: 2017-10-02

## 2017-10-02 RX ORDER — LEVOTHYROXINE SODIUM 0.07 MG/1
75 TABLET ORAL
COMMUNITY
End: 2019-01-09 | Stop reason: SDUPTHER

## 2017-10-02 RX ORDER — ATORVASTATIN CALCIUM 40 MG/1
40 TABLET, FILM COATED ORAL
Status: DISCONTINUED | OUTPATIENT
Start: 2017-10-02 | End: 2017-10-11 | Stop reason: HOSPADM

## 2017-10-02 RX ORDER — OXYCODONE HYDROCHLORIDE 30 MG/1
30 TABLET ORAL EVERY 4 HOURS PRN
Status: DISCONTINUED | OUTPATIENT
Start: 2017-10-02 | End: 2017-10-11 | Stop reason: HOSPADM

## 2017-10-02 RX ORDER — GUAIFENESIN/DEXTROMETHORPHAN 100-10MG/5
10 SYRUP ORAL EVERY 6 HOURS PRN
Status: DISCONTINUED | OUTPATIENT
Start: 2017-10-02 | End: 2017-10-07

## 2017-10-02 RX ORDER — MORPHINE SULFATE 4 MG/ML
2 INJECTION, SOLUTION INTRAMUSCULAR; INTRAVENOUS ONCE
Status: DISCONTINUED | OUTPATIENT
Start: 2017-10-02 | End: 2017-10-02

## 2017-10-02 RX ORDER — HYDROMORPHONE HYDROCHLORIDE 4 MG/1
4 TABLET ORAL 2 TIMES DAILY
Status: DISCONTINUED | OUTPATIENT
Start: 2017-10-02 | End: 2017-10-11 | Stop reason: HOSPADM

## 2017-10-02 RX ORDER — MORPHINE SULFATE 4 MG/ML
2 INJECTION, SOLUTION INTRAMUSCULAR; INTRAVENOUS ONCE
Status: COMPLETED | OUTPATIENT
Start: 2017-10-02 | End: 2017-10-02

## 2017-10-02 RX ORDER — LEVOTHYROXINE SODIUM 0.07 MG/1
75 TABLET ORAL
Status: DISCONTINUED | OUTPATIENT
Start: 2017-10-03 | End: 2017-10-11 | Stop reason: HOSPADM

## 2017-10-02 RX ORDER — ONDANSETRON 4 MG/1
4 TABLET, ORALLY DISINTEGRATING ORAL EVERY 4 HOURS PRN
Status: DISCONTINUED | OUTPATIENT
Start: 2017-10-02 | End: 2017-10-11 | Stop reason: HOSPADM

## 2017-10-02 RX ORDER — LABETALOL HYDROCHLORIDE 5 MG/ML
10 INJECTION, SOLUTION INTRAVENOUS EVERY 4 HOURS PRN
Status: DISCONTINUED | OUTPATIENT
Start: 2017-10-02 | End: 2017-10-11 | Stop reason: HOSPADM

## 2017-10-02 RX ORDER — POLYETHYLENE GLYCOL 3350 17 G/17G
1 POWDER, FOR SOLUTION ORAL
Status: DISCONTINUED | OUTPATIENT
Start: 2017-10-02 | End: 2017-10-11 | Stop reason: HOSPADM

## 2017-10-02 RX ORDER — CLOPIDOGREL BISULFATE 75 MG/1
75 TABLET ORAL DAILY
Status: DISCONTINUED | OUTPATIENT
Start: 2017-10-03 | End: 2017-10-11 | Stop reason: HOSPADM

## 2017-10-02 RX ORDER — LEVOFLOXACIN 5 MG/ML
750 INJECTION, SOLUTION INTRAVENOUS EVERY 24 HOURS
Status: DISCONTINUED | OUTPATIENT
Start: 2017-10-02 | End: 2017-10-03

## 2017-10-02 RX ORDER — BISACODYL 10 MG
10 SUPPOSITORY, RECTAL RECTAL
Status: DISCONTINUED | OUTPATIENT
Start: 2017-10-02 | End: 2017-10-11 | Stop reason: HOSPADM

## 2017-10-02 RX ORDER — ALPRAZOLAM 1 MG/1
1 TABLET ORAL 3 TIMES DAILY PRN
Status: DISCONTINUED | OUTPATIENT
Start: 2017-10-02 | End: 2017-10-10

## 2017-10-02 RX ADMIN — HYDROMORPHONE HYDROCHLORIDE 4 MG: 4 TABLET ORAL at 22:25

## 2017-10-02 RX ADMIN — ALPRAZOLAM 1 MG: 1 TABLET ORAL at 22:25

## 2017-10-02 RX ADMIN — MORPHINE SULFATE 120 MG: 60 TABLET, EXTENDED RELEASE ORAL at 22:25

## 2017-10-02 RX ADMIN — STANDARDIZED SENNA CONCENTRATE AND DOCUSATE SODIUM 2 TABLET: 8.6; 5 TABLET, FILM COATED ORAL at 22:25

## 2017-10-02 RX ADMIN — ATORVASTATIN CALCIUM 40 MG: 40 TABLET, FILM COATED ORAL at 22:39

## 2017-10-02 RX ADMIN — SODIUM CHLORIDE: 9 INJECTION, SOLUTION INTRAVENOUS at 22:26

## 2017-10-02 RX ADMIN — ALPRAZOLAM 1 MG: 0.25 TABLET ORAL at 21:41

## 2017-10-02 RX ADMIN — ENOXAPARIN SODIUM 40 MG: 100 INJECTION SUBCUTANEOUS at 22:41

## 2017-10-02 RX ADMIN — MORPHINE SULFATE 2 MG: 4 INJECTION INTRAVENOUS at 21:41

## 2017-10-02 ASSESSMENT — LIFESTYLE VARIABLES
EVER HAD A DRINK FIRST THING IN THE MORNING TO STEADY YOUR NERVES TO GET RID OF A HANGOVER: NO
ON A TYPICAL DAY WHEN YOU DRINK ALCOHOL HOW MANY DRINKS DO YOU HAVE: 1
AVERAGE NUMBER OF DAYS PER WEEK YOU HAVE A DRINK CONTAINING ALCOHOL: 1
TOTAL SCORE: 0
HOW MANY TIMES IN THE PAST YEAR HAVE YOU HAD 5 OR MORE DRINKS IN A DAY: 0
HAVE YOU EVER FELT YOU SHOULD CUT DOWN ON YOUR DRINKING: NO
ALCOHOL_USE: YES
HAVE PEOPLE ANNOYED YOU BY CRITICIZING YOUR DRINKING: NO
TOTAL SCORE: 0
EVER_SMOKED: YES
CONSUMPTION TOTAL: NEGATIVE
TOTAL SCORE: 0
EVER FELT BAD OR GUILTY ABOUT YOUR DRINKING: NO

## 2017-10-02 ASSESSMENT — PATIENT HEALTH QUESTIONNAIRE - PHQ9
2. FEELING DOWN, DEPRESSED, IRRITABLE, OR HOPELESS: NOT AT ALL
SUM OF ALL RESPONSES TO PHQ9 QUESTIONS 1 AND 2: 0
SUM OF ALL RESPONSES TO PHQ QUESTIONS 1-9: 0
1. LITTLE INTEREST OR PLEASURE IN DOING THINGS: NOT AT ALL

## 2017-10-02 ASSESSMENT — COGNITIVE AND FUNCTIONAL STATUS - GENERAL
SUGGESTED CMS G CODE MODIFIER MOBILITY: CL
DAILY ACTIVITIY SCORE: 15
MOVING TO AND FROM BED TO CHAIR: A LOT
MOVING FROM LYING ON BACK TO SITTING ON SIDE OF FLAT BED: A LOT
STANDING UP FROM CHAIR USING ARMS: A LOT
TURNING FROM BACK TO SIDE WHILE IN FLAT BAD: A LITTLE
DRESSING REGULAR UPPER BODY CLOTHING: A LOT
CLIMB 3 TO 5 STEPS WITH RAILING: A LOT
SUGGESTED CMS G CODE MODIFIER DAILY ACTIVITY: CK
WALKING IN HOSPITAL ROOM: A LOT
HELP NEEDED FOR BATHING: A LOT
DRESSING REGULAR LOWER BODY CLOTHING: A LOT
TOILETING: A LOT
MOBILITY SCORE: 13
PERSONAL GROOMING: A LITTLE

## 2017-10-02 ASSESSMENT — PAIN SCALES - GENERAL
PAINLEVEL_OUTOF10: 8
PAINLEVEL_OUTOF10: 0

## 2017-10-02 NOTE — TELEPHONE ENCOUNTER
Patient called and told the  I have been trying to reach her.  Patient requesting Xanax early.  Patient aware she is not due for Xanax and we will not be refilling this early.  Patient continued to explain that she was in the hospital recently.  I offered patient and appt and she declined.  Patient will call us back for an appt at a later time.

## 2017-10-02 NOTE — ED NOTES
To triage via w/c with report of   Chief Complaint   Patient presents with   • Other     pt AMA from this facility yesterday.  admitted for NSTEMI, CHF, COPD.  reports she promised to return if she was not feeling better today, reports not feeling better today.

## 2017-10-03 ENCOUNTER — APPOINTMENT (OUTPATIENT)
Dept: RADIOLOGY | Facility: MEDICAL CENTER | Age: 62
DRG: 190 | End: 2017-10-03
Attending: INTERNAL MEDICINE
Payer: MEDICARE

## 2017-10-03 PROBLEM — J96.01 ACUTE RESPIRATORY FAILURE WITH HYPOXEMIA (HCC): Status: ACTIVE | Noted: 2017-10-03

## 2017-10-03 LAB
ALBUMIN SERPL BCP-MCNC: 3.3 G/DL (ref 3.2–4.9)
ALBUMIN/GLOB SERPL: 1.2 G/DL
ALP SERPL-CCNC: 75 U/L (ref 30–99)
ALT SERPL-CCNC: 33 U/L (ref 2–50)
ANION GAP SERPL CALC-SCNC: 6 MMOL/L (ref 0–11.9)
APPEARANCE UR: CLEAR
AST SERPL-CCNC: 28 U/L (ref 12–45)
BACTERIA UR CULT: NORMAL
BASOPHILS # BLD AUTO: 0.1 % (ref 0–1.8)
BASOPHILS # BLD: 0.01 K/UL (ref 0–0.12)
BILIRUB SERPL-MCNC: 0.4 MG/DL (ref 0.1–1.5)
BILIRUB UR QL STRIP.AUTO: NEGATIVE
BUN SERPL-MCNC: 22 MG/DL (ref 8–22)
CALCIUM SERPL-MCNC: 8.7 MG/DL (ref 8.5–10.5)
CHLORIDE SERPL-SCNC: 105 MMOL/L (ref 96–112)
CO2 SERPL-SCNC: 27 MMOL/L (ref 20–33)
COLOR UR: YELLOW
CREAT SERPL-MCNC: 0.78 MG/DL (ref 0.5–1.4)
EOSINOPHIL # BLD AUTO: 0.04 K/UL (ref 0–0.51)
EOSINOPHIL NFR BLD: 0.4 % (ref 0–6.9)
ERYTHROCYTE [DISTWIDTH] IN BLOOD BY AUTOMATED COUNT: 52.4 FL (ref 35.9–50)
GFR SERPL CREATININE-BSD FRML MDRD: >60 ML/MIN/1.73 M 2
GLOBULIN SER CALC-MCNC: 2.7 G/DL (ref 1.9–3.5)
GLUCOSE SERPL-MCNC: 81 MG/DL (ref 65–99)
GLUCOSE UR STRIP.AUTO-MCNC: NEGATIVE MG/DL
HCT VFR BLD AUTO: 45.3 % (ref 37–47)
HGB BLD-MCNC: 14 G/DL (ref 12–16)
IMM GRANULOCYTES # BLD AUTO: 0.08 K/UL (ref 0–0.11)
IMM GRANULOCYTES NFR BLD AUTO: 0.7 % (ref 0–0.9)
KETONES UR STRIP.AUTO-MCNC: NEGATIVE MG/DL
LEUKOCYTE ESTERASE UR QL STRIP.AUTO: NEGATIVE
LYMPHOCYTES # BLD AUTO: 2.36 K/UL (ref 1–4.8)
LYMPHOCYTES NFR BLD: 21.6 % (ref 22–41)
MAGNESIUM SERPL-MCNC: 2.1 MG/DL (ref 1.5–2.5)
MCH RBC QN AUTO: 28.3 PG (ref 27–33)
MCHC RBC AUTO-ENTMCNC: 30.9 G/DL (ref 33.6–35)
MCV RBC AUTO: 91.7 FL (ref 81.4–97.8)
MICRO URNS: NORMAL
MONOCYTES # BLD AUTO: 0.71 K/UL (ref 0–0.85)
MONOCYTES NFR BLD AUTO: 6.5 % (ref 0–13.4)
NEUTROPHILS # BLD AUTO: 7.74 K/UL (ref 2–7.15)
NEUTROPHILS NFR BLD: 70.7 % (ref 44–72)
NITRITE UR QL STRIP.AUTO: NEGATIVE
NRBC # BLD AUTO: 0 K/UL
NRBC BLD AUTO-RTO: 0 /100 WBC
PH UR STRIP.AUTO: 6 [PH]
PLATELET # BLD AUTO: 170 K/UL (ref 164–446)
PMV BLD AUTO: 10.8 FL (ref 9–12.9)
POTASSIUM SERPL-SCNC: 3.9 MMOL/L (ref 3.6–5.5)
PROT SERPL-MCNC: 6 G/DL (ref 6–8.2)
PROT UR QL STRIP: NEGATIVE MG/DL
RBC # BLD AUTO: 4.94 M/UL (ref 4.2–5.4)
RBC UR QL AUTO: NEGATIVE
SIGNIFICANT IND 70042: NORMAL
SITE SITE: NORMAL
SODIUM SERPL-SCNC: 138 MMOL/L (ref 135–145)
SOURCE SOURCE: NORMAL
SP GR UR STRIP.AUTO: 1.01
TROPONIN I SERPL-MCNC: 0.04 NG/ML (ref 0–0.04)
UROBILINOGEN UR STRIP.AUTO-MCNC: 2 MG/DL
WBC # BLD AUTO: 10.9 K/UL (ref 4.8–10.8)

## 2017-10-03 PROCEDURE — 94667 MNPJ CHEST WALL 1ST: CPT

## 2017-10-03 PROCEDURE — 71250 CT THORAX DX C-: CPT

## 2017-10-03 PROCEDURE — 700102 HCHG RX REV CODE 250 W/ 637 OVERRIDE(OP): Performed by: INTERNAL MEDICINE

## 2017-10-03 PROCEDURE — 80053 COMPREHEN METABOLIC PANEL: CPT

## 2017-10-03 PROCEDURE — 84484 ASSAY OF TROPONIN QUANT: CPT

## 2017-10-03 PROCEDURE — A9270 NON-COVERED ITEM OR SERVICE: HCPCS | Performed by: INTERNAL MEDICINE

## 2017-10-03 PROCEDURE — 36415 COLL VENOUS BLD VENIPUNCTURE: CPT

## 2017-10-03 PROCEDURE — 700111 HCHG RX REV CODE 636 W/ 250 OVERRIDE (IP): Performed by: INTERNAL MEDICINE

## 2017-10-03 PROCEDURE — 99233 SBSQ HOSP IP/OBS HIGH 50: CPT | Performed by: INTERNAL MEDICINE

## 2017-10-03 PROCEDURE — A9270 NON-COVERED ITEM OR SERVICE: HCPCS

## 2017-10-03 PROCEDURE — 700101 HCHG RX REV CODE 250: Performed by: INTERNAL MEDICINE

## 2017-10-03 PROCEDURE — 94640 AIRWAY INHALATION TREATMENT: CPT

## 2017-10-03 PROCEDURE — 770020 HCHG ROOM/CARE - TELE (206)

## 2017-10-03 PROCEDURE — 81003 URINALYSIS AUTO W/O SCOPE: CPT

## 2017-10-03 PROCEDURE — 85025 COMPLETE CBC W/AUTO DIFF WBC: CPT

## 2017-10-03 PROCEDURE — 94669 MECHANICAL CHEST WALL OSCILL: CPT

## 2017-10-03 PROCEDURE — 700105 HCHG RX REV CODE 258

## 2017-10-03 PROCEDURE — 94760 N-INVAS EAR/PLS OXIMETRY 1: CPT

## 2017-10-03 PROCEDURE — 700102 HCHG RX REV CODE 250 W/ 637 OVERRIDE(OP)

## 2017-10-03 PROCEDURE — 94668 MNPJ CHEST WALL SBSQ: CPT

## 2017-10-03 RX ORDER — METRONIDAZOLE 500 MG/1
500 TABLET ORAL EVERY 8 HOURS
Status: DISCONTINUED | OUTPATIENT
Start: 2017-10-03 | End: 2017-10-11 | Stop reason: HOSPADM

## 2017-10-03 RX ORDER — SODIUM CHLORIDE 9 MG/ML
INJECTION, SOLUTION INTRAVENOUS
Status: COMPLETED
Start: 2017-10-03 | End: 2017-10-04

## 2017-10-03 RX ORDER — GUAIFENESIN 600 MG/1
1200 TABLET, EXTENDED RELEASE ORAL EVERY 12 HOURS
Status: DISCONTINUED | OUTPATIENT
Start: 2017-10-03 | End: 2017-10-04

## 2017-10-03 RX ORDER — LEVOFLOXACIN 5 MG/ML
750 INJECTION, SOLUTION INTRAVENOUS EVERY 24 HOURS
Status: DISCONTINUED | OUTPATIENT
Start: 2017-10-03 | End: 2017-10-04

## 2017-10-03 RX ORDER — NYSTATIN 100000 U/G
CREAM TOPICAL 2 TIMES DAILY
Status: DISCONTINUED | OUTPATIENT
Start: 2017-10-03 | End: 2017-10-11 | Stop reason: HOSPADM

## 2017-10-03 RX ORDER — GUAIFENESIN 600 MG/1
1200 TABLET, EXTENDED RELEASE ORAL EVERY 12 HOURS
Status: DISCONTINUED | OUTPATIENT
Start: 2017-10-03 | End: 2017-10-07

## 2017-10-03 RX ADMIN — ENOXAPARIN SODIUM 40 MG: 100 INJECTION SUBCUTANEOUS at 08:37

## 2017-10-03 RX ADMIN — HYDROMORPHONE HYDROCHLORIDE 4 MG: 4 TABLET ORAL at 20:32

## 2017-10-03 RX ADMIN — GUAIFENESIN 1200 MG: 600 TABLET, EXTENDED RELEASE ORAL at 20:37

## 2017-10-03 RX ADMIN — METRONIDAZOLE 500 MG: 500 TABLET ORAL at 20:34

## 2017-10-03 RX ADMIN — OXYCODONE HYDROCHLORIDE 30 MG: 30 TABLET ORAL at 21:40

## 2017-10-03 RX ADMIN — SODIUM CHLORIDE 10 ML: 9 INJECTION, SOLUTION INTRAVENOUS at 15:14

## 2017-10-03 RX ADMIN — ALBUTEROL SULFATE 2.5 MG: 2.5 SOLUTION RESPIRATORY (INHALATION) at 10:38

## 2017-10-03 RX ADMIN — CLOPIDOGREL 75 MG: 75 TABLET, FILM COATED ORAL at 08:36

## 2017-10-03 RX ADMIN — DIPHENHYDRAMINE HCL 25 MG: 25 TABLET ORAL at 12:54

## 2017-10-03 RX ADMIN — MORPHINE SULFATE 120 MG: 60 TABLET, EXTENDED RELEASE ORAL at 08:36

## 2017-10-03 RX ADMIN — OXYCODONE HYDROCHLORIDE 30 MG: 30 TABLET ORAL at 15:29

## 2017-10-03 RX ADMIN — LEVOTHYROXINE SODIUM 75 MCG: 75 TABLET ORAL at 06:49

## 2017-10-03 RX ADMIN — STANDARDIZED SENNA CONCENTRATE AND DOCUSATE SODIUM 2 TABLET: 8.6; 5 TABLET, FILM COATED ORAL at 08:36

## 2017-10-03 RX ADMIN — ALBUTEROL SULFATE 2.5 MG: 2.5 SOLUTION RESPIRATORY (INHALATION) at 07:17

## 2017-10-03 RX ADMIN — LEVOFLOXACIN 750 MG: 5 INJECTION, SOLUTION INTRAVENOUS at 00:29

## 2017-10-03 RX ADMIN — HYDROMORPHONE HYDROCHLORIDE 4 MG: 4 TABLET ORAL at 08:36

## 2017-10-03 RX ADMIN — OXYCODONE HYDROCHLORIDE 30 MG: 30 TABLET ORAL at 00:43

## 2017-10-03 RX ADMIN — NICOTINE 14 MG: 14 PATCH, EXTENDED RELEASE TRANSDERMAL at 04:31

## 2017-10-03 RX ADMIN — ALBUTEROL SULFATE 2.5 MG: 2.5 SOLUTION RESPIRATORY (INHALATION) at 16:01

## 2017-10-03 RX ADMIN — LEVOFLOXACIN 750 MG: 5 INJECTION, SOLUTION INTRAVENOUS at 20:46

## 2017-10-03 RX ADMIN — OXYCODONE HYDROCHLORIDE 30 MG: 30 TABLET ORAL at 04:34

## 2017-10-03 RX ADMIN — ALPRAZOLAM 1 MG: 1 TABLET ORAL at 21:40

## 2017-10-03 RX ADMIN — NYSTATIN: 100000 CREAM TOPICAL at 12:54

## 2017-10-03 RX ADMIN — STANDARDIZED SENNA CONCENTRATE AND DOCUSATE SODIUM 2 TABLET: 8.6; 5 TABLET, FILM COATED ORAL at 20:33

## 2017-10-03 RX ADMIN — ALPRAZOLAM 1 MG: 1 TABLET ORAL at 03:51

## 2017-10-03 RX ADMIN — GUAIFENESIN 1200 MG: 600 TABLET, EXTENDED RELEASE ORAL at 15:20

## 2017-10-03 RX ADMIN — NYSTATIN: 100000 CREAM TOPICAL at 20:47

## 2017-10-03 RX ADMIN — METRONIDAZOLE 500 MG: 500 INJECTION, SOLUTION INTRAVENOUS at 14:00

## 2017-10-03 RX ADMIN — GUAIFENESIN 1200 MG: 600 TABLET, EXTENDED RELEASE ORAL at 20:47

## 2017-10-03 RX ADMIN — ALPRAZOLAM 1 MG: 1 TABLET ORAL at 12:54

## 2017-10-03 RX ADMIN — OMEPRAZOLE 20 MG: 20 CAPSULE, DELAYED RELEASE ORAL at 06:49

## 2017-10-03 RX ADMIN — PREDNISONE 50 MG: 50 TABLET ORAL at 08:36

## 2017-10-03 RX ADMIN — MORPHINE SULFATE 120 MG: 60 TABLET, EXTENDED RELEASE ORAL at 20:31

## 2017-10-03 ASSESSMENT — COPD QUESTIONNAIRES
DO YOU EVER COUGH UP ANY MUCUS OR PHLEGM?: YES, EVERY DAY
COPD SCREENING SCORE: 8
DURING THE PAST 4 WEEKS HOW MUCH DID YOU FEEL SHORT OF BREATH: SOME OF THE TIME
DURING THE PAST 4 WEEKS HOW MUCH DID YOU FEEL SHORT OF BREATH: SOME OF THE TIME
HAVE YOU SMOKED AT LEAST 100 CIGARETTES IN YOUR ENTIRE LIFE: YES
COPD SCREENING SCORE: 8
DO YOU EVER COUGH UP ANY MUCUS OR PHLEGM?: YES, EVERY DAY
HAVE YOU SMOKED AT LEAST 100 CIGARETTES IN YOUR ENTIRE LIFE: YES

## 2017-10-03 ASSESSMENT — PATIENT HEALTH QUESTIONNAIRE - PHQ9
SUM OF ALL RESPONSES TO PHQ9 QUESTIONS 1 AND 2: 0
SUM OF ALL RESPONSES TO PHQ QUESTIONS 1-9: 0
1. LITTLE INTEREST OR PLEASURE IN DOING THINGS: NOT AT ALL
2. FEELING DOWN, DEPRESSED, IRRITABLE, OR HOPELESS: NOT AT ALL

## 2017-10-03 ASSESSMENT — PAIN SCALES - GENERAL
PAINLEVEL_OUTOF10: 8
PAINLEVEL_OUTOF10: 9
PAINLEVEL_OUTOF10: 10

## 2017-10-03 ASSESSMENT — ENCOUNTER SYMPTOMS
WEAKNESS: 1
WHEEZING: 1
SPUTUM PRODUCTION: 1
ABDOMINAL PAIN: 1
COUGH: 1
SHORTNESS OF BREATH: 1
DIAPHORESIS: 1
HEMOPTYSIS: 0

## 2017-10-03 ASSESSMENT — LIFESTYLE VARIABLES: EVER_SMOKED: YES

## 2017-10-03 NOTE — ASSESSMENT & PLAN NOTE
weaning her home pain medications, as per schedule  Psychiatry agrees this needs to be done  Patient refuses alternate medications aimed at neuropathy  Psychiatry to discuss with the patient's primary care provider

## 2017-10-03 NOTE — CARE PLAN
Problem: Safety  Goal: Will remain free from injury  Outcome: PROGRESSING AS EXPECTED  Patient educated about risk of falls and reasoning for use of tread socks, calling for help, and bed alarms.       Problem: Knowledge Deficit  Goal: Knowledge of disease process/condition, treatment plan, diagnostic tests, and medications will improve  Outcome: PROGRESSING AS EXPECTED  Patient educated regarding medications, procedures and plan of care.

## 2017-10-03 NOTE — PROGRESS NOTES
[facility] Hospitalist Progress Note    Date of Service: 10/3/2017    Chief Complaint  Chest pain    62 y.o. female  with past medical history of chronic pain and opiate dependence, admitted on 2017 with right-sided pneumonia, chest pain, concern for NSTEMI- her troponin peaked at 0.4. She was to undergo cardiac catheterization but then signed out AMA on 10/1.  She returns with continued complaints of chest pain which is more pleuritic than typical for angina, her troponins are decreased significantly. She has been seen by cardiology and they do not feel cardiac cath is warranted at this time.    Interval Problem Update  Patient continues to complain of pain despite very high doses of narcotics and very obvious sedation. She complains of the pain as being a burning all through her abdomen, she also endorses continued chest pain shortness of breath. She is not producing much sputum. She is seen with nursing student and her bedside RN.  She was discussed in multidisciplinary Rounds    Consultants/Specialty  Cardiology- signed off    Disposition  Probably home, PT OT evchidi pending        Review of Systems   Unable to perform ROS: Other   Constitutional: Positive for diaphoresis and malaise/fatigue.   Respiratory: Positive for cough, sputum production, shortness of breath and wheezing. Negative for hemoptysis.    Cardiovascular: Positive for chest pain.   Gastrointestinal: Positive for abdominal pain.   Genitourinary: Positive for dysuria.   Neurological: Positive for weakness.   Limited 2/2 patient's sedation/ borderline obtundation   Physical Exam  Laboratory/Imaging   Hemodynamics  Temp (24hrs), Av.5 °C (97.7 °F), Min:36.3 °C (97.3 °F), Max:37.1 °C (98.7 °F)   Temperature: 36.5 °C (97.7 °F)  Pulse  Av.2  Min: 58  Max: 86    Blood Pressure: 121/59, NIBP: 113/59      Respiratory      Respiration: 20, Pulse Oximetry: 91 %, O2 Daily Delivery Respiratory : Silicone Nasal Cannula     Given By:: Mouthpiece,  PEP/CPT Method: Positive Airway Pressure Device, Work Of Breathing / Effort: Mild  RUL Breath Sounds: Expiratory Wheezes, RML Breath Sounds: Expiratory Wheezes, RLL Breath Sounds: Expiratory Wheezes, CHAYO Breath Sounds: Expiratory Wheezes, LLL Breath Sounds: Expiratory Wheezes    Fluids    Intake/Output Summary (Last 24 hours) at 10/03/17 1207  Last data filed at 10/03/17 0958   Gross per 24 hour   Intake              240 ml   Output              200 ml   Net               40 ml       Nutrition  Orders Placed This Encounter   Procedures   • Diet Order     Standing Status:   Standing     Number of Occurrences:   1     Order Specific Question:   Diet:     Answer:   Cardiac [6]     Physical Exam   Constitutional: She appears well-developed. No distress.   Obese disheveled   HENT:   Head: Normocephalic and atraumatic.   Eyes: Pupils are equal, round, and reactive to light. Right eye exhibits no discharge. Left eye exhibits no discharge.   Neck: Neck supple.   Cardiovascular: Normal rate and regular rhythm.    Pulmonary/Chest:   Right-sided wheezes rhonchi  Poor respiratory effort   Abdominal: Soft. Bowel sounds are normal. She exhibits distension. There is tenderness. There is rebound. There is no guarding.   Neurological:   Very groggy   Skin: Skin is warm and dry. She is not diaphoretic.   Psychiatric:   too groggy to determine   Nursing note and vitals reviewed.      Recent Labs      10/01/17   0518  10/02/17   1836  10/03/17   0605   WBC  10.6  16.9*  10.9*   RBC  5.08  5.19  4.94   HEMOGLOBIN  14.6  15.3  14.0   HEMATOCRIT  46.4  46.6  45.3   MCV  91.3  89.8  91.7   MCH  28.7  29.5  28.3   MCHC  31.5*  32.8*  30.9*   RDW  52.1*  50.2*  52.4*   PLATELETCT  153*  178  170   MPV  11.6  11.3  10.8     Recent Labs      09/30/17   1915  10/02/17   1836  10/03/17   0605   SODIUM  135  138  138   POTASSIUM  4.3  4.2  3.9   CHLORIDE  103  104  105   CO2  24  25  27   GLUCOSE  120*  104*  81   BUN  11  22  22   CREATININE   0.82  0.82  0.78   CALCIUM  8.9  9.3  8.7     Recent Labs      10/01/17   0518  10/02/17   1836   APTT  51.9*  28.1   INR   --   1.01     Recent Labs      09/30/17   1915  10/02/17   1836   BNPBTYPENAT  608*  241*              Assessment/Plan     Pneumonia- (present on admission)   Assessment & Plan    CXR diffuse infiltrate- looks like right/ unilateral pulmonary edema, however pro calcitonin was 0.06, she has no fever, blood cultures negative thus far  Given her pleuritic chest pain and likely aspiration- with unilateral wheezing, will wish to rule out aspirated foreign body- reviewed CT and shows bilateral infiltrates R>>L, no FB  Continue Levaquin, add flagyl and mucinex- I suspect this is aspiration from too much narcotic medication  RT protocol  Stop fluids        NSTEMI (non-ST elevated myocardial infarction) (CMS-HCC)- (present on admission)   Assessment & Plan    No ST-T wave changes on EKG, per cardiology chest pain is atypical  Troponins are improved from prior admission, echo with EF of 60  Cardiology has signed off        COPD exacerbation (CMS-HCC)- (present on admission)   Assessment & Plan    On oxygen, RT protocol, prednisone-wheezing is secondary to pneumonia, add mucinex        Cellulitis and abscess of leg- (present on admission)   Assessment & Plan    Seems to be chronic , secondary to cat scratches          Dysuria- (present on admission)   Assessment & Plan    Chronic  Culture is negative  Stop Levaquin  Urology follow-up for symptoms        Acute respiratory failure with hypoxemia (CMS-HCC)- (present on admission)   Assessment & Plan    2/2 COPD and PNA        Chest pain- (present on admission)   Assessment & Plan    Cardiology feels it is not cardiac in origin -patient has history of opiate dependence, continue with PO  pain medicines  Because this is a relatively chronic complaint, would be unwise to transfer off telemetry, as she would require rapid response, serial enzymes for this  ongoing complaint if moved off this floor          Hypothyroidism- (present on admission)   Assessment & Plan    Continue levothyroxine        Chronic low back pain- (present on admission)   Assessment & Plan    Continue home pain medications  Needs to f/u with pcp to wean off heavy opiate regimen  Patient is borderline obtunded from her heavy narcotic doses and this may interfere with her respiratory recovery we may need to wean these off in house but likely she will again sign out AMA            Reviewed items::  Labs reviewed, Medications reviewed and Radiology images reviewed  Calles catheter::  No Calles  DVT prophylaxis pharmacological::  Enoxaparin (Lovenox)  Ulcer Prophylaxis::  Not indicated  Antibiotics:  Treating active infection/contamination beyond 24 hours perioperative coverage

## 2017-10-03 NOTE — ED NOTES
Protocol initiated. Blood sent to lab  Pt appears drowsy, per granddaughter pt always appears this way. I asked if she takes any medication that makes her drowsy, she mumbles a couple pain medications.   Pt to ekg  fsbs 103, pt still wearing old armband, removed old armband.

## 2017-10-03 NOTE — ED NOTES
Med Rec completed per patient at bedside.  Allergies reviewed   Bactrim 800/160mg  Completed 9-27-17

## 2017-10-03 NOTE — H&P
HOSPITAL MEDICINE HISTORY/ PHYSICAL    Date of Service:  10/2/2017   8:39 PM       Patient ID:   Name: Ashleigh Garces  . YOB: 1955. Age: 62 y.o. female. MRN: [id number]    Admitting Attending:  Raj Green     PCP : Sudha Lam M.D.          Chief Complaint:       Chest pain    History of Present Illness:    Dez is a 62 y.o. female w/h/o COPD, bipolar disorder, congestive heart failure, dyslipidemia and chronic low back pain, opiate dependence\, smoking,  was admitted on 9/30 with COPD exacerbation requiring BiPAP, found to have NSTEMI with a probable proximal LAD lesion, started on IV heparine started on ASA desensitization, treated with lasix, started on lipitor  and apparently was planned to do angiography, but she left AMA. She came back today, with the same complaints: mid chest pain, which she describes as dull, brought up by cough and deep breath, SOB, cough with green sputum, chills, feeling warm, wheezes, generalized weakness. Additionally, ROS positive for dysuria and chronic rash from her cats scratches, and LLQ chronic pain           Review of Systems:    Has ROS  Please see HPI, all other systems were reviewed and are negative (AMA/CMS criteria)              Past Medical/ Family / Social history (PFSH):   Past Medical History:   Diagnosis Date   • Arthritis    • ASTHMA    • Bipolar disorder (CMS-HCC)    • Bursitis    • Chronic low back pain    • Congestive heart failure (CMS-HCC)    • Dyslipidemia    • Fall     last fall a month ago   • GERD (gastroesophageal reflux disease)    • Hypothyroid    • Migraine    • Obesity    • Sinusitis; chronic    • Unspecified urinary incontinence      Past Surgical History:   Procedure Laterality Date   • ABDOMINAL HYSTERECTOMY TOTAL      age 19's    • APPENDECTOMY     • CHOLECYSTECTOMY     • GYN SURGERY     • OTHER ABDOMINAL SURGERY     • OTHER ORTHOPEDIC SURGERY      back surgery     Current Outpatient Medications:  No current  "facility-administered medications on file prior to encounter.      Current Outpatient Prescriptions on File Prior to Encounter   Medication Sig Dispense Refill   • SYNTHROID 75 MCG Tab TAKE ONE TABLET BY MOUTH EVERY DAY 90 Tab 3   • sulfamethoxazole-trimethoprim (BACTRIM DS) 800-160 MG tablet TAKE 1 TABLET BY MOUTH TWICE A DAY 28 Tab 0   • furosemide (LASIX) 20 MG Tab TAKE 1 TAB BY MOUTH EVERY DAY. 90 Tab 0   • alprazolam (XANAX) 1 MG Tab Take 1 Tab by mouth 3 times a day as needed. 90 Tab 0   • omeprazole (PRILOSEC) 20 MG delayed-release capsule Take 1 Cap by mouth every day. 90 Cap 3   • estradiol (ESTRACE) 0.5 MG tablet Take 1 Tab by mouth every day. 90 Tab 3   • HYDROmorphone (DILAUDID) 4 MG Tab Take 4 mg by mouth 2 Times a Day.     • morphine SR (MS CONTIN) 60 MG TBCR tablet Take 120 mg by mouth every 12 hours.       Medication Allergy/Sensitivities:  Allergies   Allergen Reactions   • Contrast Media With Iodine [Iodine] Anaphylaxis   • Naproxen    • Asa [Aspirin]    • Cephalosporins    • Compazine    • Doxycycline    • Macrolides    • Phenothiazines    • Sympathomimetics    • Toradol Anaphylaxis     Family History:  Family History   Problem Relation Age of Onset   • Cancer Mother      unknown   • Hypertension Father    • Diabetes Father    • Stroke Father    • Diabetes Paternal Grandmother       Social History:  Social History   Substance Use Topics   • Smoking status: Current Every Day Smoker     Packs/day: 0.50     Years: 30.00     Types: Cigarettes   • Smokeless tobacco: Never Used      Comment: 1/2 pack per day   • Alcohol use No     #################################################################  Physical Exam:   Vitals/ General Appearance:   Weight/BMI: Body mass index is 41.2 kg/m².  Blood pressure 138/114, pulse 70, temperature 37.1 °C (98.7 °F), resp. rate 16, height 1.626 m (5' 4\"), weight 108.9 kg (240 lb), last menstrual period 01/01/1988, SpO2 97 %.   Vitals:    10/02/17 1525 10/02/17 1529 " "10/02/17 1650 10/02/17 1957   BP: 130/81  138/114    Pulse: 84  70    Resp: 18  16    Temp: 37.1 °C (98.7 °F)      SpO2: 92%  91% 97%   Weight:  108.9 kg (240 lb)     Height: 1.626 m (5' 4\")       Oxygen Therapy:  Pulse Oximetry: 97 %, O2 (LPM): 5, O2 Delivery: Nasal Cannula    Constitutional:  well developed, obese debilitated  HENMT: Normocephalic, atraumatic, b/l ears normal, nose normal  Eyes:  EOMI, conjunctiva normal, no discharge  Neck: no tracheal deviation, supple  Cardiovascular: normal heart rate, normal rhythm, no murmurs, no rubs or gallops; no cyanosis, clubbing or edema  Lungs: b/l exp wheezes, rhonchi.  Abdomen: soft, tender in LLQ, no guarding or rebound  Skin: warm, dry, no erythema, multiple pustular and crusted elements throughout over the trunk and extremities    Neurologic: Alert and oriented, generalized muscle weakness 4/5 , no focal deficits, CN II-XII normal  Psychiatric: Some anxiety or depression    #################################################################  Lab Data Review:    Objective   Recent Results (from the past 24 hour(s))   ACCU-CHEK GLUCOSE    Collection Time: 10/02/17  6:32 PM   Result Value Ref Range    Glucose - Accu-Ck 104 (H) 65 - 99 mg/dL   Troponin    Collection Time: 10/02/17  6:36 PM   Result Value Ref Range    Troponin I 0.07 (H) 0.00 - 0.04 ng/mL   Btype Natriuretic Peptide    Collection Time: 10/02/17  6:36 PM   Result Value Ref Range    B Natriuretic Peptide 241 (H) 0 - 100 pg/mL   CBC with Differential    Collection Time: 10/02/17  6:36 PM   Result Value Ref Range    WBC 16.9 (H) 4.8 - 10.8 K/uL    RBC 5.19 4.20 - 5.40 M/uL    Hemoglobin 15.3 12.0 - 16.0 g/dL    Hematocrit 46.6 37.0 - 47.0 %    MCV 89.8 81.4 - 97.8 fL    MCH 29.5 27.0 - 33.0 pg    MCHC 32.8 (L) 33.6 - 35.0 g/dL    RDW 50.2 (H) 35.9 - 50.0 fL    Platelet Count 178 164 - 446 K/uL    MPV 11.3 9.0 - 12.9 fL    Neutrophils-Polys 83.30 (H) 44.00 - 72.00 %    Lymphocytes 10.30 (L) 22.00 - 41.00 % "    Monocytes 5.60 0.00 - 13.40 %    Eosinophils 0.00 0.00 - 6.90 %    Basophils 0.10 0.00 - 1.80 %    Immature Granulocytes 0.70 0.00 - 0.90 %    Nucleated RBC 0.00 /100 WBC    Neutrophils (Absolute) 14.05 (H) 2.00 - 7.15 K/uL    Lymphs (Absolute) 1.74 1.00 - 4.80 K/uL    Monos (Absolute) 0.94 (H) 0.00 - 0.85 K/uL    Eos (Absolute) 0.00 0.00 - 0.51 K/uL    Baso (Absolute) 0.02 0.00 - 0.12 K/uL    Immature Granulocytes (abs) 0.11 0.00 - 0.11 K/uL    NRBC (Absolute) 0.00 K/uL   Complete Metabolic Panel (CMP)    Collection Time: 10/02/17  6:36 PM   Result Value Ref Range    Sodium 138 135 - 145 mmol/L    Potassium 4.2 3.6 - 5.5 mmol/L    Chloride 104 96 - 112 mmol/L    Co2 25 20 - 33 mmol/L    Anion Gap 9.0 0.0 - 11.9    Glucose 104 (H) 65 - 99 mg/dL    Bun 22 8 - 22 mg/dL    Creatinine 0.82 0.50 - 1.40 mg/dL    Calcium 9.3 8.5 - 10.5 mg/dL    AST(SGOT) 46 (H) 12 - 45 U/L    ALT(SGPT) 38 2 - 50 U/L    Alkaline Phosphatase 82 30 - 99 U/L    Total Bilirubin 0.4 0.1 - 1.5 mg/dL    Albumin 3.7 3.2 - 4.9 g/dL    Total Protein 6.7 6.0 - 8.2 g/dL    Globulin 3.0 1.9 - 3.5 g/dL    A-G Ratio 1.2 g/dL   Prothrombin Time    Collection Time: 10/02/17  6:36 PM   Result Value Ref Range    PT 13.6 12.0 - 14.6 sec    INR 1.01 0.87 - 1.13   APTT    Collection Time: 10/02/17  6:36 PM   Result Value Ref Range    APTT 28.1 24.7 - 36.0 sec   Lipase    Collection Time: 10/02/17  6:36 PM   Result Value Ref Range    Lipase 14 11 - 82 U/L   ESTIMATED GFR    Collection Time: 10/02/17  6:36 PM   Result Value Ref Range    GFR If African American >60 >60 mL/min/1.73 m 2    GFR If Non African American >60 >60 mL/min/1.73 m 2   EKG (ER)    Collection Time: 10/02/17  6:39 PM   Result Value Ref Range    Report       [facility]    Test Date:  2017-10-02  Pt Name:    JESSIKA MORALES            Department: ER  MRN:        [id number]                      Room:  Gender:     F                            Technician:  [id number]  :        [id number]                   Requested By:ER TRIAGE PROTOCOL  Order #:    [id number]                    Reading MD:    Measurements  Intervals                                Axis  Rate:       62                           P:          -16  AK:         164                          QRS:        132  QRSD:       90                           T:          -29  QT:         436  QTc:        443    Interpretive Statements  SINUS RHYTHM  RIGHT AXIS DEVIATION  ABNORMAL T, PROBABLE ISCHEMIA, WIDESPREAD  Compared to ECG 10/01/2017 05:13:23  Right-axis deviation now present  T-wave abnormality still present  Possible ischemia still present         (click the triangle to expand results)  My interpretation of lab results:   White blood cells 16.9, troponin 0.07, , AST 46.    Imaging/Procedures Review:    DX-CHEST-LIMITED (1 VIEW)   Final Result         1.  Pulmonary edema and/or infiltrates are identified, which are somewhat decreased since the prior exam.   2.  Linear density at the right lung base favors atelectasis   3.  Cardiomegaly        EKG:   per my independant read:  QTc:443, HR: 63, Normal Sinus Rhythm, widespread T-wave inversion with no changes from previous EKG.  Right axis deviation    Assessment and Plan:      Pneumonia   Assessment & Plan    Shortness of breath, hypoxemia, cough w/ green sputum, white blood cells  16.9, BL infiltrates on CXR  Does not appear to be septic  High risk for resistant deidre  She was treated with levaquin in the hospital for 2 days Will restart levofloxacin IV  Sputum culture, blood culture  Respiratory protocol        NSTEMI (non-ST elevated myocardial infarction) (CMS-HCC)- (present on admission)   Assessment & Plan    Was diagnosed at previous admission 2 days ago  EKG has no changes  Troponin trends down  Admit to telemetry  Restart plavix, lipitor  Discussed with patient. , who promised to see the patient        COPD exacerbation (CMS-HCC)- (present on  admission)   Assessment & Plan    Start prednisone  levofloxacin IV  Respiratory protocol  Oxygen  smoking cessation conunselling         Cellulitis and abscess of leg- (present on admission)   Assessment & Plan    Seems to be chronic , secondary to cat scratches          UTI (urinary tract infection)- (present on admission)   Assessment & Plan    Chronic  Ucx preliminary negative  IV levaquin while in the hospital  Urology f/u as o/p        Chest pain   Assessment & Plan    Pleuritic chest pain  Patient just had NSTEMI, trop trending down, EKG did not change (widespread Tw inversion)  Allergic to aspirin, will start on plavix  We'll call cardiology for follow-up with possible angiography  Morphine IV as needed           Hypothyroidism- (present on admission)   Assessment & Plan    Continue levothyroxine        Chronic low back pain- (present on admission)   Assessment & Plan    Continue home pain medications  Needs to f/u with pcp to wean off heavy opiate regimen              1. Prophylaxis: lovenox  2. Code: Full code per patient with pt present  3. Dispo: Pt will be admitted to inpatient for management that is expected to take greater than 2 midnights

## 2017-10-03 NOTE — ASSESSMENT & PLAN NOTE
No ST-T wave changes on EKG, per cardiology chest pain is atypical  Troponins are improved from prior admission, echo with EF of 60  Cardiology has signed off

## 2017-10-03 NOTE — ASSESSMENT & PLAN NOTE
On oxygen, RT protocol, prednisone-wheezing was likely precipitated by her dysphagia, aspiration  More wheezing today likely secondary to ongoing aspiration and noncompliance with aspiration precautions

## 2017-10-03 NOTE — CONSULTS
Cardiology Consult Note:    Austin To  Date & Time note created:    10/2/2017   10:08 PM     Referring MD:  Dr. Green    Patient ID:   Name:             Ashleigh Garces     YOB: 1955  Age:                 62 y.o.  female   MRN:               [id number]                                                             Chief Complaint / Reason for consult:  Abnormal EKG in setting of clinically non-significant troponin elevation.    History of Present Illness:    61 yo woman with prior medical history of bipolar disorder, chronic back pain with chronic use of narcotics, presented to the hospital again with report of not feeling well. Of note, patient recently left AMA yesterday. She initially presented to the hospital because of pneumonia. She was found to have elevated troponin. She was empirically treated for an acute coronary syndrome. However, today her troponin has relatively reduced to 0.07. Her EKG remains unchanged with diffuse T-wave inversion. Patient continued to report of having chronic continuous chest pain since yesterday. Although she is a very poor historian.     She did have a transthoracic echocardiogram with normal left ventricular systolic function and no significant valvular disease.    She is not volume overloaded at this time.    Review of Systems:      Constitutional: Denies fevers, Denies weight changes  Eyes: Denies changes in vision, no eye pain  Ears/Nose/Throat/Mouth: Denies nasal congestion or sore throat   Cardiovascular: yes chest pain, no palpitations   Respiratory: no shortness of breath , Denies cough  Gastrointestinal/Hepatic: Denies abdominal pain, nausea, vomiting, diarrhea, constipation or GI bleeding   Genitourinary: Denies dysuria or frequency  Musculoskeletal/Rheum: Denies  joint pain and swelling   Skin: Denies rash  Neurological: Denies headache, confusion, memory loss or focal weakness/parasthesias  Psychiatric: denies mood disorder   Endocrine:  Rima thyroid problems  Heme/Oncology/Lymph Nodes: Denies enlarged lymph nodes, denies brusing or known bleeding disorder  All other systems were reviewed and are negative (AMA/CMS criteria)                Past Medical History:   Past Medical History:   Diagnosis Date   • Arthritis    • ASTHMA    • Bipolar disorder (CMS-HCC)    • Bursitis    • Chronic low back pain    • Congestive heart failure (CMS-HCC)    • Dyslipidemia    • Fall     last fall a month ago   • GERD (gastroesophageal reflux disease)    • Hypothyroid    • Migraine    • Obesity    • Sinusitis; chronic    • Unspecified urinary incontinence      Active Hospital Problems    Diagnosis   • Pneumonia [J18.9]     Priority: High   • COPD exacerbation (CMS-HCC) [J44.1]     Priority: High   • NSTEMI (non-ST elevated myocardial infarction) (CMS-HCC) [I21.4]     Priority: High   • Cellulitis and abscess of leg [L03.119, L02.419]     Priority: High   • UTI (urinary tract infection) [N39.0]     Priority: Medium   • Hypothyroidism [E03.9]     Priority: Low   • Chronic low back pain [M54.5, G89.29]     Priority: Low   • Chest pain [R07.9]       Past Surgical History:  Past Surgical History:   Procedure Laterality Date   • ABDOMINAL HYSTERECTOMY TOTAL      age 19's    • APPENDECTOMY     • CHOLECYSTECTOMY     • GYN SURGERY     • OTHER ABDOMINAL SURGERY     • OTHER ORTHOPEDIC SURGERY      back surgery       Hospital Medications:    Current Facility-Administered Medications:   •  morphine (pf) 4 mg/ml injection 2 mg, 2 mg, Intravenous, Once, Raj Green M.D.  •  senna-docusate (PERICOLACE or SENOKOT S) 8.6-50 MG per tablet 2 Tab, 2 Tab, Oral, BID **AND** polyethylene glycol/lytes (MIRALAX) PACKET 1 Packet, 1 Packet, Oral, QDAY PRN **AND** magnesium hydroxide (MILK OF MAGNESIA) suspension 30 mL, 30 mL, Oral, QDAY PRN **AND** bisacodyl (DULCOLAX) suppository 10 mg, 10 mg, Rectal, QDAY PRN, Raj Green M.D.  •  Respiratory Care per Protocol, , Nebulization,  Continuous RT, Raj Green M.D.  •  NS infusion, , Intravenous, Continuous, Raj Green M.D.  •  enoxaparin (LOVENOX) inj 30 mg, 30 mg, Subcutaneous, Q12HRS, Raj Green M.D.  •  labetalol (NORMODYNE,TRANDATE) injection 10 mg, 10 mg, Intravenous, Q4HRS PRN, Raj Green M.D.  •  guaifenesin dextromethorphan (ROBITUSSIN DM) 100-10 MG/5ML syrup 10 mL, 10 mL, Oral, Q6HRS PRN, Raj Green M.D.  •  INITIATE NICOTINE REPLACEMENT PROTOCOL , , , Once **AND** [START ON 10/3/2017] nicotine (NICODERM) 14 MG/24HR 14 mg, 14 mg, Transdermal, Daily-0600 **AND** Protocol 205 PATIENT EDUCATION MATERIALS, , , Once **AND** Protocol 205 Rotate nicotine patch application sites daily , , , CONTINUOUS **AND** nicotine polacrilex (NICORETTE) 2 MG piece 2 mg, 2 mg, Oral, Q HOUR PRN, Raj Green M.D.  •  acetaminophen (TYLENOL) tablet 650 mg, 650 mg, Oral, Q6HRS PRN, Raj Green M.D.  •  ondansetron (ZOFRAN) syringe/vial injection 4 mg, 4 mg, Intravenous, Q4HRS PRN, Raj Green M.D.  •  ondansetron (ZOFRAN ODT) dispertab 4 mg, 4 mg, Oral, Q4HRS PRN, Raj Green M.D.  •  alprazolam (XANAX) tablet 1 mg, 1 mg, Oral, TID PRN, Raj Green M.D.  •  diphenhydrAMINE (BENADRYL) tablet/capsule 25 mg, 25 mg, Oral, Q6HRS PRN, Raj Green M.D.  •  [START ON 10/3/2017] levothyroxine (SYNTHROID) tablet 75 mcg, 75 mcg, Oral, AM ES, Raj Green M.D.  •  morphine ER (MS CONTIN) tablet 120 mg, 120 mg, Oral, Q12HRS, Raj Green M.D.  •  HYDROmorphone (DILAUDID) tablet 4 mg, 4 mg, Oral, BID, Raj Green M.D.  •  [START ON 10/3/2017] omeprazole (PRILOSEC) capsule 20 mg, 20 mg, Oral, DAILY, Raj Green M.D.  •  oxycodone (OXY-IR) TABS 30 mg, 30 mg, Oral, Q4HRS PRN, Raj Green M.D.  •  [START ON 10/3/2017] levofloxacin in D5W (LEVAQUIN) IVPB 750 mg, 750 mg, Intravenous, Q24HRS, Raj Green M.D.  •  [START ON 10/3/2017] predniSONE (DELTASONE) tablet 50 mg, 50 mg,  Oral, DAILY, Raj Green M.D.  •  [START ON 10/3/2017] clopidogrel (PLAVIX) tablet 75 mg, 75 mg, Oral, DAILY, Raj Green M.D.  •  atorvastatin (LIPITOR) tablet 40 mg, 40 mg, Oral, QHS, Raj Green M.D.    Current Outpatient Medications:  Prescriptions Prior to Admission   Medication Sig Dispense Refill Last Dose   • levothyroxine (SYNTHROID) 75 MCG Tab Take 75 mcg by mouth Every morning on an empty stomach.   10/2/2017 at 1000   • nystatin (MYCOSTATIN) powder Apply 1 g to affected area(s) 1 time daily as needed.   10/1/2017 at 1000   • oxycodone (OXY-IR) 30 MG immediate release tablet Take 30 mg by mouth every four hours as needed for Moderate Pain.   10/2/2017 at 0700   • diphenhydrAMINE (BENADRYL) 25 MG Tab Take 25 mg by mouth every 6 hours as needed for Sleep.   10/1/2017 at unknown   • sulfamethoxazole-trimethoprim (BACTRIM DS) 800-160 MG tablet TAKE 1 TABLET BY MOUTH TWICE A DAY 28 Tab 0 9/27/2017 at FINISHED   • furosemide (LASIX) 20 MG Tab TAKE 1 TAB BY MOUTH EVERY DAY. 90 Tab 0 10/2/2017 at 0700   • alprazolam (XANAX) 1 MG Tab Take 1 Tab by mouth 3 times a day as needed. 90 Tab 0 10/2/2017 at 0700   • omeprazole (PRILOSEC) 20 MG delayed-release capsule Take 1 Cap by mouth every day. 90 Cap 3 10/2/2017 at 0700   • estradiol (ESTRACE) 0.5 MG tablet Take 1 Tab by mouth every day. 90 Tab 3 10/2/2017 at 0700   • HYDROmorphone (DILAUDID) 4 MG Tab Take 4 mg by mouth 2 Times a Day.   10/2/2017 at 0700   • morphine SR (MS CONTIN) 60 MG TBCR tablet Take 120 mg by mouth 3 times a day.   10/2/2017 at 0700       Medication Allergy:  Allergies   Allergen Reactions   • Contrast Media With Iodine [Iodine] Anaphylaxis   • Naproxen    • Asa [Aspirin]    • Cephalosporins    • Compazine    • Doxycycline    • Macrolides    • Phenothiazines    • Sympathomimetics    • Toradol Anaphylaxis       Family History:  Family History   Problem Relation Age of Onset   • Cancer Mother      unknown   • Hypertension Father   "  • Diabetes Father    • Stroke Father    • Diabetes Paternal Grandmother        Social History:  Social History     Social History   • Marital status: Single     Spouse name: N/A   • Number of children: N/A   • Years of education: N/A     Occupational History   • Not on file.     Social History Main Topics   • Smoking status: Current Every Day Smoker     Packs/day: 0.50     Years: 30.00     Types: Cigarettes   • Smokeless tobacco: Never Used      Comment: 1/2 pack per day   • Alcohol use No   • Drug use: No   • Sexual activity: Not on file     Other Topics Concern   • Not on file     Social History Narrative    ** Merged History Encounter **              Physical Exam:  Vitals/ General Appearance:   Weight/BMI: Body mass index is 41.2 kg/m².  Blood pressure 132/72, pulse 60, temperature 36.6 °C (97.8 °F), resp. rate 20, height 1.626 m (5' 4\"), weight 108.9 kg (240 lb), last menstrual period 01/01/1988, SpO2 95 %.  Vitals:    10/02/17 1650 10/02/17 1957 10/02/17 2138 10/02/17 2158   BP: 138/114   132/72   Pulse: 70  67 60   Resp: 16  20 20   Temp:    36.6 °C (97.8 °F)   SpO2: 91% 97% 96% 95%   Weight:       Height:         Oxygen Therapy:  Pulse Oximetry: 95 %, O2 (LPM): 4, O2 Delivery: Silicone Nasal Cannula    Constitutional:   Well developed, Well nourished, No acute distress  HENMT:  Normocephalic, Atraumatic, Oropharynx moist mucous membranes, No oral exudates, Nose normal.  No thyromegaly.  Eyes:  EOMI, Conjunctiva normal, No discharge.  Neck:  Normal range of motion, No cervical tenderness,  no JVD.  Cardiovascular:  Normal heart rate, Normal rhythm, No murmurs, No rubs, No gallops.   Extremitites with intact distal pulses, no cyanosis, or edema.  Lungs:  Normal breath sounds, breath sounds clear to auscultation bilaterally,  no rales, no rhonchi, no wheezing.   Abdomen: Bowel sounds normal, Soft, No tenderness, No guarding, No rebound, No masses, No hepatosplenomegaly.  Skin: Warm, Dry, No erythema, No rash, " no induration.  Neurologic: Alert & oriented x 3, No focal deficits noted, cranial nerves II through X are intact.  Psychiatric: Affect normal, Judgment normal, Mood normal.      MDM (Data Review):     Records reviewed and summarized in current documentation    Lab Data Review:  Recent Results (from the past 24 hour(s))   ACCU-CHEK GLUCOSE    Collection Time: 10/02/17  6:32 PM   Result Value Ref Range    Glucose - Accu-Ck 104 (H) 65 - 99 mg/dL   Troponin    Collection Time: 10/02/17  6:36 PM   Result Value Ref Range    Troponin I 0.07 (H) 0.00 - 0.04 ng/mL   Btype Natriuretic Peptide    Collection Time: 10/02/17  6:36 PM   Result Value Ref Range    B Natriuretic Peptide 241 (H) 0 - 100 pg/mL   CBC with Differential    Collection Time: 10/02/17  6:36 PM   Result Value Ref Range    WBC 16.9 (H) 4.8 - 10.8 K/uL    RBC 5.19 4.20 - 5.40 M/uL    Hemoglobin 15.3 12.0 - 16.0 g/dL    Hematocrit 46.6 37.0 - 47.0 %    MCV 89.8 81.4 - 97.8 fL    MCH 29.5 27.0 - 33.0 pg    MCHC 32.8 (L) 33.6 - 35.0 g/dL    RDW 50.2 (H) 35.9 - 50.0 fL    Platelet Count 178 164 - 446 K/uL    MPV 11.3 9.0 - 12.9 fL    Neutrophils-Polys 83.30 (H) 44.00 - 72.00 %    Lymphocytes 10.30 (L) 22.00 - 41.00 %    Monocytes 5.60 0.00 - 13.40 %    Eosinophils 0.00 0.00 - 6.90 %    Basophils 0.10 0.00 - 1.80 %    Immature Granulocytes 0.70 0.00 - 0.90 %    Nucleated RBC 0.00 /100 WBC    Neutrophils (Absolute) 14.05 (H) 2.00 - 7.15 K/uL    Lymphs (Absolute) 1.74 1.00 - 4.80 K/uL    Monos (Absolute) 0.94 (H) 0.00 - 0.85 K/uL    Eos (Absolute) 0.00 0.00 - 0.51 K/uL    Baso (Absolute) 0.02 0.00 - 0.12 K/uL    Immature Granulocytes (abs) 0.11 0.00 - 0.11 K/uL    NRBC (Absolute) 0.00 K/uL   Complete Metabolic Panel (CMP)    Collection Time: 10/02/17  6:36 PM   Result Value Ref Range    Sodium 138 135 - 145 mmol/L    Potassium 4.2 3.6 - 5.5 mmol/L    Chloride 104 96 - 112 mmol/L    Co2 25 20 - 33 mmol/L    Anion Gap 9.0 0.0 - 11.9    Glucose 104 (H) 65 - 99 mg/dL     Bun 22 8 - 22 mg/dL    Creatinine 0.82 0.50 - 1.40 mg/dL    Calcium 9.3 8.5 - 10.5 mg/dL    AST(SGOT) 46 (H) 12 - 45 U/L    ALT(SGPT) 38 2 - 50 U/L    Alkaline Phosphatase 82 30 - 99 U/L    Total Bilirubin 0.4 0.1 - 1.5 mg/dL    Albumin 3.7 3.2 - 4.9 g/dL    Total Protein 6.7 6.0 - 8.2 g/dL    Globulin 3.0 1.9 - 3.5 g/dL    A-G Ratio 1.2 g/dL   Prothrombin Time    Collection Time: 10/02/17  6:36 PM   Result Value Ref Range    PT 13.6 12.0 - 14.6 sec    INR 1.01 0.87 - 1.13   APTT    Collection Time: 10/02/17  6:36 PM   Result Value Ref Range    APTT 28.1 24.7 - 36.0 sec   Lipase    Collection Time: 10/02/17  6:36 PM   Result Value Ref Range    Lipase 14 11 - 82 U/L   ESTIMATED GFR    Collection Time: 10/02/17  6:36 PM   Result Value Ref Range    GFR If African American >60 >60 mL/min/1.73 m 2    GFR If Non African American >60 >60 mL/min/1.73 m 2   EKG (ER)    Collection Time: 10/02/17  6:39 PM   Result Value Ref Range    Report       [facility] Emergency Dept.    Test Date:  2017-10-02  Pt Name:    JESSIKA MORALES            Department: ER  MRN:        [id number]                      Room:  Gender:     F                            Technician: [id number]  :        1955                   Requested By:ER TRIAGE PROTOCOL  Order #:    [id number]                    Reading MD:    Measurements  Intervals                                Axis  Rate:       62                           P:          -16  RI:         164                          QRS:        132  QRSD:       90                           T:          -29  QT:         436  QTc:        443    Interpretive Statements  SINUS RHYTHM  RIGHT AXIS DEVIATION  ABNORMAL T, PROBABLE ISCHEMIA, WIDESPREAD  Compared to ECG 10/01/2017 05:13:23  Right-axis deviation now present  T-wave abnormality still present  Possible ischemia still present         Imaging/Procedures Review:    Chest Xray:  Reviewed    EKG:   As in HPI.     MDM (Assessment and Plan):      Active Hospital Problems    Diagnosis   • Pneumonia [J18.9]     Priority: High   • COPD exacerbation (CMS-[facility]) [J44.1]     Priority: High   • NSTEMI (non-ST elevated myocardial infarction) (CMS-HCC) [I21.4]     Priority: High   • Cellulitis and abscess of leg [L03.119, L02.419]     Priority: High   • UTI (urinary tract infection) [N39.0]     Priority: Medium   • Hypothyroidism [E03.9]     Priority: Low   • Chronic low back pain [M54.5, G89.29]     Priority: Low   • Chest pain [R07.9]       At this time, her chest pain is atypical in nature.  I have low suspicion for acute coronary syndrome at this time.  No indication for heparin drip at this time.  She might be benefited from a psychiatry consult.  In terms of her chronic pain syndrome, she might be benefited from pain management.  No further invasive cardiac testing is indicated at this time.    Will sign off at this time, please call us with further questions.  Patient will be followed in the outpatient cardiology clinic for further cardiac care.    Thank you for referring this patient to our cardiology service.    Austin Ornelas MD.  [facility].

## 2017-10-03 NOTE — RESPIRATORY CARE
COPD EDUCATION by COPD CLINICAL EDUCATOR  10/3/2017 at 11:00 AM by Kate Murray     Patient interviewed by COPD education team. Patient refused COPD program at this time.
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
## 2017-10-03 NOTE — PROGRESS NOTES
[place] Fall Risk Assessment:     Last Known Fall: Within the last month  Mobility: Dizziness/generalized weakness  Medications: Cardiovascular or central nervous system meds  Mental Status/LOC/Awareness: Awake, alert, and oriented to date, place, and person  Toileting Needs: No needs  Volume/Electrolyte Status: Use of IV fluids/tube feeds  Communication/Sensory: No deficits  Behavior: Depression/anxiety  [place] [facility] Fall Risk Total: 11  Fall Risk Level: MODERATE RISK    Universal Fall Precautions:  call light/belongings in reach, bed in low position and locked, wheelchairs and assistive devices out of sight, use non-slip footwear, siderails up x 2, adequate lighting, educate on level of risk, clutter free and spill free environment, educate to call for assistance    Fall Risk Level Interventions:    TRIAL (TELE 8, NEURO, MED ANUJA 5) Moderate Fall Risk Interventions  Place yellow fall risk ID band on patient: completed  Provide patient/family education based on risk assessment : completed  Educate patient/family to call staff for assistance when getting out of bed: completed  Place fall precaution signage outside patient door: verified  Utilize bed/chair fall alarm: verified     Patient Specific Interventions:     Medication: review medications with patient and family  Mental Status/LOC/Awareness: reinforce falls education  Toileting: provide frquent toileting  Volume/Electrolyte Status: ensure patient remains hydrated  Communication/Sensory: update plan of care on whiteboard  Behavioral: collaborate with doctor for possible psych consult  Mobility: provide comfort measures during transport

## 2017-10-03 NOTE — ASSESSMENT & PLAN NOTE
Issues during SLP eval, now on NTFL  Variations in lung exam are likely secondary to compliance with diet  I told her once her lung exam is clear we will reimage her

## 2017-10-03 NOTE — ED PROVIDER NOTES
"ED Provider Note    Scribed for Enrique Miranda M.D. by Elkin Greene. 10/2/2017  8:08 PM    Primary care provider: Sudha Lam M.D.  Means of arrival: Walk in  History obtained from: Patient  History limited by: None    CHIEF COMPLAINT  Chief Complaint   Patient presents with   • Other     pt AMA from this facility yesterday.  admitted for NSTEMI, CHF, COPD.  reports she promised to return if she was not feeling better today, reports not feeling better today.       HPI  Ashleigh Garces is a 62 y.o. female who presents to the ED complaining of chronic back pain and chest pain which initially started 3 months ago. Patient was here yesterday with chest pain and back pain and left AMA. At that time, she was thought to have acute coronary syndrome. She is here today due to persisting chest pain. Patient reports the chest pain to the left side of her chest. She rates the pain a 10/10 in severity. She states \"I am under the impression that both of my breasts are infected.\" Granddaughter notes the patient has had an infection for 4-5 months now that has spread to her breasts. Patient notes currently being on a sulfate antibiotic. She reports some associated intermittent subjective fever in which she has been feeling hot. She does not report any active fever, shortness of breath, or chills on exam. Patient notes history of high cholesterol. She denies having diabetes. She denies any family history of blood clot or aneurysms. Patient is a smoker. She is on chronic pain medications.    REVIEW OF SYSTEMS  Pertinent positives include: chest pain, back pain, subjective fever (feels hot).  Pertinent negatives include: active fevers, shortness of breath, chills.  10+ systems reviewed and negative.    C    PAST MEDICAL HISTORY  Past Medical History:   Diagnosis Date   • Arthritis    • ASTHMA    • Bipolar disorder (CMS-HCC)    • Bursitis    • Chronic low back pain    • Congestive heart failure (CMS-HCC)    • Dyslipidemia  "   • Fall     last fall a month ago   • GERD (gastroesophageal reflux disease)    • Hypothyroid    • Migraine    • Obesity    • Sinusitis; chronic    • Unspecified urinary incontinence        FAMILY HISTORY  Family History   Problem Relation Age of Onset   • Cancer Mother      unknown   • Hypertension Father    • Diabetes Father    • Stroke Father    • Diabetes Paternal Grandmother        SOCIAL HISTORY  Social History   Substance Use Topics   • Smoking status: Current Every Day Smoker     Packs/day: 0.50     Years: 30.00     Types: Cigarettes   • Smokeless tobacco: Never Used      Comment: 1/2 pack per day   • Alcohol use No     History   Drug Use No       SURGICAL HISTORY  Past Surgical History:   Procedure Laterality Date   • ABDOMINAL HYSTERECTOMY TOTAL      age 19's    • APPENDECTOMY     • CHOLECYSTECTOMY     • GYN SURGERY     • OTHER ABDOMINAL SURGERY     • OTHER ORTHOPEDIC SURGERY      back surgery       CURRENT MEDICATIONS  Home Medications     Reviewed by Daniel Phillips (Pharmacy Tech) on 10/02/17 at 2055  Med List Status: Complete   Medication Last Dose Status   alprazolam (XANAX) 1 MG Tab 10/2/2017 Active   diphenhydrAMINE (BENADRYL) 25 MG Tab 10/1/2017 Active   estradiol (ESTRACE) 0.5 MG tablet 10/2/2017 Active   furosemide (LASIX) 20 MG Tab 10/2/2017 Active   HYDROmorphone (DILAUDID) 4 MG Tab 10/2/2017 Active   levothyroxine (SYNTHROID) 75 MCG Tab 10/2/2017 Active   morphine SR (MS CONTIN) 60 MG TBCR tablet 10/2/2017 Active   nystatin (MYCOSTATIN) powder 10/1/2017 Active   omeprazole (PRILOSEC) 20 MG delayed-release capsule 10/2/2017 Active   oxycodone (OXY-IR) 30 MG immediate release tablet 10/2/2017 Active   sulfamethoxazole-trimethoprim (BACTRIM DS) 800-160 MG tablet 9/27/2017 Active                ALLERGIES  Allergies   Allergen Reactions   • Contrast Media With Iodine [Iodine] Anaphylaxis   • Naproxen    • Asa [Aspirin]    • Cephalosporins    • Compazine    • Doxycycline    • Macrolides    •  "Phenothiazines    • Sympathomimetics    • Toradol Anaphylaxis       PHYSICAL EXAM  VITAL SIGNS: /114   Pulse 70   Temp 37.1 °C (98.7 °F)   Resp 16   Ht 1.626 m (5' 4\")   Wt 108.9 kg (240 lb)   LMP 01/01/1988   SpO2 91%   BMI 41.20 kg/m²   Reviewed and hypertensive, afebrile, low-normal oxygenation on room air  Constitutional: Well developed, Well nourished.  HENT: Normocephalic, atraumatic, bilateral external ears normal, oropharynx moist, No exudates or erythema.   Eyes: PERRLA, conjunctiva pink, no scleral icterus.   Cardiovascular: Regular rate and rhythm. No murmurs, rubs or gallops.  Respiratory: Lungs clear to auscultation bilaterally. Diffuse wheezes. No rales, or rhonchi.  Gastrointestinal:  Abdomen soft, non-tender, non distended.  Skin: Excoriates all over arms and legs. Mild pink hue to breasts but otherwise no erythema, no rash.   Genitourinary: No costovertebral angle tenderness.   Neurologic: Alert & oriented x 3, cranial nerves 2-12 intact by passive exam.  No focal deficit noted.  Psychiatric: Affect normal, Judgment normal, Mood normal.     DIFFERENTIAL DIAGNOSIS:  ACS, drug seeking behavior, pulmonary edema, pneumonia, cellulitis.    EKG  Interpreted by me  Rhythm:  Normal sinus rhythm   Rate: 62  Axis: rightward at 132  Ectopy: none  Conduction: normal  ST Segments: no acute change  T Waves: Anterior and inferior T wave inversions which are old.  Q Waves: none  Clinical Impression: Sinus rhythm, right axis deviation, non specific ST change    RADIOLOGY/PROCEDURES  DX-CHEST-LIMITED (1 VIEW)   Final Result         1.  Pulmonary edema and/or infiltrates are identified, which are somewhat decreased since the prior exam.   2.  Linear density at the right lung base favors atelectasis   3.  Cardiomegaly          LABORATORY:  Results for orders placed or performed during the hospital encounter of 10/02/17   Troponin   Result Value Ref Range    Troponin I 0.07 (H) 0.00 - 0.04 ng/mL   Btype " Natriuretic Peptide   Result Value Ref Range    B Natriuretic Peptide 241 (H) 0 - 100 pg/mL   CBC with Differential   Result Value Ref Range    WBC 16.9 (H) 4.8 - 10.8 K/uL    RBC 5.19 4.20 - 5.40 M/uL    Hemoglobin 15.3 12.0 - 16.0 g/dL    Hematocrit 46.6 37.0 - 47.0 %    MCV 89.8 81.4 - 97.8 fL    MCH 29.5 27.0 - 33.0 pg    MCHC 32.8 (L) 33.6 - 35.0 g/dL    RDW 50.2 (H) 35.9 - 50.0 fL    Platelet Count 178 164 - 446 K/uL    MPV 11.3 9.0 - 12.9 fL    Neutrophils-Polys 83.30 (H) 44.00 - 72.00 %    Lymphocytes 10.30 (L) 22.00 - 41.00 %    Monocytes 5.60 0.00 - 13.40 %    Eosinophils 0.00 0.00 - 6.90 %    Basophils 0.10 0.00 - 1.80 %    Immature Granulocytes 0.70 0.00 - 0.90 %    Nucleated RBC 0.00 /100 WBC    Neutrophils (Absolute) 14.05 (H) 2.00 - 7.15 K/uL    Lymphs (Absolute) 1.74 1.00 - 4.80 K/uL    Monos (Absolute) 0.94 (H) 0.00 - 0.85 K/uL    Eos (Absolute) 0.00 0.00 - 0.51 K/uL    Baso (Absolute) 0.02 0.00 - 0.12 K/uL    Immature Granulocytes (abs) 0.11 0.00 - 0.11 K/uL    NRBC (Absolute) 0.00 K/uL   Complete Metabolic Panel (CMP)   Result Value Ref Range    Sodium 138 135 - 145 mmol/L    Potassium 4.2 3.6 - 5.5 mmol/L    Chloride 104 96 - 112 mmol/L    Co2 25 20 - 33 mmol/L    Anion Gap 9.0 0.0 - 11.9    Glucose 104 (H) 65 - 99 mg/dL    Bun 22 8 - 22 mg/dL    Creatinine 0.82 0.50 - 1.40 mg/dL    Calcium 9.3 8.5 - 10.5 mg/dL    AST(SGOT) 46 (H) 12 - 45 U/L    ALT(SGPT) 38 2 - 50 U/L    Alkaline Phosphatase 82 30 - 99 U/L    Total Bilirubin 0.4 0.1 - 1.5 mg/dL    Albumin 3.7 3.2 - 4.9 g/dL    Total Protein 6.7 6.0 - 8.2 g/dL    Globulin 3.0 1.9 - 3.5 g/dL    A-G Ratio 1.2 g/dL   Prothrombin Time   Result Value Ref Range    PT 13.6 12.0 - 14.6 sec    INR 1.01 0.87 - 1.13   APTT   Result Value Ref Range    APTT 28.1 24.7 - 36.0 sec   Lipase   Result Value Ref Range    Lipase 14 11 - 82 U/L   ESTIMATED GFR   Result Value Ref Range    GFR If African American >60 >60 mL/min/1.73 m 2    GFR If Non African American  >60 >60 mL/min/1.73 m 2   ACCU-CHEK GLUCOSE   Result Value Ref Range    Glucose - Accu-Ck 104 (H) 65 - 99 mg/dL     COURSE & MEDICAL DECISION MAKING    8:05 PM Reviewed old records which show patient left AMA yesterday and was thought to have acute coronary syndrome. She had troponin elevation and T wave inversion which was new then. Patient had an echo which was essentially normal. She is on chronic pain medications and was sedated.     8:08 PM - Patient seen and examined at bedside. Patient will be treated with morphine 2 mg, xanax 1 mg for her symptoms. Ordered DX chest, lactic acid, creatine kinase, blood culture, estimated GFR, accu-chek glucose, troponin, BNP, CBC, CMP, PT, APTT, lipase, EKG to evaluate.     8:26 PM - I discussed the patient's case and the above findings with Dr. Lees (hospitalist) who will admit the patient.     Patient presents with recent chest pain, T-wave inversions on EKG and mildly elevated troponin. Patient requires stress testing and possible coronary catheterization. She also has COPD exacerbation which is acute. She has apparent resolving pulmonary edema. Less likely she has pneumonia. She has chronic pain and excoriations. Although there is pink skin pn the breasts there is no evidence of definite cellulitis except that she has an elevated white blood cell count..    PLAN:  Admission by hospitalist for stress testing or angiogram    DISPOSITION:  Patient will be admitted to Dr. Lees in guarded condition.     FINAL IMPRESSION  1. Acute coronary syndrome (CMS-[facility])    2. Elevated troponin    3. Acute exacerbation of chronic obstructive pulmonary disease (COPD) (CMS-[facility])    4. Chronic pain syndrome    5.      Leukocytosis      Electronically signed by: Elkin Greene, 10/2/2017 8:08 PM    The note accurately reflects work and decisions made by me.  Electronically signed by: Enrique Miranda, 10/2/2017 10:32 PM

## 2017-10-03 NOTE — PROGRESS NOTES
2 RN skin check performed with Oswaldo RN.  Redness in groin, scabbing on abdomen and bilateral legs and feet. Calloused heels and dry feet.

## 2017-10-03 NOTE — DISCHARGE PLANNING
Care Transition Team Assessment    IHD met with pt at bedside. Pt currently lives alone, but states that she has family and community support to rely on. Pt does not use any home O2 or home health services at this time, but does use a cane. Pt states that she feels as though she will need home health services after discharge. Pt states that she has been unable to lift anything heavier than 3lbs, or take care of the wounds on her breasts and stomach properly.     Information Source  Orientation : Oriented x 4  Information Given By: Patient  Informant's Name: Ashleigh  Who is responsible for making decisions for patient? : Patient    Readmission Evaluation  Is this a readmission?: Yes - unplanned readmission  Why do you think you were readmitted?: Pt left AMA   Was an appointment arranged for you prior to discharge?: No  Were there new prescriptions you were supposed to fill after you were discharged?: No  Did you understand your discharge instructions?: Yes  Did you have enough support after your last discharge?: Yes    Elopement Risk  Legal Hold: No    Interdisciplinary Discharge Planning  Does Admitting Nurse Feel This Could be a Complex Discharge?: No  Lives with - Patient's Self Care Capacity: Alone and Able to Care For Self  Patient or legal guardian wants to designate a caregiver (see row info): No  Support Systems: Family Member(s)  Housing / Facility: 1 Story Apartment / Condo  Do You Take your Prescribed Medications Regularly: Yes  Able to Return to Previous ADL's: Future Time w/Therapy  Mobility Issues: No  Prior Services: None  Patient Expects to be Discharged to:: Home  Assistance Needed: Unknown at this Time  Durable Medical Equipment: Not Applicable    Discharge Preparedness  What is your plan after discharge?: Home health care  What are your discharge supports?: Other (comment) (Granddaughter/Friends)  Prior Functional Level: Ambulatory, Independent with Activities of Daily Living, Independent with  Medication Management, Uses Cane  Difficulity with ADLs: Walking  Difficulty with ADLs Comment: uses cane  Difficulity with IADLs: Driving  Difficulity with IADL Comments: Assisted by friends and family    Functional Assesment  Prior Functional Level: Ambulatory, Independent with Activities of Daily Living, Independent with Medication Management, Uses Cane    Finances  Financial Barriers to Discharge: No  Prescription Coverage: Yes ([facility] on [address] [facility] on [address])    Vision / Hearing Impairment  Vision Impairment : No  Hearing Impairment : No    Values / Beliefs / Concerns  Values / Beliefs Concerns : No         Domestic Abuse  Have you ever been the victim of abuse or violence?: No  Physical Abuse or Sexual Abuse: No  Verbal Abuse or Emotional Abuse: No  Possible Abuse Reported to:: Not Applicable    Psychological Assessment  History of Substance Abuse: None  History of Psychiatric Problems: No  Non-compliant with Treatment: No    Discharge Risks or Barriers  Discharge risks or barriers?: No    Anticipated Discharge Information  Anticipated discharge disposition: Home  Discharge Address: [address] [place] [address]  Discharge Contact Phone Number: [phone]

## 2017-10-04 PROBLEM — R13.10 DYSPHAGIA: Status: ACTIVE | Noted: 2017-10-04

## 2017-10-04 PROBLEM — F99 PSYCHIATRIC DISORDER: Status: ACTIVE | Noted: 2017-10-04

## 2017-10-04 PROBLEM — F13.20 ANXIOLYTIC DEPENDENCE (HCC): Status: ACTIVE | Noted: 2017-10-04

## 2017-10-04 PROBLEM — J69.0 ASPIRATION PNEUMONIA (HCC): Status: ACTIVE | Noted: 2017-10-02

## 2017-10-04 PROCEDURE — 700102 HCHG RX REV CODE 250 W/ 637 OVERRIDE(OP)

## 2017-10-04 PROCEDURE — 700102 HCHG RX REV CODE 250 W/ 637 OVERRIDE(OP): Performed by: INTERNAL MEDICINE

## 2017-10-04 PROCEDURE — 700111 HCHG RX REV CODE 636 W/ 250 OVERRIDE (IP): Performed by: INTERNAL MEDICINE

## 2017-10-04 PROCEDURE — 94640 AIRWAY INHALATION TREATMENT: CPT

## 2017-10-04 PROCEDURE — 94760 N-INVAS EAR/PLS OXIMETRY 1: CPT

## 2017-10-04 PROCEDURE — 700101 HCHG RX REV CODE 250: Performed by: INTERNAL MEDICINE

## 2017-10-04 PROCEDURE — A9270 NON-COVERED ITEM OR SERVICE: HCPCS

## 2017-10-04 PROCEDURE — A9270 NON-COVERED ITEM OR SERVICE: HCPCS | Performed by: INTERNAL MEDICINE

## 2017-10-04 PROCEDURE — 770020 HCHG ROOM/CARE - TELE (206)

## 2017-10-04 PROCEDURE — 92610 EVALUATE SWALLOWING FUNCTION: CPT

## 2017-10-04 PROCEDURE — G8997 SWALLOW GOAL STATUS: HCPCS | Mod: CJ

## 2017-10-04 PROCEDURE — G8996 SWALLOW CURRENT STATUS: HCPCS | Mod: CK

## 2017-10-04 PROCEDURE — 94669 MECHANICAL CHEST WALL OSCILL: CPT

## 2017-10-04 PROCEDURE — 94668 MNPJ CHEST WALL SBSQ: CPT

## 2017-10-04 PROCEDURE — 99233 SBSQ HOSP IP/OBS HIGH 50: CPT | Performed by: INTERNAL MEDICINE

## 2017-10-04 RX ORDER — LEVOFLOXACIN 750 MG/1
750 TABLET, FILM COATED ORAL DAILY
Status: DISCONTINUED | OUTPATIENT
Start: 2017-10-05 | End: 2017-10-11 | Stop reason: HOSPADM

## 2017-10-04 RX ADMIN — METRONIDAZOLE 500 MG: 500 TABLET ORAL at 20:58

## 2017-10-04 RX ADMIN — ALBUTEROL SULFATE 2.5 MG: 2.5 SOLUTION RESPIRATORY (INHALATION) at 10:42

## 2017-10-04 RX ADMIN — ENOXAPARIN SODIUM 40 MG: 100 INJECTION SUBCUTANEOUS at 09:00

## 2017-10-04 RX ADMIN — GUAIFENESIN 1200 MG: 600 TABLET, EXTENDED RELEASE ORAL at 08:37

## 2017-10-04 RX ADMIN — DIPHENHYDRAMINE HCL 25 MG: 25 TABLET ORAL at 04:27

## 2017-10-04 RX ADMIN — ALPRAZOLAM 1 MG: 1 TABLET ORAL at 13:43

## 2017-10-04 RX ADMIN — STANDARDIZED SENNA CONCENTRATE AND DOCUSATE SODIUM 2 TABLET: 8.6; 5 TABLET, FILM COATED ORAL at 08:36

## 2017-10-04 RX ADMIN — PREDNISONE 50 MG: 50 TABLET ORAL at 08:36

## 2017-10-04 RX ADMIN — HYDROMORPHONE HYDROCHLORIDE 4 MG: 4 TABLET ORAL at 08:35

## 2017-10-04 RX ADMIN — GUAIFENESIN 1200 MG: 600 TABLET, EXTENDED RELEASE ORAL at 08:36

## 2017-10-04 RX ADMIN — OXYCODONE HYDROCHLORIDE 30 MG: 30 TABLET ORAL at 13:44

## 2017-10-04 RX ADMIN — ALBUTEROL SULFATE 2.5 MG: 2.5 SOLUTION RESPIRATORY (INHALATION) at 22:45

## 2017-10-04 RX ADMIN — OMEPRAZOLE 20 MG: 20 CAPSULE, DELAYED RELEASE ORAL at 08:36

## 2017-10-04 RX ADMIN — NYSTATIN: 100000 CREAM TOPICAL at 20:58

## 2017-10-04 RX ADMIN — NYSTATIN: 100000 CREAM TOPICAL at 08:40

## 2017-10-04 RX ADMIN — ONDANSETRON 4 MG: 4 TABLET, ORALLY DISINTEGRATING ORAL at 02:54

## 2017-10-04 RX ADMIN — MORPHINE SULFATE 120 MG: 60 TABLET, EXTENDED RELEASE ORAL at 08:35

## 2017-10-04 RX ADMIN — ALPRAZOLAM 1 MG: 1 TABLET ORAL at 05:27

## 2017-10-04 RX ADMIN — STANDARDIZED SENNA CONCENTRATE AND DOCUSATE SODIUM 2 TABLET: 8.6; 5 TABLET, FILM COATED ORAL at 20:58

## 2017-10-04 RX ADMIN — METRONIDAZOLE 500 MG: 500 TABLET ORAL at 13:43

## 2017-10-04 RX ADMIN — METRONIDAZOLE 500 MG: 500 TABLET ORAL at 06:37

## 2017-10-04 RX ADMIN — GUAIFENESIN 1200 MG: 600 TABLET, EXTENDED RELEASE ORAL at 20:58

## 2017-10-04 RX ADMIN — NICOTINE 14 MG: 14 PATCH, EXTENDED RELEASE TRANSDERMAL at 06:37

## 2017-10-04 RX ADMIN — MORPHINE SULFATE 120 MG: 60 TABLET, EXTENDED RELEASE ORAL at 21:11

## 2017-10-04 RX ADMIN — ATORVASTATIN CALCIUM 40 MG: 40 TABLET, FILM COATED ORAL at 20:57

## 2017-10-04 RX ADMIN — CLOPIDOGREL 75 MG: 75 TABLET, FILM COATED ORAL at 08:36

## 2017-10-04 RX ADMIN — OXYCODONE HYDROCHLORIDE 30 MG: 30 TABLET ORAL at 02:54

## 2017-10-04 RX ADMIN — LEVOFLOXACIN 750 MG: 5 INJECTION, SOLUTION INTRAVENOUS at 08:40

## 2017-10-04 RX ADMIN — LEVOTHYROXINE SODIUM 75 MCG: 75 TABLET ORAL at 06:37

## 2017-10-04 RX ADMIN — ALBUTEROL SULFATE 2.5 MG: 2.5 SOLUTION RESPIRATORY (INHALATION) at 15:10

## 2017-10-04 ASSESSMENT — ENCOUNTER SYMPTOMS
WEAKNESS: 1
WHEEZING: 1
COUGH: 1
HEMOPTYSIS: 0
SPUTUM PRODUCTION: 1
NERVOUS/ANXIOUS: 1
DIAPHORESIS: 0
SHORTNESS OF BREATH: 1

## 2017-10-04 ASSESSMENT — PAIN SCALES - GENERAL
PAINLEVEL_OUTOF10: 10

## 2017-10-04 NOTE — CARE PLAN
Problem: Pain Management  Goal: Pain level will decrease to patient's comfort goal  Outcome: NOT MET  Pt states pain is 20/10, pt educated on pain scale and still rates pain as 20/10.  +

## 2017-10-04 NOTE — THERAPY
"Speech Language Therapy Clinical Swallow Evaluation completed.    Functional Status: Pt was seen for a clinical swallow evaluation on this date. She was AAOx4 and agreeable to CSE. Pt very verbose and tangential. Pt required max cues to be redirected to task. She consistently asks for pain medications and describes each area of her body that is experiencing pain. Pt not able to give good insight into her PLOF vs CLOF related to swallowing, as she always referred back to what might be causing her pain/infections/fungus/etc. She completed an oral mech exam with no gross motor deficits noted. She consumed PO trials of single ice chips, thins, NTL, puree, and soft solids. Pt exhibited wet/gurgly voice and audible breathing post trials of single ice chips and thins via side of cup. This via straw resulted in immediate coughing. Pt consumed NTL, puree, and single piece of fruit with no overt s/sx of aspiration. Prolonged chewing was noted d/t edentulous state. Recommend pt be placed on a NTFL diet, NO STRAWS. SLP following closely for dysphagia therapy. Spoke with RN and MD. MD requested a FEES be completed to r/o silent aspiration. FEES scheduled for 10/5/17. Please hold PO with any difficulty or concern for aspiration. Thank you for the consult.     Recommendations - Diet: Diet / Liquid Recommendation: Nectar Thick Full Liquid                           Strategies: No Straws and Head of Bed at 90 Degrees                          Medication Administration: Medication Administration : Float Whole with Puree, Crush all Medications in Puree    Plan of Care: Will benefit from Speech Therapy 3 times per week  Post-Acute Therapy: Discharge to a transitional care facility for continued skilled therapy services. OR discharge to home with outpatient or home health for additional skilled therapy services.    See \"Rehab Therapy-Acute\" Patient Summary Report for complete documentation.   "

## 2017-10-04 NOTE — PROGRESS NOTES
Pt lying in bed, eyes closed, no signs of distress. Bedside shift report completed, discussed pt POC. Bed low and locked, call light in reach, no needs at this time

## 2017-10-04 NOTE — PROGRESS NOTES
[place] Fall Risk Assessment:     Last Known Fall: Within the last month  Mobility: Dizziness/generalized weakness  Medications: Cardiovascular or central nervous system meds  Mental Status/LOC/Awareness: Awake, alert, and oriented to date, place, and person  Toileting Needs: No needs  Volume/Electrolyte Status: Use of IV fluids/tube feeds  Communication/Sensory: No deficits  Behavior: Depression/anxiety  [place] Fall Risk Total: 11  Fall Risk Level: MODERATE RISK    Universal Fall Precautions:  call light/belongings in reach, bed in low position and locked, wheelchairs and assistive devices out of sight, siderails up x 2, use non-slip footwear, adequate lighting, clutter free and spill free environment, educate on level of risk, educate to call for assistance    Fall Risk Level Interventions:    TRIAL (TELE 8, NEURO, MED ANUJA 5) Moderate Fall Risk Interventions  Place yellow fall risk ID band on patient: verified  Provide patient/family education based on risk assessment : verified  Educate patient/family to call staff for assistance when getting out of bed: verified  Place fall precaution signage outside patient door: verified  Utilize bed/chair fall alarm: verified     Patient Specific Interventions:     Medication: review medications with patient and family, assess for medications that can be discontinued or dosage decreased and limit combination of prn medications  Mental Status/LOC/Awareness: reorient patient, reinforce falls education, encourage family to stay with patient, check on patient hourly, utilize bed/chair fall alarm, reinforce the use of call light and provide activity  Toileting: consider obtaining elevated toilet seat or bedside commode (BSC), provide frquent toileting, monitor intake and output/use of appropriate interventions, instruct patient/family on the use of grab bars and instruct patient/family on the need to call for assistance when toileting  Volume/Electrolyte Status:  ensure patient remains hydrated, administer medications as ordered for nausea and vomiting and monitor abnormal lab values  Communication/Sensory: update plan of care on whiteboard, ensure proper positioning when transferrng/ambulating and ensure patient has glasses/contacts and hearing aids/dentures  Behavioral: encourage patient to voice feelings, engage patient in daily activities, administer medication as ordered and instruct/reinforce fall program rationale  Mobility: schedule physical activity throughout the day, provide comfort measures during transport, dangle prior to standing, utilize bed/chair fall alarm, ensure bed is locked and in lowest position, provide appropriate assistive device and instruct patient to exit bed on their strongest side

## 2017-10-05 PROBLEM — F39 MOOD DISORDER (HCC): Status: ACTIVE | Noted: 2017-10-04

## 2017-10-05 LAB
BACTERIA BLD CULT: NORMAL
BACTERIA BLD CULT: NORMAL
SIGNIFICANT IND 70042: NORMAL
SIGNIFICANT IND 70042: NORMAL
SITE SITE: NORMAL
SITE SITE: NORMAL
SOURCE SOURCE: NORMAL
SOURCE SOURCE: NORMAL

## 2017-10-05 PROCEDURE — A9270 NON-COVERED ITEM OR SERVICE: HCPCS

## 2017-10-05 PROCEDURE — 700102 HCHG RX REV CODE 250 W/ 637 OVERRIDE(OP): Performed by: INTERNAL MEDICINE

## 2017-10-05 PROCEDURE — 700111 HCHG RX REV CODE 636 W/ 250 OVERRIDE (IP): Performed by: INTERNAL MEDICINE

## 2017-10-05 PROCEDURE — 700102 HCHG RX REV CODE 250 W/ 637 OVERRIDE(OP)

## 2017-10-05 PROCEDURE — 770020 HCHG ROOM/CARE - TELE (206)

## 2017-10-05 PROCEDURE — A9270 NON-COVERED ITEM OR SERVICE: HCPCS | Performed by: INTERNAL MEDICINE

## 2017-10-05 PROCEDURE — 94668 MNPJ CHEST WALL SBSQ: CPT

## 2017-10-05 PROCEDURE — G8997 SWALLOW GOAL STATUS: HCPCS | Mod: CK

## 2017-10-05 PROCEDURE — 94640 AIRWAY INHALATION TREATMENT: CPT

## 2017-10-05 PROCEDURE — A9270 NON-COVERED ITEM OR SERVICE: HCPCS | Performed by: FAMILY MEDICINE

## 2017-10-05 PROCEDURE — 92612 ENDOSCOPY SWALLOW (FEES) VID: CPT

## 2017-10-05 PROCEDURE — 700102 HCHG RX REV CODE 250 W/ 637 OVERRIDE(OP): Performed by: FAMILY MEDICINE

## 2017-10-05 PROCEDURE — 700101 HCHG RX REV CODE 250: Performed by: INTERNAL MEDICINE

## 2017-10-05 PROCEDURE — 94760 N-INVAS EAR/PLS OXIMETRY 1: CPT

## 2017-10-05 PROCEDURE — 99233 SBSQ HOSP IP/OBS HIGH 50: CPT | Performed by: INTERNAL MEDICINE

## 2017-10-05 PROCEDURE — G8996 SWALLOW CURRENT STATUS: HCPCS | Mod: CK

## 2017-10-05 PROCEDURE — 94669 MECHANICAL CHEST WALL OSCILL: CPT

## 2017-10-05 RX ORDER — MORPHINE SULFATE 60 MG/1
120 TABLET, FILM COATED, EXTENDED RELEASE ORAL EVERY 12 HOURS
Status: DISCONTINUED | OUTPATIENT
Start: 2017-10-05 | End: 2017-10-05

## 2017-10-05 RX ORDER — MORPHINE SULFATE 100 MG/1
100 TABLET ORAL EVERY 12 HOURS
Status: DISCONTINUED | OUTPATIENT
Start: 2017-10-07 | End: 2017-10-05

## 2017-10-05 RX ORDER — MORPHINE SULFATE 30 MG/1
90 TABLET, FILM COATED, EXTENDED RELEASE ORAL EVERY 12 HOURS
Status: DISCONTINUED | OUTPATIENT
Start: 2017-10-10 | End: 2017-10-05

## 2017-10-05 RX ORDER — ALUMINA, MAGNESIA, AND SIMETHICONE 2400; 2400; 240 MG/30ML; MG/30ML; MG/30ML
30 SUSPENSION ORAL ONCE
Status: COMPLETED | OUTPATIENT
Start: 2017-10-05 | End: 2017-10-05

## 2017-10-05 RX ORDER — MORPHINE SULFATE 60 MG/1
60 TABLET, FILM COATED, EXTENDED RELEASE ORAL EVERY 12 HOURS
Status: DISCONTINUED | OUTPATIENT
Start: 2017-10-13 | End: 2017-10-11 | Stop reason: HOSPADM

## 2017-10-05 RX ORDER — MORPHINE SULFATE 60 MG/1
60 TABLET, FILM COATED, EXTENDED RELEASE ORAL EVERY 12 HOURS
Status: DISCONTINUED | OUTPATIENT
Start: 2017-10-13 | End: 2017-10-05

## 2017-10-05 RX ORDER — MORPHINE SULFATE 30 MG/1
30 TABLET, FILM COATED, EXTENDED RELEASE ORAL EVERY 12 HOURS
Status: DISCONTINUED | OUTPATIENT
Start: 2017-10-20 | End: 2017-10-05

## 2017-10-05 RX ORDER — MORPHINE SULFATE 30 MG/1
90 TABLET, FILM COATED, EXTENDED RELEASE ORAL EVERY 12 HOURS
Status: DISCONTINUED | OUTPATIENT
Start: 2017-10-10 | End: 2017-10-11 | Stop reason: HOSPADM

## 2017-10-05 RX ORDER — MORPHINE SULFATE 60 MG/1
120 TABLET, FILM COATED, EXTENDED RELEASE ORAL EVERY 12 HOURS
Status: COMPLETED | OUTPATIENT
Start: 2017-10-05 | End: 2017-10-07

## 2017-10-05 RX ORDER — MORPHINE SULFATE 30 MG/1
30 TABLET, FILM COATED, EXTENDED RELEASE ORAL EVERY 12 HOURS
Status: DISCONTINUED | OUTPATIENT
Start: 2017-10-20 | End: 2017-10-11 | Stop reason: HOSPADM

## 2017-10-05 RX ORDER — MORPHINE SULFATE 100 MG/1
100 TABLET ORAL EVERY 12 HOURS
Status: DISPENSED | OUTPATIENT
Start: 2017-10-07 | End: 2017-10-10

## 2017-10-05 RX ADMIN — MORPHINE SULFATE 60 MG: 60 TABLET, EXTENDED RELEASE ORAL at 21:51

## 2017-10-05 RX ADMIN — ENOXAPARIN SODIUM 40 MG: 100 INJECTION SUBCUTANEOUS at 09:13

## 2017-10-05 RX ADMIN — HYDROMORPHONE HYDROCHLORIDE 4 MG: 4 TABLET ORAL at 21:47

## 2017-10-05 RX ADMIN — DIPHENHYDRAMINE HCL 25 MG: 25 TABLET ORAL at 22:01

## 2017-10-05 RX ADMIN — ALUMINUM HYDROXIDE, MAGNESIUM HYDROXIDE,SIMETHICONE 30 ML: 400; 400; 40 LIQUID ORAL at 01:51

## 2017-10-05 RX ADMIN — ALPRAZOLAM 1 MG: 1 TABLET ORAL at 18:19

## 2017-10-05 RX ADMIN — ALBUTEROL SULFATE 2.5 MG: 2.5 SOLUTION RESPIRATORY (INHALATION) at 07:57

## 2017-10-05 RX ADMIN — LEVOFLOXACIN 750 MG: 750 TABLET, FILM COATED ORAL at 09:14

## 2017-10-05 RX ADMIN — HYDROMORPHONE HYDROCHLORIDE 4 MG: 4 TABLET ORAL at 09:14

## 2017-10-05 RX ADMIN — PREDNISONE 50 MG: 50 TABLET ORAL at 09:15

## 2017-10-05 RX ADMIN — GUAIFENESIN 1200 MG: 600 TABLET, EXTENDED RELEASE ORAL at 21:50

## 2017-10-05 RX ADMIN — OXYCODONE HYDROCHLORIDE 30 MG: 30 TABLET ORAL at 01:11

## 2017-10-05 RX ADMIN — STANDARDIZED SENNA CONCENTRATE AND DOCUSATE SODIUM 2 TABLET: 8.6; 5 TABLET, FILM COATED ORAL at 09:14

## 2017-10-05 RX ADMIN — ALPRAZOLAM 1 MG: 1 TABLET ORAL at 09:14

## 2017-10-05 RX ADMIN — STANDARDIZED SENNA CONCENTRATE AND DOCUSATE SODIUM 2 TABLET: 8.6; 5 TABLET, FILM COATED ORAL at 21:47

## 2017-10-05 RX ADMIN — METRONIDAZOLE 500 MG: 500 TABLET ORAL at 14:55

## 2017-10-05 RX ADMIN — NYSTATIN: 100000 CREAM TOPICAL at 22:07

## 2017-10-05 RX ADMIN — METRONIDAZOLE 500 MG: 500 TABLET ORAL at 21:47

## 2017-10-05 RX ADMIN — DIPHENHYDRAMINE HCL 25 MG: 25 TABLET ORAL at 14:57

## 2017-10-05 RX ADMIN — ALBUTEROL SULFATE 2.5 MG: 2.5 SOLUTION RESPIRATORY (INHALATION) at 11:48

## 2017-10-05 RX ADMIN — ALBUTEROL SULFATE 2.5 MG: 2.5 SOLUTION RESPIRATORY (INHALATION) at 21:00

## 2017-10-05 RX ADMIN — MORPHINE SULFATE 120 MG: 60 TABLET, EXTENDED RELEASE ORAL at 09:14

## 2017-10-05 RX ADMIN — OXYCODONE HYDROCHLORIDE 30 MG: 30 TABLET ORAL at 06:03

## 2017-10-05 RX ADMIN — CLOPIDOGREL 75 MG: 75 TABLET, FILM COATED ORAL at 09:14

## 2017-10-05 RX ADMIN — NYSTATIN: 100000 CREAM TOPICAL at 09:00

## 2017-10-05 RX ADMIN — OMEPRAZOLE 20 MG: 20 CAPSULE, DELAYED RELEASE ORAL at 09:15

## 2017-10-05 RX ADMIN — LEVOTHYROXINE SODIUM 75 MCG: 75 TABLET ORAL at 06:00

## 2017-10-05 RX ADMIN — OXYCODONE HYDROCHLORIDE 30 MG: 30 TABLET ORAL at 14:55

## 2017-10-05 RX ADMIN — NICOTINE 14 MG: 14 PATCH, EXTENDED RELEASE TRANSDERMAL at 06:00

## 2017-10-05 RX ADMIN — ALBUTEROL SULFATE 2.5 MG: 2.5 SOLUTION RESPIRATORY (INHALATION) at 15:42

## 2017-10-05 RX ADMIN — ALPRAZOLAM 1 MG: 1 TABLET ORAL at 01:11

## 2017-10-05 RX ADMIN — GUAIFENESIN 1200 MG: 600 TABLET, EXTENDED RELEASE ORAL at 09:14

## 2017-10-05 RX ADMIN — METRONIDAZOLE 500 MG: 500 TABLET ORAL at 06:00

## 2017-10-05 ASSESSMENT — PAIN SCALES - GENERAL
PAINLEVEL_OUTOF10: 10

## 2017-10-05 ASSESSMENT — COPD QUESTIONNAIRES
COPD SCREENING SCORE: 8
DO YOU EVER COUGH UP ANY MUCUS OR PHLEGM?: YES, EVERY DAY
DURING THE PAST 4 WEEKS HOW MUCH DID YOU FEEL SHORT OF BREATH: SOME OF THE TIME
HAVE YOU SMOKED AT LEAST 100 CIGARETTES IN YOUR ENTIRE LIFE: YES

## 2017-10-05 NOTE — ASSESSMENT & PLAN NOTE
likely multifactorial  Over sedation  Edentulous  Her lung exam is worse on the left side today suspect she is eating and a semi-recumbant position  She is in denial about her issues-I told her she needs to be compliant if she wishes to be eligible for discharge

## 2017-10-05 NOTE — PROGRESS NOTES
[facility]ist Progress Note    Date of Service: 10/5/2017    Chief Complaint  Chest pain    62 y.o. female  with past medical history of chronic pain and opiate dependence, admitted on 2017 with right-sided pneumonia, chest pain, concern for NSTEMI- her troponin peaked at 0.4. She was to undergo cardiac catheterization but then signed out AMA on 10/1.  She returns with continued complaints of chest pain which is more pleuritic than typical for angina, her troponins are decreased significantly. She has been seen by cardiology and they do not feel cardiac cath is warranted at this time.    Interval Problem Update  She continues to complain of pain. She is upset about her nectar thick liquids, insists that this is not necessary but failed her fees study. A piece of a peach was resting on her vocal cords and she was unaware of it until the speech therapist told her to clear it. She continues to be very tangential very labile. Seen with RN, discussed with speech therapist, discussed with psychiatry      PATIENT IS INCAPACITATED for medical decisions and AMA  Verified with Dr Wood that Legal Hold is not needed for incapacity  Patient seen by Dr. Wood this AM concurs with incapacity and underlying psychiatric illness and agrees meds need to be weaned    Consultants/Specialty  Cardiology- signed off  psychiatry    Disposition  TB D        Review of Systems   Unable to perform ROS: Psychiatric disorder     Patient unreliable historian- changes story to try and get more narcotics, anxiolytics  States she feels well in order to try and get discharged   Physical Exam  Laboratory/Imaging   Hemodynamics  Temp (24hrs), Av.6 °C (97.9 °F), Min:36.1 °C (97 °F), Max:37.2 °C (99 °F)   Temperature: 37.2 °C (99 °F)  Pulse  Av.2  Min: 54  Max: 86    Blood Pressure: 120/69      Respiratory      Respiration: 20, Pulse Oximetry: 93 %, O2 Daily Delivery Respiratory : Silicone Nasal Cannula     Given By:: Mouthpiece,  PEP/CPT Method: Positive Airway Pressure Device, Work Of Breathing / Effort: Mild  RUL Breath Sounds: Expiratory Wheezes, RML Breath Sounds: Expiratory Wheezes, RLL Breath Sounds: Expiratory Wheezes, CHAYO Breath Sounds: Expiratory Wheezes, LLL Breath Sounds: Expiratory Wheezes    Fluids    Intake/Output Summary (Last 24 hours) at 10/05/17 1637  Last data filed at 10/05/17 0956   Gross per 24 hour   Intake             1170 ml   Output              500 ml   Net              670 ml       Nutrition  Orders Placed This Encounter   Procedures   • DIET ORDER     Standing Status:   Standing     Number of Occurrences:   1     Order Specific Question:   Diet:     Answer:   Cardiac [6]     Order Specific Question:   Texture/Fiber modifications:     Answer:   Dysphagia 1(Pureed)specify fluid consistency(question 6) [1]     Order Specific Question:   Consistency/Fluid modifications:     Answer:   Nectar Thick [2]     Physical Exam   Constitutional: She appears well-developed. No distress.   Obese disheveled   HENT:   Head: Normocephalic and atraumatic.   edentulous   Eyes: Pupils are equal, round, and reactive to light. Right eye exhibits no discharge. Left eye exhibits no discharge.   Neck: Neck supple.   Cardiovascular: Normal rate and regular rhythm.    Pulmonary/Chest: No respiratory distress.   She has upper airway wheezes and rhonchi-however her lower airway on the right is much improved. Her left lung remains relatively clear.       Abdominal: Soft. Bowel sounds are normal. She exhibits no distension. There is no tenderness. There is no rebound and no guarding.   Musculoskeletal: She exhibits no edema or tenderness.   Neurological:   More alert, still slurring words   Skin: Skin is warm and dry. She is not diaphoretic.   Scattered eschars but no cellulitis noted   Psychiatric: Her mood appears anxious. Her affect is labile and inappropriate. Her speech is tangential and slurred. Thought content is paranoid and delusional.  Cognition and memory are impaired. She expresses impulsivity and inappropriate judgment.   Nursing note and vitals reviewed.      Recent Labs      10/02/17   1836  10/03/17   0605   WBC  16.9*  10.9*   RBC  5.19  4.94   HEMOGLOBIN  15.3  14.0   HEMATOCRIT  46.6  45.3   MCV  89.8  91.7   MCH  29.5  28.3   MCHC  32.8*  30.9*   RDW  50.2*  52.4*   PLATELETCT  178  170   MPV  11.3  10.8     Recent Labs      10/02/17   1836  10/03/17   0605   SODIUM  138  138   POTASSIUM  4.2  3.9   CHLORIDE  104  105   CO2  25  27   GLUCOSE  104*  81   BUN  22  22   CREATININE  0.82  0.78   CALCIUM  9.3  8.7     Recent Labs      10/02/17   1836   APTT  28.1   INR  1.01     Recent Labs      10/02/17   1836   BNPBTYPENAT  241*              Assessment/Plan     Aspiration pneumonia (CMS-HCC)- (present on admission)   Assessment & Plan    Issues during SLP eval, now on NTFL  I doubt the antibiotics have had this much effect in 48 hours and attribute her lung exam clearing to the change in her diet made by speech yesterday  Of course patient continues to deny that there is any issues with her swallowing        NSTEMI (non-ST elevated myocardial infarction) (CMS-HCC)- (present on admission)   Assessment & Plan    No ST-T wave changes on EKG, per cardiology chest pain is atypical  Troponins are improved from prior admission, echo with EF of 60  Cardiology has signed off        COPD exacerbation (CMS-HCC)- (present on admission)   Assessment & Plan    On oxygen, RT protocol, prednisone-wheezing was likely precipitated by her dysphagia, aspiration  She still exhibits some upper airway wheezing with forced expiration        Cellulitis and abscess of leg- (present on admission)   Assessment & Plan    I did not see patient on admission  There are scratches and scabs but no signs infection        Dysuria- (present on admission)   Assessment & Plan    Chronic  Culture is negative  Urology follow-up for symptoms        Dysphagia- (present on admission)    Assessment & Plan    likely multifactorial  Over sedation  Edentulous  Patient's lung exam is much better on nectar thick full liquid diet  She is in denial about her issues        Anxiolytic dependence (CMS-HCC)   Assessment & Plan    Benzodiazepines will be weaned after narcotic doses have been weaned back          Mood disorder (CMS-HCC)- (present on admission)   Assessment & Plan    Psychiatry agrees that patient likely has underlying mood disorder, psychotic features-unclear the contribution of her medications to her symptomatology and her psychosis. Patient at this time is refusing psychiatric medication  SHE IS INCAPACITATED FOR MEDICAL DECISIONS - including AMA        Acute respiratory failure with hypoxemia (CMS-HCC)- (present on admission)   Assessment & Plan    2/2 COPD and PNA        Chest pain- (present on admission)   Assessment & Plan    Noncardiac-per cardiology, signed off  Possibly just drug-seeking          Hypothyroidism- (present on admission)   Assessment & Plan    Continue levothyroxine        Opiate dependence (CMS-HCC)- (present on admission)   Assessment & Plan    Beginning to wean her home pain medications  Psychiatry agrees this needs to be done  Patient refuses alternate medications aimed at neuropathy  Will initiate wean on her MS Contin- discussed with pharmacy, discussed with nursing and patient at bedside  Psychiatry to discuss with the patient's primary care provider            Reviewed items::  Labs reviewed, Medications reviewed and Radiology images reviewed  Calles catheter::  No Calles  DVT prophylaxis pharmacological::  Enoxaparin (Lovenox)  Ulcer Prophylaxis::  Not indicated  Antibiotics:  Treating active infection/contamination beyond 24 hours perioperative coverage

## 2017-10-05 NOTE — CARE PLAN
Problem: Pain Management  Goal: Pain level will decrease to patient's comfort goal  Outcome: PROGRESSING AS EXPECTED  Educated patient regarding pain scale assessment, reviewed with patient plan to keep pain under control and not oversedate. Reviewed pharmacological and non-pharmacological options for pain management. Patient verbalized understanding with reinforcement needed.

## 2017-10-05 NOTE — CARE PLAN
Problem: Bronchoconstriction:  Goal: Improve in air movement and diminished wheezing  Respiratory Therapy Update    Interdisciplinary Plan of Care-Goals (Indications)  Obstructive Ventilatory Defect or Pulmonary Disease without Obvious Obstruction: History / Diagnosis (10/04/17 2245)  Interdisciplinary Plan of Care-Outcomes   Bronchodilator Outcome: Diminished Wheezing and Volume of Air Movement Increased (10/04/17 1511)  Hyperinflation Protocol Goals/Outcome: Greater Than 60% of Predicted I.S. Volume x 24 hrs (10/04/17 2245)    #PEP/CPT (Manual) Initial: Initial (10/03/17 0721)     #SVN Performed: Yes (10/04/17 2245)    Cough: Non Productive (10/04/17 2245)  Sputum Amount: Unable to Evaluate (10/04/17 2245)                        O2 (LPM): 4.5 (10/05/17 0034)  O2 Daily Delivery Respiratory : Silicone Nasal Cannula (10/04/17 2245)    Breath Sounds  Pre/Post Intervention: Pre Intervention Assessment (10/04/17 2245)  RUL Breath Sounds: Expiratory Wheezes (10/04/17 2245)  RML Breath Sounds: Expiratory Wheezes (10/04/17 2245)  RLL Breath Sounds: Expiratory Wheezes (10/04/17 2245)  CHAYO Breath Sounds: Expiratory Wheezes (10/04/17 2245)  LLL Breath Sounds: Expiratory Wheezes (10/04/17 2245)    Therapy changed to   Events/Summary/Plan: No changes overnight. Patient refused tx's at times.

## 2017-10-05 NOTE — PROGRESS NOTES
Received report from day shift nurse at bedside. Patient is drowsy/sedated arousable to voice oriented X 3. Safety measures in place, bed is locked and at lowest position, bed alarm active and working, call light within reach. Hourly rounding in place and will continue to monitor the patient.

## 2017-10-05 NOTE — PROGRESS NOTES
[facility]    Date of Service: 10/4/2017    Chief Complaint  Chest pain    62 y.o. female  with past medical history of chronic pain and opiate dependence, admitted on 9/30/2017 with right-sided pneumonia, chest pain, concern for NSTEMI- her troponin peaked at 0.4. She was to undergo cardiac catheterization but then signed out AMA on 10/1.  She returns with continued complaints of chest pain which is more pleuritic than typical for angina, her troponins are decreased significantly. She has been seen by cardiology and they do not feel cardiac cath is warranted at this time.    Interval Problem Update  Patient initially states she is having terrible day with more pain, more SOB, wheezing etc.  Starts to ask for IV pain meds  I advised she is overmedicated, that that is why she aspirated and got PNA, she said  \"I dont have PNA\" I feel fine now and I'm ready to go home.  I said she is not medically ready, wasn't ready when she left AMA a few days ago.  Patient has poor insight, remains with slurring of words, noted to have poor thought processes w/ ? Delusions.    Discussed w/ RN, Charge RN, SW    PATIENT IS INCAPACITATED for medical decisions and AMA  Verified with Dr Wood that Legal Hold is not needed for incapacity  She will eval patient tomorrow    Consultants/Specialty  Cardiology- signed off  Pending psychiatry    Disposition  Probably home, PT OT evals pending        Review of Systems   Unable to perform ROS: Psychiatric disorder   Constitutional: Positive for malaise/fatigue. Negative for diaphoresis.   Respiratory: Positive for cough, sputum production, shortness of breath and wheezing. Negative for hemoptysis.    Cardiovascular: Positive for chest pain.   Neurological: Positive for weakness.   Psychiatric/Behavioral: The patient is nervous/anxious.      Patient unreliable historian- changes story to try and get more narcotics, anxiolytics  States she feels well in order to try and get " discharged   Physical Exam  Laboratory/Imaging   Hemodynamics  Temp (24hrs), Av.5 °C (97.7 °F), Min:36.1 °C (97 °F), Max:36.9 °C (98.4 °F)   Temperature: 36.7 °C (98.1 °F)  Pulse  Av.7  Min: 54  Max: 86    Blood Pressure: 105/64      Respiratory      Respiration: 16, Pulse Oximetry: 90 %, O2 Daily Delivery Respiratory : Silicone Nasal Cannula     Given By:: Mouthpiece, PEP/CPT Method: Positive Airway Pressure Device, Work Of Breathing / Effort: Mild  RUL Breath Sounds: Expiratory Wheezes, RML Breath Sounds: Expiratory Wheezes, RLL Breath Sounds: Expiratory Wheezes, CHAYO Breath Sounds: Expiratory Wheezes, LLL Breath Sounds: Expiratory Wheezes    Fluids  No intake or output data in the 24 hours ending 10/04/17 1837    Nutrition  Orders Placed This Encounter   Procedures   • DIET ORDER     Standing Status:   Standing     Number of Occurrences:   1     Order Specific Question:   Diet:     Answer:   Cardiac [6]     Order Specific Question:   Diet:     Answer:   Full Liquid [11]     Order Specific Question:   Consistency/Fluid modifications:     Answer:   Nectar Thick [2]     Physical Exam   Constitutional: She appears well-developed. No distress.   Obese disheveled   HENT:   Head: Normocephalic and atraumatic.   edentulous   Eyes: Pupils are equal, round, and reactive to light. Right eye exhibits no discharge. Left eye exhibits no discharge.   Neck: Neck supple.   Cardiovascular: Normal rate and regular rhythm.    Pulmonary/Chest: No respiratory distress.   Right-sided wheezes rhonchi  Effort slightly better  Weak wheezy cough   Abdominal: Soft. Bowel sounds are normal. She exhibits no distension. There is tenderness. There is no rebound and no guarding.   Musculoskeletal: She exhibits no edema or tenderness.   Neurological:   More alert, still slurring words   Skin: Skin is warm and dry. She is not diaphoretic.   Scattered eschars but no cellulitis noted   Psychiatric: Her mood appears anxious. Her affect is  "labile and inappropriate. Her speech is tangential and slurred. Thought content is paranoid and delusional. Cognition and memory are impaired. She expresses impulsivity and inappropriate judgment.   Nursing note and vitals reviewed.      Recent Labs      10/02/17   1836  10/03/17   0605   WBC  16.9*  10.9*   RBC  5.19  4.94   HEMOGLOBIN  15.3  14.0   HEMATOCRIT  46.6  45.3   MCV  89.8  91.7   MCH  29.5  28.3   MCHC  32.8*  30.9*   RDW  50.2*  52.4*   PLATELETCT  178  170   MPV  11.3  10.8     Recent Labs      10/02/17   1836  10/03/17   0605   SODIUM  138  138   POTASSIUM  4.2  3.9   CHLORIDE  104  105   CO2  25  27   GLUCOSE  104*  81   BUN  22  22   CREATININE  0.82  0.78   CALCIUM  9.3  8.7     Recent Labs      10/02/17   1836   APTT  28.1   INR  1.01     Recent Labs      10/02/17   1836   BNPBTYPENAT  241*              Assessment/Plan     Aspiration pneumonia (CMS-HCC)- (present on admission)   Assessment & Plan    Issues during SLP eval, now on NTFL  Patient states \"I dont think I have pneumonia, I feel better\"        NSTEMI (non-ST elevated myocardial infarction) (CMS-HCC)- (present on admission)   Assessment & Plan    No ST-T wave changes on EKG, per cardiology chest pain is atypical  Troponins are improved from prior admission, echo with EF of 60  Cardiology has signed off        COPD exacerbation (CMS-HCC)- (present on admission)   Assessment & Plan    On oxygen, RT protocol, prednisone-wheezing is secondary to pneumonia, add mucinex        Cellulitis and abscess of leg- (present on admission)   Assessment & Plan    I did not see patient on admission  There are scratches and scabs but no signs infection        Dysuria- (present on admission)   Assessment & Plan    Chronic  Culture is negative  Urology follow-up for symptoms        Dysphagia- (present on admission)   Assessment & Plan    likely multifactorial  Over sedation  Edentulous  ? GERD        Anxiolytic dependence (CMS-HCC)   Assessment & Plan    " "Says its because she recently had to put her pet down (due to incompetence/inexperience of vet)  \"he was 15 but he still had 4-5 good years left in him\"   records indicate her Ativan Rx goes back further than this          Psychiatric disorder- (present on admission)   Assessment & Plan    Poor insight and fanciful thoughts/ ? paranoia  Consult pending  Between her poor reasoning and her heavy mind altering medications (Ativan/ Narcotics)  SHE IS INCAPACITATED FOR MEDICAL DECISIONS - including AMA        Acute respiratory failure with hypoxemia (CMS-HCC)- (present on admission)   Assessment & Plan    2/2 COPD and PNA        Chest pain- (present on admission)   Assessment & Plan    Claimed was worse this AM until it became obvious that I wasn't increasing her meds- then she suddenly said she felt better          Hypothyroidism- (present on admission)   Assessment & Plan    Continue levothyroxine        Chronic low back pain- (present on admission)   Assessment & Plan    Continue home pain medications   report verified  Unless she is diverting, her home doses are like what she's on here  She doesn't buy in to that her doses are high            Reviewed items::  Labs reviewed, Medications reviewed and Radiology images reviewed  Calles catheter::  No Calles  DVT prophylaxis pharmacological::  Enoxaparin (Lovenox)  Ulcer Prophylaxis::  Not indicated  Antibiotics:  Treating active infection/contamination beyond 24 hours perioperative coverage        "

## 2017-10-05 NOTE — PROGRESS NOTES
[facility] Fall Risk Assessment:     Last Known Fall: Within the last month  Mobility: Dizziness/generalized weakness, Immobilized/requires assist of one person  Medications: Cardiovascular or central nervous system meds  Mental Status/LOC/Awareness: Oriented to person and place  Toileting Needs: No needs  Volume/Electrolyte Status: No problems  Communication/Sensory: No deficits  Behavior: Depression/anxiety  [place] Fall Risk Total: 12  Fall Risk Level: MODERATE RISK    Universal Fall Precautions:  call light/belongings in reach, bed in low position and locked, wheelchairs and assistive devices out of sight, siderails up x 2, use non-slip footwear, adequate lighting, clutter free and spill free environment, educate on level of risk, educate to call for assistance    Fall Risk Level Interventions:    TRIAL (TELE 8, NEURO, MED ANUJA 5) Moderate Fall Risk Interventions  Place yellow fall risk ID band on patient: verified  Provide patient/family education based on risk assessment : verified  Educate patient/family to call staff for assistance when getting out of bed: verified  Place fall precaution signage outside patient door: verified  Utilize bed/chair fall alarm: verified     Patient Specific Interventions:     Medication: review medications with patient and family, assess for medications that can be discontinued or dosage decreased and limit combination of prn medications  Mental Status/LOC/Awareness: reorient patient, reinforce falls education, check on patient hourly, utilize bed/chair fall alarm and reinforce the use of call light  Toileting: monitor intake and output/use of appropriate interventions, instruct patient/family on the need to call for assistance when toileting and do not leave patient unattended in bathroom/refer to toileting scripting  Volume/Electrolyte Status: ensure patient remains hydrated and monitor abnormal lab values  Communication/Sensory: update plan of care on whiteboard and  ensure proper positioning when transferrng/ambulating  Behavioral: collaborate with doctor for possible psych consult, administer medication as ordered and instruct/reinforce fall program rationale  Mobility: dangle prior to standing, utilize bed/chair fall alarm and ensure bed is locked and in lowest position

## 2017-10-05 NOTE — PSYCHIATRY
"PSYCHIATRIC CONSULTATION:  Reason for admission:   COPD exacerbation   Reason for consult: capacity   Requesting Physician:Peter    Legal status:    No hold required     Chief Complaint:    HPI:   Pt is a 61 y/o woman with COPD, BAD, CHF, opiate dependence, here for COPD exacerbation and NSTEMI. Pt was to undergo cardiac cath and then left AMA, now readmitted. Cardiology doesn't feel cath is warranted at this time.she had a skin infection for 4-5 months; has spread to breasts. Also appears to have chronic cellulitis on legs.    She is utilizing high doses of narcotics and appears sedated to team. In ED chapincito reported she always appears this way. Of note, after she left AMA she called her outpatient provider for early xanax fill which was declined. On 10/1 she was screaming at staff, calling doctors \"killers\". Family tried to convince her to stay.       She still complains of severe abdominal and chest pain. She has been borderline obtunded from pain med doses, but if weaned may attempt to sign out AMA again according to team. She has aspirated due to overmedication. Team finds patient incapacitated. She takes anxiety medication, has reported it is due to having to put down her pets. She has been paranoid per staff. She was verbose and tangential during speech appointment, asking for pain medications to speech therapist. She was placed on a full liquid diet, no straws.       Regarding recent behaviors around meds: several very lengthy messages left on 8/29 requesting xanax refills, stating she \"needs them\". Not discussing anxiety at appointments.   Left 25 minute appointment that was not understandable on 8/10.     She is very insistent that she needs xanax, but can't tell me what disorder she is taking it for. She is relatively demeaning in her description of [facility] inpatient staff and says her outpatient physicians, who she states are \"experts\" and \"the best around\" can manage her meds. She is resitant " "to even talking about medication changes. She appears tangential and sedate. She is grandiose.  She clearly has some pathology that might indicated some manic symptoms while oversedated, but unclear how much of her tangentiality is also to divert away from discussing pathology and treatment as she is so resistant to medication changes. She states she needs the xanax so she won't die.       From [facility]:     Xanax 1mg - mostly from Dr.Hamid Lam  9/8 90 pills  8/29 30 pills  7/31 90 pills  6/30 90 pills  6/2 90 pills  5/5 90 pills  4/7 90 pills  3/10 90 pills  2/10 90 pills  1/11 90 pills    Hydromorphone 4mg from Dr. Sudha Lam  9/21 60 pills  8/24 60 pills  7/27 60 pills  6/29 60 pills  5/31 60 pills  5/3 60 pills  4/5 60 pills  3/10 20 pills  2/13 20 pills  1/12 20 pills    Morphine Sulfate ER 60mg from Dr. Enrrique Santa  9/21 120 pills  8/24 120 pills   7/27 120 pills  6/21 120 pills  5/31 120 pills  5/3 120 pills  4/5 120 pills  3/10 120 pills  2/13 120 pills   1/12 120 pills     Oxycodone 30 mg from Dr. Enrrique Santa   9/21 180 pills   8/24 180 pills   7/27 180 pills   6/29 180 pills   5/31 180 pills   5/3 180 pills   4/5 180 pills   3/10 180 pills   2/13 180 pills   1/12 180 pills     Psychiatric Review of   Systems:current symptoms as reported by pt.  Depression:        Brenda:  Anxiety/Panic Attacks   PTSD symptom:   Psychosis:  Other:       Medical Review of Systems: as reported by pt. All systems reviewed. Only those found to be + are noted below. All others are negative.    Pan positive on ROS. She is reporting \"all over body pain\". She reports that she has broken  Her back and almost all her bones.          Psychiatric Examination: observed phenomenon:  Vitals:   Vitals:    10/08/17 1505 10/08/17 1851 10/09/17 0402 10/09/17 0700   BP: 138/70 135/79 143/66 139/71   Pulse: (!) 56 (!) 57 68 (!) 54   Resp: 17 18 18 18   Temp: 35.9 °C (96.6 °F) 36.5 °C (97.7 °F) 36.3 °C (97.4 °F) 36.7 °C (98 °F) " "  SpO2: 98% 97% 96% 98%   Weight:       Height:           Musculoskeletal(abnormal movements, gait, etc):psychomotor retardation   Appearance:obese.  discheveled , hair unbrushed   Thoughts: tangential. Paranoia.   Speech:pressured . Slurred.   Mood:          \"fine\"   Affect:         Mildly labile   SI/HI:   Denies   Attention/Alertness:   Poor   Memory:    Poor   Orientation:      Fund of Knowledge:    decreased  Insight/Judgement into symptoms:  Very poor   Neurological Testing:( ie clock, cube drawing, MMSE, MOCA,etc.)    Past Psychiatric Hx:   Outpatient treatment for an unknown disorder. Appears that she is receiving medication from her PCP. Denies being treated by psychiatry.   Family Psychiatric Hx:    Social Hx:  Social History     Social History   • Marital status: Single     Spouse name: N/A   • Number of children: N/A   • Years of education: N/A     Occupational History   • Not on file.     Social History Main Topics   • Smoking status: Current Every Day Smoker     Packs/day: 0.50     Years: 30.00     Types: Cigarettes   • Smokeless tobacco: Never Used      Comment: 1/2 pack per day   • Alcohol use No   • Drug use: No   • Sexual activity: Not on file     Other Topics Concern   • Not on file     Social History Narrative    ** Merged History Encounter **          Lives alone. Doesn't use home O2 or health services. Uses a cane. Cannot lift things. Cannot take care of her wounds appropriately. She reports she has supports. Lives in 1 story apartment.   Drug/Alcohol/Tobacco Hx:   Drugs:   Alcohol:   Tobacco:    Medical Hx: labs, MARS, medications, etc were reviewed. Only those findings of potential interest to psychiatry are noted below:  Medical Conditions:     Past Medical History:   Diagnosis Date   • Arthritis    • ASTHMA    • Bipolar disorder (CMS-[facility])    • Bursitis    • Chronic low back pain    • Congestive heart failure (CMS-[facility])    • Dyslipidemia    • Fall     last fall a month ago   • GERD " (gastroesophageal reflux disease)    • Hypothyroid    • Migraine    • Obesity    • Sinusitis; chronic    • Unspecified urinary incontinence        Allergies: Contrast media with iodine [iodine]; Naproxen; Asa [aspirin]; Cephalosporins; Compazine; Doxycycline; Macrolides; Phenothiazines; Sympathomimetics; and Toradol    Medications     Xanax 1mg   Benadryl  Estradiol  Lasix  Dilaudid 4mg   synthoid  Morphine SR 60mg   Nystatin powder  prilosec  Oxycodone  Bactrim     Labs:  Recent Results (from the past 76 hour(s))   ACCU-CHEK GLUCOSE    Collection Time: 10/02/17  6:32 PM   Result Value Ref Range    Glucose - Accu-Ck 104 (H) 65 - 99 mg/dL   Troponin    Collection Time: 10/02/17  6:36 PM   Result Value Ref Range    Troponin I 0.07 (H) 0.00 - 0.04 ng/mL   Btype Natriuretic Peptide    Collection Time: 10/02/17  6:36 PM   Result Value Ref Range    B Natriuretic Peptide 241 (H) 0 - 100 pg/mL   CBC with Differential    Collection Time: 10/02/17  6:36 PM   Result Value Ref Range    WBC 16.9 (H) 4.8 - 10.8 K/uL    RBC 5.19 4.20 - 5.40 M/uL    Hemoglobin 15.3 12.0 - 16.0 g/dL    Hematocrit 46.6 37.0 - 47.0 %    MCV 89.8 81.4 - 97.8 fL    MCH 29.5 27.0 - 33.0 pg    MCHC 32.8 (L) 33.6 - 35.0 g/dL    RDW 50.2 (H) 35.9 - 50.0 fL    Platelet Count 178 164 - 446 K/uL    MPV 11.3 9.0 - 12.9 fL    Neutrophils-Polys 83.30 (H) 44.00 - 72.00 %    Lymphocytes 10.30 (L) 22.00 - 41.00 %    Monocytes 5.60 0.00 - 13.40 %    Eosinophils 0.00 0.00 - 6.90 %    Basophils 0.10 0.00 - 1.80 %    Immature Granulocytes 0.70 0.00 - 0.90 %    Nucleated RBC 0.00 /100 WBC    Neutrophils (Absolute) 14.05 (H) 2.00 - 7.15 K/uL    Lymphs (Absolute) 1.74 1.00 - 4.80 K/uL    Monos (Absolute) 0.94 (H) 0.00 - 0.85 K/uL    Eos (Absolute) 0.00 0.00 - 0.51 K/uL    Baso (Absolute) 0.02 0.00 - 0.12 K/uL    Immature Granulocytes (abs) 0.11 0.00 - 0.11 K/uL    NRBC (Absolute) 0.00 K/uL   Complete Metabolic Panel (CMP)    Collection Time: 10/02/17  6:36 PM   Result  Value Ref Range    Sodium 138 135 - 145 mmol/L    Potassium 4.2 3.6 - 5.5 mmol/L    Chloride 104 96 - 112 mmol/L    Co2 25 20 - 33 mmol/L    Anion Gap 9.0 0.0 - 11.9    Glucose 104 (H) 65 - 99 mg/dL    Bun 22 8 - 22 mg/dL    Creatinine 0.82 0.50 - 1.40 mg/dL    Calcium 9.3 8.5 - 10.5 mg/dL    AST(SGOT) 46 (H) 12 - 45 U/L    ALT(SGPT) 38 2 - 50 U/L    Alkaline Phosphatase 82 30 - 99 U/L    Total Bilirubin 0.4 0.1 - 1.5 mg/dL    Albumin 3.7 3.2 - 4.9 g/dL    Total Protein 6.7 6.0 - 8.2 g/dL    Globulin 3.0 1.9 - 3.5 g/dL    A-G Ratio 1.2 g/dL   Prothrombin Time    Collection Time: 10/02/17  6:36 PM   Result Value Ref Range    PT 13.6 12.0 - 14.6 sec    INR 1.01 0.87 - 1.13   APTT    Collection Time: 10/02/17  6:36 PM   Result Value Ref Range    APTT 28.1 24.7 - 36.0 sec   Lipase    Collection Time: 10/02/17  6:36 PM   Result Value Ref Range    Lipase 14 11 - 82 U/L   BLOOD CULTURE x2    Collection Time: 10/02/17  6:36 PM   Result Value Ref Range    Significant Indicator NEG     Source BLD     Site PERIPHERAL     Blood Culture       No Growth    Note: Blood cultures are incubated for 5 days and  are monitored continuously.Positive blood cultures  are called to the RN and reported as soon as  they are identified.     ESTIMATED GFR    Collection Time: 10/02/17  6:36 PM   Result Value Ref Range    GFR If African American >60 >60 mL/min/1.73 m 2    GFR If Non African American >60 >60 mL/min/1.73 m 2   EKG (ER)    Collection Time: 10/02/17  6:39 PM   Result Value Ref Range    Report       [facility] Emergency Dept.    Test Date:  2017-10-02  Pt Name:    JESSIKA MORALES            Department: ER  MRN:        [id number]                      Room:  Gender:     F                            Technician: [id number]  :        [id number]                   Requested By:ER TRIAGE PROTOCOL  Order #:    [id number]                    Reading MD:    Measurements  Intervals                                Axis  Rate:        62                           P:          -16  AK:         164                          QRS:        132  QRSD:       90                           T:          -29  QT:         436  QTc:        443    Interpretive Statements  SINUS RHYTHM  RIGHT AXIS DEVIATION  ABNORMAL T, PROBABLE ISCHEMIA, WIDESPREAD  Compared to ECG 10/01/2017 05:13:23  Right-axis deviation now present  T-wave abnormality still present  Possible ischemia still present     BLOOD CULTURE x2    Collection Time: 10/02/17  8:36 PM   Result Value Ref Range    Significant Indicator NEG     Source BLD     Site PERIPHERAL     Blood Culture       No Growth    Note: Blood cultures are incubated for 5 days and  are monitored continuously.Positive blood cultures  are called to the RN and reported as soon as  they are identified.     LACTIC ACID    Collection Time: 10/02/17 10:48 PM   Result Value Ref Range    Lactic Acid 1.3 0.5 - 2.0 mmol/L   CREATINE KINASE    Collection Time: 10/02/17 10:48 PM   Result Value Ref Range    CPK Total 230 (H) 0 - 154 U/L   Procalcitonin    Collection Time: 10/02/17 10:48 PM   Result Value Ref Range    Procalcitonin 0.06 <0.25 ng/mL   MAGNESIUM    Collection Time: 10/02/17 10:48 PM   Result Value Ref Range    Magnesium 2.1 1.5 - 2.5 mg/dL   Urinalysis    Collection Time: 10/03/17  3:44 AM   Result Value Ref Range    Color Yellow     Character Clear     Specific Gravity 1.015 <1.035    Ph 6.0 5.0 - 8.0    Glucose Negative Negative mg/dL    Ketones Negative Negative mg/dL    Protein Negative Negative mg/dL    Bilirubin Negative Negative    Urobilinogen, Urine 2.0 Negative    Nitrite Negative Negative    Leukocyte Esterase Negative Negative    Occult Blood Negative Negative    Micro Urine Req see below    TROPONIN    Collection Time: 10/03/17  6:05 AM   Result Value Ref Range    Troponin I 0.04 0.00 - 0.04 ng/mL   Comp Metabolic Panel (CMP)    Collection Time: 10/03/17  6:05 AM   Result Value Ref Range    Sodium 138 135 - 145 mmol/L     Potassium 3.9 3.6 - 5.5 mmol/L    Chloride 105 96 - 112 mmol/L    Co2 27 20 - 33 mmol/L    Anion Gap 6.0 0.0 - 11.9    Glucose 81 65 - 99 mg/dL    Bun 22 8 - 22 mg/dL    Creatinine 0.78 0.50 - 1.40 mg/dL    Calcium 8.7 8.5 - 10.5 mg/dL    AST(SGOT) 28 12 - 45 U/L    ALT(SGPT) 33 2 - 50 U/L    Alkaline Phosphatase 75 30 - 99 U/L    Total Bilirubin 0.4 0.1 - 1.5 mg/dL    Albumin 3.3 3.2 - 4.9 g/dL    Total Protein 6.0 6.0 - 8.2 g/dL    Globulin 2.7 1.9 - 3.5 g/dL    A-G Ratio 1.2 g/dL   CBC with Differential    Collection Time: 10/03/17  6:05 AM   Result Value Ref Range    WBC 10.9 (H) 4.8 - 10.8 K/uL    RBC 4.94 4.20 - 5.40 M/uL    Hemoglobin 14.0 12.0 - 16.0 g/dL    Hematocrit 45.3 37.0 - 47.0 %    MCV 91.7 81.4 - 97.8 fL    MCH 28.3 27.0 - 33.0 pg    MCHC 30.9 (L) 33.6 - 35.0 g/dL    RDW 52.4 (H) 35.9 - 50.0 fL    Platelet Count 170 164 - 446 K/uL    MPV 10.8 9.0 - 12.9 fL    Neutrophils-Polys 70.70 44.00 - 72.00 %    Lymphocytes 21.60 (L) 22.00 - 41.00 %    Monocytes 6.50 0.00 - 13.40 %    Eosinophils 0.40 0.00 - 6.90 %    Basophils 0.10 0.00 - 1.80 %    Immature Granulocytes 0.70 0.00 - 0.90 %    Nucleated RBC 0.00 /100 WBC    Neutrophils (Absolute) 7.74 (H) 2.00 - 7.15 K/uL    Lymphs (Absolute) 2.36 1.00 - 4.80 K/uL    Monos (Absolute) 0.71 0.00 - 0.85 K/uL    Eos (Absolute) 0.04 0.00 - 0.51 K/uL    Baso (Absolute) 0.01 0.00 - 0.12 K/uL    Immature Granulocytes (abs) 0.08 0.00 - 0.11 K/uL    NRBC (Absolute) 0.00 K/uL   ESTIMATED GFR    Collection Time: 10/03/17  6:05 AM   Result Value Ref Range    GFR If African American >60 >60 mL/min/1.73 m 2    GFR If Non African American >60 >60 mL/min/1.73 m 2     CT-CHEST (THORAX) W/O   Final Result      7.3 mm dense endobronchial lesion in the right lower lobe.      Right lower lobe atelectasis versus consolidation. Mild bronchiectasis is seen in the right lower lobe.      Bilateral groundglass opacities, right greater than left may be infectious or inflammatory.       Trace right pleural effusion.      Subsegmental right middle lobe and lingular atelectasis.      Prominence of the main pulmonary artery compatible with pulmonary arterial hypertension.      Mildly prominent subcarinal lymph node is nonspecific.      Atherosclerotic plaque.      Status post cholecystectomy.      5.1 x 3.9 cm right pericardial cyst is increased in size compared to prior.      DX-CHEST-LIMITED (1 VIEW)   Final Result         1.  Pulmonary edema and/or infiltrates are identified, which are somewhat decreased since the prior exam.   2.  Linear density at the right lung base favors atelectasis   3.  Cardiomegaly              ASSESSMENT: (new dx, acuity level)  Psychiatric:  Opiate use disorder   Benzodiazepine use disorder   Psychosis unspecified  Mood disorder unspecified     Medical:   Pneumonia  NSTEMI  COPD  Cellulitis, chronic, secondary to cat scratches  UTI  Hypothyroidism     PLAN:    She has a serious addiction to substances. Highly recommend taper of opiates and of xanax. 1mg tid is too high of a dose when she is aspirating.     Recommend alternative treatment for psychiatric symptoms, however it is unclear what she is even experiencing because she is so sedated.     Mood stabilizer like depakote might just further sedate her. Recommend something less sedating like abilify, although she is declining at this point. If she agrees recommend abilify 5mg po daily.     She is not going to agree to taper of substances, because she has a considerable use disorder. Her psychiatric symtpoms and inappropriate behaviors around benzos and opiates are well described in the outpatient notes and have been going on for quite a long time.  Recommend physicians decrease dose of benzo and opiates anyway because it is clearly doing her harm.     Will follow 1-2 x per week if she is being tapered.     Thank you for the consult.

## 2017-10-05 NOTE — ASSESSMENT & PLAN NOTE
Psychiatry agrees that patient likely has underlying mood disorder, psychotic features-unclear the contribution of her medications to her symptomatology and her psychosis. Patient at this time is refusing psychiatric medication  SHE IS INCAPACITATED FOR MEDICAL DECISIONS - including AMA    Past late between tearful episodes to laughing-continue to monitor

## 2017-10-05 NOTE — PROGRESS NOTES
Received bedside report from NYDIA Jerry and assumed care of patient.  Patient is awake and complaining about her coffee being thickened, states she does not need it that way.  She claims that she does not have aspiration pneumonia.  Re-educated her the reasons why we have to thicken her coffee and that she will not be given thin liquids.

## 2017-10-05 NOTE — THERAPY
"  Speech Language Therapy FEES completed.    Functional Status: Pt presenting with mild-moderate oropharyngeal dysphagia. She had asymmetry of the pharynx with her right pharyngeal wall protruding medially and approximating the R edge of the epiglottis. Epiglottis appeared to have complete inversion however thins noted to waterfall over this area prior to the swallow. She had trace aspiration on thins x1 and on one occasion a piece of fruit sitting on arytenoids and vocal folds for 1-2 seconds prior to swallow trigger. Safest diet would pureed and ntl, which she is on. She is adamant about drinking unthickened coffee and demos poor insight into deficits and consequences of aspiration.     Recommendations - Diet: Nectar Thick Liquid, Dysphagia I                          Strategies: Monitor during meals and Head of Bed at 90 Degrees                          Medication Administration: Crush all Medications in Puree  Plan of Care: Will benefit from Speech Therapy 3 times per week  Post-Acute Therapy: Discharge to home with outpatient or home health for additional skilled therapy services.    See \"Rehab Therapy-Acute\" Patient Summary Report for complete documentation.   "

## 2017-10-06 PROBLEM — T17.800A ASPIRATION INTO LOWER RESPIRATORY TRACT: Status: ACTIVE | Noted: 2017-10-06

## 2017-10-06 PROBLEM — F29 PSYCHOSIS (HCC): Status: ACTIVE | Noted: 2017-10-06

## 2017-10-06 PROBLEM — R63.2 POLYPHAGIA: Status: ACTIVE | Noted: 2017-10-06

## 2017-10-06 PROCEDURE — 94640 AIRWAY INHALATION TREATMENT: CPT

## 2017-10-06 PROCEDURE — A9270 NON-COVERED ITEM OR SERVICE: HCPCS | Performed by: INTERNAL MEDICINE

## 2017-10-06 PROCEDURE — 700111 HCHG RX REV CODE 636 W/ 250 OVERRIDE (IP): Performed by: INTERNAL MEDICINE

## 2017-10-06 PROCEDURE — A9270 NON-COVERED ITEM OR SERVICE: HCPCS

## 2017-10-06 PROCEDURE — 94669 MECHANICAL CHEST WALL OSCILL: CPT

## 2017-10-06 PROCEDURE — 94668 MNPJ CHEST WALL SBSQ: CPT

## 2017-10-06 PROCEDURE — 700102 HCHG RX REV CODE 250 W/ 637 OVERRIDE(OP): Performed by: INTERNAL MEDICINE

## 2017-10-06 PROCEDURE — A6250 SKIN SEAL PROTECT MOISTURIZR: HCPCS | Performed by: INTERNAL MEDICINE

## 2017-10-06 PROCEDURE — 700102 HCHG RX REV CODE 250 W/ 637 OVERRIDE(OP)

## 2017-10-06 PROCEDURE — 99232 SBSQ HOSP IP/OBS MODERATE 35: CPT | Performed by: INTERNAL MEDICINE

## 2017-10-06 PROCEDURE — 770006 HCHG ROOM/CARE - MED/SURG/GYN SEMI*

## 2017-10-06 PROCEDURE — 94760 N-INVAS EAR/PLS OXIMETRY 1: CPT

## 2017-10-06 PROCEDURE — 700101 HCHG RX REV CODE 250: Performed by: INTERNAL MEDICINE

## 2017-10-06 RX ORDER — ESTRADIOL 1 MG/1
0.5 TABLET ORAL DAILY
Status: DISCONTINUED | OUTPATIENT
Start: 2017-10-06 | End: 2017-10-10

## 2017-10-06 RX ADMIN — NICOTINE 14 MG: 14 PATCH, EXTENDED RELEASE TRANSDERMAL at 05:36

## 2017-10-06 RX ADMIN — ESTRADIOL 0.5 MG: 1 TABLET ORAL at 18:50

## 2017-10-06 RX ADMIN — OXYCODONE HYDROCHLORIDE 30 MG: 30 TABLET ORAL at 18:50

## 2017-10-06 RX ADMIN — LEVOFLOXACIN 750 MG: 750 TABLET, FILM COATED ORAL at 08:37

## 2017-10-06 RX ADMIN — CLOPIDOGREL 75 MG: 75 TABLET, FILM COATED ORAL at 08:37

## 2017-10-06 RX ADMIN — ALBUTEROL SULFATE 2.5 MG: 2.5 SOLUTION RESPIRATORY (INHALATION) at 15:18

## 2017-10-06 RX ADMIN — HYDROMORPHONE HYDROCHLORIDE 4 MG: 4 TABLET ORAL at 08:37

## 2017-10-06 RX ADMIN — NYSTATIN: 100000 CREAM TOPICAL at 13:23

## 2017-10-06 RX ADMIN — MORPHINE SULFATE 120 MG: 60 TABLET, EXTENDED RELEASE ORAL at 08:37

## 2017-10-06 RX ADMIN — OXYCODONE HYDROCHLORIDE 30 MG: 30 TABLET ORAL at 13:23

## 2017-10-06 RX ADMIN — STANDARDIZED SENNA CONCENTRATE AND DOCUSATE SODIUM 2 TABLET: 8.6; 5 TABLET, FILM COATED ORAL at 21:16

## 2017-10-06 RX ADMIN — GUAIFENESIN 1200 MG: 600 TABLET, EXTENDED RELEASE ORAL at 21:15

## 2017-10-06 RX ADMIN — LEVOTHYROXINE SODIUM 75 MCG: 75 TABLET ORAL at 05:36

## 2017-10-06 RX ADMIN — METRONIDAZOLE 500 MG: 500 TABLET ORAL at 13:23

## 2017-10-06 RX ADMIN — ALPRAZOLAM 1 MG: 1 TABLET ORAL at 08:48

## 2017-10-06 RX ADMIN — ALBUTEROL SULFATE 2.5 MG: 2.5 SOLUTION RESPIRATORY (INHALATION) at 19:18

## 2017-10-06 RX ADMIN — ALBUTEROL SULFATE 2.5 MG: 2.5 SOLUTION RESPIRATORY (INHALATION) at 08:05

## 2017-10-06 RX ADMIN — OXYCODONE HYDROCHLORIDE 30 MG: 30 TABLET ORAL at 00:09

## 2017-10-06 RX ADMIN — METRONIDAZOLE 500 MG: 500 TABLET ORAL at 05:36

## 2017-10-06 RX ADMIN — MORPHINE SULFATE 120 MG: 60 TABLET, EXTENDED RELEASE ORAL at 21:15

## 2017-10-06 RX ADMIN — OXYCODONE HYDROCHLORIDE 30 MG: 30 TABLET ORAL at 05:36

## 2017-10-06 RX ADMIN — ALBUTEROL SULFATE 2.5 MG: 2.5 SOLUTION RESPIRATORY (INHALATION) at 02:14

## 2017-10-06 RX ADMIN — ALPRAZOLAM 1 MG: 1 TABLET ORAL at 13:22

## 2017-10-06 RX ADMIN — ALPRAZOLAM 1 MG: 1 TABLET ORAL at 23:21

## 2017-10-06 RX ADMIN — DIPHENHYDRAMINE HCL 25 MG: 25 TABLET ORAL at 18:50

## 2017-10-06 RX ADMIN — OMEPRAZOLE 20 MG: 20 CAPSULE, DELAYED RELEASE ORAL at 08:37

## 2017-10-06 RX ADMIN — HYDROMORPHONE HYDROCHLORIDE 4 MG: 4 TABLET ORAL at 21:15

## 2017-10-06 RX ADMIN — ALPRAZOLAM 1 MG: 1 TABLET ORAL at 00:09

## 2017-10-06 RX ADMIN — METRONIDAZOLE 500 MG: 500 TABLET ORAL at 21:16

## 2017-10-06 RX ADMIN — ENOXAPARIN SODIUM 40 MG: 100 INJECTION SUBCUTANEOUS at 08:38

## 2017-10-06 RX ADMIN — ALBUTEROL SULFATE 2.5 MG: 2.5 SOLUTION RESPIRATORY (INHALATION) at 10:44

## 2017-10-06 RX ADMIN — PREDNISONE 50 MG: 50 TABLET ORAL at 08:37

## 2017-10-06 RX ADMIN — NYSTATIN: 100000 CREAM TOPICAL at 22:23

## 2017-10-06 ASSESSMENT — PAIN SCALES - GENERAL
PAINLEVEL_OUTOF10: 9
PAINLEVEL_OUTOF10: 6
PAINLEVEL_OUTOF10: 5
PAINLEVEL_OUTOF10: 7
PAINLEVEL_OUTOF10: 8
PAINLEVEL_OUTOF10: 7
PAINLEVEL_OUTOF10: 8
PAINLEVEL_OUTOF10: 6
PAINLEVEL_OUTOF10: 7

## 2017-10-06 ASSESSMENT — LIFESTYLE VARIABLES: EVER_SMOKED: YES

## 2017-10-06 NOTE — PROGRESS NOTES
[place] Fall Risk Assessment:     Last Known Fall: Within the last month  Mobility: Immobilized/requires assist of one person  Medications: Cardiovascular or central nervous system meds  Mental Status/LOC/Awareness: Awake, alert, and oriented to date, place, and person  Toileting Needs: No needs  Volume/Electrolyte Status: No problems  Communication/Sensory: No deficits  Behavior: Depression/anxiety  [place] Fall Risk Total: 10  Fall Risk Level: LOW RISK    Universal Fall Precautions:  call light/belongings in reach, bed in low position and locked, siderails up x 2, adequate lighting, use non-slip footwear, clutter free and spill free environment, educate on level of risk, educate to call for assistance    Fall Risk Level Interventions:   TRIAL ([employer] 8, NEURO, MED ANUJA 5) Low Fall Risk Interventions  Place yellow fall risk ID band on patient: verified  Provide patient/family education based on risk assessment: completed  Educate patient/family to call staff for assistance when getting out of bed: completed  Place fall precaution signage outside patient door: verifiedTRIAL ([employer] 8, NEURO, MED ANUJA 5) Moderate Fall Risk Interventions  Place yellow fall risk ID band on patient: verified  Provide patient/family education based on risk assessment : verified  Educate patient/family to call staff for assistance when getting out of bed: verified  Place fall precaution signage outside patient door: verified  Utilize bed/chair fall alarm: verified     Patient Specific Interventions:     Medication:   Mental Status/LOC/Awareness: reinforce falls education, check on patient hourly and reinforce the use of call light  Toileting: provide frquent toileting and instruct patient/family on the need to call for assistance when toileting  Volume/Electrolyte Status: ensure patient remains hydrated, advance diet as tolerated and monitor abnormal lab values  Communication/Sensory: update plan of care on whiteboard and ensure  patient has glasses/contacts and hearing aids/dentures  Behavioral: engage patient in daily activities, administer medication as ordered and instruct/reinforce fall program rationale  Mobility: schedule physical activity throughout the day, dangle prior to standing and ensure bed is locked and in lowest position

## 2017-10-06 NOTE — CARE PLAN
Problem: Bronchoconstriction:  Goal: Improve in air movement and diminished wheezing  Respiratory Therapy Update    Interdisciplinary Plan of Care-Goals (Indications)  Obstructive Ventilatory Defect or Pulmonary Disease without Obvious Obstruction: History / Diagnosis (10/06/17 0214)  Interdisciplinary Plan of Care-Outcomes   Bronchodilator Outcome: Diminished Wheezing and Volume of Air Movement Increased (10/06/17 0214)  Hyperinflation Protocol Goals/Outcome: Greater Than 60% of Predicted I.S. Volume x 24 hrs (10/06/17 0214)    #PEP/CPT (Manual) Initial: Initial (10/03/17 0721)     #SVN Performed: Yes (10/06/17 0214)    Cough: Non Productive (10/06/17 0214)  Sputum Amount: Unable to Evaluate (10/06/17 0214)       O2 (LPM): 4.5 (10/06/17 0214)  O2 Daily Delivery Respiratory : Silicone Nasal Cannula (10/06/17 0214)    Breath Sounds  Pre/Post Intervention: Pre Intervention Assessment (10/06/17 0214)  RUL Breath Sounds: Clear;Diminished (10/06/17 0214)  RML Breath Sounds: Clear;Diminished (10/06/17 0214)  RLL Breath Sounds: Clear;Diminished (10/06/17 0214)  CHAYO Breath Sounds: Clear;Diminished (10/06/17 0214)  LLL Breath Sounds: Clear;Diminished (10/06/17 0214)    Therapy changed to   Events/Summary/Plan: SVN/PEP/IS (10/06/17 0214)

## 2017-10-06 NOTE — CARE PLAN
Problem: Safety  Goal: Will remain free from injury  Outcome: PROGRESSING AS EXPECTED  Pt educated on the importance fall prevention methods, such as treaded sock and the bed alarm. Pt stated they will use the call light prior to any attempts of ambulation. Ambulatory ability assessed, treaded socks in place, bed locked and in low position, frequent trips to bathroom offered, and call light and phone within reach.

## 2017-10-06 NOTE — PROGRESS NOTES
Assumed care at 0700. Bedside report received from Guero. Patient's chart and MAR reviewed. Pt complains of chronic low back pain at this time, was recently medicated per MAR. Pt is A & O 4, but tangential in her communication. Patient was updated on plan of care for the day. Questions answered and concerns addressed.  Pt denies any additional needs at this time. White board updated. Call light, phone and personal belongings within reach.

## 2017-10-06 NOTE — PROGRESS NOTES
[place]ia Price Fall Risk Assessment:     Last Known Fall: Within the last month  Mobility: Immobilized/requires assist of one person  Medications: Cardiovascular or central nervous system meds  Mental Status/LOC/Awareness: Awake, alert, and oriented to date, place, and person  Toileting Needs: No needs  Volume/Electrolyte Status: No problems  Communication/Sensory: No deficits  Behavior: Depression/anxiety  [place] Fall Risk Total: 10  Fall Risk Level: LOW RISK    Universal Fall Precautions:  call light/belongings in reach, wheelchairs and assistive devices out of sight, bed in low position and locked, siderails up x 2, use non-slip footwear, adequate lighting, clutter free and spill free environment, educate on level of risk, educate to call for assistance    Fall Risk Level Interventions:   TRIAL ([employer], NEURO, MED ANUJA 5) Low Fall Risk Interventions  Place yellow fall risk ID band on patient: verified  Provide patient/family education based on risk assessment: completed  Educate patient/family to call staff for assistance when getting out of bed: completed  Place fall precaution signage outside patient door: verifiedTRIAL ([employer], NEURO, MED ANUJA 5) Moderate Fall Risk Interventions  Place yellow fall risk ID band on patient: verified  Provide patient/family education based on risk assessment : verified  Educate patient/family to call staff for assistance when getting out of bed: verified  Place fall precaution signage outside patient door: verified  Utilize bed/chair fall alarm: verified     Patient Specific Interventions:     Medication: review medications with patient and family, assess for medications that can be discontinued or dosage decreased and limit combination of prn medications  Mental Status/LOC/Awareness: reinforce falls education, check on patient hourly and reinforce the use of call light  Toileting: instruct patient/family on the need to call for assistance when toileting, do not leave  patient unattended in bathroom/refer to toileting scripting and toilet prior to giving pain medications  Volume/Electrolyte Status: monitor abnormal lab values  Communication/Sensory: update plan of care on whiteboard and ensure proper positioning when transferrng/ambulating  Behavioral: administer medication as ordered and instruct/reinforce fall program rationale  Mobility: ensure bed is locked and in lowest position and provide appropriate assistive device

## 2017-10-06 NOTE — PROGRESS NOTES
Assumed care of patient. Bedside report received from NYDIA Borrego. Updated on POC, call light within reach and fall precautions in place. Bed locked and in lowest position. Patient instructed to call for assistance before getting out of bed. All questions answered, no other needs at this time. MAR, labs, orders reviewed.

## 2017-10-07 PROCEDURE — 700102 HCHG RX REV CODE 250 W/ 637 OVERRIDE(OP): Performed by: INTERNAL MEDICINE

## 2017-10-07 PROCEDURE — A9270 NON-COVERED ITEM OR SERVICE: HCPCS | Performed by: INTERNAL MEDICINE

## 2017-10-07 PROCEDURE — 700111 HCHG RX REV CODE 636 W/ 250 OVERRIDE (IP): Performed by: INTERNAL MEDICINE

## 2017-10-07 PROCEDURE — 700101 HCHG RX REV CODE 250: Performed by: INTERNAL MEDICINE

## 2017-10-07 PROCEDURE — A9270 NON-COVERED ITEM OR SERVICE: HCPCS

## 2017-10-07 PROCEDURE — 770006 HCHG ROOM/CARE - MED/SURG/GYN SEMI*

## 2017-10-07 PROCEDURE — 94640 AIRWAY INHALATION TREATMENT: CPT

## 2017-10-07 PROCEDURE — 700102 HCHG RX REV CODE 250 W/ 637 OVERRIDE(OP)

## 2017-10-07 PROCEDURE — 99233 SBSQ HOSP IP/OBS HIGH 50: CPT | Performed by: INTERNAL MEDICINE

## 2017-10-07 PROCEDURE — 307059 PAD,EAR PROTECTOR: Performed by: INTERNAL MEDICINE

## 2017-10-07 RX ORDER — MORPHINE SULFATE 60 MG/1
60 TABLET, FILM COATED, EXTENDED RELEASE ORAL
Status: COMPLETED | OUTPATIENT
Start: 2017-10-08 | End: 2017-10-08

## 2017-10-07 RX ADMIN — HYDROMORPHONE HYDROCHLORIDE 4 MG: 4 TABLET ORAL at 21:14

## 2017-10-07 RX ADMIN — METRONIDAZOLE 500 MG: 500 TABLET ORAL at 21:15

## 2017-10-07 RX ADMIN — METRONIDAZOLE 500 MG: 500 TABLET ORAL at 05:40

## 2017-10-07 RX ADMIN — METRONIDAZOLE 500 MG: 500 TABLET ORAL at 14:10

## 2017-10-07 RX ADMIN — MORPHINE SULFATE 60 MG: 60 TABLET, EXTENDED RELEASE ORAL at 16:42

## 2017-10-07 RX ADMIN — NYSTATIN: 100000 CREAM TOPICAL at 10:08

## 2017-10-07 RX ADMIN — HYDROMORPHONE HYDROCHLORIDE 4 MG: 4 TABLET ORAL at 10:12

## 2017-10-07 RX ADMIN — ALPRAZOLAM 1 MG: 1 TABLET ORAL at 10:39

## 2017-10-07 RX ADMIN — ALPRAZOLAM 1 MG: 1 TABLET ORAL at 16:41

## 2017-10-07 RX ADMIN — NYSTATIN: 100000 CREAM TOPICAL at 21:26

## 2017-10-07 RX ADMIN — ESTRADIOL 0.5 MG: 1 TABLET ORAL at 11:10

## 2017-10-07 RX ADMIN — LEVOFLOXACIN 750 MG: 750 TABLET, FILM COATED ORAL at 10:05

## 2017-10-07 RX ADMIN — LEVOTHYROXINE SODIUM 75 MCG: 75 TABLET ORAL at 05:40

## 2017-10-07 RX ADMIN — OXYCODONE HYDROCHLORIDE 30 MG: 30 TABLET ORAL at 14:23

## 2017-10-07 RX ADMIN — CLOPIDOGREL 75 MG: 75 TABLET, FILM COATED ORAL at 10:05

## 2017-10-07 RX ADMIN — ENOXAPARIN SODIUM 40 MG: 100 INJECTION SUBCUTANEOUS at 10:09

## 2017-10-07 RX ADMIN — PREDNISONE 50 MG: 50 TABLET ORAL at 10:04

## 2017-10-07 RX ADMIN — ALBUTEROL SULFATE 2.5 MG: 2.5 SOLUTION RESPIRATORY (INHALATION) at 07:03

## 2017-10-07 RX ADMIN — OXYCODONE HYDROCHLORIDE 30 MG: 30 TABLET ORAL at 04:29

## 2017-10-07 RX ADMIN — NICOTINE 14 MG: 14 PATCH, EXTENDED RELEASE TRANSDERMAL at 05:40

## 2017-10-07 RX ADMIN — ALPRAZOLAM 1 MG: 1 TABLET ORAL at 21:15

## 2017-10-07 RX ADMIN — OXYCODONE HYDROCHLORIDE 30 MG: 30 TABLET ORAL at 21:14

## 2017-10-07 RX ADMIN — OMEPRAZOLE 20 MG: 20 CAPSULE, DELAYED RELEASE ORAL at 11:09

## 2017-10-07 RX ADMIN — MORPHINE SULFATE 60 MG: 60 TABLET, EXTENDED RELEASE ORAL at 10:06

## 2017-10-07 RX ADMIN — STANDARDIZED SENNA CONCENTRATE AND DOCUSATE SODIUM 2 TABLET: 8.6; 5 TABLET, FILM COATED ORAL at 21:14

## 2017-10-07 ASSESSMENT — ENCOUNTER SYMPTOMS
NERVOUS/ANXIOUS: 1
MEMORY LOSS: 1
DEPRESSION: 1

## 2017-10-07 ASSESSMENT — PAIN SCALES - GENERAL
PAINLEVEL_OUTOF10: 8
PAINLEVEL_OUTOF10: 5
PAINLEVEL_OUTOF10: 8
PAINLEVEL_OUTOF10: 6

## 2017-10-07 NOTE — CONSULTS
DATE OF SERVICE:  10/06/2017    CHIEF COMPLAINT:  Chest pain.    HISTORY OF PRESENT ILLNESS:  A very pleasant woman walking her dog one-half to   mile a day with breathlessness over the past month.  She has angina and   tightness in her chest relieved with rest, lasts for about half hour.    She has had a family history of coronary artery disease in her father and   brother, but they had this in age 60s, not premature.  She has a history of   hypertension, has a history of acid reflux, GERD and Resendez's esophagus.  She   has had breast cancer with high-dose radiation therapy, both for rib   involvement and left breast cancer in .  She also has a squamous cell   carcinoma of the skin.  She has adenoma of the small bowel.  She has had a   past surgical history of low anterior resection, appendectomy, lumpectomies,   colonoscopies, colectomy, ERCP, and cholecystotomy.    She stopped smoking 17 years ago.  She does not use alcohol.  She does not use   drugs.    Her echo shows that she has mild aortic stenosis and mitral regurgitation.    Her pullback on the cath for the aortic valve was only 10 mm.  Her aortic   valve gradient by echo was approximately 20.  Her mitral valve shows mild   regurgitation with a heavily calcified mitral ring.  Her EF is thought to be   about 35%.    Heart catheterization shows that she has significant left main and thin LAD   and _____ lesions of 90%, heavily calcified arteries in left main as well as a   heavily calcified right with a 80-90% mid right lesion with a large patent   ductus arteriosus takeoff.  Her targets are calcified, but noticeable.  She   also has a diagonal that is heavily .    SOCIAL HISTORY:  Daughter at bedside.    FAMILY HISTORY:  Positive for older members of her family having coronary   artery disease.    REVIEW OF SYSTEMS:  A 14-point review of systems is negative except for that   mentioned above.    ALLERGIES:  None.    MEDICATIONS:  Per medication  chart.    PHYSICAL EXAMINATION:  GENERAL:  No acute distress, alert, appearance appropriate, oriented x3, awake   and alert.  VITAL SIGNS:  Pulse 80, blood pressure 135/85, and respirations 12.  HEENT:  PERRLA. EOM normal.  NEUROLOGIC:  Cranial nerves II-XII are intact.  Four extremities motor   function equal, reflex normal.  Motor equal.  CARDIAC:  S1, S2.  No S3, no S4.  I hear no murmur.  PULMONARY:  Clear.  DERMATOLOGIC:  Clear.  ABDOMEN:  Benign.  EXTREMITIES:  Normal range of motion.  Saphenous vein is good for harvesting   bilaterally.  VASCULAR:  Femoral, carotids, radials 1+.  GYNECOLOGIC:  Deferred.  RECTAL: Deferred.  GENITOURINARY:  Deferred.    IMPRESSION AND PLAN:  1.  She has significant coronary artery disease that we will treat by coronary   bypass surgery.  2.  She has significant valve disease, which at this point is nonsurgical,   could be treated with TAVR in the future should she develop an increasing   aortic valve gradient.  If her aortic valve was approached most likely what   little mitral regurgitation she has would be most likely lessened.  3.  She has hypertension and multiple other medical problems, which we will   not treat other than with the cardiologist and the hospitalist team postop.    She has a significant history of cancer.  The radiation therapy to her left   breast may prohibit us from using a left MARIE.  She undoubtably had a boost to   her radiation therapy for her rib involvement.  This may preclude us from   using a left MARIE.  In addition, it may preclude us when we were in surgery to   doing surgeon exposures of the heart including that of the circumflex should   this area be heavily calcified or scar to the point where we cannot rotate the   heart.    She has a STS 3% mortality, and a mortality and morbidity combination of 22%.    I believe her mortality is 5% with a 30% chance of problems, especially   considering the amount of radiation she has had and the ability to  expose or   not to expose the heart to do coronary bypass surgery.  She has a [place]point   classification IV.  She has a New York Heart Classification IV.    I explained the risk of surgery to the patient and her daughter.  These risks   to include mortality in addition to the risk of infection, bleeding, heart   attack, stroke, blood transfusion risk, liver, lung, kidney failure,   diaphragmatic paralysis, paresis, ulnar neuropathy, chest wall paresthesia,   leg paresthesia, reoperation, bleeding, reoperation for infection,   tracheostomy, ventilation, AIDS, hepatitis, missed counts of surgical items.    I explained the alternatives, intent, and expectation of procedure to the   patient and daughter.  They understand and accept the above, understand and   accept the risks of the planned surgery as scheduled and told this may be an   extensive surgery.  She should be in the hospital for most likely 10 days   because of the possibility of severe adhesive and calcified heart and   surrounding tissue.    She understands and accepts the risks and desires to proceed with surgery.  I   have discussed this case with Dr. Laina Fragoso, reviewed the films with   Dr. Wells, planned surgery on Monday.  Preop orders are being written.       ____________________________________     MD ERICKA MORTON / DIONI    DD:  10/06/2017 16:52:32  DT:  10/06/2017 20:39:06    D#:  [id number]  Job#:  [id number]

## 2017-10-07 NOTE — FLOWSHEET NOTE
Respiratory Care     10/07/17 1203   Events/Summary/Plan   Events/Summary/Plan Pt AAO and now refusing allowed assessment but declined due to nausea   Therapy Not Performed   Type of Therapy Not Performed SVN   Reason Therapy Not Performed Refused   Respiratory WDL   Respiratory (WDL) X   Chest Exam   Respiration 16   Pulse 64   Breath Sounds   LLL Breath Sounds Diminished

## 2017-10-07 NOTE — PROGRESS NOTES
[facility] Hospitalist Progress Note    Date of Service: 10/6/2017    Chief Complaint  Chest pain    62 y.o. female  with past medical history of chronic pain and opiate dependence, admitted on 2017 with right-sided pneumonia, chest pain, concern for NSTEMI- her troponin peaked at 0.4. She was to undergo cardiac catheterization but then signed out AMA on 10/1.  She returns with continued complaints of chest pain which is more pleuritic than typical for angina, her troponins are decreased significantly. She has been seen by cardiology and they do not feel cardiac cath is warranted at this time.    Interval Problem Update  She continues to say that she is fine and wants to go home. She does not process the answers she is given, she tries to be manipulative, assuring that she has good family support and a very good doctor on the outside.   Due to her behaviors, she is very taxing upon the nursing staff.    PATIENT IS INCAPACITATED for medical decisions and AMA  Verified with Dr Wood that Legal Hold is not needed for incapacity    Consultants/Specialty  Cardiology- signed off  psychiatry    Disposition  TB D        Review of Systems   Unable to perform ROS: Psychiatric disorder     Patient unreliable historian- changes story to try and get more narcotics, anxiolytics  States she feels well in order to try and get discharged   Physical Exam  Laboratory/Imaging   Hemodynamics  Temp (24hrs), Av.6 °C (97.9 °F), Min:36.1 °C (97 °F), Max:37.2 °C (99 °F)   Temperature: 36.1 °C (97 °F)  Pulse  Av.6  Min: 54  Max: 86    Blood Pressure: 127/74      Respiratory      Respiration: 20, Pulse Oximetry: 92 %, O2 Daily Delivery Respiratory : Silicone Nasal Cannula     Given By:: Mouthpiece, PEP/CPT Method: Positive Airway Pressure Device, Work Of Breathing / Effort: Mild  RUL Breath Sounds: Clear;Diminished, RML Breath Sounds: Clear;Diminished, RLL Breath Sounds: Clear;Diminished, CHAYO Breath Sounds: Clear;Diminished, LLL  Breath Sounds: Clear;Diminished    Fluids    Intake/Output Summary (Last 24 hours) at 10/06/17 1902  Last data filed at 10/06/17 1400   Gross per 24 hour   Intake              420 ml   Output                0 ml   Net              420 ml       Nutrition  Orders Placed This Encounter   Procedures   • DIET ORDER     Standing Status:   Standing     Number of Occurrences:   1     Order Specific Question:   Diet:     Answer:   Cardiac [6]     Order Specific Question:   Texture/Fiber modifications:     Answer:   Dysphagia 1(Pureed)specify fluid consistency(question 6) [1]     Order Specific Question:   Consistency/Fluid modifications:     Answer:   Nectar Thick [2]     Physical Exam   Constitutional: She appears well-developed. No distress.   Obese disheveled   HENT:   Head: Normocephalic and atraumatic.   edentulous   Eyes: Pupils are equal, round, and reactive to light. Right eye exhibits no discharge. Left eye exhibits no discharge.   Neck: Neck supple.   Cardiovascular: Normal rate and regular rhythm.    Pulmonary/Chest: No respiratory distress.   Wheezes and rhonchi predominantly on the left now       Abdominal: Soft. Bowel sounds are normal. She exhibits no distension. There is no tenderness. There is no rebound and no guarding.   Musculoskeletal: She exhibits no edema or tenderness.   Neurological: She is alert.   still slurring words   Skin: Skin is warm and dry. She is not diaphoretic.   Scattered eschars but no cellulitis noted   Psychiatric: Her mood appears anxious. Her affect is labile and inappropriate. Her speech is tangential and slurred. Thought content is paranoid and delusional (differing delusions every day). Cognition and memory are impaired. She expresses impulsivity and inappropriate judgment. She is inattentive.   Nursing note and vitals reviewed.                               Assessment/Plan     Aspiration pneumonia (CMS-HCC)- (present on admission)   Assessment & Plan    Issues during SLP eval,  now on NTFL  Variations in lung exam are likely secondary to compliance with diet  I told her once her lung exam is clear we will reimage her          NSTEMI (non-ST elevated myocardial infarction) (CMS-HCC)- (present on admission)   Assessment & Plan    No ST-T wave changes on EKG, per cardiology chest pain is atypical  Troponins are improved from prior admission, echo with EF of 60  Cardiology has signed off        COPD exacerbation (CMS-HCC)- (present on admission)   Assessment & Plan    On oxygen, RT protocol, prednisone-wheezing was likely precipitated by her dysphagia, aspiration  More wheezing today likely secondary to ongoing aspiration and noncompliance with aspiration precautions        Cellulitis and abscess of leg- (present on admission)   Assessment & Plan    It is noted there is a lot of asymmetry and she has more eschars on her right leg than her left  She says that because a doctor 3 years ago stuck her with a bunch of needles, and gave her an infection/        Dysuria- (present on admission)   Assessment & Plan    Chronic  Culture is negative  Urology follow-up for symptoms        Aspiration into lower respiratory tract   Assessment & Plan    Suspect ongoing        Psychosis   Assessment & Plan    Ongoing  Refuses psychiatric medications  Has little insight into behavior  Clear cognitive impairments  Remains incapacitated        Dysphagia- (present on admission)   Assessment & Plan    likely multifactorial  Over sedation  Edentulous  Her lung exam is worse on the left side today suspect she is eating and a semi-recumbant position  She is in denial about her issues-I told her she needs to be compliant if she wishes to be eligible for discharge        Anxiolytic dependence (CMS-HCC)- (present on admission)   Assessment & Plan    Benzodiazepines will be weaned after narcotic doses have been weaned back          Mood disorder (CMS-HCC)- (present on admission)   Assessment & Plan    Psychiatry agrees  that patient likely has underlying mood disorder, psychotic features-unclear the contribution of her medications to her symptomatology and her psychosis. Patient at this time is refusing psychiatric medication  SHE IS INCAPACITATED FOR MEDICAL DECISIONS - including AMA        Acute respiratory failure with hypoxemia (CMS-[facility])- (present on admission)   Assessment & Plan    2/2 COPD and PNA        Chest pain- (present on admission)   Assessment & Plan    Noncardiac-per cardiology, signed off  Possibly just drug-seeking          Hypothyroidism- (present on admission)   Assessment & Plan    Continue levothyroxine        Opiate dependence (CMS-HCC)- (present on admission)   Assessment & Plan    weaning her home pain medications  Psychiatry agrees this needs to be done  Patient refuses alternate medications aimed at neuropathy  Psychiatry to discuss with the patient's primary care provider              Reviewed items::  Labs reviewed, Medications reviewed and Radiology images reviewed  Calles catheter::  No Calles  DVT prophylaxis pharmacological::  Enoxaparin (Lovenox)  Ulcer Prophylaxis::  Not indicated  Antibiotics:  Treating active infection/contamination beyond 24 hours perioperative coverage

## 2017-10-07 NOTE — PROGRESS NOTES
[facility] Hospitalist Progress Note    Date of Service: 10/7/2017    Chief Complaint  Chest pain    62 y.o. female  with past medical history of chronic pain and opiate dependence, admitted on 2017 with right-sided pneumonia, chest pain, concern for NSTEMI- her troponin peaked at 0.4. She was to undergo cardiac catheterization but then signed out AMA on 10/1.  She returns with continued complaints of chest pain which is more pleuritic than typical for angina, her troponins are decreased significantly. She has been seen by cardiology and they do not feel cardiac cath is warranted at this time.    Interval Problem Update  10/6Shmilind continues to say that she is fine and wants to go home. She does not process the answers she is given, she tries to be manipulative, assuring that she has good family support and a very good doctor on the outside.   Due to her behaviors, she is very taxing upon the nursing staff.        PATIENT IS INCAPACITATED for medical decisions and AMA  Verified with Dr Wood that Legal Hold is not needed for incapacity    10/7 denies lower extremity weakness, denies lower extremity pain  Patient tearful during exam, states that she lives with her  who can support her  States that her dog recently passed, tearful during exam, easily consolable    Consultants/Specialty  Cardiology- signed off  psychiatry    Disposition  TB D  Lack of capacity for medical decision making  continue to monitor        Review of Systems   Unable to perform ROS: Psychiatric disorder   Psychiatric/Behavioral: Positive for depression and memory loss. The patient is nervous/anxious.      Patient unreliable historian- changes story to try and get more narcotics, anxiolytics  States she feels well in order to try and get discharged   Physical Exam  Laboratory/Imaging   Hemodynamics  Temp (24hrs), Av.3 °C (97.3 °F), Min:36.1 °C (97 °F), Max:36.4 °C (97.6 °F)   Temperature: 36.3 °C (97.4 °F)  Pulse  Av.3  Min: 54   Max: 86 Heart Rate (Monitored): 88  Blood Pressure: 120/54      Respiratory      Respiration: 16, Pulse Oximetry: 93 %, O2 Daily Delivery Respiratory : Room Air with O2 Available (RN notified od RA)     Given By:: Mouthpiece, PEP/CPT Method: Positive Airway Pressure Device, Work Of Breathing / Effort: Mild  RUL Breath Sounds: Expiratory Wheezes;Diminished, RML Breath Sounds: Diminished, RLL Breath Sounds: Clear, CHAYO Breath Sounds: Expiratory Wheezes;Diminished, LLL Breath Sounds: Diminished    Fluids    Intake/Output Summary (Last 24 hours) at 10/07/17 1318  Last data filed at 10/06/17 1400   Gross per 24 hour   Intake              360 ml   Output                0 ml   Net              360 ml       Nutrition  Orders Placed This Encounter   Procedures   • DIET ORDER     Standing Status:   Standing     Number of Occurrences:   1     Order Specific Question:   Diet:     Answer:   Cardiac [6]     Order Specific Question:   Texture/Fiber modifications:     Answer:   Dysphagia 1(Pureed)specify fluid consistency(question 6) [1]     Order Specific Question:   Consistency/Fluid modifications:     Answer:   Nectar Thick [2]     Physical Exam   Constitutional: She appears well-developed. No distress.   Obese disheveled   HENT:   Head: Normocephalic and atraumatic.   edentulous   Eyes: Pupils are equal, round, and reactive to light.   Neck: Normal range of motion. Neck supple.   Cardiovascular: Normal rate, regular rhythm and intact distal pulses.    Pulmonary/Chest: Effort normal. No respiratory distress. She has no wheezes.   Wheezes and rhonchi predominantly on the left now       Abdominal: Soft. Bowel sounds are normal. She exhibits no distension and no mass. There is no tenderness.   Musculoskeletal: She exhibits edema. She exhibits no tenderness.   Diffuse eschars on bilateral lower extremities right side greater than left, minimal erythema, nontender to palpation   Neurological: She is alert. No cranial nerve deficit.  Coordination normal.   Skin: Skin is warm and dry.   Scattered eschars but no cellulitis noted   Psychiatric: Her mood appears anxious. Her affect is labile and inappropriate. Her speech is tangential and slurred. Thought content is paranoid and delusional (differing delusions every day). Cognition and memory are impaired. She expresses impulsivity and inappropriate judgment.   Tearful She is inattentive.   Nursing note and vitals reviewed.                               Assessment/Plan     Aspiration pneumonia (CMS-HCC)- (present on admission)   Assessment & Plan    Issues during SLP eval, now on NTFL  Variations in lung exam are likely secondary to compliance with diet  I told her once her lung exam is clear we will reimage her          NSTEMI (non-ST elevated myocardial infarction) (CMS-HCC)- (present on admission)   Assessment & Plan    No ST-T wave changes on EKG, per cardiology chest pain is atypical  Troponins are improved from prior admission, echo with EF of 60  Cardiology has signed off        COPD exacerbation (CMS-HCC)- (present on admission)   Assessment & Plan    On oxygen, RT protocol, prednisone-wheezing was likely precipitated by her dysphagia, aspiration  More wheezing today likely secondary to ongoing aspiration and noncompliance with aspiration precautions        Cellulitis and abscess of leg- (present on admission)   Assessment & Plan    It is noted there is a lot of asymmetry and she has more eschars on her right leg than her left  She says that because a doctor 3 years ago stuck her with a bunch of needles, and gave her an infection/  Minimal erythema  Continue oral antibiotics        Dysuria- (present on admission)   Assessment & Plan    Chronic  Culture is negative  Urology follow-up for symptoms        Aspiration into lower respiratory tract   Assessment & Plan    Suspect ongoing        Psychosis   Assessment & Plan    Easily reoriented  Refuses psychiatric medications  Has little insight  into behavior  Clear cognitive impairments  Remains incapacitated        Dysphagia- (present on admission)   Assessment & Plan    likely multifactorial  Over sedation  Edentulous  Her lung exam is worse on the left side today suspect she is eating and a semi-recumbant position  She is in denial about her issues-I told her she needs to be compliant if she wishes to be eligible for discharge        Anxiolytic dependence (CMS-HCC)- (present on admission)   Assessment & Plan    Benzodiazepines will be weaned after narcotic doses have been weaned back          Mood disorder (CMS-HCC)- (present on admission)   Assessment & Plan    Psychiatry agrees that patient likely has underlying mood disorder, psychotic features-unclear the contribution of her medications to her symptomatology and her psychosis. Patient at this time is refusing psychiatric medication  SHE IS INCAPACITATED FOR MEDICAL DECISIONS - including AMA    Past late between tearful episodes to laughing-continue to monitor        Acute respiratory failure with hypoxemia (CMS-HCC)- (present on admission)   Assessment & Plan    2/2 COPD and PNA        Chest pain- (present on admission)   Assessment & Plan    Noncardiac-per cardiology, signed off  Possibly just drug-seeking          Hypothyroidism- (present on admission)   Assessment & Plan    Continue levothyroxine        Opiate dependence (CMS-HCC)- (present on admission)   Assessment & Plan    weaning her home pain medications  Psychiatry agrees this needs to be done  Patient refuses alternate medications aimed at neuropathy  Psychiatry to discuss with the patient's primary care provider              Reviewed items::  Labs reviewed, Medications reviewed and Radiology images reviewed  Calles catheter::  No Calles  DVT prophylaxis pharmacological::  Enoxaparin (Lovenox)  Ulcer Prophylaxis::  Not indicated  Antibiotics:  Treating active infection/contamination beyond 24 hours perioperative coverage

## 2017-10-07 NOTE — FLOWSHEET NOTE
Respiratory  Stopped PEP therapy - Goals met willre-evaluated SVN need     10/07/17 0706   Events/Summary/Plan   Events/Summary/Plan PT WANTING TO STOP tx CJ Dc'd over predicted IS values and understands use   Interdisciplinary Plan of Care-Goals (Indications)   Obstructive Ventilatory Defect or Pulmonary Disease without Obvious Obstruction History / Diagnosis   Interdisciplinary Plan of Care-Outcomes    Bronchodilator Outcome Patient at Stable Baseline   RT Assessment of Delivered Medications   Evaluation of Medication Delivery Daily Yes-- Pt /Family has been Instructed in use of Respiratory Medications and Adverse Reactions   SVN Group   #SVN Performed Yes   Given By: Mouthpiece   Date SVN Last Changed 10/07/17   Date SVN Next Change Due (Q 7 Days) 10/14/17   Incentive Spirometry Group   Incentive Spirometry Instruction Yes   Breathing Exercises Yes   Incentive Spirometer Volume 2000 mL   Respiratory WDL   Respiratory (WDL) X   Chest Exam   Respiration 16   Pulse 82  (post 65)   Breath Sounds   RLL Breath Sounds Clear   LLL Breath Sounds Clear   Oxygen   Pulse Oximetry 96 %   O2 (LPM) 1   O2 Daily Delivery Respiratory  Silicone Nasal Cannula  (PT placed on RA    RN notified of RA)

## 2017-10-07 NOTE — PROGRESS NOTES
[place]ia Price Fall Risk Assessment:     Last Known Fall: Within the last six months  Mobility: Dizziness/generalized weakness  Medications: Cardiovascular and central nervous system meds  Mental Status/LOC/Awareness: Awake, alert, and oriented to date, place, and person  Toileting Needs: No needs  Volume/Electrolyte Status: No problems  Communication/Sensory: No deficits  Behavior: Depression/anxiety  [place]ia [facility] Fall Risk Total: 9  Fall Risk Level: LOW RISK    Universal Fall Precautions:  bed in low position and locked, call light/belongings in reach, siderails up x 2, use non-slip footwear, adequate lighting, clutter free and spill free environment, educate to call for assistance, educate on level of risk    Fall Risk Level Interventions:   TRIAL ([employer] 8, NEURO, MED ANUJA 5) Low Fall Risk Interventions  Place yellow fall risk ID band on patient: verified  Provide patient/family education based on risk assessment: verified  Educate patient/family to call staff for assistance when getting out of bed: verified  Place fall precaution signage outside patient door: verifiedTRIAL (TELE 8, NEURO, MED ANUJA 5) Moderate Fall Risk Interventions  Place yellow fall risk ID band on patient: verified  Provide patient/family education based on risk assessment : verified  Educate patient/family to call staff for assistance when getting out of bed: verified  Place fall precaution signage outside patient door: verified  Utilize bed/chair fall alarm: verified     Patient Specific Interventions:     Medication: review medications with patient and family, assess for medications that can be discontinued or dosage decreased and limit combination of prn medications  Mental Status/LOC/Awareness: not applicable  Toileting: instruct patient/family on the use of grab bars, instruct patient/family on the need to call for assistance when toileting and do not leave patient unattended in bathroom/refer to toileting scripting  Volume/Electrolyte  Status: ensure patient remains hydrated  Communication/Sensory: update plan of care on whiteboard  Behavioral: administer medication as ordered  Mobility: ensure bed is locked and in lowest position and instruct patient to exit bed on their strongest side

## 2017-10-07 NOTE — PROGRESS NOTES
Pt arrived to unit w/ transport and all personal belongings. Pt AAOx4 with no s/s of acute distress. Pt oriented to room and unit. All needs met at this time. Bed in low and locked position. Call light w/in reach.

## 2017-10-07 NOTE — ASSESSMENT & PLAN NOTE
Easily reoriented  Refuses psychiatric medications  Has little insight into behavior  Clear cognitive impairments  Remains incapacitated      10/9 patient appears to lack insight into her multiple medical condition  Patient is now requesting that her POA P her boyfriend of over 10 years, who apparently is back in town  [place]   to assist with possible POA assignment    10/10  has identified next of kin, daughter Maria E  At this time is requesting psychiatry reevaluation   to assist with arranging for 24 hour assistance,/cost, if this is feasible discharge for patient and daughter  At this time patient is not safe for discharge to home without 24-hour support  Nicole states that she will consider having patient lived with her if needed

## 2017-10-07 NOTE — CARE PLAN
Problem: Respiratory:  Goal: Respiratory status will improve  Outcome: PROGRESSING AS EXPECTED  Pt placed on . Oxygen titrated from 4L down to 1.5L. SaO2 remaining above 91%.     Problem: Psychosocial Needs:  Goal: Level of anxiety will decrease  Outcome: PROGRESSING SLOWER THAN EXPECTED  Pt medicated per MAR. Emotional support and education provided to help alleviate anxiety.

## 2017-10-07 NOTE — FLOWSHEET NOTE
10/06/17 1919   Type of Assessment   Reassessment Yes   Patient History   Pulmonary Diagnosis copd ex, pna   Home O2 Use Prior To Admission? No   Home Treatments/Frequency No   COPD Risk Screening   Do you have a history of COPD? Yes   Do you have a Pulmonologist? Yes   Smoking History   Have you Ever Smoked Yes   Have you Smoked in the Last 12 Mos Yes   Have you Quit Smoking No    Smoking Cessation Offered Patient Counseled   Level Of Consciousness   Level of Consciousness Alert   Respiratory WDL   Respiratory (WDL) X   Chest Exam   Work Of Breathing / Effort Mild   Respiration 18   Pulse 86   Heart Rate (Monitored) 88   Breath Sounds   Pre/Post Intervention Pre Intervention Assessment   RUL Breath Sounds Clear;Coarse Crackles   RML Breath Sounds Clear;Coarse Crackles   RLL Breath Sounds Diminished   CHAYO Breath Sounds Clear   LLL Breath Sounds Diminished   Secretions   Cough Non Productive   Sputum Amount Unable to Evaluate   Sputum Color Unable to Evaluate   Sputum Consistency Unable to Evaluate   Incentive Spirometer Instruct   Predicted (ml) 2100   60% (ml) 1260   40% (ml) 840   Actual (ml) 2000   Bronchodilator Protocol   Med Order With RCP Yes - Initial Order by MD for Therapy - Follow Order for 24 Hours, Reassess Patient   Is this an exacerbation of COPD/Asthma? Yes - Reassessment, QID for 24 hours   Obstructive Ventilatory Defect or Pulmonary Disease without Obvious Obstruction History / Diagnosis   Breath Sounds Criteria 1    Benefit Criteria 0    Pulse Criteria 0    Respiratory Rate Criteria 1    S.O.B. Criteria 0    Criteria Total 2   Point Values 4-6 - QID and PRN   Bronchodilator Goals/Outcome Improved Patient Appearance with Decreased use of Accessory Muscles   Hyperinflation Protocol   Hyperinflation Protocol Indications Pneumonia   I.S. Greater than 60% of Predicted Breathing exercises QID x 24 hours then BID x 24 hours.  (Chest Trauma or Open Heart Surgery)   Hyperinflation Protocol Goals/Outcome  Greater Than 60% of Predicted I.S. Volume x 24 hrs   O2 Protocol   O2 (LPM) 4   Pulse Oximetry 96 %

## 2017-10-08 LAB
ALBUMIN SERPL BCP-MCNC: 3.2 G/DL (ref 3.2–4.9)
ALBUMIN/GLOB SERPL: 1.2 G/DL
ALP SERPL-CCNC: 63 U/L (ref 30–99)
ALT SERPL-CCNC: 16 U/L (ref 2–50)
ANION GAP SERPL CALC-SCNC: 8 MMOL/L (ref 0–11.9)
AST SERPL-CCNC: 11 U/L (ref 12–45)
BILIRUB SERPL-MCNC: 0.4 MG/DL (ref 0.1–1.5)
BUN SERPL-MCNC: 15 MG/DL (ref 8–22)
CALCIUM SERPL-MCNC: 8.9 MG/DL (ref 8.5–10.5)
CHLORIDE SERPL-SCNC: 102 MMOL/L (ref 96–112)
CO2 SERPL-SCNC: 28 MMOL/L (ref 20–33)
CREAT SERPL-MCNC: 0.55 MG/DL (ref 0.5–1.4)
GFR SERPL CREATININE-BSD FRML MDRD: >60 ML/MIN/1.73 M 2
GLOBULIN SER CALC-MCNC: 2.7 G/DL (ref 1.9–3.5)
GLUCOSE SERPL-MCNC: 85 MG/DL (ref 65–99)
POTASSIUM SERPL-SCNC: 4 MMOL/L (ref 3.6–5.5)
PROT SERPL-MCNC: 5.9 G/DL (ref 6–8.2)
SODIUM SERPL-SCNC: 138 MMOL/L (ref 135–145)

## 2017-10-08 PROCEDURE — 36415 COLL VENOUS BLD VENIPUNCTURE: CPT

## 2017-10-08 PROCEDURE — 700111 HCHG RX REV CODE 636 W/ 250 OVERRIDE (IP): Performed by: INTERNAL MEDICINE

## 2017-10-08 PROCEDURE — 99233 SBSQ HOSP IP/OBS HIGH 50: CPT | Performed by: INTERNAL MEDICINE

## 2017-10-08 PROCEDURE — A9270 NON-COVERED ITEM OR SERVICE: HCPCS | Performed by: INTERNAL MEDICINE

## 2017-10-08 PROCEDURE — 307059 PAD,EAR PROTECTOR: Performed by: INTERNAL MEDICINE

## 2017-10-08 PROCEDURE — 700102 HCHG RX REV CODE 250 W/ 637 OVERRIDE(OP): Performed by: INTERNAL MEDICINE

## 2017-10-08 PROCEDURE — 700102 HCHG RX REV CODE 250 W/ 637 OVERRIDE(OP)

## 2017-10-08 PROCEDURE — 770006 HCHG ROOM/CARE - MED/SURG/GYN SEMI*

## 2017-10-08 PROCEDURE — 700102 HCHG RX REV CODE 250 W/ 637 OVERRIDE(OP): Performed by: FAMILY MEDICINE

## 2017-10-08 PROCEDURE — A9270 NON-COVERED ITEM OR SERVICE: HCPCS

## 2017-10-08 PROCEDURE — 80053 COMPREHEN METABOLIC PANEL: CPT

## 2017-10-08 PROCEDURE — A9270 NON-COVERED ITEM OR SERVICE: HCPCS | Performed by: FAMILY MEDICINE

## 2017-10-08 RX ADMIN — ENOXAPARIN SODIUM 40 MG: 100 INJECTION SUBCUTANEOUS at 10:17

## 2017-10-08 RX ADMIN — ALPRAZOLAM 1 MG: 1 TABLET ORAL at 15:25

## 2017-10-08 RX ADMIN — OXYCODONE HYDROCHLORIDE 30 MG: 30 TABLET ORAL at 04:39

## 2017-10-08 RX ADMIN — NYSTATIN: 100000 CREAM TOPICAL at 10:17

## 2017-10-08 RX ADMIN — OXYCODONE HYDROCHLORIDE 30 MG: 30 TABLET ORAL at 11:40

## 2017-10-08 RX ADMIN — METRONIDAZOLE 500 MG: 500 TABLET ORAL at 15:18

## 2017-10-08 RX ADMIN — METRONIDAZOLE 500 MG: 500 TABLET ORAL at 05:43

## 2017-10-08 RX ADMIN — MORPHINE SULFATE 60 MG: 60 TABLET, EXTENDED RELEASE ORAL at 00:24

## 2017-10-08 RX ADMIN — MORPHINE SULFATE 100 MG: 100 TABLET, EXTENDED RELEASE ORAL at 10:13

## 2017-10-08 RX ADMIN — STANDARDIZED SENNA CONCENTRATE AND DOCUSATE SODIUM 2 TABLET: 8.6; 5 TABLET, FILM COATED ORAL at 20:08

## 2017-10-08 RX ADMIN — HYDROMORPHONE HYDROCHLORIDE 4 MG: 4 TABLET ORAL at 10:12

## 2017-10-08 RX ADMIN — DIPHENHYDRAMINE HCL 25 MG: 25 TABLET ORAL at 22:57

## 2017-10-08 RX ADMIN — OMEPRAZOLE 20 MG: 20 CAPSULE, DELAYED RELEASE ORAL at 10:16

## 2017-10-08 RX ADMIN — PREDNISONE 50 MG: 50 TABLET ORAL at 10:13

## 2017-10-08 RX ADMIN — LEVOTHYROXINE SODIUM 75 MCG: 75 TABLET ORAL at 05:43

## 2017-10-08 RX ADMIN — MORPHINE SULFATE 100 MG: 100 TABLET, EXTENDED RELEASE ORAL at 20:08

## 2017-10-08 RX ADMIN — ALPRAZOLAM 1 MG: 1 TABLET ORAL at 21:55

## 2017-10-08 RX ADMIN — HYDROMORPHONE HYDROCHLORIDE 4 MG: 4 TABLET ORAL at 20:08

## 2017-10-08 RX ADMIN — ATORVASTATIN CALCIUM 40 MG: 40 TABLET, FILM COATED ORAL at 20:08

## 2017-10-08 RX ADMIN — NICOTINE 14 MG: 14 PATCH, EXTENDED RELEASE TRANSDERMAL at 05:43

## 2017-10-08 RX ADMIN — ESTRADIOL 0.5 MG: 1 TABLET ORAL at 10:15

## 2017-10-08 RX ADMIN — METRONIDAZOLE 500 MG: 500 TABLET ORAL at 21:55

## 2017-10-08 RX ADMIN — ALPRAZOLAM 1 MG: 1 TABLET ORAL at 05:51

## 2017-10-08 RX ADMIN — CLOPIDOGREL 75 MG: 75 TABLET, FILM COATED ORAL at 10:12

## 2017-10-08 RX ADMIN — OXYCODONE HYDROCHLORIDE 30 MG: 30 TABLET ORAL at 16:58

## 2017-10-08 RX ADMIN — OXYCODONE HYDROCHLORIDE 30 MG: 30 TABLET ORAL at 21:55

## 2017-10-08 RX ADMIN — LEVOFLOXACIN 750 MG: 750 TABLET, FILM COATED ORAL at 10:17

## 2017-10-08 RX ADMIN — NYSTATIN: 100000 CREAM TOPICAL at 22:57

## 2017-10-08 RX ADMIN — ATORVASTATIN CALCIUM 40 MG: 40 TABLET, FILM COATED ORAL at 00:24

## 2017-10-08 ASSESSMENT — ENCOUNTER SYMPTOMS
PALPITATIONS: 0
SHORTNESS OF BREATH: 0
FLANK PAIN: 0
COUGH: 1
ABDOMINAL PAIN: 0
SPUTUM PRODUCTION: 0
BACK PAIN: 0
MEMORY LOSS: 1
NERVOUS/ANXIOUS: 1
DIAPHORESIS: 0
HEADACHES: 0
DEPRESSION: 1
SENSORY CHANGE: 0
SPEECH CHANGE: 0
FOCAL WEAKNESS: 0
MYALGIAS: 0
DIZZINESS: 0
WEAKNESS: 0
CHILLS: 0
NAUSEA: 0
FEVER: 0

## 2017-10-08 ASSESSMENT — PAIN SCALES - GENERAL
PAINLEVEL_OUTOF10: 10
PAINLEVEL_OUTOF10: 10
PAINLEVEL_OUTOF10: 8

## 2017-10-08 NOTE — CARE PLAN
Problem: Mobility  Goal: Risk for activity intolerance will decrease  Pt instructed on ambulation, increased mobility, increase strength needed for home safety.
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
## 2017-10-08 NOTE — CARE PLAN
Problem: Infection  Goal: Will remain free from infection  Outcome: PROGRESSING AS EXPECTED      Problem: Respiratory:  Goal: Respiratory status will improve  Outcome: PROGRESSING AS EXPECTED

## 2017-10-08 NOTE — PROGRESS NOTES
Pt requesting evening dose of MS contin. According to chart pt refused full 120mg dose in am and RN administered partial dose of 60mg at 1006, then another 60mg dose was administered at 1642. According to the chart she was to be tapered down but with the schedule given during the day evening dose calculation needs MD clarification. Paged on call MD

## 2017-10-08 NOTE — PROGRESS NOTES
[facility]ia Price Fall Risk Assessment:     Last Known Fall: Within the last six months  Mobility: Dizziness/generalized weakness  Medications: Cardiovascular and central nervous system meds  Mental Status/LOC/Awareness: Awake, alert, and oriented to date, place, and person  Toileting Needs: No needs  Volume/Electrolyte Status: No problems  Communication/Sensory: No deficits  Behavior: Depression/anxiety  [place] Price Fall Risk Total: 9  Fall Risk Level: LOW RISK    Universal Fall Precautions:  bed in low position and locked, call light/belongings in reach    Fall Risk Level Interventions:   TRIAL ([employer] 8, NEURO, MED ANUJA 5) Low Fall Risk Interventions  Place yellow fall risk ID band on patient: verified  Provide patient/family education based on risk assessment: verified  Educate patient/family to call staff for assistance when getting out of bed: verified  Place fall precaution signage outside patient door: verifiedTRIAL (TELE 8, NEURO, MED ANUJA 5) Moderate Fall Risk Interventions  Place yellow fall risk ID band on patient: verified  Provide patient/family education based on risk assessment : verified  Educate patient/family to call staff for assistance when getting out of bed: verified  Place fall precaution signage outside patient door: verified  Utilize bed/chair fall alarm: verified     Patient Specific Interventions:     Medication: review medications with patient and family and assess for medications that can be discontinued or dosage decreased  Mental Status/LOC/Awareness: not applicable  Toileting: instruct patient/family on the use of grab bars and instruct patient/family on the need to call for assistance when toileting  Volume/Electrolyte Status: ensure patient remains hydrated  Communication/Sensory: update plan of care on whiteboard  Behavioral: administer medication as ordered  Mobility: ensure bed is locked and in lowest position

## 2017-10-08 NOTE — PROGRESS NOTES
RN spoke to Dr. Patel, he stated to give one 60mg PO MS contin dose at 0000 and then resume tapering schedule.

## 2017-10-09 PROCEDURE — 97165 OT EVAL LOW COMPLEX 30 MIN: CPT

## 2017-10-09 PROCEDURE — G8988 SELF CARE GOAL STATUS: HCPCS | Mod: CI

## 2017-10-09 PROCEDURE — G8980 MOBILITY D/C STATUS: HCPCS | Mod: CI

## 2017-10-09 PROCEDURE — A9270 NON-COVERED ITEM OR SERVICE: HCPCS | Performed by: INTERNAL MEDICINE

## 2017-10-09 PROCEDURE — G8987 SELF CARE CURRENT STATUS: HCPCS | Mod: CI

## 2017-10-09 PROCEDURE — G8979 MOBILITY GOAL STATUS: HCPCS | Mod: CI

## 2017-10-09 PROCEDURE — 97161 PT EVAL LOW COMPLEX 20 MIN: CPT

## 2017-10-09 PROCEDURE — A9270 NON-COVERED ITEM OR SERVICE: HCPCS

## 2017-10-09 PROCEDURE — G8978 MOBILITY CURRENT STATUS: HCPCS | Mod: CI

## 2017-10-09 PROCEDURE — 770006 HCHG ROOM/CARE - MED/SURG/GYN SEMI*

## 2017-10-09 PROCEDURE — 700102 HCHG RX REV CODE 250 W/ 637 OVERRIDE(OP): Performed by: INTERNAL MEDICINE

## 2017-10-09 PROCEDURE — 700111 HCHG RX REV CODE 636 W/ 250 OVERRIDE (IP): Performed by: INTERNAL MEDICINE

## 2017-10-09 PROCEDURE — 700102 HCHG RX REV CODE 250 W/ 637 OVERRIDE(OP)

## 2017-10-09 PROCEDURE — G8989 SELF CARE D/C STATUS: HCPCS | Mod: CI

## 2017-10-09 PROCEDURE — 99233 SBSQ HOSP IP/OBS HIGH 50: CPT | Performed by: INTERNAL MEDICINE

## 2017-10-09 RX ADMIN — NYSTATIN: 100000 CREAM TOPICAL at 21:37

## 2017-10-09 RX ADMIN — ATORVASTATIN CALCIUM 40 MG: 40 TABLET, FILM COATED ORAL at 21:36

## 2017-10-09 RX ADMIN — STANDARDIZED SENNA CONCENTRATE AND DOCUSATE SODIUM 2 TABLET: 8.6; 5 TABLET, FILM COATED ORAL at 09:37

## 2017-10-09 RX ADMIN — OXYCODONE HYDROCHLORIDE 30 MG: 30 TABLET ORAL at 04:21

## 2017-10-09 RX ADMIN — METRONIDAZOLE 500 MG: 500 TABLET ORAL at 14:04

## 2017-10-09 RX ADMIN — LEVOTHYROXINE SODIUM 75 MCG: 75 TABLET ORAL at 06:03

## 2017-10-09 RX ADMIN — OXYCODONE HYDROCHLORIDE 30 MG: 30 TABLET ORAL at 14:04

## 2017-10-09 RX ADMIN — METRONIDAZOLE 500 MG: 500 TABLET ORAL at 06:02

## 2017-10-09 RX ADMIN — OXYCODONE HYDROCHLORIDE 30 MG: 30 TABLET ORAL at 19:41

## 2017-10-09 RX ADMIN — MORPHINE SULFATE 100 MG: 100 TABLET, EXTENDED RELEASE ORAL at 21:36

## 2017-10-09 RX ADMIN — OMEPRAZOLE 20 MG: 20 CAPSULE, DELAYED RELEASE ORAL at 09:37

## 2017-10-09 RX ADMIN — METRONIDAZOLE 500 MG: 500 TABLET ORAL at 21:36

## 2017-10-09 RX ADMIN — ALPRAZOLAM 1 MG: 1 TABLET ORAL at 14:04

## 2017-10-09 RX ADMIN — ESTRADIOL 0.5 MG: 1 TABLET ORAL at 09:38

## 2017-10-09 RX ADMIN — ENOXAPARIN SODIUM 40 MG: 100 INJECTION SUBCUTANEOUS at 09:37

## 2017-10-09 RX ADMIN — LEVOFLOXACIN 750 MG: 750 TABLET, FILM COATED ORAL at 09:38

## 2017-10-09 RX ADMIN — ALPRAZOLAM 1 MG: 1 TABLET ORAL at 06:03

## 2017-10-09 RX ADMIN — HYDROMORPHONE HYDROCHLORIDE 4 MG: 4 TABLET ORAL at 21:36

## 2017-10-09 RX ADMIN — HYDROMORPHONE HYDROCHLORIDE 4 MG: 4 TABLET ORAL at 09:39

## 2017-10-09 RX ADMIN — NYSTATIN: 100000 CREAM TOPICAL at 09:39

## 2017-10-09 RX ADMIN — CLOPIDOGREL 75 MG: 75 TABLET, FILM COATED ORAL at 09:38

## 2017-10-09 RX ADMIN — PREDNISONE 50 MG: 50 TABLET ORAL at 09:37

## 2017-10-09 RX ADMIN — NICOTINE 14 MG: 14 PATCH, EXTENDED RELEASE TRANSDERMAL at 06:02

## 2017-10-09 RX ADMIN — MORPHINE SULFATE 100 MG: 100 TABLET, EXTENDED RELEASE ORAL at 09:38

## 2017-10-09 ASSESSMENT — COGNITIVE AND FUNCTIONAL STATUS - GENERAL
MOVING FROM LYING ON BACK TO SITTING ON SIDE OF FLAT BED: A LITTLE
MOBILITY SCORE: 18
SUGGESTED CMS G CODE MODIFIER MOBILITY: CK
WALKING IN HOSPITAL ROOM: A LITTLE
DAILY ACTIVITIY SCORE: 24
CLIMB 3 TO 5 STEPS WITH RAILING: A LITTLE
STANDING UP FROM CHAIR USING ARMS: A LITTLE
MOVING TO AND FROM BED TO CHAIR: A LITTLE
TURNING FROM BACK TO SIDE WHILE IN FLAT BAD: A LITTLE
SUGGESTED CMS G CODE MODIFIER DAILY ACTIVITY: CH

## 2017-10-09 ASSESSMENT — ENCOUNTER SYMPTOMS
FOCAL WEAKNESS: 0
DIAPHORESIS: 0
BACK PAIN: 0
MEMORY LOSS: 1
NERVOUS/ANXIOUS: 0
WEAKNESS: 0
FLANK PAIN: 0
DEPRESSION: 1
SHORTNESS OF BREATH: 0
COUGH: 1
PALPITATIONS: 0
TREMORS: 0
DIZZINESS: 0
MYALGIAS: 0
ABDOMINAL PAIN: 0
SPUTUM PRODUCTION: 0

## 2017-10-09 ASSESSMENT — ACTIVITIES OF DAILY LIVING (ADL): TOILETING: INDEPENDENT

## 2017-10-09 ASSESSMENT — PAIN SCALES - GENERAL
PAINLEVEL_OUTOF10: 10
PAINLEVEL_OUTOF10: 6
PAINLEVEL_OUTOF10: 6

## 2017-10-09 ASSESSMENT — GAIT ASSESSMENTS
GAIT LEVEL OF ASSIST: STAND BY ASSIST
DISTANCE (FEET): 150
DEVIATION: INCREASED BASE OF SUPPORT;SHUFFLED GAIT

## 2017-10-09 NOTE — PROGRESS NOTES
[facility] Hospitalist Progress Note    Date of Service: 10/9/2017    Chief Complaint  Chest pain    62 y.o. female  with past medical history of chronic pain and opiate dependence, admitted on 9/30/2017 with right-sided pneumonia, chest pain, concern for NSTEMI- her troponin peaked at 0.4. She was to undergo cardiac catheterization but then signed out AMA on 10/1.  She returns with continued complaints of chest pain which is more pleuritic than typical for angina, her troponins are decreased significantly. She has been seen by cardiology and they do not feel cardiac cath is warranted at this time.    Interval Problem Update  10/6She continues to say that she is fine and wants to go home. She does not process the answers she is given, she tries to be manipulative, assuring that she has good family support and a very good doctor on the outside.   Due to her behaviors, she is very taxing upon the nursing staff.        PATIENT IS INCAPACITATED for medical decisions and AMA  Verified with Dr Wood that Legal Hold is not needed for incapacity    10/7 denies lower extremity weakness, denies lower extremity pain  Patient tearful during exam, states that she lives with her  who can support her  States that her dog recently passed, tearful during exam, easily consolable    10/8 patient is sitting at the nursing station, ambulating with no assistance  Patient states that she feels great  No new complaints  She is tangential in thought  Requesting discharge to home to self  Discussed with patient regarding need for further assistance at home  Patient continues to state that she has a partner named Ton who was out of town who can help her  Patient does not appear to have insight into her condition  Patient states that she has a granddaughter, who is able to help her, however her granddaughter goes to school and work and will not be with her for 24 hours.  As per nursing staff granddaughter is visiting patient      10/9   patient is ambulatory to nursing station  Patient does not report any new complaints  Patient clearly lacks insight into her medical condition  Patient is tangential in thought, unable to clearly state her medical condition, however states that she agrees with the need for assistance at home  Patient states that her boyfriend Ton is back in town and can help her  Patient is alert and oriented ×3     Consultants/Specialty  Cardiology- signed off  psychiatry    Disposition  TBD  Lack of capacity for medical decision making   to assist with establishing power of  possibly that of her niece Lois versus boyfriend, Ton, next of kin for medical decision making  continue to monitor        Review of Systems   Unable to perform ROS: Psychiatric disorder   Constitutional: Negative for diaphoresis and malaise/fatigue.   HENT: Negative for congestion and hearing loss.    Respiratory: Positive for cough. Negative for sputum production and shortness of breath.    Cardiovascular: Negative for palpitations and leg swelling.   Gastrointestinal: Negative for abdominal pain.   Genitourinary: Negative for dysuria and flank pain.   Musculoskeletal: Negative for back pain, joint pain and myalgias.   Neurological: Negative for dizziness, tremors, focal weakness and weakness.   Psychiatric/Behavioral: Positive for depression and memory loss. The patient is not nervous/anxious.      Patient unreliable historian- changes story to try and get more narcotics, anxiolytics  States she feels well in order to try and get discharged   Physical Exam  Laboratory/Imaging   Hemodynamics  Temp (24hrs), Av.3 °C (97.4 °F), Min:35.9 °C (96.6 °F), Max:36.7 °C (98 °F)   Temperature: 36.7 °C (98 °F)  Pulse  Av.3  Min: 54  Max: 86    Blood Pressure: 139/71      Respiratory      Respiration: 18, Pulse Oximetry: 98 %        RUL Breath Sounds: Diminished, RML Breath Sounds: Diminished, RLL Breath Sounds: Diminished, CHAYO Breath  Sounds: Diminished, LLL Breath Sounds: Diminished    Fluids    Intake/Output Summary (Last 24 hours) at 10/09/17 1122  Last data filed at 10/09/17 0855   Gross per 24 hour   Intake              236 ml   Output                0 ml   Net              236 ml       Nutrition  Orders Placed This Encounter   Procedures   • DIET ORDER     Standing Status:   Standing     Number of Occurrences:   1     Order Specific Question:   Diet:     Answer:   Cardiac [6]     Order Specific Question:   Texture/Fiber modifications:     Answer:   Dysphagia 1(Pureed)specify fluid consistency(question 6) [1]     Order Specific Question:   Consistency/Fluid modifications:     Answer:   Nectar Thick [2]     Physical Exam   Constitutional: She is oriented to person, place, and time. She appears well-developed and well-nourished. No distress.   Obese disheveled  Slurred speech   HENT:   Head: Normocephalic and atraumatic.   Nose: Nose normal.   edentulous   Eyes: EOM are normal. Pupils are equal, round, and reactive to light. Right eye exhibits no discharge. Left eye exhibits no discharge.   Neck: Normal range of motion. No thyromegaly present.   Cardiovascular: Normal rate, regular rhythm and intact distal pulses.    Pulmonary/Chest: Effort normal. No respiratory distress.   Wheezes and rhonchi predominantly on the left now       Abdominal: Soft. Bowel sounds are normal. She exhibits no distension. There is no tenderness.   Musculoskeletal: She exhibits edema.   Diffuse eschars on bilateral lower extremities right side greater than left, minimal erythema, nontender to palpation   Neurological: She is alert and oriented to person, place, and time. No cranial nerve deficit. Coordination normal.   Skin: Skin is warm and dry. No rash noted. She is not diaphoretic. No erythema.   Scattered eschars but no cellulitis noted   Psychiatric: Her mood appears anxious. Her affect is labile and inappropriate. Her speech is tangential and slurred. Thought  content is paranoid and delusional (differing delusions every day). Cognition and memory are impaired. She expresses impulsivity and inappropriate judgment.   Tangential She is inattentive.   Nursing note and vitals reviewed.          Recent Labs      10/08/17   0310   SODIUM  138   POTASSIUM  4.0   CHLORIDE  102   CO2  28   GLUCOSE  85   BUN  15   CREATININE  0.55   CALCIUM  8.9                      Assessment/Plan     Aspiration pneumonia (CMS-HCC)- (present on admission)   Assessment & Plan    Issues during SLP eval, now on NTFL  Variations in lung exam are likely secondary to compliance with diet  I told her once her lung exam is clear we will reimage her          NSTEMI (non-ST elevated myocardial infarction) (CMS-HCC)- (present on admission)   Assessment & Plan    No ST-T wave changes on EKG, per cardiology chest pain is atypical  Troponins are improved from prior admission, echo with EF of 60  Cardiology has signed off        COPD exacerbation (CMS-HCC)- (present on admission)   Assessment & Plan    On oxygen, RT protocol, prednisone-wheezing was likely precipitated by her dysphagia, aspiration  More wheezing today likely secondary to ongoing aspiration and noncompliance with aspiration precautions        Cellulitis and abscess of leg- (present on admission)   Assessment & Plan    It is noted there is a lot of asymmetry and she has more eschars on her right leg than her left  She says that because a doctor 3 years ago stuck her with a bunch of needles, and gave her an infection/  Minimal erythema  Continue oral antibiotics        Dysuria- (present on admission)   Assessment & Plan    Chronic  Culture is negative  Urology follow-up for symptoms        Aspiration into lower respiratory tract   Assessment & Plan    Suspect ongoing        Psychosis   Assessment & Plan    Easily reoriented  Refuses psychiatric medications  Has little insight into behavior  Clear cognitive impairments  Remains incapacitated  SW may  need to pursue guardianship vs designation of POA/family  Psychiatry to assist    10/9 patient appears to lack insight into her multiple medical condition  Patient is now requesting that her POA P her boyfriend of over 10 years, who apparently is back in town  Ton   to assist with possible POA assignment        Dysphagia- (present on admission)   Assessment & Plan    likely multifactorial  Over sedation  Edentulous  Her lung exam is worse on the left side today suspect she is eating and a semi-recumbant position  She is in denial about her issues-I told her she needs to be compliant if she wishes to be eligible for discharge        Anxiolytic dependence (CMS-HCC)- (present on admission)   Assessment & Plan    Benzodiazepines will be weaned after narcotic doses have been weaned back          Mood disorder (CMS-HCC)- (present on admission)   Assessment & Plan    Psychiatry agrees that patient likely has underlying mood disorder, psychotic features-unclear the contribution of her medications to her symptomatology and her psychosis. Patient at this time is refusing psychiatric medication  SHE IS INCAPACITATED FOR MEDICAL DECISIONS - including AMA    Past late between tearful episodes to laughing-continue to monitor        Acute respiratory failure with hypoxemia (CMS-HCC)- (present on admission)   Assessment & Plan    2/2 COPD and PNA        Chest pain- (present on admission)   Assessment & Plan    Noncardiac-per cardiology, signed off  Possibly just drug-seeking          Hypothyroidism- (present on admission)   Assessment & Plan    Continue levothyroxine        Opiate dependence (CMS-HCC)- (present on admission)   Assessment & Plan    weaning her home pain medications, as per schedule  Psychiatry agrees this needs to be done  Patient refuses alternate medications aimed at neuropathy  Psychiatry to discuss with the patient's primary care provider              Reviewed items::  Labs reviewed, Medications  reviewed and Radiology images reviewed  Calles catheter::  No Calles  DVT prophylaxis pharmacological::  Enoxaparin (Lovenox)  Ulcer Prophylaxis::  Not indicated  Antibiotics:  Treating active infection/contamination beyond 24 hours perioperative coverage

## 2017-10-09 NOTE — CARE PLAN
Problem: Knowledge Deficit  Goal: Knowledge of disease process/condition, treatment plan, diagnostic tests, and medications will improve  POC: keep pt safe, pt lacks medical decision capacity, pt not able to leave AMA    Problem: Discharge Barriers/Planning  Goal: Patient's continuum of care needs will be met  SW consult placed to determine if pt has home caregiver

## 2017-10-09 NOTE — DISCHARGE PLANNING
This SW and Charge RN met with pt and pt's daughter Maria E at bedside. Pt's granddaughter Bhargavi was also present.   Staff explained that 24 hour care is being recommended for pt at this time due to pt's inability to understand medical needs (per Psych). Maria E adamantly refused transferring pt to 24 hour facility stating she does not want her mother to feel trapped and lose her independence. Maria E feels that options for in home care should be exhausted before exploring other options. If this fails, Maria E states she will bring her mom home with her in [place] to help care for her.   Staff will request Psychiatry to re-evaluate this pt, however staff explained that at this time pt is incapacitated and unable to make medical decisions and therefore pt would not be able to leave AMA. Pt appears to continue to lack clear insight into her medical needs based on this conversation, but continues to state that she wants to go home and wants to take her medications.   Per Maria E's request, TALISHA completed referral for Cassia SINGH and faxed to [facility]. TALISHA will make referral to [facility] for Homemaker and other supportive services. TALISHA provided Maria E information on PCA through Medicaid as well.

## 2017-10-09 NOTE — PROGRESS NOTES
Received report, assumed pt care. Pt a&o 4, VSS, assessment completed. Resting comfortably in bed with call light, bedside table in reach. No c/o at this time. Side rails up 2. Instructed to use call light when needing to get OOB, verbalized understanding. Bed in low position. Will continue to monitor.

## 2017-10-09 NOTE — DISCHARGE PLANNING
Per rounds, pt requires 24 hour care and would be appropriate for GH, assisted living, or LTC at [facility]. Pt's daughter is currently on her way from [place] and will become this pt's decision maker as she is NOK and pt is incapacitated to make medical decisions. SW will discuss options for placement with pt and daughter, Maria E.

## 2017-10-09 NOTE — DISCHARGE PLANNING
SW completed application for referral to [facility] for Homemaker services, case management, meals on wheels.

## 2017-10-09 NOTE — CARE PLAN
Problem: Respiratory:  Goal: Respiratory status will improve  Outcome: PROGRESSING AS EXPECTED  Pt on 1.5L oxygen via nasal cannula    Problem: Pain Management  Goal: Pain level will decrease to patient's comfort goal  Outcome: PROGRESSING SLOWER THAN EXPECTED  Pt c/o pain 10/10. Pt receiving scheduled and prn pain medications.

## 2017-10-10 PROBLEM — I10 ESSENTIAL HYPERTENSION: Status: ACTIVE | Noted: 2017-10-10

## 2017-10-10 LAB
ALBUMIN SERPL BCP-MCNC: 3.9 G/DL (ref 3.2–4.9)
ALBUMIN/GLOB SERPL: 1.2 G/DL
ALP SERPL-CCNC: 74 U/L (ref 30–99)
ALT SERPL-CCNC: 18 U/L (ref 2–50)
ANION GAP SERPL CALC-SCNC: 6 MMOL/L (ref 0–11.9)
AST SERPL-CCNC: 17 U/L (ref 12–45)
BASOPHILS # BLD AUTO: 0.2 % (ref 0–1.8)
BASOPHILS # BLD: 0.03 K/UL (ref 0–0.12)
BILIRUB SERPL-MCNC: 0.5 MG/DL (ref 0.1–1.5)
BUN SERPL-MCNC: 15 MG/DL (ref 8–22)
CALCIUM SERPL-MCNC: 9.2 MG/DL (ref 8.5–10.5)
CHLORIDE SERPL-SCNC: 102 MMOL/L (ref 96–112)
CO2 SERPL-SCNC: 29 MMOL/L (ref 20–33)
CREAT SERPL-MCNC: 0.74 MG/DL (ref 0.5–1.4)
EOSINOPHIL # BLD AUTO: 0.08 K/UL (ref 0–0.51)
EOSINOPHIL NFR BLD: 0.6 % (ref 0–6.9)
ERYTHROCYTE [DISTWIDTH] IN BLOOD BY AUTOMATED COUNT: 47.2 FL (ref 35.9–50)
GFR SERPL CREATININE-BSD FRML MDRD: >60 ML/MIN/1.73 M 2
GLOBULIN SER CALC-MCNC: 3.2 G/DL (ref 1.9–3.5)
GLUCOSE SERPL-MCNC: 103 MG/DL (ref 65–99)
HCT VFR BLD AUTO: 54.1 % (ref 37–47)
HGB BLD-MCNC: 17 G/DL (ref 12–16)
IMM GRANULOCYTES # BLD AUTO: 0.12 K/UL (ref 0–0.11)
IMM GRANULOCYTES NFR BLD AUTO: 0.9 % (ref 0–0.9)
LYMPHOCYTES # BLD AUTO: 2.22 K/UL (ref 1–4.8)
LYMPHOCYTES NFR BLD: 17.2 % (ref 22–41)
MCH RBC QN AUTO: 27.7 PG (ref 27–33)
MCHC RBC AUTO-ENTMCNC: 31.4 G/DL (ref 33.6–35)
MCV RBC AUTO: 88.1 FL (ref 81.4–97.8)
MONOCYTES # BLD AUTO: 0.57 K/UL (ref 0–0.85)
MONOCYTES NFR BLD AUTO: 4.4 % (ref 0–13.4)
NEUTROPHILS # BLD AUTO: 9.86 K/UL (ref 2–7.15)
NEUTROPHILS NFR BLD: 76.7 % (ref 44–72)
NRBC # BLD AUTO: 0 K/UL
NRBC BLD AUTO-RTO: 0 /100 WBC
PLATELET # BLD AUTO: 253 K/UL (ref 164–446)
PMV BLD AUTO: 10.3 FL (ref 9–12.9)
POTASSIUM SERPL-SCNC: 3.7 MMOL/L (ref 3.6–5.5)
PROT SERPL-MCNC: 7.1 G/DL (ref 6–8.2)
RBC # BLD AUTO: 6.14 M/UL (ref 4.2–5.4)
SODIUM SERPL-SCNC: 137 MMOL/L (ref 135–145)
WBC # BLD AUTO: 12.9 K/UL (ref 4.8–10.8)

## 2017-10-10 PROCEDURE — 80053 COMPREHEN METABOLIC PANEL: CPT

## 2017-10-10 PROCEDURE — 700102 HCHG RX REV CODE 250 W/ 637 OVERRIDE(OP): Performed by: INTERNAL MEDICINE

## 2017-10-10 PROCEDURE — 700102 HCHG RX REV CODE 250 W/ 637 OVERRIDE(OP)

## 2017-10-10 PROCEDURE — 36415 COLL VENOUS BLD VENIPUNCTURE: CPT

## 2017-10-10 PROCEDURE — A9270 NON-COVERED ITEM OR SERVICE: HCPCS

## 2017-10-10 PROCEDURE — 85025 COMPLETE CBC W/AUTO DIFF WBC: CPT

## 2017-10-10 PROCEDURE — 99233 SBSQ HOSP IP/OBS HIGH 50: CPT | Performed by: INTERNAL MEDICINE

## 2017-10-10 PROCEDURE — A9270 NON-COVERED ITEM OR SERVICE: HCPCS | Performed by: INTERNAL MEDICINE

## 2017-10-10 PROCEDURE — 700111 HCHG RX REV CODE 636 W/ 250 OVERRIDE (IP): Performed by: INTERNAL MEDICINE

## 2017-10-10 PROCEDURE — 92526 ORAL FUNCTION THERAPY: CPT

## 2017-10-10 PROCEDURE — 770006 HCHG ROOM/CARE - MED/SURG/GYN SEMI*

## 2017-10-10 RX ORDER — ARIPIPRAZOLE 10 MG/1
5 TABLET ORAL DAILY
Status: DISCONTINUED | OUTPATIENT
Start: 2017-10-10 | End: 2017-10-11 | Stop reason: HOSPADM

## 2017-10-10 RX ORDER — LISINOPRIL 10 MG/1
10 TABLET ORAL
Status: DISCONTINUED | OUTPATIENT
Start: 2017-10-10 | End: 2017-10-11 | Stop reason: HOSPADM

## 2017-10-10 RX ORDER — ALPRAZOLAM 0.25 MG/1
0.75 TABLET ORAL 3 TIMES DAILY PRN
Status: DISCONTINUED | OUTPATIENT
Start: 2017-10-10 | End: 2017-10-11 | Stop reason: HOSPADM

## 2017-10-10 RX ADMIN — PREDNISONE 50 MG: 50 TABLET ORAL at 07:35

## 2017-10-10 RX ADMIN — NICOTINE 14 MG: 14 PATCH, EXTENDED RELEASE TRANSDERMAL at 05:15

## 2017-10-10 RX ADMIN — METRONIDAZOLE 500 MG: 500 TABLET ORAL at 05:14

## 2017-10-10 RX ADMIN — CLOPIDOGREL 75 MG: 75 TABLET, FILM COATED ORAL at 07:34

## 2017-10-10 RX ADMIN — ALPRAZOLAM 1 MG: 1 TABLET ORAL at 00:25

## 2017-10-10 RX ADMIN — ESTRADIOL 0.5 MG: 1 TABLET ORAL at 07:35

## 2017-10-10 RX ADMIN — METRONIDAZOLE 500 MG: 500 TABLET ORAL at 13:49

## 2017-10-10 RX ADMIN — OXYCODONE HYDROCHLORIDE 30 MG: 30 TABLET ORAL at 00:25

## 2017-10-10 RX ADMIN — NYSTATIN: 100000 CREAM TOPICAL at 07:44

## 2017-10-10 RX ADMIN — OXYCODONE HYDROCHLORIDE 30 MG: 30 TABLET ORAL at 13:49

## 2017-10-10 RX ADMIN — MORPHINE SULFATE 90 MG: 30 TABLET, EXTENDED RELEASE ORAL at 20:11

## 2017-10-10 RX ADMIN — NYSTATIN: 100000 CREAM TOPICAL at 21:00

## 2017-10-10 RX ADMIN — LISINOPRIL 10 MG: 10 TABLET ORAL at 09:47

## 2017-10-10 RX ADMIN — ARIPIPRAZOLE 5 MG: 10 TABLET ORAL at 17:38

## 2017-10-10 RX ADMIN — MORPHINE SULFATE 100 MG: 100 TABLET, EXTENDED RELEASE ORAL at 07:35

## 2017-10-10 RX ADMIN — METRONIDAZOLE 500 MG: 500 TABLET ORAL at 20:12

## 2017-10-10 RX ADMIN — DIPHENHYDRAMINE HCL 25 MG: 25 TABLET ORAL at 20:10

## 2017-10-10 RX ADMIN — ALPRAZOLAM 1 MG: 1 TABLET ORAL at 13:49

## 2017-10-10 RX ADMIN — HYDROMORPHONE HYDROCHLORIDE 4 MG: 4 TABLET ORAL at 07:36

## 2017-10-10 RX ADMIN — ATORVASTATIN CALCIUM 40 MG: 40 TABLET, FILM COATED ORAL at 20:11

## 2017-10-10 RX ADMIN — HYDROMORPHONE HYDROCHLORIDE 4 MG: 4 TABLET ORAL at 20:11

## 2017-10-10 RX ADMIN — ENOXAPARIN SODIUM 40 MG: 100 INJECTION SUBCUTANEOUS at 07:36

## 2017-10-10 RX ADMIN — STANDARDIZED SENNA CONCENTRATE AND DOCUSATE SODIUM 2 TABLET: 8.6; 5 TABLET, FILM COATED ORAL at 20:11

## 2017-10-10 RX ADMIN — OMEPRAZOLE 20 MG: 20 CAPSULE, DELAYED RELEASE ORAL at 07:35

## 2017-10-10 RX ADMIN — ENOXAPARIN SODIUM 40 MG: 100 INJECTION SUBCUTANEOUS at 20:10

## 2017-10-10 RX ADMIN — LEVOTHYROXINE SODIUM 75 MCG: 75 TABLET ORAL at 05:15

## 2017-10-10 RX ADMIN — LEVOFLOXACIN 750 MG: 750 TABLET, FILM COATED ORAL at 07:35

## 2017-10-10 ASSESSMENT — ENCOUNTER SYMPTOMS
HEADACHES: 0
NAUSEA: 0
FEVER: 0
NERVOUS/ANXIOUS: 0
FOCAL WEAKNESS: 0
DIZZINESS: 0
WEAKNESS: 0
ABDOMINAL PAIN: 0
DEPRESSION: 1
MEMORY LOSS: 1
MYALGIAS: 0
TREMORS: 0
PALPITATIONS: 0
COUGH: 1
SHORTNESS OF BREATH: 0

## 2017-10-10 ASSESSMENT — PAIN SCALES - GENERAL
PAINLEVEL_OUTOF10: 6
PAINLEVEL_OUTOF10: 9
PAINLEVEL_OUTOF10: 6
PAINLEVEL_OUTOF10: 6
PAINLEVEL_OUTOF10: 7
PAINLEVEL_OUTOF10: 9

## 2017-10-10 NOTE — CARE PLAN
Problem: Discharge Barriers/Planning  Goal: Patient's continuum of care needs will be met  Awaiting OT and wound evaluation. Possible discharge 10/10.     Problem: Pain Management  Goal: Pain level will decrease to patient's comfort goal  Will medicate for pain PRN see MAR

## 2017-10-10 NOTE — DIETARY
Brief Nutrition Note:  Patient does not like pureed texture, but this remains necessary per Speech therapy.  Per CNA, patient is drinking nectar thick liquids well and asks for more liquids.  She is receiving smoothies and protein containing items on trays.  No wt loss noted since admission.  Rec:  Encourage PO intake.  Advance diet as appropriate per Speech therapy.  Consider multivitamin.    RD following.

## 2017-10-10 NOTE — PROGRESS NOTES
[place] Fall Risk Assessment:     Last Known Fall: Within the last six months  Mobility: Dizziness/generalized weakness  Medications: Cardiovascular and central nervous system meds  Mental Status/LOC/Awareness: Awake, alert, and oriented to date, place, and person  Toileting Needs: No needs  Volume/Electrolyte Status: No problems  Communication/Sensory: No deficits  Behavior: Depression/anxiety  [place] Fall Risk Total: 9  Fall Risk Level: LOW RISK     Universal Fall Precautions:  call light/belongings in reach, wheelchairs and assistive devices out of sight, bed in low position and locked, siderails up x 2, clutter free and spill free environment, educate on level of risk, educate to call for assistance, use non-slip footwear, adequate lighting     Fall Risk Level Interventions:   TRIAL ([employer], NEURO, MED ANUJA 5) Low Fall Risk Interventions  Place yellow fall risk ID band on patient: verified  Provide patient/family education based on risk assessment: verified  Educate patient/family to call staff for assistance when getting out of bed: verified  Place fall precaution signage outside patient door: verifiedTRIAL ([employer], NEURO, MED ANUJA 5) Moderate Fall Risk Interventions  Place yellow fall risk ID band on patient: verified  Provide patient/family education based on risk assessment : verified  Educate patient/family to call staff for assistance when getting out of bed: verified  Place fall precaution signage outside patient door: verified  Utilize bed/chair fall alarm: verified      Patient Specific Interventions:      Medication: review medications with patient and family  Mental Status/LOC/Awareness: reinforce falls education, check on patient hourly, utilize bed/chair fall alarm, reinforce the use of call light and provide activity  Toileting: instruct patient/family on the use of grab bars, instruct patient/family on the need to call for assistance when toileting and toilet prior to giving pain  medications  Volume/Electrolyte Status: ensure patient remains hydrated and advance diet as tolerated  Communication/Sensory: update plan of care on whiteboard and ensure proper positioning when transferrng/ambulating  Behavioral: encourage patient to voice feelings, engage patient in daily activities and instruct/reinforce fall program rationale  Mobility: dangle prior to standing, ensure bed is locked and in lowest position and instruct patient to exit bed on their strongest side

## 2017-10-10 NOTE — PROGRESS NOTES
Ethics consult received and forwarded to the Ethics Team for evaluation. Appreciate the thorough explanation of the situation to best assist the team.    Please call [phone] with any questions or concerns.

## 2017-10-10 NOTE — CARE PLAN
Problem: Discharge Barriers/Planning  Goal: Patient's continuum of care needs will be met  Pt reinforced on discharge plans, coordination between MDs and SW and family. Pt reinforced prn and with any changes.

## 2017-10-10 NOTE — DISCHARGE PLANNING
Patient discussed during rounds. Ethics referral requested by care team.   Referral faxed to Palliative Care.

## 2017-10-10 NOTE — FACE TO FACE
Face to Face Supporting Documentation - Home Health    The encounter with this patient was in whole or in part the primary reason for home health admission.    Date of encounter:   Patient:                    MRN:                       YOB: 2018  Ashleigh Garces  [id number]  1955     Home health to see patient for:  Skilled Nursing care for assessment, interventions & education, Physical Therapy evaluation and treatment and Occupational therapy evaluation and treatment    Skilled need for:  Exacerbation of Chronic Disease State aspiration pna, chronic pain syndrome    Skilled nursing interventions to include:  Comment: pt/ot    Homebound status evidenced by:  Need the aid of supportive devices such as crutches, canes, wheelchairs or walkers or Needs the assistance of another person in order to leave the home. Leaving home requires a considerable and taxing effort. There is a normal inability to leave the home.    Community Physician to provide follow up care: Sudha Lam M.D.     Optional Interventions? No      I certify the face to face encounter for this home health care referral meets the CMS requirements and the encounter/clinical assessment with the patient was, in whole, or in part, for the medical condition(s) listed above, which is the primary reason for home health care. Based on my clinical findings: the service(s) are medically necessary, support the need for home health care, and the homebound criteria are met.  I certify that this patient has had a face to face encounter by myself.  Zakiya Lugo D.O. - NPI: [id number]

## 2017-10-10 NOTE — PSYCHIATRY
"PSYCHIATRIC FOLLOW UP:    Reason for Admission: Pneumonia  Chest pain  Psychosis  Aspiration pneumonia (CMS-HCC)  Polyphagia  Legal hold status:  n/a  Psychiatric Supervising Attending:   Lily Wood MD        HPI:    Patient seen this AM and was noted to have pressured speech with repeated statements that she is well enough to go home. The conversation was rather tangential and patient was verbose. She stated she can care for herself, pay her bills, and keep herself safe. However, she believes she was admitted for pneumonia and ended up with an infection requiring hospitalization. She states she went AMA because she needed to get home for her cat as one had recently . She does not understand that it was not appropriate to go AMA from a cardiac cath procedure and cannot state the consequences of such. She does not admit to an issue with Xanax and states she takes it keep her anxiety at bay. She reports an income through SSD of $975/mo and pays rent of $152/mo. She initially states she lives in a 2 bedroom apartment on the first floor, then at the end of the conversation says she is going to be moving into a 2 bedroom apartment. She changes her story quite a bit and becomes defensive when pointing out discrepancies or questioning her ability to care for herself.       Psychiatric Examination: observed phenomenon:  Vitals: Blood pressure 144/77, pulse 63, temperature 36.3 °C (97.4 °F), resp. rate 18, height 1.626 m (5' 4\"), weight (!) 134.2 kg (295 lb 13.7 oz), last menstrual period 1988, SpO2 93 %.  Musculoskeletal: normal psychomotor activity, no tics or unusual mannerisms noted  Appearance: WDWN, appropriate dress and grooming. Behavior is calm, cooperative, somewhat intense eye contact  Thoughts:  Linear, logical, no blocking of thought noted, no delusional content noted, tangential  Speech: Spontaneous, pressured speech with variable volume. Some stuttering noted  Mood: \"good - I can go home\"       "     Affect: Euphoric      SI/HI: denies, no evidence of active SI/HI noted   Attention/Alertness: Alert  Memory: Recent and remote memory grossly intact     Orientation: A&Ox3     Fund of Knowledge: Fair     Insight/Judgement into symptoms: poor/poor  Neurological Testing: Not formally tested    Medical systems reviewed:       Lab results/tests:   Recent Results (from the past 24 hour(s))   CBC WITH DIFFERENTIAL    Collection Time: 10/10/17  9:36 AM   Result Value Ref Range    WBC 12.9 (H) 4.8 - 10.8 K/uL    RBC 6.14 (H) 4.20 - 5.40 M/uL    Hemoglobin 17.0 (H) 12.0 - 16.0 g/dL    Hematocrit 54.1 (H) 37.0 - 47.0 %    MCV 88.1 81.4 - 97.8 fL    MCH 27.7 27.0 - 33.0 pg    MCHC 31.4 (L) 33.6 - 35.0 g/dL    RDW 47.2 35.9 - 50.0 fL    Platelet Count 253 164 - 446 K/uL    MPV 10.3 9.0 - 12.9 fL    Neutrophils-Polys 76.70 (H) 44.00 - 72.00 %    Lymphocytes 17.20 (L) 22.00 - 41.00 %    Monocytes 4.40 0.00 - 13.40 %    Eosinophils 0.60 0.00 - 6.90 %    Basophils 0.20 0.00 - 1.80 %    Immature Granulocytes 0.90 0.00 - 0.90 %    Nucleated RBC 0.00 /100 WBC    Neutrophils (Absolute) 9.86 (H) 2.00 - 7.15 K/uL    Lymphs (Absolute) 2.22 1.00 - 4.80 K/uL    Monos (Absolute) 0.57 0.00 - 0.85 K/uL    Eos (Absolute) 0.08 0.00 - 0.51 K/uL    Baso (Absolute) 0.03 0.00 - 0.12 K/uL    Immature Granulocytes (abs) 0.12 (H) 0.00 - 0.11 K/uL    NRBC (Absolute) 0.00 K/uL   COMP METABOLIC PANEL    Collection Time: 10/10/17  9:36 AM   Result Value Ref Range    Sodium 137 135 - 145 mmol/L    Potassium 3.7 3.6 - 5.5 mmol/L    Chloride 102 96 - 112 mmol/L    Co2 29 20 - 33 mmol/L    Anion Gap 6.0 0.0 - 11.9    Glucose 103 (H) 65 - 99 mg/dL    Bun 15 8 - 22 mg/dL    Creatinine 0.74 0.50 - 1.40 mg/dL    Calcium 9.2 8.5 - 10.5 mg/dL    AST(SGOT) 17 12 - 45 U/L    ALT(SGPT) 18 2 - 50 U/L    Alkaline Phosphatase 74 30 - 99 U/L    Total Bilirubin 0.5 0.1 - 1.5 mg/dL    Albumin 3.9 3.2 - 4.9 g/dL    Total Protein 7.1 6.0 - 8.2 g/dL    Globulin 3.2 1.9 -  3.5 g/dL    A-G Ratio 1.2 g/dL   ESTIMATED GFR    Collection Time: 10/10/17  9:36 AM   Result Value Ref Range    GFR If African American >60 >60 mL/min/1.73 m 2    GFR If Non African American >60 >60 mL/min/1.73 m 2          Assessment:(acuity level)  Mood disorder unspecified  Opiate use disorder   Benzodiazepine use disorder           Plan:  Recommend primary team to taper Xanax to 0.75mg PO TID to further taper  Recommend starting Abilify 5mg PO Qdaily for mood  Patient has no capacity to live alone.     legal hold: n/a  Anticipated F/U: within 48 hours.  Will follow

## 2017-10-10 NOTE — PROGRESS NOTES
Assumed care of patient at 1915, received report from day RN at that time. Communication board updated and POC reviewed with pt. Pt has refused the bed alarm and has been educated on it's importance, pt has demonstrated using the call light appropriately.  Assessment complete. Pt is A&O x4.  No other needs at this time. Call light and personal belongings within reach.

## 2017-10-10 NOTE — THERAPY
"Occupational Therapy Evaluation completed.   Functional Status:  Sup[ervised with ADLs and txfs  Plan of Care: Patient with no further skilled OT needs in the acute care setting at this time  Discharge Recommendations: Post-acute therapy Currently anticipate no further skilled therapy needs once patient is discharged from the inpatient setting.    See \"Rehab Therapy-Acute\" Patient Summary Report for complete documentation.    "

## 2017-10-10 NOTE — PROGRESS NOTES
[facility] Hospitalist Progress Note    Date of Service: 10/10/2017    Chief Complaint  Chest pain    62 y.o. female  with past medical history of chronic pain and opiate dependence, admitted on 9/30/2017 with right-sided pneumonia, chest pain, concern for NSTEMI- her troponin peaked at 0.4. She was to undergo cardiac catheterization but then signed out AMA on 10/1.  She returns with continued complaints of chest pain which is more pleuritic than typical for angina, her troponins are decreased significantly. She has been seen by cardiology and they do not feel cardiac cath is warranted at this time.    Interval Problem Update  10/6She continues to say that she is fine and wants to go home. She does not process the answers she is given, she tries to be manipulative, assuring that she has good family support and a very good doctor on the outside.   Due to her behaviors, she is very taxing upon the nursing staff.        PATIENT IS INCAPACITATED for medical decisions and AMA  Verified with Dr Wood that Legal Hold is not needed for incapacity    10/7 denies lower extremity weakness, denies lower extremity pain  Patient tearful during exam, states that she lives with her  who can support her  States that her dog recently passed, tearful during exam, easily consolable    10/8 patient is sitting at the nursing station, ambulating with no assistance  Patient states that she feels great  No new complaints  She is tangential in thought  Requesting discharge to home to self  Discussed with patient regarding need for further assistance at home  Patient continues to state that she has a partner named Ton who was out of town who can help her  Patient does not appear to have insight into her condition  Patient states that she has a granddaughter, who is able to help her, however her granddaughter goes to school and work and will not be with her for 24 hours.  As per nursing staff granddaughter is visiting  patient      10/9  patient is ambulatory to nursing station  Patient does not report any new complaints  Patient clearly lacks insight into her medical condition  Patient is tangential in thought, unable to clearly state her medical condition, however states that she agrees with the need for assistance at home  Patient states that her boyfriend Ton is back in town and can help her  Patient is alert and oriented ×3     10/10 patient continues to ask to go home  Patient is oriented ×3, however upon further questioning is not able to clearly define her condition, lacks insight  Tangential in thought, has persisting thoughts    Consultants/Specialty  Cardiology- signed off  psychiatry    Disposition  TBD  Lack of capacity for medical decision making  Psychiatry following   discussed with patient's daughter regarding need for 24-hour monitoring    D/w pt dtr, Maria E, who lives in [place], plans to take mother home with her.  Will try to arrange home health and 24 hour caregiver support, SW to assist.    The total time spent face to face with this patient and dtr , was 65 mins of which 60% of time was spent on counseling and coordinating care.  Specifically speaking w/ rn, team and pt at length re poc.        Review of Systems   Unable to perform ROS: Psychiatric disorder   Constitutional: Negative for fever.   HENT: Negative for congestion and hearing loss.    Respiratory: Positive for cough. Negative for shortness of breath.    Cardiovascular: Negative for palpitations and leg swelling.   Gastrointestinal: Negative for abdominal pain and nausea.   Genitourinary: Negative for dysuria.   Musculoskeletal: Negative for joint pain and myalgias.   Neurological: Negative for dizziness, tremors, focal weakness, weakness and headaches.   Psychiatric/Behavioral: Positive for depression and memory loss. The patient is not nervous/anxious.      Patient unreliable historian- changes story to try and get more  narcotics, anxiolytics  States she feels well in order to try and get discharged   Physical Exam  Laboratory/Imaging   Hemodynamics  Temp (24hrs), Av.4 °C (97.5 °F), Min:36.2 °C (97.2 °F), Max:36.6 °C (97.8 °F)   Temperature: 36.3 °C (97.4 °F)  Pulse  Av.2  Min: 54  Max: 86    Blood Pressure: 144/77      Respiratory      Respiration: 18, Pulse Oximetry: 98 %        RUL Breath Sounds: Diminished, RML Breath Sounds: Diminished, RLL Breath Sounds: Diminished, CHAYO Breath Sounds: Diminished, LLL Breath Sounds: Diminished    Fluids    Intake/Output Summary (Last 24 hours) at 10/10/17 0916  Last data filed at 10/09/17 1300   Gross per 24 hour   Intake              118 ml   Output                0 ml   Net              118 ml       Nutrition  Orders Placed This Encounter   Procedures   • DIET ORDER     Standing Status:   Standing     Number of Occurrences:   1     Order Specific Question:   Diet:     Answer:   Cardiac [6]     Order Specific Question:   Texture/Fiber modifications:     Answer:   Dysphagia 1(Pureed)specify fluid consistency(question 6) [1]     Order Specific Question:   Consistency/Fluid modifications:     Answer:   Nectar Thick [2]     Physical Exam   Constitutional: She is oriented to person, place, and time. She appears well-developed and well-nourished. No distress.   Obese disheveled  Slurred speech   HENT:   Head: Normocephalic and atraumatic.   Mouth/Throat: No oropharyngeal exudate.   edentulous   Eyes: EOM are normal. Pupils are equal, round, and reactive to light. Right eye exhibits no discharge. Left eye exhibits no discharge.   Neck: Normal range of motion.   Cardiovascular: Normal rate, regular rhythm and intact distal pulses.    Pulmonary/Chest: Effort normal and breath sounds normal. No respiratory distress.          Abdominal: Soft. Bowel sounds are normal. She exhibits no distension.   Musculoskeletal: She exhibits edema. She exhibits no tenderness.   Diffuse eschars on bilateral  lower extremities right side greater than left, minimal erythema, nontender to palpation   Neurological: She is alert and oriented to person, place, and time. No cranial nerve deficit. Coordination normal.   Skin: Skin is warm and dry.   Scattered eschars but no cellulitis noted   Psychiatric: Her mood appears anxious. Her affect is labile and inappropriate. Her speech is tangential and slurred. Thought content is paranoid and delusional (differing delusions every day). Cognition and memory are impaired. She expresses impulsivity and inappropriate judgment.   Tangential  Repetitive   She is inattentive.   Nursing note and vitals reviewed.          Recent Labs      10/08/17   0310   SODIUM  138   POTASSIUM  4.0   CHLORIDE  102   CO2  28   GLUCOSE  85   BUN  15   CREATININE  0.55   CALCIUM  8.9                      Assessment/Plan     Essential hypertension   Assessment & Plan    Add lisinopril        Aspiration pneumonia (CMS-HCC)- (present on admission)   Assessment & Plan    Issues during SLP eval, now on NTFL  Variations in lung exam are likely secondary to compliance with diet  I told her once her lung exam is clear we will reimage her          NSTEMI (non-ST elevated myocardial infarction) (CMS-HCC)- (present on admission)   Assessment & Plan    No ST-T wave changes on EKG, per cardiology chest pain is atypical  Troponins are improved from prior admission, echo with EF of 60  Cardiology has signed off        COPD exacerbation (CMS-HCC)- (present on admission)   Assessment & Plan    On oxygen, RT protocol, prednisone-wheezing was likely precipitated by her dysphagia, aspiration  More wheezing today likely secondary to ongoing aspiration and noncompliance with aspiration precautions        Cellulitis and abscess of leg- (present on admission)   Assessment & Plan    It is noted there is a lot of asymmetry and she has more eschars on her right leg than her left  She says that because a doctor 3 years ago stuck her  with a bunch of needles, and gave her an infection/  Minimal erythema  Continue oral antibiotics        Dysuria- (present on admission)   Assessment & Plan    Chronic  Culture is negative  Urology follow-up for symptoms        Aspiration into lower respiratory tract   Assessment & Plan    Suspect ongoing        Psychosis   Assessment & Plan    Easily reoriented  Refuses psychiatric medications  Has little insight into behavior  Clear cognitive impairments  Remains incapacitated      10/9 patient appears to lack insight into her multiple medical condition  Patient is now requesting that her POA P her boyfriend of over 10 years, who apparently is back in town  [place]   to assist with possible POA assignment    10/10  has identified next of kin, daughter Maria E  At this time is requesting psychiatry reevaluation   to assist with arranging for 24 hour assistance,/cost, if this is feasible discharge for patient and daughter  At this time patient is not safe for discharge to home without 24-hour support  Nicole states that she will consider having patient lived with her if needed        Dysphagia- (present on admission)   Assessment & Plan    likely multifactorial  Over sedation  Edentulous  Her lung exam is worse on the left side today suspect she is eating and a semi-recumbant position  She is in denial about her issues-I told her she needs to be compliant if she wishes to be eligible for discharge        Anxiolytic dependence (CMS-[facility])- (present on admission)   Assessment & Plan    Benzodiazepines will be weaned after narcotic doses have been weaned back          Mood disorder (CMS-[facility])- (present on admission)   Assessment & Plan    Psychiatry agrees that patient likely has underlying mood disorder, psychotic features-unclear the contribution of her medications to her symptomatology and her psychosis. Patient at this time is refusing psychiatric medication  SHE IS INCAPACITATED FOR  MEDICAL DECISIONS - including AMA    Past late between tearful episodes to laughing-continue to monitor        Acute respiratory failure with hypoxemia (CMS-[facility])- (present on admission)   Assessment & Plan    2/2 COPD and PNA        Chest pain- (present on admission)   Assessment & Plan    Noncardiac-per cardiology, signed off  Possibly just drug-seeking          Hypothyroidism- (present on admission)   Assessment & Plan    Continue levothyroxine        Opiate dependence (CMS-HCC)- (present on admission)   Assessment & Plan    weaning her home pain medications, as per schedule  Psychiatry agrees this needs to be done  Patient refuses alternate medications aimed at neuropathy  Psychiatry to discuss with the patient's primary care provider              Reviewed items::  Labs reviewed, Medications reviewed and Radiology images reviewed  Calles catheter::  No Calles  DVT prophylaxis pharmacological::  Enoxaparin (Lovenox)  Ulcer Prophylaxis::  Not indicated  Antibiotics:  Treating active infection/contamination beyond 24 hours perioperative coverage

## 2017-10-10 NOTE — THERAPY
"Speech Language Therapy dysphagia treatment completed.   Functional Status:  Dysphagia therapy completed today. Patient awake sitting up in bed. She had a very dificult time focusing on task and following simple directions. She was very verbose and tangential, talking in a continuous stream and repeating herself multiple times. Patient needed constant redirection to complete single task (i.e. take a sip). Patient consumed pureed solids and nectar thick liquids with good oral phase and no overt signs of aspiration. She was unable to perform a double swallow or take sip after each bite, despite verbal cues. Patient educated on dysphagia exercises. Specifically, pharyngeal strengthening and laryngeal elevation exercises. She was able to complete pharyngeal exercise (Stephanie) in 3/10 attempts with MAX verbal and visual cues. Patient given packet of dysphagia exercises and instructed to complete exercises daily. Recommendations:  Due to high risk of aspiration, recommend continue Dysphagia 1/nectar thick liquid diet. Continue ST services for soft chopped solid PO trials and dysphagia exercises.  Plan of Care: Will benefit from Speech Therapy 3 times per week  Post-Acute Therapy: Discharge to home with outpatient or home health for additional skilled therapy services.    See \"Rehab Therapy-Acute\" Patient Summary Report for complete documentation.     "

## 2017-10-10 NOTE — PROGRESS NOTES
Hospital Medicine Progress Note, Adult, Complex               Author: Ray Combs Date & Time created: 10/10/2017  4:16 PM     Interval History:  ***    Review of Systems:  ROS    Physical Exam:  Physical Exam    Labs:        Invalid input(s): [id number]      Recent Labs      10/08/17   0310  10/10/17   0936   SODIUM  138  137   POTASSIUM  4.0  3.7   CHLORIDE  102  102   CO2  28  29   BUN  15  15   CREATININE  0.55  0.74   CALCIUM  8.9  9.2     Recent Labs      10/08/17   0310  10/10/17   0936   ALTSGPT  16  18   ASTSGOT  11*  17   ALKPHOSPHAT  63  74   TBILIRUBIN  0.4  0.5   GLUCOSE  85  103*     Recent Labs      10/10/17   0936   RBC  6.14*   HEMOGLOBIN  17.0*   HEMATOCRIT  54.1*   PLATELETCT  253     Recent Labs      10/08/17   0310  10/10/17   0936   WBC   --   12.9*   NEUTSPOLYS   --   76.70*   LYMPHOCYTES   --   17.20*   MONOCYTES   --   4.40   EOSINOPHILS   --   0.60   BASOPHILS   --   0.20   ASTSGOT  11*  17   ALTSGPT  16  18   ALKPHOSPHAT  63  74   TBILIRUBIN  0.4  0.5           Hemodynamics:  Temp (24hrs), Av.5 °C (97.7 °F), Min:36.2 °C (97.2 °F), Max:37.1 °C (98.7 °F)  Temperature: 37.1 °C (98.7 °F)  Pulse  Av.4  Min: 54  Max: 86   Blood Pressure: 131/72     Respiratory:    Respiration: 17, Pulse Oximetry: 95 %        RUL Breath Sounds: Diminished, RML Breath Sounds: Diminished, RLL Breath Sounds: Diminished, CHAYO Breath Sounds: Diminished, LLL Breath Sounds: Diminished  Fluids:    Intake/Output Summary (Last 24 hours) at 10/10/17 1616  Last data filed at 10/10/17 0900   Gross per 24 hour   Intake              118 ml   Output                0 ml   Net              118 ml       GI/Nutrition:  Orders Placed This Encounter   Procedures   • DIET ORDER     Standing Status:   Standing     Number of Occurrences:   1     Order Specific Question:   Diet:     Answer:   Cardiac [6]     Order Specific Question:   Texture/Fiber modifications:     Answer:   Dysphagia 1(Pureed)specify fluid  consistency(question 6) [1]     Order Specific Question:   Consistency/Fluid modifications:     Answer:   Nectar Thick [2]     Medical Decision Making, by Problem:  Active Hospital Problems    Diagnosis   • Essential hypertension [I10]   • Aspiration pneumonia (CMS-HCC) [J69.0]   • COPD exacerbation (CMS-HCC) [J44.1]   • NSTEMI (non-ST elevated myocardial infarction) (CMS-HCC) [I21.4]   • Cellulitis and abscess of leg [L03.119, L02.419]   • Dysuria [R30.0]   • Hypothyroidism [E03.9]   • Opiate dependence (CMS-HCC) [F11.20]   • Psychosis [F29]   • Aspiration into lower respiratory tract [T17.800A]   • Mood disorder (CMS-HCC) [F39]   • Anxiolytic dependence (CMS-HCC) [F13.20]   • Dysphagia [R13.10]   • Acute respiratory failure with hypoxemia (CMS-HCC) [J96.01]   • Chest pain [R07.9]       Quality-Core Measures

## 2017-10-10 NOTE — PROGRESS NOTES
OT IRP Treatment      Primary Rehabilitation Diagnosis: SDH, BI  Expected Discharge Date: 10/26/17  Planned Discharge Destination: Home    SUBJECTIVE: Subjective: Pt attempting to expalin information from eye doctor appointment but expressive language does not allow this (10/10/17 1431)       OBJECTIVE:  Precautions  Seizure Precautions: Yes (10/07/17 1500)  Other Precautions: swedish knee cage and R AFO on while up (10/05/17 0830)  Precautions Comments: fall risk, pt can be impulsive, decreased postural control (09/28/17 0820)    See below for current functional status overview.  See OT flowsheet for full details regarding the OT therapy provided.    ASSESSMENT:   Pt requires increase in verbal cues to sustain attention to tasks and for motor planning each aspect of transitional movements and activities.  Appears to be more easily distracted by internal stimuli this date both in quiet room setting and in gym setting.  Increase in perseverative motor patterns noted (As part of attention).    Increase in right distal finger flexor tone observed.  Anticipate need for wrist/hand splint - may benefit from paddle type splint for optimal composite wrist/digit extension - plan to fabricate 10-13-17    OT Identified Barriers to Discharge: postural control, ataxia, midline, balance, activity tolerane, strength, vision changes, ADLs, extensive family etaching needs, DME review     EDUCATION:   On this date, education was provided to patient regarding  self-cares and household mobility  The response to education was/were: Needs reinforcement    PLAN:   Continue skilled OT, including the following Treatment Interventions: ADL retraining;Functional transfer training (10/09/17 0701)   OT Frequency: 7 days/week (10/09/17 0701), Frequency Comments: 60 min at least 5x/wk (10/09/17 0701)    Treatment Plan for Next Session: AM: oral cares in stance at sink (stand from WC), dresing seated in wide armchiar in room  - gives better TYLER.   [place]ia [place] Fall Risk Assessment:     Last Known Fall: Within the last six months  Mobility: Dizziness/generalized weakness  Medications: Cardiovascular and central nervous system meds  Mental Status/LOC/Awareness: Awake, alert, and oriented to date, place, and person  Toileting Needs: No needs  Volume/Electrolyte Status: No problems  Communication/Sensory: No deficits  Behavior: Depression/anxiety  [place]ia Price Fall Risk Total: 9  Fall Risk Level: LOW RISK     Universal Fall Precautions:  bed in low position and locked, call light/belongings in reach     Fall Risk Level Interventions:   TRIAL (QuanDx 8, NEURO, MED ANUJA 5) Low Fall Risk Interventions  Place yellow fall risk ID band on patient: verified  Provide patient/family education based on risk assessment: verified  Educate patient/family to call staff for assistance when getting out of bed: verified  Place fall precaution signage outside patient door: verifiedTRIAL (TELE 8, NEURO, MED ANUJA 5) Moderate Fall Risk Interventions  Place yellow fall risk ID band on patient: verified  Provide patient/family education based on risk assessment : verified  Educate patient/family to call staff for assistance when getting out of bed: verified  Place fall precaution signage outside patient door: verified  Utilize bed/chair fall alarm: verified      Patient Specific Interventions:      Medication: review medications with patient and family and assess for medications that can be discontinued or dosage decreased  Mental Status/LOC/Awareness: not applicable  Toileting: instruct patient/family on the use of grab bars and instruct patient/family on the need to call for assistance when toileting  Volume/Electrolyte Status: ensure patient remains hydrated  Communication/Sensory: update plan of care on whiteboard  Behavioral: administer medication as ordered  Mobility: ensure bed is locked and in lowest position   Emphasis on sustained attention, motor planning for lower body,  clothes orientation for upper body  Additional Plan Considerations: PM: unsupported sit, postural conrol, R scap mobilization, weight bearing through open hand, vibration for motor re-ed facilitation, gross grasp/release into gravity eliminated planes - hand over hand   Plan Comments: plan for splint fabrication RUE Friday    RECOMMENDATIONS FOR DISCHARGE:  Recommendations for Discharge: OT: CBRF, OP therapy    PT/OT Mobility Equipment for Discharge: will continue to assess  (10/10/17 1100)  PT/OT ADL Equipment for Discharge: pt owns tub/shower transfer bench-no needs anticipated (10/07/17 1500)      FUNCTIONAL DATA OVERVIEW LAST 24 HOURS  ADLs   Self Cares/ADL's  Grooming Assistance: Minimal Assist (Min) (10/10/17 0717)  Oral Hygiene Assistance: Minimal Assist (Min);Standing at sink (10/10/17 0717)  Upper Body Dressing Assistance: Moderate Assist (Mod) (10/10/17 0717)  Upper Body Dressing Deficit: Thread RUE;Thread LUE (10/10/17 0717)  Lower Body Clothing Assistance: Maximal Assist (Max) (10/10/17 0717)  Footwear Assistance: Maximal Assist (Max);Chair (10/10/17 0717)  Lower Body Dressing Deficit: Don/doff R sock;Don/doff L sock;Thread LLE into pants;Don/doff R shoe;Don/doff L shoe (10/10/17 0717)  Toileting Assistance: Maximal Assist (Max) (10/10/17 0717)  Toileting Deficit: Clothing management up;Clothing management down (10/10/17 0717)  Self Cares/ADL's Comments #1: Grooming - requires set up to wash face, cues to bring task to completion.  Washes hands with max assist-hand over hand for RUE and for mtoor planning needs/integration of both hands together. Oral cares with min for balnce in stance.    (10/10/17 0717)    Household mobility  Household Mobility  Supine to Sit: Moderate Assist (Mod) (transition to right side, upper trunk initiation, ) (10/10/17 0717)  Sit to Stand: Minimal Assist (Min) (10/10/17 1431)  Stand to Sit: Minimal Assist (Min)  (10/10/17 1431)  Stand Pivot Transfers: Moderate Assist (Mod) (10/10/17 1431)  Toilet Transfers: Maximal Assist (Max) (10/10/17 0717)  Sitting - Static: Touching/Steadying Assistance;Supervision (10/10/17 1431)  Standing - Static: Moderate Assist (Mod) (facilitate abdominal activation, prevent posterior lean) (10/10/17 1431)  Household Mobility Comments #1: Initial stance at sink for oral cares with min to light min asist; with fatigue and increase in activity demands then mod assist for subsequenct standing and noted if stance is in open room vs. in front of surface that may help to define his space.  Max step by step verbal cues for motor sequencing.  (10/10/17 0717)    Home Management       Tolerance  OT Activity Tolerance  Activity Tolerance: 1:1 Activity to rest (10/10/17 1431)    Cognition  Communication/Cognition  Communication: Expressive aphasia;Receptive aphasia (10/10/17 1431)  Overall Cognitive Status: Impaired (10/10/17 1431)  Attention: Impaired sustained attention;Impaired selective attention (10/10/17 1431)  Executive Functions: Impaired initiation;Impaired planning;Impaired organization;Impaired self monitoring/self awareness (max cues) (10/10/17 1431)  Following Verbal Directions: Follows one step directions with repetition (10/10/17 0717)  Safety Judgement: Decreased awareness of need for assistance;Decreased awareness of need for safety (10/10/17 1431)  Awareness of Deficits: Decreased awareness of deficits (10/10/17 1431)  Problem Solving: Assistance required to identify errors made;Assistance required to generate solutions (mod-max cues) (10/10/17 0717)  Cognition Comments: EAsily distracted internally;  requires step by step cues for motor planning of all tasks/movements, cues for redirection and to reengage pt including in conversation, easily perseverative (10/10/17 1431)    Interventions  Other Interventions 1: Unsupported sit, facilitation of lumbar/thoracic extension wtih mod manual cues  to achieve and sustain - noted increase in trunk ataxia with extension, therefore pt tends to pull into flexino pattern for stabilzation.  Right scap mobilization followed by passive shouler flexion wtih scap assist through near full rnage - no pain complaints.  Active shoulder through scaption plane to 80 degrees.  Increase in R digit flexor tone - anticipate need for splint.  (10/10/17 1431)  Other Interventions 2: Trial of shoulder/elbow coupling in low plane reaching with relaxation of flexion pattern to initaite release - max cues, hand over hand, passive digit extension with wrist flexion pattern.   (10/10/17 1431)

## 2017-10-10 NOTE — PROGRESS NOTES
Virginia Price Fall Risk Assessment:     Last Known Fall: Within the last six months  Mobility: Dizziness/generalized weakness  Medications: Cardiovascular and central nervous system meds  Mental Status/LOC/Awareness: Awake, alert, and oriented to date, place, and person  Toileting Needs: No needs  Volume/Electrolyte Status: No problems  Communication/Sensory: No deficits  Behavior: Depression/anxiety  Virginia Price Fall Risk Total: 9  Fall Risk Level: LOW RISK    Universal Fall Precautions:  call light/belongings in reach, wheelchairs and assistive devices out of sight, bed in low position and locked, siderails up x 2, clutter free and spill free environment, educate on level of risk, educate to call for assistance, use non-slip footwear, adequate lighting    Fall Risk Level Interventions:   TRIAL (Kuli Kuli, NEURO, MED ANUJA 5) Low Fall Risk Interventions  Place yellow fall risk ID band on patient: verified  Provide patient/family education based on risk assessment: verified  Educate patient/family to call staff for assistance when getting out of bed: verified  Place fall precaution signage outside patient door: verifiedTRIAL ([employer] 8, NEURO, MED ANUJA 5) Moderate Fall Risk Interventions  Place yellow fall risk ID band on patient: verified  Provide patient/family education based on risk assessment : verified  Educate patient/family to call staff for assistance when getting out of bed: verified  Place fall precaution signage outside patient door: verified  Utilize bed/chair fall alarm: verified     Patient Specific Interventions:     Medication: review medications with patient and family  Mental Status/LOC/Awareness: reinforce falls education, check on patient hourly, utilize bed/chair fall alarm, reinforce the use of call light and provide activity  Toileting: instruct patient/family on the use of grab bars, instruct patient/family on the need to call for assistance when toileting and toilet prior to giving pain  medications  Volume/Electrolyte Status: ensure patient remains hydrated and advance diet as tolerated  Communication/Sensory: update plan of care on whiteboard and ensure proper positioning when transferrng/ambulating  Behavioral: encourage patient to voice feelings, engage patient in daily activities and instruct/reinforce fall program rationale  Mobility: dangle prior to standing, ensure bed is locked and in lowest position and instruct patient to exit bed on their strongest side

## 2017-10-11 VITALS
HEART RATE: 78 BPM | HEIGHT: 64 IN | DIASTOLIC BLOOD PRESSURE: 61 MMHG | TEMPERATURE: 97.4 F | BODY MASS INDEX: 50.02 KG/M2 | RESPIRATION RATE: 18 BRPM | WEIGHT: 293 LBS | OXYGEN SATURATION: 93 % | SYSTOLIC BLOOD PRESSURE: 131 MMHG

## 2017-10-11 PROCEDURE — 700111 HCHG RX REV CODE 636 W/ 250 OVERRIDE (IP): Performed by: INTERNAL MEDICINE

## 2017-10-11 PROCEDURE — A9270 NON-COVERED ITEM OR SERVICE: HCPCS | Performed by: INTERNAL MEDICINE

## 2017-10-11 PROCEDURE — 700102 HCHG RX REV CODE 250 W/ 637 OVERRIDE(OP): Performed by: INTERNAL MEDICINE

## 2017-10-11 PROCEDURE — 99239 HOSP IP/OBS DSCHRG MGMT >30: CPT | Performed by: INTERNAL MEDICINE

## 2017-10-11 RX ORDER — LISINOPRIL 10 MG/1
10 TABLET ORAL DAILY
Qty: 30 TAB | Refills: 0 | Status: SHIPPED | OUTPATIENT
Start: 2017-10-11 | End: 2018-01-03

## 2017-10-11 RX ORDER — MORPHINE SULFATE 30 MG/1
90 TABLET, FILM COATED, EXTENDED RELEASE ORAL EVERY 12 HOURS
Qty: 60 TAB | Refills: 0 | Status: SHIPPED | OUTPATIENT
Start: 2017-10-11 | End: 2019-06-11

## 2017-10-11 RX ORDER — ALPRAZOLAM 0.25 MG/1
0.75 TABLET ORAL 3 TIMES DAILY PRN
Qty: 30 TAB | Refills: 0 | Status: SHIPPED | OUTPATIENT
Start: 2017-10-11 | End: 2017-10-16 | Stop reason: SDUPTHER

## 2017-10-11 RX ORDER — CLOPIDOGREL BISULFATE 75 MG/1
75 TABLET ORAL DAILY
Qty: 30 TAB | Refills: 0 | Status: SHIPPED | OUTPATIENT
Start: 2017-10-11 | End: 2019-01-09

## 2017-10-11 RX ORDER — LEVOFLOXACIN 750 MG/1
750 TABLET, FILM COATED ORAL DAILY
Qty: 1 TAB | Refills: 0 | Status: SHIPPED | OUTPATIENT
Start: 2017-10-11 | End: 2018-01-03

## 2017-10-11 RX ORDER — ARIPIPRAZOLE 5 MG/1
5 TABLET ORAL DAILY
Qty: 30 TAB | Refills: 0 | Status: SHIPPED | OUTPATIENT
Start: 2017-10-11 | End: 2019-01-09

## 2017-10-11 RX ORDER — METRONIDAZOLE 500 MG/1
500 TABLET ORAL EVERY 8 HOURS
Qty: 3 TAB | Refills: 0 | Status: SHIPPED | OUTPATIENT
Start: 2017-10-11 | End: 2018-01-03

## 2017-10-11 RX ORDER — ATORVASTATIN CALCIUM 40 MG/1
40 TABLET, FILM COATED ORAL
Qty: 30 TAB | Refills: 0 | Status: SHIPPED | OUTPATIENT
Start: 2017-10-11 | End: 2017-11-10 | Stop reason: SINTOL

## 2017-10-11 RX ORDER — NICOTINE 21 MG/24HR
1 PATCH, TRANSDERMAL 24 HOURS TRANSDERMAL EVERY 24 HOURS
Qty: 30 PATCH | Refills: 0 | Status: SHIPPED | OUTPATIENT
Start: 2017-10-11 | End: 2018-01-03

## 2017-10-11 RX ORDER — AMOXICILLIN 250 MG
2 CAPSULE ORAL 2 TIMES DAILY
Qty: 30 TAB | Refills: 0 | Status: SHIPPED | OUTPATIENT
Start: 2017-10-11 | End: 2019-01-09

## 2017-10-11 RX ORDER — METHYLPREDNISOLONE 4 MG/1
TABLET ORAL
Qty: 1 KIT | Refills: 0 | Status: SHIPPED | OUTPATIENT
Start: 2017-10-11 | End: 2018-01-03

## 2017-10-11 RX ADMIN — MORPHINE SULFATE 90 MG: 30 TABLET, EXTENDED RELEASE ORAL at 09:14

## 2017-10-11 RX ADMIN — OXYCODONE HYDROCHLORIDE 30 MG: 30 TABLET ORAL at 18:20

## 2017-10-11 RX ADMIN — HYDROMORPHONE HYDROCHLORIDE 4 MG: 4 TABLET ORAL at 09:13

## 2017-10-11 RX ADMIN — OMEPRAZOLE 20 MG: 20 CAPSULE, DELAYED RELEASE ORAL at 09:11

## 2017-10-11 RX ADMIN — METRONIDAZOLE 500 MG: 500 TABLET ORAL at 14:11

## 2017-10-11 RX ADMIN — CLOPIDOGREL 75 MG: 75 TABLET, FILM COATED ORAL at 09:10

## 2017-10-11 RX ADMIN — LEVOTHYROXINE SODIUM 75 MCG: 75 TABLET ORAL at 05:51

## 2017-10-11 RX ADMIN — STANDARDIZED SENNA CONCENTRATE AND DOCUSATE SODIUM 2 TABLET: 8.6; 5 TABLET, FILM COATED ORAL at 09:10

## 2017-10-11 RX ADMIN — METRONIDAZOLE 500 MG: 500 TABLET ORAL at 05:51

## 2017-10-11 RX ADMIN — ALPRAZOLAM 0.75 MG: 0.25 TABLET ORAL at 12:23

## 2017-10-11 RX ADMIN — OXYCODONE HYDROCHLORIDE 30 MG: 30 TABLET ORAL at 05:54

## 2017-10-11 RX ADMIN — NYSTATIN: 100000 CREAM TOPICAL at 09:13

## 2017-10-11 RX ADMIN — LISINOPRIL 10 MG: 10 TABLET ORAL at 09:11

## 2017-10-11 RX ADMIN — LEVOFLOXACIN 750 MG: 750 TABLET, FILM COATED ORAL at 09:10

## 2017-10-11 RX ADMIN — OXYCODONE HYDROCHLORIDE 30 MG: 30 TABLET ORAL at 13:29

## 2017-10-11 RX ADMIN — NICOTINE 14 MG: 14 PATCH, EXTENDED RELEASE TRANSDERMAL at 05:51

## 2017-10-11 RX ADMIN — ENOXAPARIN SODIUM 40 MG: 100 INJECTION SUBCUTANEOUS at 09:10

## 2017-10-11 RX ADMIN — PREDNISONE 40 MG: 50 TABLET ORAL at 09:10

## 2017-10-11 RX ADMIN — ARIPIPRAZOLE 5 MG: 10 TABLET ORAL at 09:11

## 2017-10-11 RX ADMIN — ALPRAZOLAM 0.75 MG: 0.25 TABLET ORAL at 02:05

## 2017-10-11 ASSESSMENT — PAIN SCALES - GENERAL
PAINLEVEL_OUTOF10: 7
PAINLEVEL_OUTOF10: 8
PAINLEVEL_OUTOF10: 8
PAINLEVEL_OUTOF10: 6
PAINLEVEL_OUTOF10: 8

## 2017-10-11 NOTE — WOUND TEAM
Patient seen by wound team per consult order. Thorough head to toe assessment completed. Patient has healed scabs in place to bilateral legs and arms, no open wounds. Bilateral heels are cracked, dry and fissured. Sween cream applied to bilateral heels, arms and legs, treaded socks in place. No areas of breakdown noted to lower abdomen, beneath pannus. Discussed findings and plan of care with patient and staff RN. Patient given sween cream to apply daily. Patient verbalized understanding.

## 2017-10-11 NOTE — PROGRESS NOTES
Pt does not have bed alarm on because she refuses it. Pt has been educated on fall precautions and safety concerns. Pt is able to mosley & fc. Pt is able to answer questions appropriately, but has moments of confusion. Pt has a labile affect and rants intermittently. Pt denies numbness and tingling sensation. Call light is in reach. Pt has continuous chronic pain. Pt stable in room.

## 2017-10-11 NOTE — DISCHARGE PLANNING
Faxed pts Rx to ([phone]) the 24 hour [facility] on [address]. Requested fill be completed by 6693.

## 2017-10-11 NOTE — DISCHARGE SUMMARY
CHIEF COMPLAINT ON ADMISSION  Chief Complaint   Patient presents with   • Other     pt AMA from this facility yesterday.  admitted for NSTEMI, CHF, COPD.  reports she promised to return if she was not feeling better today, reports not feeling better today.       CODE STATUS  Full Code    HPI & HOSPITAL COURSE  This is a 62 y.o. female here with past medical history of chronic pain and opiate dependence, admitted on 9/30/2017 with right-sided pneumonia, chest pain, concern for NSTEMI- her troponin peaked at 0.4. She was to undergo cardiac catheterization but then signed out AMA on 10/1.  She returns with continued complaints of chest pain which is more pleuritic than typical for angina, her troponins are decreased significantly. She has been seen by cardiology and they do not feel cardiac cath is warranted at this time.     Patient was admitted for COPD exacerbation as well as aspiration pneumonia. Patient will finish a course of antibiotics with 1 additional day at home. She was also noted to have cellulitis of the leg which was covered with above antibiotics. The lower extremity right appears worse than the left. Residual eschars noted. No surrounding erythema. Nontender to palpation.    Patient did have intermittent bouts of increasing agitation and psychosis during this admission which may be multifactorial. Patient does have an underlying mood disorder and is  chronically narcotic dependent at high doses with multiple sedative prescriptions on board. Including MS Contin of 120 mg twice a day, which she is on at home.    Patient is also on multiple sedative prescriptions including anti-anxiety medication with above narcotics. Patient admission is likely multifactorial including possible accidental sedative overdose with resultant aspiration pneumonia and confusion. During the course of her stay there were concerns regarding patient's orientation and capacity for this decision-making. Patient was seen by  psychiatry and deemed incapacitated for medical decision as she has lack of insight into her condition. Patient is deemed unsafe to be discharged to home alone. We did discuss this information with patient as well as patient's daughter who are reluctant to above recommendations. However patient's daughter states that she will be able to provide 24-hour assistance for the patient and patient will be discharged to home with her daughter. Patient's mentation has improved during this admission with appropriate orientation however continues to lack insight. Again we have reiterated to reiterated to patient and her daughter regarding further narcotic tapering., The process has been started in this admission. Patient continues to have slurred speech. Patient is edentulous. There is high concern for further aspiration as patient does have a mild to's degree of dysphasia. Patient has no other physical limitations and will be discharged home with her daughter. We have discussed the case with geriatric consultants who are recommending possible outpatient follow-up with the [facility], specializing in geriatric/dementia care. Patient is advised of the above and to follow up with primary care provider for referral. At this time patient's infections appear to be improved as well as her physical function. Patient does have intermittent bouts of confusion, which may be her new baseline.    We have advised patient and her daughter regarding medication administration. Patient's daughter is to administer medications patient has she is on multiple sedative prescriptions and may be forgetful, resulting in accidental overdose. Again we have advised patient further tapering off narcotics. To be continued by her primary care provider.    Apparently during the course of her stay patient has been seen by cardiothoracic surgery Dr. Sam with noted multi-vessel coronary artery disease requiring coronary artery bypass graft. At this  point patient is chest pain-free. Patient was seen by cardiology, however due to atypical nature of the pain patient is advised to follow up with outpatient cardiology for further cardiac monitoring and risk stratification. We have advised patient to follow-up with cardiothoracic surgery as an outpatient for further plans for bypass as needed.    Therefore, she is discharged in good and stable condition with close outpatient follow-up.    SPECIFIC OUTPATIENT FOLLOW-UP  Primary care provider/discharge clinic in one week  Antibiotics ×1 day    DISCHARGE PROBLEM LIST  Active Problems:    Cellulitis and abscess of leg POA: Yes    COPD exacerbation (CMS-HCC) POA: Yes    NSTEMI (non-ST elevated myocardial infarction) (CMS-HCC) POA: Yes    Aspiration pneumonia (CMS-HCC) POA: Yes    Essential hypertension POA: Unknown    Dysuria POA: Yes    Opiate dependence (CMS-HCC) POA: Yes    Hypothyroidism (Chronic) POA: Yes    Chest pain POA: Yes    Acute respiratory failure with hypoxemia (CMS-HCC) POA: Yes    Mood disorder (CMS-HCC) POA: Yes    Anxiolytic dependence (CMS-HCC) POA: Yes    Dysphagia POA: Yes    Psychosis POA: Unknown    Aspiration into lower respiratory tract POA: Unknown  Resolved Problems:    * No resolved hospital problems. *      FOLLOW UP  Future Appointments  Date Time Provider Department Center   10/12/2017 9:30 AM CARE MANAGER [facility]   10/17/2017 1:00 PM SIVAN [facility] MEDICAL SERVICES ([facility] POS)  [address] [place] [id number]  [phone]          MEDICATIONS ON DISCHARGE   Ashleigh Garces   Home Medication Instructions SUNSHINE:[id number]    Printed on:10/11/17 1616   Medication Information                      alprazolam (XANAX) 0.25 MG Tab  Take 3 Tabs by mouth 3 times a day as needed for Anxiety.             aripiprazole (ABILIFY) 5 MG tablet  Take 1 Tab by mouth every day.             atorvastatin (LIPITOR) 40 MG Tab  Take 1 Tab by mouth every bedtime.              clopidogrel (PLAVIX) 75 MG Tab  Take 1 Tab by mouth every day.             diphenhydrAMINE (BENADRYL) 25 MG Tab  Take 25 mg by mouth every 6 hours as needed for Sleep.             furosemide (LASIX) 20 MG Tab  TAKE 1 TAB BY MOUTH EVERY DAY.             HYDROmorphone (DILAUDID) 4 MG Tab  Take 4 mg by mouth 2 Times a Day.             levofloxacin (LEVAQUIN) 750 MG tablet  Take 1 Tab by mouth every day.             levothyroxine (SYNTHROID) 75 MCG Tab  Take 75 mcg by mouth Every morning on an empty stomach.             lisinopril (PRINIVIL) 10 MG Tab  Take 1 Tab by mouth every day.             MethylPREDNISolone (MEDROL DOSEPAK) 4 MG Tablet Therapy Pack  Use as directed             metronidazole (FLAGYL) 500 MG Tab  Take 1 Tab by mouth every 8 hours.             morphine ER (MS CONTIN) 30 MG Tab CR tablet  Take 3 Tabs by mouth every 12 hours.             nicotine (NICODERM) 14 MG/24HR PATCH 24 HR  Apply 1 Patch to skin as directed every 24 hours.             nystatin (MYCOSTATIN) powder  Apply 1 g to affected area(s) 1 time daily as needed.             omeprazole (PRILOSEC) 20 MG delayed-release capsule  Take 1 Cap by mouth every day.             senna-docusate (PERICOLACE OR SENOKOT S) 8.6-50 MG Tab  Take 2 Tabs by mouth 2 Times a Day.                 DIET  Orders Placed This Encounter   Procedures   • DIET ORDER     Standing Status:   Standing     Number of Occurrences:   1     Order Specific Question:   Diet:     Answer:   Cardiac [6]     Order Specific Question:   Texture/Fiber modifications:     Answer:   Dysphagia 1(Pureed)specify fluid consistency(question 6) [1]     Order Specific Question:   Consistency/Fluid modifications:     Answer:   Nectar Thick [2]   • DISCONTINUE DIET TRAY     Standing Status:   Standing     Number of Occurrences:   1       ACTIVITY  As tolerated.  Weight bearing as tolerated      CONSULTATIONS  Psychiatry  Cardiothoracic surgery  Cardiology    PROCEDURES  None    LABORATORY  Lab  Results   Component Value Date/Time    SODIUM 137 10/10/2017 09:36 AM    POTASSIUM 3.7 10/10/2017 09:36 AM    CHLORIDE 102 10/10/2017 09:36 AM    CO2 29 10/10/2017 09:36 AM    GLUCOSE 103 (H) 10/10/2017 09:36 AM    BUN 15 10/10/2017 09:36 AM    CREATININE 0.74 10/10/2017 09:36 AM    CREATININE 1.0 04/27/2009 11:51 AM        Lab Results   Component Value Date/Time    WBC 12.9 (H) 10/10/2017 09:36 AM    HEMOGLOBIN 17.0 (H) 10/10/2017 09:36 AM    HEMATOCRIT 54.1 (H) 10/10/2017 09:36 AM    PLATELETCT 253 10/10/2017 09:36 AM        Total time of the discharge process exceeds 45 minutes

## 2017-10-11 NOTE — DISCHARGE PLANNING
SW met with pt at bedside, per request. Patient stated that she would like to move into a two bedroom apartment and that she lives in [place]. Patient states she spoke with her daughter and is confused about where services are being set up. SW explained that services are being set up at Maria E's house as that is where pt will d/c. Patient requested that SW f/u with Maria E as patient wants to discharge home. SW reiterated that pt will d/c to Maria E's home.     SW placed call to Maria E and left generic message requesting a call back. Per earlier conversation with Maria E, she will be here at 1700 today to  pt. HH services have been confirmed.       TALISHA spoke with pt's daughter who voiced concern about pt's medical needs and medication needs for d/c. TALISHA explained that [facility] at [address] (map provided for NYDIA [place]). TALISHA explained that Constanza will go over medications and d/c instructions when Maria E arrives. TALISHA also explained that the main concern from medical staff is medication management and general safety of the patient.

## 2017-10-11 NOTE — DISCHARGE PLANNING
"TALISHA spoke with pt's daughter Maria E over the phone. Maria E gave permission to send referral to [facility] however she is concerned about the cost of this as Medicaid will not  the copay amount due to her address in [place]. TALISHA will request [facility]  call Maria E to explain insurance copayment amounts.    Maria E works at the Welfare office in [place] however and can assist patient in obtaining medi-shyanne and further care-giving supports ([employer]). Maria E verbalizes that she knows how to do these things.    Maria E continues to talk about \"when she goes home\" and TALISHA reiterated that patient requires 24 hour care and this is a chronic problem. It is unlikely that patient would ever be safe to go back to her home alone. Maria E states she will determine this once she brings her to [place].     [facility] for [facility]  faxed to [facility].   "

## 2017-10-11 NOTE — PROGRESS NOTES
Virginia Price Fall Risk Assessment:     Last Known Fall: Within the last six months  Mobility: Dizziness/generalized weakness  Medications: Cardiovascular and central nervous system meds  Mental Status/LOC/Awareness: Awake, alert, and oriented to date, place, and person  Toileting Needs: No needs  Volume/Electrolyte Status: No problems  Communication/Sensory: No deficits  Behavior: Depression/anxiety  Virginia Price Fall Risk Total: 9  Fall Risk Level: LOW RISK    Universal Fall Precautions:  call light/belongings in reach, bed in low position and locked, siderails up x 2, use non-slip footwear, adequate lighting, educate to call for assistance, clutter free and spill free environment    Fall Risk Level Interventions:   TRIAL ([employer] 8, NEURO, MED ANUJA 5) Low Fall Risk Interventions  Place yellow fall risk ID band on patient: verified  Provide patient/family education based on risk assessment: completed  Educate patient/family to call staff for assistance when getting out of bed: completed  Place fall precaution signage outside patient door: verifiedTRIAL ([employer] 8, NEURO, MED ANUJA 5) Moderate Fall Risk Interventions  Place yellow fall risk ID band on patient: verified  Provide patient/family education based on risk assessment : verified  Educate patient/family to call staff for assistance when getting out of bed: verified  Place fall precaution signage outside patient door: verified  Utilize bed/chair fall alarm: verified     Patient Specific Interventions:     Medication: review medications with patient and family and assess for medications that can be discontinued or dosage decreased  Mental Status/LOC/Awareness: reinforce falls education, check on patient hourly, utilize bed/chair fall alarm and reinforce the use of call light  Toileting: instruct patient/family on the need to call for assistance when toileting  Volume/Electrolyte Status: ensure patient remains hydrated and monitor abnormal lab  values  Communication/Sensory: update plan of care on whiteboard and ensure proper positioning when transferrng/ambulating  Behavioral: encourage patient to voice feelings, administer medication as ordered and instruct/reinforce fall program rationale  Mobility: provide comfort measures during transport, utilize bed/chair fall alarm, ensure bed is locked and in lowest position, provide appropriate assistive device and instruct patient to exit bed on their strongest side

## 2017-10-11 NOTE — DISCHARGE PLANNING
Received choice form from TALISHA Roldan for patients home health services, referral has been sent to [facility] per patient request. A request for [facility] to call patients daughter Maria E @[phone] regarding patient co-pay cost was sent with referral per direction from TALISHA Roldan.

## 2017-10-11 NOTE — DISCHARGE PLANNING
Address for  services is [phone] [place].      will not be able to see pt until Tuesday 10/17. Dr. Lugo notified.

## 2017-10-11 NOTE — DISCHARGE INSTRUCTIONS
Discharge Instructions    Discharged to home by car with relative. Discharged via wheelchair, hospital escort: Yes.  Special equipment needed: Not Applicable    Be sure to schedule a follow-up appointment with your primary care doctor or any specialists as instructed.   -Discharged to home with home health and 24-hour monitoring, with patient's daughter/family   Follow up with primary care provider/discharge clinic in one week   Levaquin/Flagyl ×1 day   Continue MS Contin taper, to follow up with her primary care provider    Discharge Plan:   Diet Plan: Discussed  Activity Level: Discussed  Smoking Cessation Offered: Patient Counseled  Confirmed Symptoms Management: Discussed  Medication Reconciliation Updated: Yes  Influenza Vaccine Indication: Patient Refuses    I understand that a diet low in cholesterol, fat, and sodium is recommended for good health. Unless I have been given specific instructions below for another diet, I accept this instruction as my diet prescription.     Other diet:   Dysphagia Diet Level 1, Pureed  The dysphasia level 1 diet includes foods that are completely pureed and smooth. The foods have a pudding-like texture, such as the texture of pureed pancakes, mashed potatoes, and yogurt. The diet does not include foods with lumps or coarse textures. Liquids should be smooth and may either be thin, nectar-thick, honey-like, or spoon-thick.  This diet is helpful for people with moderate to severe swallowing problems. It reduces the risk of food getting caught in the windpipe, trachea, or lungs. You may need help or supervision during meals while following this diet.  WHAT DO I NEED TO KNOW ABOUT THIS DIET?  Foods  · You may eat foods that are soft and have a pudding-like texture. If a food does not have this texture, you may be able to eat the food after:  ¨ Pureeing it. This can be done with a  or whisk.  ¨ Moistening it with liquid. For example, you may have bread if you soak it in milk  "or syrup.  · Avoid foods that are hard, dry, sticky, chunky, lumpy, or stringy. Also avoid foods with nuts, seeds, raisins, skins, and pulp.  · Do not eat foods that you have to chew. If you have to chew the food, then you cannot eat it.  · Eat a variety of foods to get all the nutrients you need.  Liquids  · You may drink liquids that are smooth. Your health care provider will tell you if you should drink thin or thickened liquids.  · To thicken a liquid, use a food and beverage thickener or a thickening food. Thickened liquids are usually a \"pudding-like\" consistency.  · Thin liquids include fruit juices, milk, coffee, tea, yogurts, shakes, and similar foods that melt to thin liquid at room temperature.  · Avoid liquids with seeds, pulp, or chunks.  See your dietitian or health care provider regularly for help with your dietary changes.  WHAT FOODS CAN I EAT?  Grains  Store-bought soft breads, pancakes, and [place] toast that have a smooth, moist texture and do not have nuts or seeds (you will need to moisten the food with liquid). Cooked cereals that have a pudding-like consistency, such as cream of wheat or farina (no oatmeal). Pureed, well-cooked pasta, rice, and plain bread stuffing.  Vegetables  Pureed vegetables. Soft avocado. Smooth tomato paste or sauce. Strained or pureed soups (these may need to be thickened as directed). Mashed or pureed potatoes without skin (can be seasoned with butter, smooth gravy, margarine, or sour cream).  Fruits  Pureed fruits such as melons and apples without seeds or pulp. Mashed bananas. Smooth tomato paste or sauce. Fruit juices without pulp or seeds. Strained or pureed soups.  Meat and Other Protein Sources  Pureed meat. Smooth spence or liverwurst. Smooth souffles. Pureed beans (such as lentils). Pureed eggs.  Dairy  Yogurt. Smooth cheese sauces. Milk (may need to be thickened). Nutritional dairy drinks or shakes. Ask your health care provider whether you can have ice " cream.  Condiments  Finely ground salt, pepper, and other ground spices.  Sweets/Desserts  Smooth puddings and custards. Pureed desserts. Souffles. Whipped topping. Ask your health care provider whether you can have frozen desserts.  Fats and Oils  Butter. Margarine. Smooth and strained gravy. Sour cream. Mayonnaise. Cream cheese. Whipped topping. Smooth sauces (such as white sauce, cheese sauce, or hollandaise sauce).  The items listed above may not be a complete list of recommended foods or beverages. Contact your dietitian for more options.  WHAT FOODS ARE NOT RECOMMENDED?  Grains  Oatmeal. Dry cereals. Hard breads.  Vegetables  Whole vegetables. Stringy vegetables (such as celery). Thin tomato sauce.  Fruits  Whole fresh, frozen, canned, or dried fruits that have not been pureed. Stringy fruits (such as pineapple).  Meat and Other Protein Sources  Whole or ground meat, fish, or poultry. Dried or cooked lentils or legumes that have been cooked but not mashed or pureed. Non-pureed eggs. Nuts and seeds. Peanut butter.  Dairy  Non-pureed cheese. Dairy products with lumps or chunks. Ask your health care provider whether you can have ice cream.  Condiments  Coarse or seeded herbs and spices.  Sweets/Desserts  [place] preserves. Jams with seeds. Solid desserts. Sticky, chewy sweets (such as licorice and caramel). Ask your health care provider whether you can have frozen desserts.  Fats and Oils  Sauces of fats with lumps or chunks.  The items listed above may not be a complete list of foods and beverages to avoid. Contact your dietitian for more information.     This information is not intended to replace advice given to you by your health care provider. Make sure you discuss any questions you have with your health care provider.     Document Released: 12/18/2006 Document Revised: 01/08/2016 Document Reviewed: 12/01/2014  Food[employer] Interactive Patient Education ©2016 [employer]   1 Diet   Heart-Healthy Eating  "Plan  Heart-healthy meal planning includes:  · Limiting unhealthy fats.  · Increasing healthy fats.  · Making other small dietary changes.  You may need to talk with your doctor or a diet specialist (dietitian) to create an eating plan that is right for you.  WHAT TYPES OF FAT SHOULD I CHOOSE?  · Choose healthy fats. These include olive oil and canola oil, flaxseeds, walnuts, almonds, and seeds.  · Eat more omega-3 fats. These include salmon, mackerel, sardines, tuna, flaxseed oil, and ground flaxseeds. Try to eat fish at least twice each week.  · Limit saturated fats.  ¨ Saturated fats are often found in animal products, such as meats, butter, and cream.  ¨ Plant sources of saturated fats include palm oil, palm kernel oil, and coconut oil.  · Avoid foods with partially hydrogenated oils in them. These include stick margarine, some tub margarines, cookies, crackers, and other baked goods. These contain trans fats.  WHAT GENERAL GUIDELINES DO I NEED TO FOLLOW?  · Check food labels carefully. Identify foods with trans fats or high amounts of saturated fat.  · Fill one half of your plate with vegetables and green salads. Eat 4-5 servings of vegetables per day. A serving of vegetables is:  ¨ 1 cup of raw leafy vegetables.  ¨ ½ cup of raw or cooked cut-up vegetables.  ¨ ½ cup of vegetable juice.  · Fill one fourth of your plate with whole grains. Look for the word \"whole\" as the first word in the ingredient list.  · Fill one fourth of your plate with lean protein foods.  · Eat 4-5 servings of fruit per day. A serving of fruit is:  ¨ One medium whole fruit.  ¨ ¼ cup of dried fruit.  ¨ ½ cup of fresh, frozen, or canned fruit.  ¨ ½ cup of 100% fruit juice.  · Eat more foods that contain soluble fiber. These include apples, broccoli, carrots, beans, peas, and barley. Try to get 20-30 g of fiber per day.  · Eat more home-cooked food. Eat less restaurant, buffet, and fast food.  · Limit or avoid alcohol.  · Limit foods high in " starch and sugar.  · Avoid fried foods.  · Avoid frying your food. Try baking, boiling, grilling, or broiling it instead. You can also reduce fat by:  ¨ Removing the skin from poultry.  ¨ Removing all visible fats from meats.  ¨ Skimming the fat off of stews, soups, and gravies before serving them.  ¨ Steaming vegetables in water or broth.  · Lose weight if you are overweight.  · Eat 4-5 servings of nuts, legumes, and seeds per week:  ¨ One serving of dried beans or legumes equals ½ cup after being cooked.  ¨ One serving of nuts equals 1½ ounces.  ¨ One serving of seeds equals ½ ounce or one tablespoon.  · You may need to keep track of how much salt or sodium you eat. This is especially true if you have high blood pressure. Talk with your doctor or dietitian to get more information.  WHAT FOODS CAN I EAT?  Grains  Breads, including Indonesian, white, gerardo, wheat, raisin, rye, oatmeal, and Italian. Tortillas that are neither fried nor made with lard or trans fat. Low-fat rolls, including hotdog and hamburger buns and English muffins. Biscuits. Muffins. Waffles. Pancakes. Light popcorn. Whole-grain cereals. Flatbread. Moni toast. Pretzels. Breadsticks. Rusks. Low-fat snacks. Low-fat crackers, including oyster, saltine, matzo, stephanie, animal, and rye. Rice and pasta, including brown rice and pastas that are made with whole wheat.   Vegetables  All vegetables.   Fruits  All fruits, but limit coconut.  Meats and Other Protein Sources  Lean, well-trimmed beef, veal, pork, and lamb. Chicken and turkey without skin. All fish and shellfish. Wild duck, rabbit, pheasant, and venison. Egg whites or low-cholesterol egg substitutes. Dried beans, peas, lentils, and tofu. Seeds and most nuts.  Dairy  Low-fat or nonfat cheeses, including ricotta, string, and mozzarella. Skim or 1% milk that is liquid, powdered, or evaporated. Buttermilk that is made with low-fat milk. Nonfat or low-fat yogurt.  Beverages  Mineral water. Diet carbonated  beverages.  Sweets and Desserts  Sherbets and fruit ices. Honey, jam, marmalade, jelly, and syrups. Meringues and gelatins. Pure sugar candy, such as hard candy, jelly beans, gumdrops, mints, marshmallows, and small amounts of dark chocolate. Harjit food cake.  Eat all sweets and desserts in moderation.  Fats and Oils  Nonhydrogenated (trans-free) margarines. Vegetable oils, including soybean, sesame, sunflower, olive, peanut, safflower, corn, canola, and cottonseed. Salad dressings or mayonnaise made with a vegetable oil. Limit added fats and oils that you use for cooking, baking, salads, and as spreads.  Other  Cocoa powder. Coffee and tea. All seasonings and condiments.  The items listed above may not be a complete list of recommended foods or beverages. Contact your dietitian for more options.  WHAT FOODS ARE NOT RECOMMENDED?  Grains  Breads that are made with saturated or trans fats, oils, or whole milk. Croissants. Butter rolls. Cheese breads. Sweet rolls. Donuts. Buttered popcorn. Chow mein noodles. High-fat crackers, such as cheese or butter crackers.  Meats and Other Protein Sources  Fatty meats, such as hotdogs, short ribs, sausage, spareribs, foster, rib eye roast or steak, and mutton. High-fat deli meats, such as salami and bologna. Caviar. Domestic duck and goose. Organ meats, such as kidney, liver, sweetbreads, and heart.  Dairy  Cream, sour cream, cream cheese, and creamed cottage cheese. Whole-milk cheeses, including blue (eamon), Forsan Gelacio, Brie, Hector, American, Havarti, Swiss, cheddar, Camembert, and [place]. Whole or 2% milk that is liquid, evaporated, or condensed. Whole buttermilk. Cream sauce or high-fat cheese sauce. Yogurt that is made from whole milk.  Beverages  Regular sodas and juice drinks with added sugar.  Sweets and Desserts  Frosting. Pudding. Cookies. Cakes other than harjit food cake. Candy that has milk chocolate or white chocolate, hydrogenated fat, butter, coconut, or unknown  ingredients. Buttered syrups. Full-fat ice cream or ice cream drinks.  Fats and Oils  Gravy that has suet, meat fat, or shortening. Cocoa butter, hydrogenated oils, palm oil, coconut oil, palm kernel oil. These can often be found in baked products, candy, fried foods, nondairy creamers, and whipped toppings. Solid fats and shortenings, including foster fat, salt pork, lard, and butter. Nondairy cream substitutes, such as coffee creamers and sour cream substitutes. Salad dressings that are made of unknown oils, cheese, or sour cream.  The items listed above may not be a complete list of foods and beverages to avoid. Contact your dietitian for more information.     This information is not intended to replace advice given to you by your health care provider. Make sure you discuss any questions you have with your health care provider.     Document Released: 06/18/2013 Document Revised: 01/08/2016 Document Reviewed: 06/11/2015  [employer] Interactive Patient Education ©2016 [employer]      Special Instructions: Diagnosis:  Acute Coronary Syndrome (ACS) is a diagnosis that encompasses cardiac-related chest pain and heart attack. ACS occurs when the blood flow to the heart muscle is severely reduced or cut off completely due to a slow process called atherosclerosis.  Atherosclerosis is a disease in which the coronary arteries become narrow from a buildup of fat, cholesterol, and other substances that combine to form plaque. If the plaque breaks, a blood clot will form and block the blood flow to the heart muscle. This lack of blood flow can cause damage or death to the heart muscle which is called a heart attack or Myocardial Infarction (MI). There are two different types of MIs:  ST Elevation Myocardial Infarction or STEMI (the most severe type of heart attack) and Non-ST Elevation Myocardial Infarction or NSTEMI.    Treatment Plan:  · Cardiac Diet  - Low fat, low salt, low cholesterol   · Cardiac Rehab  - Your doctor has  ordered you a referral to [facility].  Call [phone] to schedule an appointment.  · Attend my follow-up appointment with my Cardiologist.  · Take my medications as prescribed by my doctor  · Exercise daily  · Lower my bad cholesterol and raise my good cholesterol and lower my blood pressure    Medications:  Certain medications are used to treat ACS.  Remember to always take medications as prescribed and never stop talking medications unless told by your doctor.    You have been prescribed the following medicatons:    Statin - Statin-Liptor is used to lower cholesterol.  Angiotensin Converting Enzyme (ACE) Inhibitor - Angiotensin Converting Enzyme Inhibitor Lisinopril is used to lower blood pressure and treat heart failure.    · Is patient discharged on Warfarin / Coumadin?   No     · Is patient Post Blood Transfusion?  No    Aspiration Pneumonia  Aspiration pneumonia is an infection in your lungs. It occurs when food, liquid, or stomach contents (vomit) are inhaled (aspirated) into your lungs. When these things get into your lungs, swelling (inflammation) and infection can occur. This can make it difficult for you to breathe. Aspiration pneumonia is a serious condition and can be life threatening.  RISK FACTORS  Aspiration pneumonia is more likely to occur when a person's cough (gag) reflex or ability to swallow has been decreased. Some things that can do this include:   · Having a brain injury or disease, such as stroke, seizures, Parkinson's disease, dementia, or amyotrophic lateral sclerosis (ALS).    · Being given general anesthetic for procedures.    · Being in a coma (unconscious).    · Having a narrowing of the tube that carries food to the stomach (esophagus).    · Drinking too much alcohol. If a person passes out and vomits, vomit can be swallowed into the lungs.    · Taking certain medicines, such as tranquilizers or sedatives.    SIGNS AND SYMPTOMS   · Coughing after swallowing food or liquids.     · Breathing problems, such as wheezing or shortness of breath.    · Bluish skin. This can be caused by lack of oxygen.    · Coughing up food or mucus. The mucus might contain blood, greenish material, or yellowish-white fluid (pus).    · Fever.    · Chest pain.    · Being more tired than usual (fatigue).    · Sweating more than usual.    · Bad breath.    DIAGNOSIS   A physical exam will be done. During the exam, the health care provider will listen to your lungs with a stethoscope to check for:   · Crackling sounds in the lungs.  · Decreased breath sounds.  · A rapid heartbeat.  Various tests may be ordered. These may include:   · Chest X-ray.    · CT scan.    · Swallowing study. This test looks at how food is swallowed and whether it goes into your breathing tube (trachea) or food pipe (esophagus).    · Sputum culture. Saliva and mucus (sputum) are collected from the lungs or the tubes that carry air to the lungs (bronchi). The sputum is then tested for bacteria.    · Bronchoscopy. This test uses a flexible tube (bronchoscope) to see inside the lungs.  TREATMENT   Treatment will usually include antibiotic medicines. Other medicines may also be used to reduce fever or pain. You may need to be treated in the hospital. In the hospital, your breathing will be carefully monitored. Depending on how well you are breathing, you may need to be given oxygen, or you may need breathing support from a breathing machine (ventilator). For people who fail a swallowing study, a feeding tube might be placed in the stomach, or they may be asked to avoid certain food textures or liquids when they eat.  HOME CARE INSTRUCTIONS   · Carefully follow any special eating instructions you were given, such as avoiding certain food textures or thickening liquids. This reduces the risk of developing aspiration pneumonia again.   · Only take over-the-counter or prescription medicines as directed by your health care provider. Follow the  directions carefully.    · If you were prescribed antibiotics, take them as directed. Finish them even if you start to feel better.    · Rest as instructed by your health care provider.    · Keep all follow-up appointments with your health care provider.    SEEK MEDICAL CARE IF:   · You develop worsening shortness of breath, wheezing, or difficulty breathing.    · You develop a fever.    · You have chest pain.    MAKE SURE YOU:   · Understand these instructions.  · Will watch your condition.  · Will get help right away if you are not doing well or get worse.     This information is not intended to replace advice given to you by your health care provider. Make sure you discuss any questions you have with your health care provider.     Document Released: 10/15/2010 Document Revised: 12/23/2014 Document Reviewed: 06/05/2014  [employer] Interactive Patient Education ©2016 [employer]      Depression / Suicide Risk    As you are discharged from this [facility] facility, it is important to learn how to keep safe from harming yourself.    Recognize the warning signs:  · Abrupt changes in personality, positive or negative- including increase in energy   · Giving away possessions  · Change in eating patterns- significant weight changes-  positive or negative  · Change in sleeping patterns- unable to sleep or sleeping all the time   · Unwillingness or inability to communicate  · Depression  · Unusual sadness, discouragement and loneliness  · Talk of wanting to die  · Neglect of personal appearance   · Rebelliousness- reckless behavior  · Withdrawal from people/activities they love  · Confusion- inability to concentrate     If you or a loved one observes any of these behaviors or has concerns about self-harm, here's what you can do:  · Talk about it- your feelings and reasons for harming yourself  · Remove any means that you might use to hurt yourself (examples: pills, rope, extension cords, firearm)  · Get professional  help from the community (Mental Health, Substance Abuse, psychological counseling)  · Do not be alone:Call your Safe Contact- someone whom you trust who will be there for you.  · Call your local CRISIS HOTLINE 663-3763 or 919-076-6135  · Call your local Children's Mobile Crisis Response Team [place] (889) 336-6084 or www.[employer]  · Call the toll free National Suicide Prevention Hotlines   · National Suicide Prevention Lifeline 152-229-NJZX (6830)  · National Hope Line Network 800-SUICIDE (762-5682)

## 2017-10-11 NOTE — DISCHARGE PLANNING
Call from [facility], patient has been accepted for HH and can be seen as early as Tuesday 10/17. They will also need a D/C summary for Home Health.     TALISHA Roldan has been notified via voicemail.

## 2017-10-11 NOTE — DISCHARGE PLANNING
Call placed to Jessica with [facility] , referral is being reviewed and wants patient to understand that this will not be an everyday nursing care like daughter is looking for. [facility] will also need the address of [facility] house where patient will be staying at in [place].     TALISHA Roldan has been notified via phone.

## 2017-10-12 ENCOUNTER — PATIENT OUTREACH (OUTPATIENT)
Dept: HEALTH INFORMATION MANAGEMENT | Facility: OTHER | Age: 62
End: 2017-10-12

## 2017-10-12 NOTE — PROGRESS NOTES
10/12/2017 0936 - Discharge Outreach attempt - LM  10/12/2017 1325 - Discharge Outreach - reached daughter and call completed.

## 2017-10-16 RX ORDER — ALPRAZOLAM 0.25 MG/1
0.75 TABLET ORAL 3 TIMES DAILY PRN
Qty: 90 TAB | Refills: 0 | Status: SHIPPED
Start: 2017-10-16 | End: 2017-11-10 | Stop reason: SDUPTHER

## 2017-10-16 RX ORDER — NYSTATIN 100000 [USP'U]/G
POWDER TOPICAL
Qty: 60 G | Refills: 0 | Status: SHIPPED | OUTPATIENT
Start: 2017-10-16 | End: 2017-11-10 | Stop reason: SDUPTHER

## 2017-10-16 NOTE — TELEPHONE ENCOUNTER
Was the patient seen in the last year in this department? Yes     Does patient have an active prescription for medications requested? No     Received Request Via: Pharmacy      in media.

## 2017-11-10 ENCOUNTER — OFFICE VISIT (OUTPATIENT)
Dept: MEDICAL GROUP | Facility: PHYSICIAN GROUP | Age: 62
End: 2017-11-10
Payer: MEDICARE

## 2017-11-10 VITALS
WEIGHT: 270 LBS | OXYGEN SATURATION: 92 % | SYSTOLIC BLOOD PRESSURE: 140 MMHG | TEMPERATURE: 97.1 F | HEIGHT: 64 IN | HEART RATE: 82 BPM | BODY MASS INDEX: 46.1 KG/M2 | DIASTOLIC BLOOD PRESSURE: 74 MMHG | RESPIRATION RATE: 16 BRPM

## 2017-11-10 DIAGNOSIS — E66.01 MORBID OBESITY (HCC): ICD-10-CM

## 2017-11-10 DIAGNOSIS — E03.9 HYPOTHYROIDISM, UNSPECIFIED TYPE: ICD-10-CM

## 2017-11-10 DIAGNOSIS — I21.4 NSTEMI (NON-ST ELEVATED MYOCARDIAL INFARCTION) (HCC): ICD-10-CM

## 2017-11-10 DIAGNOSIS — Z72.0 TOBACCO USE: ICD-10-CM

## 2017-11-10 DIAGNOSIS — Z12.39 SCREENING FOR BREAST CANCER: ICD-10-CM

## 2017-11-10 DIAGNOSIS — R60.9 EDEMA, UNSPECIFIED TYPE: ICD-10-CM

## 2017-11-10 DIAGNOSIS — Z86.19 HISTORY OF FUNGAL SKIN INFECTION: ICD-10-CM

## 2017-11-10 DIAGNOSIS — F41.9 ANXIETY: ICD-10-CM

## 2017-11-10 DIAGNOSIS — Z09 HOSPITAL DISCHARGE FOLLOW-UP: ICD-10-CM

## 2017-11-10 PROCEDURE — 99214 OFFICE O/P EST MOD 30 MIN: CPT | Performed by: INTERNAL MEDICINE

## 2017-11-10 RX ORDER — NYSTATIN 100000 [USP'U]/G
POWDER TOPICAL
Qty: 60 G | Refills: 1 | Status: SHIPPED | OUTPATIENT
Start: 2017-11-10 | End: 2018-01-04 | Stop reason: SDUPTHER

## 2017-11-10 RX ORDER — ALPRAZOLAM 1 MG/1
1 TABLET ORAL NIGHTLY PRN
Qty: 90 TAB | Refills: 0 | Status: SHIPPED | OUTPATIENT
Start: 2017-11-10 | End: 2017-12-08 | Stop reason: SDUPTHER

## 2017-11-10 RX ORDER — ALPRAZOLAM 0.25 MG/1
0.75 TABLET ORAL 3 TIMES DAILY PRN
Qty: 90 TAB | Refills: 0 | Status: SHIPPED | OUTPATIENT
Start: 2017-11-10 | End: 2017-11-10

## 2017-11-10 RX ORDER — FUROSEMIDE 20 MG/1
20 TABLET ORAL
Qty: 90 TAB | Refills: 0 | Status: SHIPPED | OUTPATIENT
Start: 2017-11-10 | End: 2017-12-14 | Stop reason: SDUPTHER
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
## 2017-11-14 ENCOUNTER — PATIENT OUTREACH (OUTPATIENT)
Dept: HEALTH INFORMATION MANAGEMENT | Facility: OTHER | Age: 62
End: 2017-11-14

## 2017-11-15 NOTE — PROGRESS NOTES
Ashleigh Garces was admitted to [facility] on 9/30/17 for COPD exacerbation, and was found to have an elevated troponin requiring catheterization. Patient refused catheterization and left AMA on 10/1/17. Patient went back to [facility] on 10/2/17 and was discharged home with her daughter on 10/11/17. Patient has a long list of diagnosis including; COPD, bipolar disorder, heart failure, chronic back pain, and opiate dependence. Patient advocate attempted to reach out to [facility] several times. After many failed attempts at reaching Maria E, patient advocate was able to finally engage with patient’s granddaughter, Bhargavi. Bhargavi could not provide much insight on patient. Patient advocate was finally able to engage with Maria E on 10/18/17. When patient was discharged from the hospital she was set up with [facility], but patient’s daughter cancelled services due to insurance not covering patient in [place]. Patient was also instructed to see her PCP or the discharge clinic within a week of discharge, and to also follow-up with cardiology. Patient had an appointment with the [facility] discharge clinic for 10/17/17, but the appointment was cancelled. Patient did see her PCP on 11/10/17 for a quick hospital follow-up. Patient did not see cardiology, and has no future appointments with them.

## 2017-11-17 LAB — EKG IMPRESSION: NORMAL

## 2017-11-30 DIAGNOSIS — Z79.890 POSTMENOPAUSAL HRT (HORMONE REPLACEMENT THERAPY): ICD-10-CM

## 2017-12-01 RX ORDER — ESTRADIOL 0.5 MG/1
0.5 TABLET ORAL DAILY
Qty: 90 TAB | Refills: 0 | Status: SHIPPED | OUTPATIENT
Start: 2017-12-01 | End: 2018-03-02 | Stop reason: SDUPTHER

## 2017-12-06 DIAGNOSIS — K21.00 GASTROESOPHAGEAL REFLUX DISEASE WITH ESOPHAGITIS: ICD-10-CM

## 2017-12-08 ENCOUNTER — OFFICE VISIT (OUTPATIENT)
Dept: MEDICAL GROUP | Facility: PHYSICIAN GROUP | Age: 62
End: 2017-12-08
Payer: MEDICARE

## 2017-12-08 VITALS
WEIGHT: 273.8 LBS | OXYGEN SATURATION: 92 % | HEIGHT: 64 IN | BODY MASS INDEX: 46.74 KG/M2 | SYSTOLIC BLOOD PRESSURE: 114 MMHG | DIASTOLIC BLOOD PRESSURE: 70 MMHG | HEART RATE: 82 BPM | RESPIRATION RATE: 16 BRPM | TEMPERATURE: 97.9 F

## 2017-12-08 DIAGNOSIS — E66.01 MORBID OBESITY (HCC): ICD-10-CM

## 2017-12-08 DIAGNOSIS — J01.00 ACUTE MAXILLARY SINUSITIS, RECURRENCE NOT SPECIFIED: ICD-10-CM

## 2017-12-08 DIAGNOSIS — R60.9 EDEMA, UNSPECIFIED TYPE: ICD-10-CM

## 2017-12-08 DIAGNOSIS — J30.89 CHRONIC NON-SEASONAL ALLERGIC RHINITIS, UNSPECIFIED TRIGGER: ICD-10-CM

## 2017-12-08 DIAGNOSIS — F41.9 ANXIETY: ICD-10-CM

## 2017-12-08 PROCEDURE — 99213 OFFICE O/P EST LOW 20 MIN: CPT | Performed by: INTERNAL MEDICINE

## 2017-12-08 RX ORDER — ALPRAZOLAM 1 MG/1
1 TABLET ORAL NIGHTLY PRN
Qty: 90 TAB | Refills: 0 | Status: SHIPPED | OUTPATIENT
Start: 2017-12-08 | End: 2018-01-03 | Stop reason: SDUPTHER

## 2017-12-08 RX ORDER — OMEPRAZOLE 20 MG/1
20 CAPSULE, DELAYED RELEASE ORAL DAILY
Qty: 90 CAP | Refills: 0 | Status: SHIPPED | OUTPATIENT
Start: 2017-12-08 | End: 2019-01-09

## 2017-12-08 RX ORDER — CETIRIZINE HYDROCHLORIDE 10 MG/1
10 TABLET, CHEWABLE ORAL DAILY
Qty: 30 TAB | Refills: 2 | Status: SHIPPED | OUTPATIENT
Start: 2017-12-08 | End: 2019-01-09 | Stop reason: SDUPTHER

## 2017-12-08 RX ORDER — LEVOFLOXACIN 500 MG/1
500 TABLET, FILM COATED ORAL DAILY
Qty: 10 TAB | Refills: 0 | Status: SHIPPED | OUTPATIENT
Start: 2017-12-08 | End: 2017-12-18 | Stop reason: SDUPTHER

## 2017-12-08 RX ORDER — FUROSEMIDE 20 MG/1
20 TABLET ORAL
Qty: 90 TAB | Refills: 0 | Status: SHIPPED | OUTPATIENT
Start: 2017-12-08 | End: 2018-03-23 | Stop reason: SDUPTHER

## 2017-12-08 ASSESSMENT — PAIN SCALES - GENERAL: PAINLEVEL: 8=MODERATE-SEVERE PAIN

## 2017-12-08 NOTE — PROGRESS NOTES
Subjective:  Ashleigh is a 62 y.o. female with the following   Past Medical History:   Diagnosis Date   • Arthritis    • ASTHMA    • Bipolar disorder (CMS-HCC)    • Bursitis    • Chronic low back pain    • Congestive heart failure (CMS-HCC)    • Dyslipidemia    • Fall     last fall a month ago   • GERD (gastroesophageal reflux disease)    • Hypothyroid    • Migraine    • Obesity    • Sinusitis; chronic    • Unspecified urinary incontinence       Family History   Problem Relation Age of Onset   • Cancer Mother      unknown   • Hypertension Father    • Diabetes Father    • Stroke Father    • Diabetes Paternal Grandmother      The patient is on the following medications:   Current Outpatient Prescriptions:   •  omeprazole (PRILOSEC) 20 MG delayed-release capsule, TAKE 1 CAP BY MOUTH EVERY DAY., Disp: 90 Cap, Rfl: 0  •  furosemide (LASIX) 20 MG Tab, TAKE 1 TAB BY MOUTH EVERY DAY., Disp: 90 Tab, Rfl: 0  •  levofloxacin (LEVAQUIN) 500 MG tablet, Take 1 Tab by mouth every day., Disp: 10 Tab, Rfl: 0  •  cetirizine (ZYRTEC) 10 MG chewable tablet, Take 1 Tab by mouth every day., Disp: 30 Tab, Rfl: 2  •  alprazolam (XANAX) 1 MG Tab, Take 1 Tab by mouth at bedtime as needed for Sleep., Disp: 90 Tab, Rfl: 0  •  estradiol (ESTRACE) 0.5 MG tablet, TAKE 1 TAB BY MOUTH EVERY DAY., Disp: 90 Tab, Rfl: 0  •  mupirocin (BACTROBAN) 2 % Ointment, APPLY TO AFFECTED AREA AS DIRECTED, Disp: 22 g, Rfl: 0  •  furosemide (LASIX) 20 MG Tab, Take 1 Tab by mouth every day., Disp: 90 Tab, Rfl: 0  •  NYSTOP (MYCOSTATIN) 065530 UNIT/GM Powder, Apply once daily, Disp: 60 g, Rfl: 1  •  morphine ER (MS CONTIN) 30 MG Tab CR tablet, Take 3 Tabs by mouth every 12 hours., Disp: 60 Tab, Rfl: 0  •  lisinopril (PRINIVIL) 10 MG Tab, Take 1 Tab by mouth every day., Disp: 30 Tab, Rfl: 0  •  aripiprazole (ABILIFY) 5 MG tablet, Take 1 Tab by mouth every day., Disp: 30 Tab, Rfl: 0  •  senna-docusate (PERICOLACE OR SENOKOT S) 8.6-50 MG Tab, Take 2 Tabs by mouth 2  "Times a Day., Disp: 30 Tab, Rfl: 0  •  metronidazole (FLAGYL) 500 MG Tab, Take 1 Tab by mouth every 8 hours., Disp: 3 Tab, Rfl: 0  •  clopidogrel (PLAVIX) 75 MG Tab, Take 1 Tab by mouth every day., Disp: 30 Tab, Rfl: 0  •  nystatin (MYCOSTATIN) powder, Apply 1 g to affected area(s) 1 time daily as needed., Disp: , Rfl:   •  diphenhydrAMINE (BENADRYL) 25 MG Tab, Take 25 mg by mouth every 6 hours as needed for Sleep., Disp: , Rfl:   •  HYDROmorphone (DILAUDID) 4 MG Tab, Take 4 mg by mouth 2 Times a Day., Disp: , Rfl:   •  levofloxacin (LEVAQUIN) 750 MG tablet, Take 1 Tab by mouth every day., Disp: 1 Tab, Rfl: 0  •  nicotine (NICODERM) 14 MG/24HR PATCH 24 HR, Apply 1 Patch to skin as directed every 24 hours., Disp: 30 Patch, Rfl: 0  •  MethylPREDNISolone (MEDROL DOSEPAK) 4 MG Tablet Therapy Pack, Use as directed, Disp: 1 Kit, Rfl: 0  •  levothyroxine (SYNTHROID) 75 MCG Tab, Take 75 mcg by mouth Every morning on an empty stomach., Disp: , Rfl:     HPI; patient is here today complaining of purulent nasal discharge, bilateral maxillary sinus pain, intermittent fever and chills, also requesting refill of Xanax for anxiety disorder denies having suicidal ideation.        ROS: All other systems reviewed and they are negative.    Blood pressure 114/70, pulse 82, temperature 36.6 °C (97.9 °F), resp. rate 16, height 1.626 m (5' 4\"), weight 124.2 kg (273 lb 12.8 oz), last menstrual period 01/01/1988, SpO2 92 %, not currently breastfeeding.on RA        Objective:      Patient is well appearing and in no acute distress.  Eyes; pupils are equally reactive to light and accommodation. Bilateral maxillary sinus tenderness on percussion,  Ears are clear, mild tympanic membranes bulging. Pharynx is clear.  Neck is soft and supple, nontender,no thyromegaly or mass.  lymphatic;  no cervical or supraclavicular lymphadenopathy.  Respiratory;  Lungs clear to auscultation bilaterally with normal respiratory effort. Gastrointestinal; abdomen " is soft, non-tender on palpation,not distended. Cardiovascular ; Heart regular rate and rhythm without murmur, no JVD.  Extremities without any clubbing, cyanosis, or edema. Neuro - psychiatric ;  alert and oriented to time, location and person, motor and sensory intact. Skin; no rash or erythema. Musculoskeletal; no tenderness on palpation, no joint swelling or erythema    Assessment ;     1. Acute sinusitis; purulent nasal discharge, fever, chills and sinus pain ×10 days. Physical exam positive for bilateral sinus tenderness on percussion. History of multiple antibiotic allergies. History of allergic rhinitis requesting refill of Zyrtec.    2. Anxiety disorder; responds to Xanax requesting refill of medication.      3. Increased BMI ; noncompliant with diet and exercise.      Plan; Patient is advised on preventive and supportive care regarding acute sinusitis, saline nasal spray, antihistamines, Levaquin 500 mg daily, If symptoms not improved or worsen return for evaluation.. Also discussed diet and lifestyle modification and weight loss, refill Xanax for anxiety disorder.    Please note that this dictation was created using voice recognition software. I have worked with consultants from the vendor as well as technical experts from [facility] to optimize the interface. I have made every reasonable attempt to correct obvious errors, but I expect that there are errors of grammar and possibly content that I did not discover before finalizing the note.

## 2017-12-11 RX ORDER — ALPRAZOLAM 0.25 MG/1
0.25 TABLET ORAL 3 TIMES DAILY PRN
Qty: 90 TAB | Refills: 0 | Status: SHIPPED | OUTPATIENT
Start: 2017-12-11 | End: 2018-01-03

## 2017-12-11 NOTE — TELEPHONE ENCOUNTER
Was the patient seen in the last year in this department? Yes     Does patient have an active prescription for medications requested? No     Received Request Via: Pharmacy     Patient called to confirm she has been taking 1 tab TID.  (request came for 3 tabs TID, however this was an error on pharmacy end per patient)     in media.

## 2017-12-14 DIAGNOSIS — R60.9 EDEMA, UNSPECIFIED TYPE: ICD-10-CM

## 2017-12-15 RX ORDER — FUROSEMIDE 20 MG/1
TABLET ORAL
Qty: 90 TAB | Refills: 0 | Status: SHIPPED | OUTPATIENT
Start: 2017-12-15 | End: 2018-01-03

## 2017-12-18 ENCOUNTER — TELEPHONE (OUTPATIENT)
Dept: MEDICAL GROUP | Facility: PHYSICIAN GROUP | Age: 62
End: 2017-12-18

## 2017-12-18 DIAGNOSIS — J32.9 SINUSITIS, UNSPECIFIED CHRONICITY, UNSPECIFIED LOCATION: ICD-10-CM

## 2017-12-18 RX ORDER — LEVOFLOXACIN 500 MG/1
500 TABLET, FILM COATED ORAL DAILY
Qty: 10 TAB | Refills: 0 | Status: SHIPPED | OUTPATIENT
Start: 2017-12-18 | End: 2018-01-03

## 2017-12-18 NOTE — TELEPHONE ENCOUNTER
1. Caller Name: Ashleigh                                      Call Back Number: [phone] (home)       Patient approves a detailed voicemail message: yes    Patient LVM stating her cough is getting better however this is continued and she is requesting a refill on her antibiotic.  States she does not have a ride to come in for an appointment today.  Please advise.

## 2017-12-18 NOTE — TELEPHONE ENCOUNTER
Phone Number Called: [phone] (home)     Message: Unable to reach patient.  Invalid phone number.  Will inform patient rx was sent if she calls back.      Left Message for patient to call back: N\A

## 2018-01-03 ENCOUNTER — OFFICE VISIT (OUTPATIENT)
Dept: MEDICAL GROUP | Facility: PHYSICIAN GROUP | Age: 63
End: 2018-01-03
Payer: MEDICARE

## 2018-01-03 VITALS
HEIGHT: 64 IN | HEART RATE: 86 BPM | TEMPERATURE: 97.9 F | OXYGEN SATURATION: 92 % | SYSTOLIC BLOOD PRESSURE: 124 MMHG | RESPIRATION RATE: 16 BRPM | DIASTOLIC BLOOD PRESSURE: 78 MMHG

## 2018-01-03 DIAGNOSIS — Q24.8 PERICARDIAL CYST: ICD-10-CM

## 2018-01-03 DIAGNOSIS — R93.89 ABNORMAL CHEST CT: ICD-10-CM

## 2018-01-03 DIAGNOSIS — Z72.0 TOBACCO USE: ICD-10-CM

## 2018-01-03 DIAGNOSIS — J98.09 LESION OF BRONCHUS: ICD-10-CM

## 2018-01-03 DIAGNOSIS — E66.01 MORBID OBESITY (HCC): ICD-10-CM

## 2018-01-03 DIAGNOSIS — F41.1 GENERALIZED ANXIETY DISORDER: ICD-10-CM

## 2018-01-03 PROCEDURE — 99213 OFFICE O/P EST LOW 20 MIN: CPT | Performed by: INTERNAL MEDICINE

## 2018-01-03 RX ORDER — ALPRAZOLAM 1 MG/1
1 TABLET ORAL 3 TIMES DAILY PRN
Qty: 90 TAB | Refills: 1 | Status: SHIPPED | OUTPATIENT
Start: 2018-01-03 | End: 2018-02-02

## 2018-01-03 ASSESSMENT — PAIN SCALES - GENERAL: PAINLEVEL: 9=SEVERE PAIN

## 2018-01-04 NOTE — PROGRESS NOTES
Subjective:  Ashleigh is a 62 y.o. female with the following   Past Medical History:   Diagnosis Date   • Arthritis    • ASTHMA    • Bipolar disorder (CMS-HCC)    • Bursitis    • Chronic low back pain    • Congestive heart failure (CMS-HCC)    • Dyslipidemia    • Fall     last fall a month ago   • GERD (gastroesophageal reflux disease)    • Hypothyroid    • Migraine    • Obesity    • Sinusitis; chronic    • Unspecified urinary incontinence       Family History   Problem Relation Age of Onset   • Cancer Mother      unknown   • Hypertension Father    • Diabetes Father    • Stroke Father    • Diabetes Paternal Grandmother      The patient is on the following medications:   Current Outpatient Prescriptions:   •  alprazolam (XANAX) 1 MG Tab, Take 1 Tab by mouth 3 times a day as needed for Sleep or Anxiety for up to 30 days., Disp: 90 Tab, Rfl: 1  •  omeprazole (PRILOSEC) 20 MG delayed-release capsule, TAKE 1 CAP BY MOUTH EVERY DAY., Disp: 90 Cap, Rfl: 0  •  furosemide (LASIX) 20 MG Tab, TAKE 1 TAB BY MOUTH EVERY DAY., Disp: 90 Tab, Rfl: 0  •  cetirizine (ZYRTEC) 10 MG chewable tablet, Take 1 Tab by mouth every day., Disp: 30 Tab, Rfl: 2  •  estradiol (ESTRACE) 0.5 MG tablet, TAKE 1 TAB BY MOUTH EVERY DAY., Disp: 90 Tab, Rfl: 0  •  mupirocin (BACTROBAN) 2 % Ointment, APPLY TO AFFECTED AREA AS DIRECTED, Disp: 22 g, Rfl: 0  •  NYSTOP (MYCOSTATIN) 205245 UNIT/GM Powder, Apply once daily, Disp: 60 g, Rfl: 1  •  morphine ER (MS CONTIN) 30 MG Tab CR tablet, Take 3 Tabs by mouth every 12 hours., Disp: 60 Tab, Rfl: 0  •  aripiprazole (ABILIFY) 5 MG tablet, Take 1 Tab by mouth every day., Disp: 30 Tab, Rfl: 0  •  senna-docusate (PERICOLACE OR SENOKOT S) 8.6-50 MG Tab, Take 2 Tabs by mouth 2 Times a Day., Disp: 30 Tab, Rfl: 0  •  clopidogrel (PLAVIX) 75 MG Tab, Take 1 Tab by mouth every day., Disp: 30 Tab, Rfl: 0  •  levothyroxine (SYNTHROID) 75 MCG Tab, Take 75 mcg by mouth Every morning on an empty stomach., Disp: , Rfl:   •   "nystatin (MYCOSTATIN) powder, Apply 1 g to affected area(s) 1 time daily as needed., Disp: , Rfl:   •  diphenhydrAMINE (BENADRYL) 25 MG Tab, Take 25 mg by mouth every 6 hours as needed for Sleep., Disp: , Rfl:   •  HYDROmorphone (DILAUDID) 4 MG Tab, Take 4 mg by mouth 2 Times a Day., Disp: , Rfl:     HPI; patient is here today requesting refill of Xanax for anxiety disorder stating that her pharmacy did not give her Xanax prescription as written previously denies any suicidal ideation. Patient also was in the emergency room a few weeks ago and had an abnormal chest CT which requires further evaluation.        ROS: All other systems reviewed and they are negative.    Blood pressure 124/78, pulse 86, temperature 36.6 °C (97.9 °F), resp. rate 16, height 1.626 m (5' 4\"), last menstrual period 01/01/1988, SpO2 92 %, not currently breastfeeding.on RA        Objective:      Patient is well appearing and in no acute distress.  Eyes; pupils are equally reactive to light and accommodation. Ears are clear, no tympanic membranes bulging. Pharynx is clear.  Neck is soft and supple, nontender,no thyromegaly or mass.  lymphatic;  no cervical or supraclavicular lymphadenopathy.  Respiratory;  Lungs clear to auscultation bilaterally with normal respiratory effort. Gastrointestinal; abdomen is soft, non-tender on palpation,not distended. Cardiovascular ; Heart regular rate and rhythm without murmur, no JVD.  Extremities without any clubbing, cyanosis, or edema. Neuro - psychiatric ;  alert and oriented to time, location and person, motor and sensory intact. Skin; no rash or erythema. Musculoskeletal; no tenderness on palpation, no joint swelling or erythema    Assessment ;   1. Abnormal Chest CT; endobronchial lesion and pericardial cyst.  Chest CT;   FINDINGS:  Evaluation of parenchymal and vascular structures is limited by the lack of intravenous contrast.    Atherosclerotic plaque is seen in the aorta. Main pulmonary artery " measures 4.2 cm worrisome for pulmonary arterial hypertension. Small amount of fluid is seen in the pericardial recesses. There is a fluid density pericardial cyst on the right measuring   5.1 x 3.9 cm which is increased in size compared to prior. No pericardial effusion is seen. There is a trace right pleural effusion. There is a right lower lobe endobronchial lesion measuring 7.3 mm which is dense in appearance. Mild right lower lobe   atelectasis versus consolidation is seen. There are groundglass opacities in the right upper, right lower and to a lesser extent left upper and left lower lobes. No pneumothorax is identified. There is subsegmental lingular atelectasis. There is   subsegmental right middle lobe atelectasis.    Precarinal lymph node measures 7 mm in short axis dimension. Subcarinal lymph node measures 1 cm in short axis dimension. There are small axillary lymph nodes.    Limited views were obtained of the upper abdomen. Gallbladder is surgically absent.    Degenerative changes are seen in the spine.   Impression       7.3 mm dense endobronchial lesion in the right lower lobe.    Right lower lobe atelectasis versus consolidation. Mild bronchiectasis is seen in the right lower lobe.    Bilateral groundglass opacities, right greater than left may be infectious or inflammatory.    Trace right pleural effusion.    Subsegmental right middle lobe and lingular atelectasis.    Prominence of the main pulmonary artery compatible with pulmonary arterial hypertension.    Mildly prominent subcarinal lymph node is nonspecific.    Atherosclerotic plaque.    Status post cholecystectomy.    5.1 x 3.9 cm right pericardial cyst is increased in size compared to prior       2. Anxiety disorder; response to Xanax 1 mg as needed, requesting refill of medication    3. Increased BMI; noncompliant with diet and exercise      4. Chronic tobacco use         Plan;Patient is advised on preventive and supportive care regarding  anxiety, discussed recent chest CT finding, referral to pulmonologist for further evaluation. Also discussed diet and lifestyle modification and weight loss, smoking cessation.    Please note that this dictation was created using voice recognition software. I have worked with consultants from the vendor as well as technical experts from [facility] [employer] to optimize the interface. I have made every reasonable attempt to correct obvious errors, but I expect that there are errors of grammar and possibly content that I did not discover before finalizing the note.

## 2018-01-17 ENCOUNTER — TELEPHONE (OUTPATIENT)
Dept: MEDICAL GROUP | Facility: PHYSICIAN GROUP | Age: 63
End: 2018-01-17

## 2018-01-17 NOTE — TELEPHONE ENCOUNTER
VOICEMAIL  1. Caller Name: Ashleigh                Call Back Number: [phone] (home)     2. Message: Patient LVM stating that she would like to schedule an appointment as she is having sinus pain and would like ENT referral.  Patient also states she believes her asthma is getting worse as well.      3. Patient approves office to leave a detailed voicemail/MyChart message: yes

## 2018-01-17 NOTE — TELEPHONE ENCOUNTER
Phone Number Called: [phone](home)     Message: LVM informing patient  is out of the office until further notice.  Aware she may present to  or call central scheduling to make an appt with another provider in our office.     Left Message for patient to call back: N\A

## 2018-01-19 ENCOUNTER — OFFICE VISIT (OUTPATIENT)
Dept: MEDICAL GROUP | Facility: PHYSICIAN GROUP | Age: 63
End: 2018-01-19
Payer: MEDICARE

## 2018-01-19 VITALS
RESPIRATION RATE: 20 BRPM | BODY MASS INDEX: 45.93 KG/M2 | OXYGEN SATURATION: 89 % | HEIGHT: 64 IN | HEART RATE: 69 BPM | SYSTOLIC BLOOD PRESSURE: 110 MMHG | DIASTOLIC BLOOD PRESSURE: 68 MMHG | WEIGHT: 269 LBS | TEMPERATURE: 98.1 F

## 2018-01-19 DIAGNOSIS — J01.01 ACUTE RECURRENT MAXILLARY SINUSITIS: ICD-10-CM

## 2018-01-19 PROBLEM — J01.00 ACUTE NON-RECURRENT MAXILLARY SINUSITIS: Status: ACTIVE | Noted: 2018-01-19

## 2018-01-19 PROBLEM — E66.9 OBESITY: Status: ACTIVE | Noted: 2018-01-19

## 2018-01-19 PROCEDURE — 99214 OFFICE O/P EST MOD 30 MIN: CPT | Performed by: PHYSICIAN ASSISTANT

## 2018-01-19 RX ORDER — LEVOFLOXACIN 500 MG/1
500 TABLET, FILM COATED ORAL DAILY
Qty: 7 TAB | Refills: 0 | Status: SHIPPED | OUTPATIENT
Start: 2018-01-19 | End: 2018-01-26

## 2018-01-19 ASSESSMENT — PAIN SCALES - GENERAL: PAINLEVEL: NO PAIN

## 2018-01-19 NOTE — ASSESSMENT & PLAN NOTE
5 days of illness including subjective fever, nasal congestion, rhinorrhea, post nasal drip, cough and sore throat. Positive for thick and green mucus. Denies fever, chills, rash, diarrhea, vomiting or abdominal pain. - Similarly ill exposures. Positive history for recurrent sinusitis. Medical history negative for recurrent OM, tonsillitis, asthma, bronchitis, pneumonia. States she has been using over-the-counter salt water nasal solution and vitamin C with no alleviation of symptoms.   She  reports that she has been smoking Cigarettes.  She has a 15.00 pack-year smoking history. She has never used smokeless tobacco.    I

## 2018-01-22 RX ORDER — NYSTATIN 100000 [USP'U]/G
POWDER TOPICAL
Qty: 60 G | Refills: 0 | Status: SHIPPED | OUTPATIENT
Start: 2018-01-22 | End: 2018-02-16 | Stop reason: SDUPTHER

## 2018-01-22 NOTE — PROGRESS NOTES
Chief Complaint   Patient presents with   • Nasal Congestion       HISTORY OF PRESENT ILLNESS: Ashleigh Garces is an established 62 y.o. female here to discuss the evaluation and management of:    Acute recurrent maxillary sinusitis  5 days of illness including subjective fever, nasal congestion, rhinorrhea, post nasal drip, cough and sore throat. Positive for thick and green mucus. Denies fever, chills, rash, diarrhea, vomiting or abdominal pain. - Similarly ill exposures. Positive history for recurrent sinusitis. Medical history negative for recurrent OM, tonsillitis, asthma, bronchitis, pneumonia. States she has been using over-the-counter salt water nasal solution and vitamin C with no alleviation of symptoms.   She  reports that she has been smoking Cigarettes.  She has a 15.00 pack-year smoking history. She has never used smokeless tobacco.    I      Patient Active Problem List    Diagnosis Date Noted   • Essential hypertension 10/10/2017     Priority: High   • Aspiration pneumonia (CMS-HCC) 10/02/2017     Priority: High   • COPD exacerbation (CMS-HCC) 09/30/2017     Priority: High   • NSTEMI (non-ST elevated myocardial infarction) (CMS-HCC) 09/30/2017     Priority: High   • Acute exacerbation of congestive heart failure (CMS-HCC) 09/30/2017     Priority: High   • Cellulitis and abscess of leg 09/15/2012     Priority: High   • SOB (shortness of breath) 06/12/2015     Priority: Medium   • Leg swelling 09/15/2012     Priority: Medium   • Dysuria 02/12/2010     Priority: Medium   • Hypothyroidism 01/08/2010     Priority: Low   • Opiate dependence (CMS-HCC)      Priority: Low   • Acute recurrent maxillary sinusitis 01/19/2018   • Obesity 01/19/2018   • Psychosis 10/06/2017   • Aspiration into lower respiratory tract 10/06/2017   • Mood disorder (CMS-HCC) 10/04/2017   • 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   • Dysphagia 10/04/2017   • Acute respiratory failure with hypoxemia (CMS-HCC) 10/03/2017   • Chest  pain 10/02/2017   • Right arm weakness 09/05/2013   • Trochanteric bursitis of right hip 10/04/2011   • Trochanteric bursitis of left hip 10/04/2011   • GERD (gastroesophageal reflux disease)    • Constipation 02/12/2010   • Abdominal pain, lower 02/12/2010   • Bloating symptom 01/08/2010   • Dyslipidemia 01/08/2010   • Sinusitis 09/18/2009       Allergies:Contrast media with iodine [iodine]; Naproxen; Asa [aspirin]; Cephalosporins; Compazine; Doxycycline; Lipitor [atorvastatin]; Macrolides; Phenothiazines; Sympathomimetics; and Toradol    Current Outpatient Prescriptions   Medication Sig Dispense Refill   • levofloxacin (LEVAQUIN) 500 MG tablet Take 1 Tab by mouth every day for 7 days. 7 Tab 0   • NYSTOP (MYCOSTATIN) 299936 UNIT/GM Powder APPLY TO AFFECTED AREA EVERY DAY 60 g 0   • alprazolam (XANAX) 1 MG Tab Take 1 Tab by mouth 3 times a day as needed for Sleep or Anxiety for up to 30 days. 90 Tab 1   • omeprazole (PRILOSEC) 20 MG delayed-release capsule TAKE 1 CAP BY MOUTH EVERY DAY. 90 Cap 0   • furosemide (LASIX) 20 MG Tab TAKE 1 TAB BY MOUTH EVERY DAY. 90 Tab 0   • cetirizine (ZYRTEC) 10 MG chewable tablet Take 1 Tab by mouth every day. 30 Tab 2   • estradiol (ESTRACE) 0.5 MG tablet TAKE 1 TAB BY MOUTH EVERY DAY. 90 Tab 0   • mupirocin (BACTROBAN) 2 % Ointment APPLY TO AFFECTED AREA AS DIRECTED 22 g 0   • morphine ER (MS CONTIN) 30 MG Tab CR tablet Take 3 Tabs by mouth every 12 hours. 60 Tab 0   • aripiprazole (ABILIFY) 5 MG tablet Take 1 Tab by mouth every day. 30 Tab 0   • senna-docusate (PERICOLACE OR SENOKOT S) 8.6-50 MG Tab Take 2 Tabs by mouth 2 Times a Day. 30 Tab 0   • clopidogrel (PLAVIX) 75 MG Tab Take 1 Tab by mouth every day. 30 Tab 0   • levothyroxine (SYNTHROID) 75 MCG Tab Take 75 mcg by mouth Every morning on an empty stomach.     • nystatin (MYCOSTATIN) powder Apply 1 g to affected area(s) 1 time daily as needed.     • diphenhydrAMINE (BENADRYL) 25 MG Tab Take 25 mg by mouth every 6 hours as  "needed for Sleep.     • HYDROmorphone (DILAUDID) 4 MG Tab Take 4 mg by mouth 2 Times a Day.       No current facility-administered medications for this visit.        Social History   Substance Use Topics   • Smoking status: Current Every Day Smoker     Packs/day: 0.50     Years: 30.00     Types: Cigarettes   • Smokeless tobacco: Never Used      Comment: 1/2 pack per day   • Alcohol use No       Family Status   Relation Status   • Mother    • Father    • Paternal Grandmother      Family History   Problem Relation Age of Onset   • Cancer Mother      unknown   • Hypertension Father    • Diabetes Father    • Stroke Father    • Diabetes Paternal Grandmother        ROS:  Review of Systems   Constitutional: Negative for chills, weight loss and malaise/fatigue. Positive for subjective fever.  HENT: Negative for ear pain, nosebleeds, congestion, sore throat and neck pain. Positive for nasal congestion, rhinorrhea, post nasal drip, and sore throat.   Eyes: Negative for blurred vision.   Respiratory: Negative for cough, sputum production, shortness of breath and wheezing. Positive for cough.  Cardiovascular: Negative for chest pain, palpitations, orthopnea and leg swelling.   Gastrointestinal: Negative for heartburn, nausea, vomiting and abdominal pain.   Genitourinary: Negative for dysuria, urgency and frequency.   Musculoskeletal: Negative for myalgias, back pain and joint pain.   Skin: Negative for rash and itching.   Neurological: Negative for dizziness, tingling, tremors, sensory change, focal weakness and headaches.   Endo/Heme/Allergies: Does not bruise/bleed easily.   Psychiatric/Behavioral: Negative for depression, suicidal ideas and memory loss.  The patient is not nervous/anxious and does not have insomnia.    All other systems reviewed and are negative except as in HPI.    Exam: Blood pressure 110/68, pulse 69, temperature 36.7 °C (98.1 °F), resp. rate 20, height 1.626 m (5' 4\"), weight 122 " kg (269 lb), last menstrual period 01/01/1988, SpO2 89 %, not currently breastfeeding. Body mass index is 46.17 kg/m².  General: Normal appearing. No distress.  HEENT: Normocephalic. Eyes conjunctiva clear lids without ptosis, pupils equal and reactive to light accommodation, ears normal shape and contour, canals are clear bilaterally, tympanic membranes are benign, nasal mucosa erythematous and boggy, oropharynx is without erythema, edema or exudates. Maxillary sinuses are tender to palpation.  Neck: Supple without JVD or bruit. Thyroid is not enlarged.  Pulmonary: Clear to ausculation.  Normal effort. No rales, ronchi, or wheezing.  Cardiovascular: Regular rate and rhythm without murmur. Carotid and radial pulses are intact and equal bilaterally.  Abdomen: Soft, nontender, nondistended. Normal bowel sounds. Liver and spleen are not palpable. Negative for CVA tenderness with palpation.  Neurologic: Grossly nonfocal.  Cranial nerves are normal. DTR's normal and symmetric.  Lymph: No cervical, supraclavicular or axillary lymph nodes are palpable  Skin: Warm and dry.  No rashes or suspicious skin lesions.  Musculoskeletal: Normal gait. No extremity cyanosis, clubbing, or edema.  Psych: Normal mood and affect. Alert and oriented x3. Judgment and insight is normal.    Medical decision-making and discussion:  1. Acute recurrent maxillary sinusitis  Patient has been prescribed Levaquin 500 mg tab tablet by mouth every day for 7 days. Advised patient to take over-the-counter probiotics due to potential gastrointestinal issues that is associated with antibiotic use.  • Twice daily use of nasal saline rinse or Neti-Pot encouraged.   • OTC anti-pyretics and decongestants as needed.   • Use OTC Mucinex to loosen mucous.  • Drink plenty of fluids to keep yourself hydrated. Warm fluids like tea and hot soup are better.  • Increase humidity in the house with a humidifier .  • Use Tylenol or Motrin for aches, pains, or  fever.  • Get plenty of rest to allow your body time to heal.    • Follow-up for worsening symptoms, difficulty breathing, lack of expected recovery, or should new symptoms or problems arise.    - levofloxacin (LEVAQUIN) 500 MG tablet; Take 1 Tab by mouth every day for 7 days.  Dispense: 7 Tab; Refill: 0          Please note that this dictation was created using voice recognition software. I have made every reasonable attempt to correct obvious errors, but I expect that there are errors of grammar and possibly content that I did not discover before finalizing the note.    Return if symptoms worsen or fail to improve.

## 2018-02-16 RX ORDER — NYSTATIN 100000 [USP'U]/G
POWDER TOPICAL
Qty: 60 G | Refills: 0 | Status: SHIPPED | OUTPATIENT
Start: 2018-02-16 | End: 2018-03-01 | Stop reason: SDUPTHER

## 2018-03-01 RX ORDER — NYSTATIN 100000 [USP'U]/G
POWDER TOPICAL
Qty: 60 G | Refills: 0 | Status: SHIPPED | OUTPATIENT
Start: 2018-03-01 | End: 2019-01-09

## 2018-03-01 NOTE — TELEPHONE ENCOUNTER
VOICEMAIL  1. Caller Name: Ashleigh Garces                        Call Back Number: [phone] (home)       2. Message: Called and left patient a message. To call back. Patient needs to establish with a new pcp for refill. LM     3. Patient approves office to leave a detailed voicemail/MyChart message: N\A

## 2018-03-02 DIAGNOSIS — Z79.890 POSTMENOPAUSAL HRT (HORMONE REPLACEMENT THERAPY): ICD-10-CM

## 2018-03-02 RX ORDER — ESTRADIOL 0.5 MG/1
0.5 TABLET ORAL DAILY
Qty: 90 TAB | Refills: 3 | Status: SHIPPED | OUTPATIENT
Start: 2018-03-02 | End: 2019-01-09 | Stop reason: SDUPTHER

## 2018-03-23 DIAGNOSIS — R60.9 EDEMA, UNSPECIFIED TYPE: ICD-10-CM

## 2018-03-23 RX ORDER — FUROSEMIDE 20 MG/1
20 TABLET ORAL
Qty: 90 TAB | Refills: 0 | Status: SHIPPED | OUTPATIENT
Start: 2018-03-23 | End: 2018-07-26 | Stop reason: SDUPTHER

## 2018-03-23 NOTE — TELEPHONE ENCOUNTER
VOICEMAIL  1. Caller Name: Ashleigh Garces                        Call Back Number: [phone] (home)       2. Message: Called and left patient a message to call back to make an appointment for future refills on medication. LM     3. Patient approves office to leave a detailed voicemail/MyChart message: N\A

## 2018-07-26 DIAGNOSIS — R60.9 EDEMA, UNSPECIFIED TYPE: ICD-10-CM

## 2018-07-26 RX ORDER — FUROSEMIDE 20 MG/1
20 TABLET ORAL
Qty: 30 TAB | Refills: 0 | Status: SHIPPED | OUTPATIENT
Start: 2018-07-26 | End: 2018-09-23 | Stop reason: SDUPTHER

## 2018-09-24 ENCOUNTER — TELEPHONE (OUTPATIENT)
Dept: MEDICAL GROUP | Facility: PHYSICIAN GROUP | Age: 63
End: 2018-09-24

## 2018-09-24 DIAGNOSIS — E03.9 HYPOTHYROIDISM, UNSPECIFIED TYPE: Chronic | ICD-10-CM

## 2019-01-09 ENCOUNTER — OFFICE VISIT (OUTPATIENT)
Dept: MEDICAL GROUP | Facility: PHYSICIAN GROUP | Age: 64
End: 2019-01-09
Payer: MEDICARE

## 2019-01-09 ENCOUNTER — HOSPITAL ENCOUNTER (OUTPATIENT)
Facility: MEDICAL CENTER | Age: 64
End: 2019-01-09
Attending: FAMILY MEDICINE
Payer: MEDICARE

## 2019-01-09 VITALS
WEIGHT: 283.4 LBS | HEIGHT: 64 IN | BODY MASS INDEX: 48.38 KG/M2 | SYSTOLIC BLOOD PRESSURE: 150 MMHG | DIASTOLIC BLOOD PRESSURE: 84 MMHG | HEART RATE: 86 BPM | OXYGEN SATURATION: 93 % | TEMPERATURE: 97.9 F | RESPIRATION RATE: 20 BRPM

## 2019-01-09 DIAGNOSIS — R60.9 EDEMA, UNSPECIFIED TYPE: ICD-10-CM

## 2019-01-09 DIAGNOSIS — N30.00 ACUTE CYSTITIS WITHOUT HEMATURIA: ICD-10-CM

## 2019-01-09 DIAGNOSIS — N89.8 VAGINAL DISCHARGE: ICD-10-CM

## 2019-01-09 DIAGNOSIS — Z79.890 POSTMENOPAUSAL HRT (HORMONE REPLACEMENT THERAPY): ICD-10-CM

## 2019-01-09 DIAGNOSIS — M79.89 LEG SWELLING: ICD-10-CM

## 2019-01-09 DIAGNOSIS — N30.10 INTERSTITIAL CYSTITIS: ICD-10-CM

## 2019-01-09 DIAGNOSIS — F39 MOOD DISORDER (HCC): ICD-10-CM

## 2019-01-09 DIAGNOSIS — E66.01 MORBID OBESITY WITH BMI OF 45.0-49.9, ADULT (HCC): ICD-10-CM

## 2019-01-09 DIAGNOSIS — E03.9 HYPOTHYROIDISM, UNSPECIFIED TYPE: Chronic | ICD-10-CM

## 2019-01-09 DIAGNOSIS — R06.02 SOB (SHORTNESS OF BREATH): ICD-10-CM

## 2019-01-09 DIAGNOSIS — I10 ESSENTIAL HYPERTENSION: Primary | ICD-10-CM

## 2019-01-09 DIAGNOSIS — F11.20 UNCOMPLICATED OPIOID DEPENDENCE (HCC): ICD-10-CM

## 2019-01-09 DIAGNOSIS — F13.20 ANXIOLYTIC DEPENDENCE (HCC): ICD-10-CM

## 2019-01-09 PROBLEM — J44.1 COPD EXACERBATION (HCC): Status: RESOLVED | Noted: 2017-09-30 | Resolved: 2019-01-09

## 2019-01-09 PROBLEM — J96.01 ACUTE RESPIRATORY FAILURE WITH HYPOXEMIA (HCC): Status: RESOLVED | Noted: 2017-10-03 | Resolved: 2019-01-09

## 2019-01-09 PROBLEM — J69.0 ASPIRATION PNEUMONIA (HCC): Status: RESOLVED | Noted: 2017-10-02 | Resolved: 2019-01-09

## 2019-01-09 PROBLEM — J01.01 ACUTE RECURRENT MAXILLARY SINUSITIS: Status: RESOLVED | Noted: 2018-01-19 | Resolved: 2019-01-09

## 2019-01-09 PROBLEM — F29 PSYCHOSIS (HCC): Status: RESOLVED | Noted: 2017-10-06 | Resolved: 2019-01-09

## 2019-01-09 PROBLEM — R07.9 CHEST PAIN: Status: RESOLVED | Noted: 2017-10-02 | Resolved: 2019-01-09

## 2019-01-09 PROBLEM — I50.9 ACUTE EXACERBATION OF CONGESTIVE HEART FAILURE (HCC): Status: RESOLVED | Noted: 2017-09-30 | Resolved: 2019-01-09

## 2019-01-09 PROBLEM — E66.9 OBESITY: Status: RESOLVED | Noted: 2018-01-19 | Resolved: 2019-01-09

## 2019-01-09 PROBLEM — T17.800A ASPIRATION INTO LOWER RESPIRATORY TRACT: Status: RESOLVED | Noted: 2017-10-06 | Resolved: 2019-01-09

## 2019-01-09 PROBLEM — R13.10 DYSPHAGIA: Status: RESOLVED | Noted: 2017-10-04 | Resolved: 2019-01-09

## 2019-01-09 LAB
APPEARANCE UR: CLEAR
BILIRUB UR STRIP-MCNC: NEGATIVE MG/DL
COLOR UR AUTO: ABNORMAL
GLUCOSE UR STRIP.AUTO-MCNC: NEGATIVE MG/DL
KETONES UR STRIP.AUTO-MCNC: NEGATIVE MG/DL
LEUKOCYTE ESTERASE UR QL STRIP.AUTO: ABNORMAL
NITRITE UR QL STRIP.AUTO: POSITIVE
PH UR STRIP.AUTO: 5.5 [PH] (ref 5–8)
PROT UR QL STRIP: NEGATIVE MG/DL
RBC UR QL AUTO: NEGATIVE
SP GR UR STRIP.AUTO: 1.02
UROBILINOGEN UR STRIP-MCNC: 0.2 MG/DL

## 2019-01-09 PROCEDURE — 87186 SC STD MICRODIL/AGAR DIL: CPT

## 2019-01-09 PROCEDURE — 87086 URINE CULTURE/COLONY COUNT: CPT

## 2019-01-09 PROCEDURE — 99215 OFFICE O/P EST HI 40 MIN: CPT | Performed by: FAMILY MEDICINE

## 2019-01-09 PROCEDURE — 81002 URINALYSIS NONAUTO W/O SCOPE: CPT | Performed by: FAMILY MEDICINE

## 2019-01-09 PROCEDURE — 87510 GARDNER VAG DNA DIR PROBE: CPT

## 2019-01-09 PROCEDURE — 87480 CANDIDA DNA DIR PROBE: CPT

## 2019-01-09 PROCEDURE — 87077 CULTURE AEROBIC IDENTIFY: CPT

## 2019-01-09 PROCEDURE — 99000 SPECIMEN HANDLING OFFICE-LAB: CPT | Performed by: FAMILY MEDICINE

## 2019-01-09 PROCEDURE — 87660 TRICHOMONAS VAGIN DIR PROBE: CPT

## 2019-01-09 RX ORDER — NYSTATIN 100000 [USP'U]/G
1 POWDER TOPICAL
Qty: 45 G | Refills: 5 | Status: SHIPPED | OUTPATIENT
Start: 2019-01-09 | End: 2019-05-17 | Stop reason: SDUPTHER

## 2019-01-09 RX ORDER — OXYCODONE HYDROCHLORIDE 30 MG/1
30 TABLET ORAL EVERY 4 HOURS PRN
Refills: 0 | COMMUNITY
Start: 2018-12-12 | End: 2023-01-20 | Stop reason: SDUPTHER

## 2019-01-09 RX ORDER — FLUOXETINE HYDROCHLORIDE 20 MG/1
20 CAPSULE ORAL DAILY
Refills: 3 | COMMUNITY
Start: 2018-11-14 | End: 2019-01-09 | Stop reason: SDUPTHER

## 2019-01-09 RX ORDER — FUROSEMIDE 20 MG/1
20 TABLET ORAL
Qty: 90 TAB | Refills: 3 | Status: SHIPPED | OUTPATIENT
Start: 2019-01-09 | End: 2019-12-02

## 2019-01-09 RX ORDER — FLUOXETINE HYDROCHLORIDE 20 MG/1
20 CAPSULE ORAL DAILY
Qty: 30 CAP | Refills: 3 | Status: SHIPPED | OUTPATIENT
Start: 2019-01-09 | End: 2019-06-11 | Stop reason: SDUPTHER

## 2019-01-09 RX ORDER — CETIRIZINE HYDROCHLORIDE 10 MG/1
10 TABLET, CHEWABLE ORAL DAILY
Qty: 90 TAB | Refills: 3 | Status: SHIPPED | OUTPATIENT
Start: 2019-01-09 | End: 2019-11-29 | Stop reason: SDUPTHER

## 2019-01-09 RX ORDER — SULFAMETHOXAZOLE AND TRIMETHOPRIM 800; 160 MG/1; MG/1
1 TABLET ORAL 2 TIMES DAILY
Qty: 6 TAB | Refills: 0 | Status: SHIPPED | OUTPATIENT
Start: 2019-01-09 | End: 2019-01-12

## 2019-01-09 RX ORDER — ALPRAZOLAM 1 MG/1
1 TABLET ORAL 2 TIMES DAILY
Refills: 0 | COMMUNITY
Start: 2018-12-12 | End: 2023-01-09 | Stop reason: SDUPTHER

## 2019-01-09 RX ORDER — BACLOFEN 10 MG/1
10 TABLET ORAL EVERY 8 HOURS PRN
Refills: 5 | COMMUNITY
Start: 2018-11-17 | End: 2023-06-14 | Stop reason: SDUPTHER

## 2019-01-09 RX ORDER — LEVOTHYROXINE SODIUM 0.07 MG/1
75 TABLET ORAL
Qty: 30 TAB | Refills: 1 | Status: SHIPPED | OUTPATIENT
Start: 2019-01-09 | End: 2019-03-08 | Stop reason: SDUPTHER

## 2019-01-09 RX ORDER — AMITRIPTYLINE HYDROCHLORIDE 25 MG/1
25 TABLET, FILM COATED ORAL 2 TIMES DAILY PRN
Refills: 5 | COMMUNITY
Start: 2018-11-17 | End: 2023-04-27 | Stop reason: SDUPTHER

## 2019-01-09 RX ORDER — ESTRADIOL 0.5 MG/1
0.5 TABLET ORAL DAILY
Qty: 90 TAB | Refills: 3 | Status: SHIPPED | OUTPATIENT
Start: 2019-01-09 | End: 2020-01-30

## 2019-01-09 ASSESSMENT — PATIENT HEALTH QUESTIONNAIRE - PHQ9: CLINICAL INTERPRETATION OF PHQ2 SCORE: 0

## 2019-01-09 ASSESSMENT — PAIN SCALES - GENERAL: PAINLEVEL: 9=SEVERE PAIN

## 2019-01-09 NOTE — ASSESSMENT & PLAN NOTE
This is a chronic condition. Her last TSH was 0.830 in 2017. She thinks her dose needs to increase because she was told by a physician she may need to get an increased dose.

## 2019-01-09 NOTE — ASSESSMENT & PLAN NOTE
This is a chronic condition. She reports she has a lot of anxiety and gets panic attacks. Sometimes she gets chest pain with her anxiety/panic attacks. No current symptoms.

## 2019-01-09 NOTE — LETTER
January 9, 2019      To Whomever It May Concern   Re: Ashleigh Garces    Please allow her to stay home to rest and heal for the next 10 days (until 1/19/18). She was seen today in the doctor's office for her medical concerns.                          Nida Richardson

## 2019-01-09 NOTE — ASSESSMENT & PLAN NOTE
This is a chronic condition.  She sees Enrrique Santa MD with pain management.  She is currently on morphine, oxycodone, and hydromorphone.

## 2019-01-09 NOTE — ASSESSMENT & PLAN NOTE
This is a chronic condition. She uses albuterol as needed which controls her symptoms. There is some documentation showing she has COPD but last PFTs were in 2007 and normal.

## 2019-01-09 NOTE — ASSESSMENT & PLAN NOTE
This is a new condition.  She reports getting green/black discharge from her vagina that is incredibly painful.  She thinks it is a fungal infection.

## 2019-01-09 NOTE — ASSESSMENT & PLAN NOTE
"She reports she was diagnosed with interstitial cystitis which causes constant pain. She also describe getting \"lumps\" in her buttocks and has been getting these all the time since she was a little girl. She has chronic pain, bloating, and dysuria. She also describes vaginal pain. She did see urology in the past and would like to see them again. She does plan to see a surgeon regarding the lumps.    She also describes some increased urinary frequency. She also describes some vaginal pain with discharge - green/black, stinky.  "
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
## 2019-01-09 NOTE — ASSESSMENT & PLAN NOTE
This is a chronic condition.  She has been getting alprazolam from Enrrique Santa MD with pain management.

## 2019-01-10 ENCOUNTER — TELEPHONE (OUTPATIENT)
Dept: MEDICAL GROUP | Facility: PHYSICIAN GROUP | Age: 64
End: 2019-01-10

## 2019-01-10 LAB
CANDIDA DNA VAG QL PROBE+SIG AMP: POSITIVE
G VAGINALIS DNA VAG QL PROBE+SIG AMP: POSITIVE
T VAGINALIS DNA VAG QL PROBE+SIG AMP: NEGATIVE

## 2019-01-10 RX ORDER — FLUCONAZOLE 150 MG/1
150 TABLET ORAL ONCE
Qty: 1 TAB | Refills: 0 | Status: SHIPPED | OUTPATIENT
Start: 2019-01-10 | End: 2019-01-10

## 2019-01-10 RX ORDER — METRONIDAZOLE 500 MG/1
500 TABLET ORAL 2 TIMES DAILY
Qty: 14 TAB | Refills: 0 | Status: SHIPPED | OUTPATIENT
Start: 2019-01-10 | End: 2019-01-17

## 2019-01-10 NOTE — PROGRESS NOTES
Recent vaginal pathogens probe showed she has Candida and bacterial vaginosis. I have prescribed metronidazole 500 mg orally twice daily x7 days for the bacterial vaginosis. I have also prescribed fluconazole 150 mg orally once for the Candida.

## 2019-01-12 LAB
BACTERIA UR CULT: ABNORMAL
BACTERIA UR CULT: ABNORMAL
SIGNIFICANT IND 70042: ABNORMAL
SITE SITE: ABNORMAL
SOURCE SOURCE: ABNORMAL

## 2019-01-21 ENCOUNTER — TELEPHONE (OUTPATIENT)
Dept: MEDICAL GROUP | Facility: PHYSICIAN GROUP | Age: 64
End: 2019-01-21

## 2019-01-21 RX ORDER — ALBUTEROL SULFATE 90 UG/1
2 AEROSOL, METERED RESPIRATORY (INHALATION) EVERY 6 HOURS PRN
Qty: 8.5 G | Refills: 3 | Status: SHIPPED | OUTPATIENT
Start: 2019-01-21 | End: 2019-06-11 | Stop reason: SDUPTHER

## 2019-01-22 NOTE — TELEPHONE ENCOUNTER
I have sent in a refill for albuterol. She will need to see me or urgent care to discuss her request for antibiotics.

## 2019-01-22 NOTE — TELEPHONE ENCOUNTER
Spoke to patient, recommended Urgent care- due to the fact she said antibiotics are not working, Did let her know the Dr. Richardson  Did refill the albuterol, Patient agreed

## 2019-01-24 ENCOUNTER — TELEPHONE (OUTPATIENT)
Dept: MEDICAL GROUP | Facility: PHYSICIAN GROUP | Age: 64
End: 2019-01-24

## 2019-01-24 NOTE — TELEPHONE ENCOUNTER
1. Caller Name: Ashleigh Garces       Call Back Number: [phone] (home)     Patient called and left a VM. Patient was mentioning something about and appt. Patient also mentioned having a referral to a surgeon she has previously seen.   Called and left patient a VM to return our call. MAGY

## 2019-01-25 ENCOUNTER — TELEPHONE (OUTPATIENT)
Dept: MEDICAL GROUP | Facility: PHYSICIAN GROUP | Age: 64
End: 2019-01-25

## 2019-01-25 NOTE — TELEPHONE ENCOUNTER
Phone Number Called: [phone] (home)       Message: LM for patient to call back, Not sure why she was calling     Left Message for patient to call back: yes

## 2019-02-01 ENCOUNTER — OFFICE VISIT (OUTPATIENT)
Dept: MEDICAL GROUP | Facility: PHYSICIAN GROUP | Age: 64
End: 2019-02-01
Payer: MEDICARE

## 2019-02-01 ENCOUNTER — TELEPHONE (OUTPATIENT)
Dept: MEDICAL GROUP | Facility: PHYSICIAN GROUP | Age: 64
End: 2019-02-01

## 2019-02-01 ENCOUNTER — HOSPITAL ENCOUNTER (OUTPATIENT)
Facility: MEDICAL CENTER | Age: 64
End: 2019-02-01
Attending: FAMILY MEDICINE
Payer: MEDICARE

## 2019-02-01 VITALS
SYSTOLIC BLOOD PRESSURE: 132 MMHG | DIASTOLIC BLOOD PRESSURE: 84 MMHG | OXYGEN SATURATION: 92 % | HEART RATE: 80 BPM | HEIGHT: 64 IN | BODY MASS INDEX: 48.28 KG/M2 | TEMPERATURE: 97.8 F | WEIGHT: 282.8 LBS

## 2019-02-01 DIAGNOSIS — F11.20 UNCOMPLICATED OPIOID DEPENDENCE (HCC): ICD-10-CM

## 2019-02-01 DIAGNOSIS — Z87.2 HISTORY OF ABSCESS OF SKIN AND SUBCUTANEOUS TISSUE: ICD-10-CM

## 2019-02-01 DIAGNOSIS — N30.00 ACUTE CYSTITIS WITHOUT HEMATURIA: ICD-10-CM

## 2019-02-01 DIAGNOSIS — N30.10 INTERSTITIAL CYSTITIS: ICD-10-CM

## 2019-02-01 DIAGNOSIS — R10.2 PELVIC PAIN: ICD-10-CM

## 2019-02-01 DIAGNOSIS — J01.80 ACUTE NON-RECURRENT SINUSITIS OF OTHER SINUS: ICD-10-CM

## 2019-02-01 DIAGNOSIS — G89.4 CHRONIC PAIN SYNDROME: ICD-10-CM

## 2019-02-01 LAB
APPEARANCE UR: NORMAL
BILIRUB UR STRIP-MCNC: NORMAL MG/DL
COLOR UR AUTO: NORMAL
GLUCOSE UR STRIP.AUTO-MCNC: NORMAL MG/DL
KETONES UR STRIP.AUTO-MCNC: NORMAL MG/DL
LEUKOCYTE ESTERASE UR QL STRIP.AUTO: NORMAL
NITRITE UR QL STRIP.AUTO: NORMAL
PH UR STRIP.AUTO: 6.5 [PH] (ref 5–8)
PROT UR QL STRIP: NORMAL MG/DL
RBC UR QL AUTO: NORMAL
SP GR UR STRIP.AUTO: 1.01
UROBILINOGEN UR STRIP-MCNC: 0.2 MG/DL

## 2019-02-01 PROCEDURE — 87186 SC STD MICRODIL/AGAR DIL: CPT

## 2019-02-01 PROCEDURE — 87086 URINE CULTURE/COLONY COUNT: CPT

## 2019-02-01 PROCEDURE — 87077 CULTURE AEROBIC IDENTIFY: CPT

## 2019-02-01 PROCEDURE — 99214 OFFICE O/P EST MOD 30 MIN: CPT | Performed by: FAMILY MEDICINE

## 2019-02-01 PROCEDURE — 81002 URINALYSIS NONAUTO W/O SCOPE: CPT | Performed by: FAMILY MEDICINE

## 2019-02-01 RX ORDER — MORPHINE SULFATE 60 MG/1
60 TABLET, FILM COATED, EXTENDED RELEASE ORAL EVERY 12 HOURS
Refills: 0 | COMMUNITY
Start: 2019-01-16 | End: 2023-01-20 | Stop reason: SDUPTHER

## 2019-02-01 RX ORDER — FLUCONAZOLE 150 MG/1
TABLET ORAL
Refills: 0 | COMMUNITY
Start: 2019-01-10 | End: 2019-06-11

## 2019-02-01 RX ORDER — NALOXONE HYDROCHLORIDE 4 MG/.1ML
SPRAY NASAL
Refills: 0 | COMMUNITY
Start: 2019-01-16 | End: 2023-02-21

## 2019-02-01 RX ORDER — SULFAMETHOXAZOLE AND TRIMETHOPRIM 800; 160 MG/1; MG/1
1 TABLET ORAL 2 TIMES DAILY
Qty: 10 TAB | Refills: 0 | Status: SHIPPED | OUTPATIENT
Start: 2019-02-01 | End: 2019-02-06

## 2019-02-01 RX ORDER — PHENAZOPYRIDINE HYDROCHLORIDE 200 MG/1
200 TABLET, FILM COATED ORAL 3 TIMES DAILY PRN
Qty: 15 TAB | Refills: 0 | Status: SHIPPED | OUTPATIENT
Start: 2019-02-01 | End: 2019-02-06

## 2019-02-01 NOTE — TELEPHONE ENCOUNTER
VOICEMAIL  1. Caller Name: Ashleigh                     Call Back Number: [phone] (home)     2. Message: Patient LVM stating she is in pain due to interstitial cystitis and needs an appointment for an antibiotic.      3. Patient approves office to leave a detailed voicemail/MyChart message: yes

## 2019-02-01 NOTE — TELEPHONE ENCOUNTER
She needs an appointment to see if antibiotics are appropriate. I will also send in a urology referral for her.

## 2019-02-01 NOTE — LETTER
2019        To Whom It May Concern:      Re: Ashleigh Garces; : 1955; continuing treatment for interstitial cystitis and skin cysts      Ms. Garces was seen in our office on 2019.  She is undergoing further treatment for her interstitial cystitis and skin cysts.      If you have any questions or concerns, please don't hesitate to call.        Sincerely,        Rafaela Diamond M.D.

## 2019-02-01 NOTE — TELEPHONE ENCOUNTER
Phone Number Called: [phone] (home)     Message: Spoke with patient and scheduled an appt today @ 4 PM with .  She is aware  placed urology referral as well for this and she will schedule with them when they contact her.     Left Message for patient to call back: no

## 2019-02-04 ENCOUNTER — TELEPHONE (OUTPATIENT)
Dept: MEDICAL GROUP | Facility: PHYSICIAN GROUP | Age: 64
End: 2019-02-04

## 2019-02-04 RX ORDER — CIPROFLOXACIN 250 MG/1
250 TABLET, FILM COATED ORAL 2 TIMES DAILY
Qty: 10 TAB | Refills: 0 | Status: SHIPPED | OUTPATIENT
Start: 2019-02-04 | End: 2019-02-09

## 2019-02-04 NOTE — PROGRESS NOTES
Urine culture grew Klebsiella pneumonia resistant to Bactrim.  It is sensitive to many cyclosporins but she has an unspecified allergy to cephalosporins.  The best option with ciprofloxacin.

## 2019-02-04 NOTE — TELEPHONE ENCOUNTER
VOICEMAIL  1. Caller Name: Ashleigh                      Call Back Number: [phone] (home)       2. Message: Patient LVM requesting we fax over letter she received from  on Friday to [place] at [phone].     3. Patient approves office to leave a detailed voicemail/MyChart message: yes

## 2019-02-04 NOTE — TELEPHONE ENCOUNTER
Phone Number Called: [phone] (home)     Message: LVM to call back.     Left Message for patient to call back: yes

## 2019-02-04 NOTE — TELEPHONE ENCOUNTER
----- Message from Nida Richardson M.D. sent at 2/4/2019  9:39 AM PST -----  Her urine culture shows she is growing Klebsiella pneumoniae that is resistant to Bactrim, which she was prescribed on Friday.  I have sent in a new prescription for ciprofloxacin that she will take twice a day for the next 5 days to treat her infection.

## 2019-02-06 NOTE — TELEPHONE ENCOUNTER
1. Caller Name: Ashleigh Garces                                           Call Back Number: [phone] (home)         Patient approves a detailed voicemail message: N\A    Gave patient the results and she said she would go get the new medication

## 2019-02-07 NOTE — CARE PLAN
Problem: Safety  Goal: Will remain free from injury    Intervention: Provide assistance with mobility  Patient encouraged to call when needing assistance. Call light within reach.       Problem: Infection  Goal: Will remain free from infection    Intervention: Assess signs and symptoms of infection  Patient assessed for s/s of infection. Patient provided with scheduled antibiotic medication. Aseptic technique used while providing care to patient.          none

## 2019-02-15 ENCOUNTER — TELEPHONE (OUTPATIENT)
Dept: MEDICAL GROUP | Facility: PHYSICIAN GROUP | Age: 64
End: 2019-02-15

## 2019-02-15 NOTE — TELEPHONE ENCOUNTER
VOICEMAIL  1. Caller Name: Ashleigh                     Call Back Number: [phone] (home)     2. Message: Patient called stating she is in need of more antibiotics for her last infections.     3. Patient approves office to leave a detailed voicemail/MyChart message: yes

## 2019-02-15 NOTE — TELEPHONE ENCOUNTER
Phone Number Called: [phone] (home)     Message: Reviewed request with PCP and patient must be seen for antibiotics.  LVM informing patient of this and as she stated in her voicemail she could not make it here today due to weather I suggested UC as we are closed until Tuesday.  Informed her if she would like to discuss in further she could call me back I would be happy to discuss options with her.     Left Message for patient to call back: yes

## 2019-02-20 ENCOUNTER — TELEPHONE (OUTPATIENT)
Dept: MEDICAL GROUP | Facility: PHYSICIAN GROUP | Age: 64
End: 2019-02-20

## 2019-02-20 NOTE — TELEPHONE ENCOUNTER
Phone Number Called: [phone] (home)     Message: LVM informing patient I spoke with Urology and they did not receive the initial referral.  Refaxed referral to urology aware they will call her to schedule or she may call them back if she would like.   Left detailed message for patient on voicemail per her request.     Left Message for patient to call back: no

## 2019-02-20 NOTE — TELEPHONE ENCOUNTER
1. Caller Name: Ashleigh                                         Call Back Number: [phone] (home)       Patient approves a detailed voicemail message: no    Patient states that she called [facility] and they did not receive referral.

## 2019-02-21 ENCOUNTER — TELEPHONE (OUTPATIENT)
Dept: MEDICAL GROUP | Facility: PHYSICIAN GROUP | Age: 64
End: 2019-02-21

## 2019-02-21 NOTE — TELEPHONE ENCOUNTER
Pt called stated the referral did not go thru I called uro and I am refaxing it        I spoke with Urology and they did not receive the initial referral.  Refaxed referral to urology aware they will call her to schedule or she may call them back if she would like.

## 2019-03-08 RX ORDER — LEVOTHYROXINE SODIUM 0.07 MG/1
TABLET ORAL
Qty: 30 TAB | Refills: 1 | Status: SHIPPED | OUTPATIENT
Start: 2019-03-08 | End: 2019-05-20 | Stop reason: SDUPTHER

## 2019-03-20 ENCOUNTER — APPOINTMENT (OUTPATIENT)
Dept: MEDICAL GROUP | Facility: PHYSICIAN GROUP | Age: 64
End: 2019-03-20
Payer: MEDICARE

## 2019-05-06 ENCOUNTER — HOSPITAL ENCOUNTER (OUTPATIENT)
Dept: LAB | Facility: MEDICAL CENTER | Age: 64
End: 2019-05-06
Attending: PHYSICIAN ASSISTANT
Payer: MEDICARE

## 2019-05-06 PROCEDURE — 87077 CULTURE AEROBIC IDENTIFY: CPT

## 2019-05-06 PROCEDURE — 87186 SC STD MICRODIL/AGAR DIL: CPT

## 2019-05-06 PROCEDURE — 87086 URINE CULTURE/COLONY COUNT: CPT

## 2019-05-09 ENCOUNTER — APPOINTMENT (OUTPATIENT)
Dept: MEDICAL GROUP | Facility: PHYSICIAN GROUP | Age: 64
End: 2019-05-09
Payer: MEDICARE

## 2019-05-17 RX ORDER — NYSTATIN 100000 [USP'U]/G
POWDER TOPICAL
Qty: 60 G | Refills: 0 | Status: SHIPPED | OUTPATIENT
Start: 2019-05-17 | End: 2019-06-11 | Stop reason: SDUPTHER

## 2019-05-20 ENCOUNTER — TELEPHONE (OUTPATIENT)
Dept: MEDICAL GROUP | Facility: PHYSICIAN GROUP | Age: 64
End: 2019-05-20

## 2019-05-20 DIAGNOSIS — E03.9 HYPOTHYROIDISM, UNSPECIFIED TYPE: Chronic | ICD-10-CM

## 2019-05-20 RX ORDER — LEVOTHYROXINE SODIUM 0.07 MG/1
TABLET ORAL
Qty: 60 TAB | Refills: 0 | Status: SHIPPED | OUTPATIENT
Start: 2019-05-20 | End: 2019-07-06 | Stop reason: SDUPTHER

## 2019-05-20 NOTE — LETTER
May 20, 2019        Ashleigh Garces  [address] [place] [address]        Dear Ashleigh:    Please have this lab work done and have lab results sent to our office.  Thank you.  Enclosed is your lab order.         Sincerely,        Nida Richardson M.D.    Electronically Signed

## 2019-05-20 NOTE — TELEPHONE ENCOUNTER
Phone Number Called: [phone] (home)     Call outcome: spoke to patient regarding message below    Message: Patient informed lab work is required for further refills.  Lab ordered mailed to home address per patient request and she will get done at [facility] and have results sent to PCP.

## 2019-05-20 NOTE — PROGRESS NOTES
She is requesting a levothyroxine refill today.  Her last thyroid lab was in September, 2017 so I have ordered a TSH in order to evaluate her dose further.

## 2019-05-20 NOTE — TELEPHONE ENCOUNTER
Phone Number Called: [phone] (home)     Call outcome: spoke to patient regarding message below    Message: I spoke with patient for several minutes and she explained that she is seeing urology for UTI and they have placed her on antibiotics for infection.  She will keep June appointment to discuss further with .

## 2019-05-20 NOTE — TELEPHONE ENCOUNTER
VOICEMAIL  1. Caller Name: Ashleigh                    Call Back Number: [phone] (home)     2. Message: Patient LVM stating she still has a UTI and now a fever and needs IV antibiotics.     3. Patient approves office to leave a detailed voicemail/MyChart message: yes

## 2019-05-21 RX ORDER — NYSTATIN 100000 [USP'U]/G
POWDER TOPICAL
Qty: 60 G | Refills: 0 | OUTPATIENT
Start: 2019-05-21

## 2019-06-11 ENCOUNTER — HOSPITAL ENCOUNTER (OUTPATIENT)
Dept: LAB | Facility: MEDICAL CENTER | Age: 64
End: 2019-06-11
Attending: FAMILY MEDICINE
Payer: MEDICARE

## 2019-06-11 ENCOUNTER — OFFICE VISIT (OUTPATIENT)
Dept: MEDICAL GROUP | Facility: PHYSICIAN GROUP | Age: 64
End: 2019-06-11
Payer: MEDICARE

## 2019-06-11 VITALS
DIASTOLIC BLOOD PRESSURE: 70 MMHG | BODY MASS INDEX: 48.25 KG/M2 | TEMPERATURE: 98.3 F | HEIGHT: 64 IN | WEIGHT: 282.6 LBS | HEART RATE: 74 BPM | RESPIRATION RATE: 16 BRPM | SYSTOLIC BLOOD PRESSURE: 122 MMHG

## 2019-06-11 DIAGNOSIS — F17.210 CIGARETTE NICOTINE DEPENDENCE WITHOUT COMPLICATION: ICD-10-CM

## 2019-06-11 DIAGNOSIS — F39 MOOD DISORDER (HCC): ICD-10-CM

## 2019-06-11 DIAGNOSIS — I10 ESSENTIAL HYPERTENSION: Primary | ICD-10-CM

## 2019-06-11 DIAGNOSIS — E03.9 HYPOTHYROIDISM, UNSPECIFIED TYPE: Chronic | ICD-10-CM

## 2019-06-11 DIAGNOSIS — E78.5 HYPERLIPIDEMIA, UNSPECIFIED HYPERLIPIDEMIA TYPE: ICD-10-CM

## 2019-06-11 DIAGNOSIS — Z11.59 NEED FOR HEPATITIS C SCREENING TEST: ICD-10-CM

## 2019-06-11 DIAGNOSIS — M79.89 LEG SWELLING: ICD-10-CM

## 2019-06-11 PROBLEM — F17.200 NICOTINE DEPENDENCE: Status: ACTIVE | Noted: 2019-06-11

## 2019-06-11 LAB — TSH SERPL DL<=0.005 MIU/L-ACNC: 2.81 UIU/ML (ref 0.38–5.33)

## 2019-06-11 PROCEDURE — 36415 COLL VENOUS BLD VENIPUNCTURE: CPT

## 2019-06-11 PROCEDURE — 99214 OFFICE O/P EST MOD 30 MIN: CPT | Performed by: FAMILY MEDICINE

## 2019-06-11 PROCEDURE — 84443 ASSAY THYROID STIM HORMONE: CPT

## 2019-06-11 RX ORDER — NICOTINE 21 MG/24HR
1 PATCH, TRANSDERMAL 24 HOURS TRANSDERMAL EVERY 24 HOURS
Qty: 30 PATCH | Refills: 2 | Status: SHIPPED | OUTPATIENT
Start: 2019-06-11 | End: 2020-07-28

## 2019-06-11 RX ORDER — SULFAMETHOXAZOLE AND TRIMETHOPRIM 400; 80 MG/1; MG/1
TABLET ORAL
Refills: 0 | COMMUNITY
Start: 2019-06-05 | End: 2020-07-28

## 2019-06-11 RX ORDER — FLUOXETINE HYDROCHLORIDE 20 MG/1
20 CAPSULE ORAL DAILY
Qty: 30 CAP | Refills: 3 | Status: SHIPPED | OUTPATIENT
Start: 2019-06-11 | End: 2019-09-30 | Stop reason: SDUPTHER

## 2019-06-11 RX ORDER — ALBUTEROL SULFATE 90 UG/1
2 AEROSOL, METERED RESPIRATORY (INHALATION) EVERY 6 HOURS PRN
Qty: 8.5 G | Refills: 3 | Status: SHIPPED | OUTPATIENT
Start: 2019-06-11 | End: 2020-04-10

## 2019-06-11 RX ORDER — TOPIRAMATE 25 MG/1
TABLET ORAL
Refills: 6 | COMMUNITY
Start: 2019-03-22 | End: 2020-07-28

## 2019-06-11 RX ORDER — NYSTATIN 100000 [USP'U]/G
POWDER TOPICAL
Qty: 120 G | Refills: 3 | Status: SHIPPED | OUTPATIENT
Start: 2019-06-11 | End: 2019-08-24 | Stop reason: SDUPTHER

## 2019-06-11 NOTE — ASSESSMENT & PLAN NOTE
This is a chronic condition. She reports she has a lot of anxiety and gets panic attacks. Sometimes she gets chest pain with her anxiety/panic attacks. No current symptoms. She is on fluoxetine and would like a refill today. No SI/HI.   2

## 2019-06-11 NOTE — ASSESSMENT & PLAN NOTE
This is a chronic condition. She has a h/o of smoking at least 40 years. She is currently smoking ~7-8 cigarettes per day. She would like to quit but wants to work on other aspects of her health first. She would like to try the patches for now to see if it helps.

## 2019-06-11 NOTE — PROGRESS NOTES
Subjective:     CC: medication refills    HPI:   Ashleigh presents today with med refills.    Mood disorder (CMS-HCC) (HCC)  This is a chronic condition. She reports she has a lot of anxiety and gets panic attacks. Sometimes she gets chest pain with her anxiety/panic attacks. No current symptoms. She is on fluoxetine and would like a refill today. No SI/HI.    Leg swelling  This is a chronic condition. She gets swelling chronically in both legs. She is on furosemide but still gets swelling. She used to wear compression stockings but is not wearing currently.    Nicotine dependence  This is a chronic condition. She has a h/o of smoking at least 40 years. She is currently smoking ~7-8 cigarettes per day. She would like to quit but wants to work on other aspects of her health first. She would like to try the patches for now to see if it helps.    Essential hypertension  This is a chronic condition.  She is on no medications other than furosemide that may affect her blood pressure.  She is on a lot of narcotics which could potentially lower blood pressure but her blood pressure looks well controlled today.      Past Medical History:   Diagnosis Date   • Arthritis    • ASTHMA    • Bipolar disorder ([facility])    • Bursitis    • Chronic low back pain    • Congestive heart failure ([facility])    • Dyslipidemia    • Fall     last fall a month ago   • GERD (gastroesophageal reflux disease)    • Hypothyroid    • Migraine    • Obesity    • Sinusitis; chronic    • Unspecified urinary incontinence        Social History   Substance Use Topics   • Smoking status: Current Every Day Smoker     Packs/day: 0.33     Years: 40.00     Types: Cigarettes   • Smokeless tobacco: Never Used   • Alcohol use No       Current Outpatient Prescriptions Ordered in [facility]   Medication Sig Dispense Refill   • sulfamethoxazole-trimethoprim (BACTRIM) 400-80 MG Tab TK 1 T PO  QD  0   • nystatin (NYSTOP) powder APPLY TO THE AFFECTED AREA DAILY AS NEEDED 120 g 3   •  FLUoxetine (PROZAC) 20 MG Cap Take 1 Cap by mouth every day. 30 Cap 3   • albuterol 108 (90 Base) MCG/ACT Aero Soln inhalation aerosol Inhale 2 Puffs by mouth every 6 hours as needed for Shortness of Breath. 8.5 g 3   • nicotine (NICODERM) 14 MG/24HR PATCH 24 HR Apply 1 Patch to skin as directed every 24 hours. 30 Patch 2   • levothyroxine (SYNTHROID) 75 MCG Tab TAKE 1 TABLET BY MOUTH EVERY MORNING 60 Tab 0   • morphine ER (MS CONTIN) 60 MG Tab CR tablet TK 1 T PO Q 8 H THEN TK 1 T PO QHS FOR 30 DAYS  0   • NARCAN 4 MG/0.1ML Liquid ISRAEL REP ALN  0   • ALPRAZolam (XANAX) 1 MG Tab Take 1 mg by mouth 2 Times a Day.  0   • oxyCODONE (OXY-IR) 30 MG immediate release tablet Take 30 mg by mouth every four hours as needed.  0   • baclofen (LIORESAL) 10 MG Tab Take 10 mg by mouth every 8 hours as needed.  5   • amitriptyline (ELAVIL) 25 MG Tab Take 25 mg by mouth 2 times a day as needed.  5   • cetirizine (ZYRTEC) 10 MG chewable tablet Take 1 Tab by mouth every day. 90 Tab 3   • esomeprazole (NEXIUM) 20 MG capsule Take 1 Cap by mouth every day. 90 Cap 3   • estradiol (ESTRACE) 0.5 MG tablet Take 1 Tab by mouth every day for 360 days. 90 Tab 3   • furosemide (LASIX) 20 MG Tab Take 1 Tab by mouth every day. 90 Tab 3   • mupirocin (BACTROBAN) 2 % Ointment APPLY TO AFFECTED AREA AS DIRECTED 22 g 3   • topiramate (TOPAMAX) 25 MG Tab   6   • diphenhydrAMINE (BENADRYL) 25 MG Tab Take 25 mg by mouth every 6 hours as needed for Sleep.     • HYDROmorphone (DILAUDID) 4 MG Tab Take 4 mg by mouth 2 Times a Day.       No current Epic-ordered facility-administered medications on file.        Allergies:  Contrast media with iodine [iodine]; Naproxen; Asa [aspirin]; Cephalosporins; Compazine; Doxycycline; Lipitor [atorvastatin]; Macrolides; Phenothiazines; Sympathomimetics; and Toradol    Health Maintenance: Completed    ROS:  Gen: + subjective fevers/chills  Pulm: no sob  CV: no chest pain    Objective:     Exam:  /70 (BP Location:  "Right arm, Patient Position: Sitting, BP Cuff Size: Adult)   Pulse 74   Temp 36.8 °C (98.3 °F) (Temporal)   Resp 16   Ht 1.626 m (5' 4\")   Wt (!) 128.2 kg (282 lb 9.6 oz)   LMP 01/01/1988   Breastfeeding? No   BMI 48.51 kg/m²  Body mass index is 48.51 kg/m².    Gen: Alert and oriented, No apparent distress.  Neck: Neck is supple without lymphadenopathy.  Lungs: Normal effort, CTA bilaterally, no wheezes, rhonchi, or rales  CV: Regular rate and rhythm. No murmurs, rubs, or gallops.  Ext: No clubbing, cyanosis, edema.    Assessment & Plan:     64 y.o. female with the following -     1. Essential hypertension  This is a chronic condition, controlled.  She is not on any medication for hypertension.  She does have furosemide on board to help with swelling and is on multiple narcotics which could potentially lower her blood pressure.  Her blood pressure looks well controlled today at 122/70.  We will continue to monitor.  - Comp Metabolic Panel; Future  - CBC WITH DIFFERENTIAL; Future    2. Mood disorder (HCC)  This is a chronic condition, stable.  She reports a long-standing history of anxiety and getting panic attacks.  She is on fluoxetine and she would like a refill today as it does help with her mood disorder.  -Continue fluoxetine 20 mg daily    3. Cigarette nicotine dependence without complication  This is a chronic condition, stable.  She has a history of smoking for at least 40 years.  At her last visit she had been smoking half pack per day and today she reports she is down to 7-8 cigarettes/day.  She is interested in quitting but does want to work on other aspects of her health first.  She is very concerned that she has a full body infection that she would like to address first.  However, after some discussion she is willing to try the middle dose of the nicotine patch to see if that will help her come off the cigarettes or decrease her cigarette use.  - nicotine (NICODERM) 14 MG/24HR PATCH 24 HR; Apply " 1 Patch to skin as directed every 24 hours.  Dispense: 30 Patch; Refill: 2    4. Leg swelling  This is a chronic condition, unchanged.  She gets chronic swelling in both her legs.  She has a nonpitting edema on exam today.  She is very concerned about a possible infection as she feels like her legs look red.  On exam her legs are kind of purple/red and I think is more related to venous insufficiency call using that color change versus an actual infection.  She did used to wear compression stockings but is not wearing them currently we did discuss restarting that to see if it helps with the swelling.  She is on furosemide but she states is not helping very much with the swelling.  If this is a venous insufficiency versus lymphedema swelling the furosemide may not help the we do not have a chance to discuss that today as she is very concerned about a possible full body infection coming from the leg swelling or leg issue.  -Continue furosemide 20 mg daily  - Comp Metabolic Panel; Future    5. Hyperlipidemia, unspecified hyperlipidemia type  This was not on her problem list but her last lipid panel approximately 10 years ago did show elevated total cholesterol.  She has not had her cholesterol checked since that time so we will go ahead and recheck it.  Today she states that no matter what the cholesterol panel looks like she will not start a statin as she feels they are very dangerous medications.  - Lipid Profile; Future    6. Need for hepatitis C screening test  - HEP C VIRUS ANTIBODY; Future    Return in about 3 months (around 9/11/2019) for f/u tobacco.    Please note that this dictation was created using voice recognition software. I have made every reasonable attempt to correct obvious errors, but I expect that there are errors of grammar and possibly content that I did not discover before finalizing the note.

## 2019-06-11 NOTE — ASSESSMENT & PLAN NOTE
This is a chronic condition. She gets swelling chronically in both legs. She is on furosemide but still gets swelling. She used to wear compression stockings but is not wearing currently.

## 2019-06-11 NOTE — ASSESSMENT & PLAN NOTE
This is a chronic condition.  She is on no medications other than furosemide that may affect her blood pressure.  She is on a lot of narcotics which could potentially lower blood pressure but her blood pressure looks well controlled today.

## 2019-06-12 ENCOUNTER — TELEPHONE (OUTPATIENT)
Dept: MEDICAL GROUP | Facility: PHYSICIAN GROUP | Age: 64
End: 2019-06-12

## 2019-06-12 NOTE — TELEPHONE ENCOUNTER
Patient returned call.      Phone Number Called: [phone] (home)      Call outcome: left message for patient to call back regarding message below     Message: LVM to call back.

## 2019-06-12 NOTE — TELEPHONE ENCOUNTER
Phone Number Called: [phone] (home)     Call outcome: left message for patient to call back regarding message below    Message: LVM to call back.

## 2019-06-12 NOTE — TELEPHONE ENCOUNTER
----- Message from Nida Richardson M.D. sent at 6/12/2019  7:56 AM PDT -----  Her TSH is in the normal range indicating correct dose of levothyroxine/Synthroid.

## 2019-06-12 NOTE — TELEPHONE ENCOUNTER
Phone Number Called: [phone] (home)     Call outcome: spoke to patient regarding message below    Message: Spoke with patient and let them know Nida Richardson M.D.'s message.

## 2019-06-12 NOTE — TELEPHONE ENCOUNTER
Phone Number Called: [phone] (home)      Call outcome: left message for patient to call back regarding message below     Message: LVM to call back.

## 2019-06-12 NOTE — TELEPHONE ENCOUNTER
Patient returned call.      Phone Number Called: [phone] (home)      Call outcome: left message for patient to call back regarding message below     Message: LVM to call back.

## 2019-06-12 NOTE — TELEPHONE ENCOUNTER
DOCUMENTATION OF PAR STATUS:    1. Name of Medication & Dose: nicotine patch 14mg    2. Name of Prescription Coverage Company & phone #: [employer]    3. Date Prior Auth Submitted: 6/12/19    4. What information was given to obtain insurance decision? 6/11/19 OV note    5. Prior Auth Status? Pending     6. Patient Notified: no, pharmacy informed.

## 2019-06-12 NOTE — TELEPHONE ENCOUNTER
Phone Number Called: [phone] (home)     Call outcome: spoke to patient regarding message below    Message: Spoke with patient and let them know Nida Richardson M.D.'s message.  She will call [phone].

## 2019-08-26 RX ORDER — NYSTATIN 100000 [USP'U]/G
POWDER TOPICAL
Qty: 120 G | Refills: 0 | Status: SHIPPED | OUTPATIENT
Start: 2019-08-26 | End: 2019-09-14 | Stop reason: SDUPTHER

## 2019-09-30 RX ORDER — FLUOXETINE HYDROCHLORIDE 20 MG/1
CAPSULE ORAL
Qty: 90 CAP | Refills: 0 | Status: SHIPPED | OUTPATIENT
Start: 2019-09-30 | End: 2020-01-06

## 2019-11-04 ENCOUNTER — TELEPHONE (OUTPATIENT)
Dept: MEDICAL GROUP | Facility: PHYSICIAN GROUP | Age: 64
End: 2019-11-04

## 2019-11-04 NOTE — TELEPHONE ENCOUNTER
VOICEMAIL  1. Caller Name: Ashleigh                       Call Back Number: [phone] (home)     2. Message: Patient LVM requesting a call back to schedule an appointment.     3. Patient approves office to leave a detailed voicemail/MyChart message: yes

## 2019-11-04 NOTE — TELEPHONE ENCOUNTER
Phone Number Called: [phone] (home)     Call outcome: left message for patient to call back regarding message below      Message: LVM requesting patient contact scheduling to arrange an appointment.

## 2019-11-05 NOTE — TELEPHONE ENCOUNTER
Phone Number Called: [phone] (home)     Call outcome: left message for patient to call back regarding message below    Message: Left another message informing patient I received her 2nd voicemail regarding appt request and she should call our scheduling department back at [phone].

## 2019-12-02 RX ORDER — FUROSEMIDE 20 MG/1
TABLET ORAL
Qty: 90 TAB | Refills: 0 | Status: SHIPPED | OUTPATIENT
Start: 2019-12-02 | End: 2020-11-24 | Stop reason: SDUPTHER

## 2019-12-12 RX ORDER — NYSTATIN 100000 [USP'U]/G
POWDER TOPICAL
Qty: 120 G | Refills: 0 | Status: SHIPPED | OUTPATIENT
Start: 2019-12-12 | End: 2020-01-03

## 2020-01-03 RX ORDER — NYSTATIN 100000 [USP'U]/G
POWDER TOPICAL
Qty: 120 G | Refills: 0 | Status: SHIPPED | OUTPATIENT
Start: 2020-01-03 | End: 2020-02-18

## 2020-01-06 RX ORDER — FLUOXETINE HYDROCHLORIDE 20 MG/1
CAPSULE ORAL
Qty: 90 CAP | Refills: 0 | Status: SHIPPED | OUTPATIENT
Start: 2020-01-06 | End: 2020-04-06

## 2020-01-29 NOTE — TELEPHONE ENCOUNTER
Received request via: Pharmacy    Was the patient seen in the last year in this department? Yes    Does the patient have an active prescription (recently filled or refills available) for medication(s) requested? No

## 2020-01-30 ENCOUNTER — APPOINTMENT (OUTPATIENT)
Dept: MEDICAL GROUP | Facility: PHYSICIAN GROUP | Age: 65
End: 2020-01-30
Payer: MEDICARE

## 2020-01-30 DIAGNOSIS — Z79.890 POSTMENOPAUSAL HRT (HORMONE REPLACEMENT THERAPY): ICD-10-CM

## 2020-01-30 RX ORDER — ESTRADIOL 0.5 MG/1
TABLET ORAL
Qty: 90 TAB | Refills: 0 | Status: SHIPPED | OUTPATIENT
Start: 2020-01-30 | End: 2020-04-09

## 2020-02-18 RX ORDER — NYSTATIN 100000 [USP'U]/G
POWDER TOPICAL
Qty: 120 G | Refills: 0 | Status: SHIPPED | OUTPATIENT
Start: 2020-02-18 | End: 2020-03-23

## 2020-03-23 RX ORDER — NYSTATIN 100000 [USP'U]/G
POWDER TOPICAL
Qty: 120 G | Refills: 0 | Status: SHIPPED | OUTPATIENT
Start: 2020-03-23 | End: 2020-04-09 | Stop reason: SDUPTHER

## 2020-04-06 RX ORDER — FLUOXETINE HYDROCHLORIDE 20 MG/1
CAPSULE ORAL
Qty: 90 CAP | Refills: 0 | Status: SHIPPED | OUTPATIENT
Start: 2020-04-06 | End: 2020-07-02

## 2020-04-09 DIAGNOSIS — Z79.890 POSTMENOPAUSAL HRT (HORMONE REPLACEMENT THERAPY): ICD-10-CM

## 2020-04-09 RX ORDER — ESTRADIOL 0.5 MG/1
TABLET ORAL
Qty: 90 TAB | Refills: 0 | Status: SHIPPED | OUTPATIENT
Start: 2020-04-09 | End: 2020-04-09 | Stop reason: SDUPTHER

## 2020-04-09 RX ORDER — NYSTATIN 100000 [USP'U]/G
POWDER TOPICAL
Qty: 120 G | Refills: 0 | Status: SHIPPED | OUTPATIENT
Start: 2020-04-09 | End: 2020-04-28

## 2020-04-09 RX ORDER — ESTRADIOL 0.5 MG/1
0.5 TABLET ORAL
Qty: 90 TAB | Refills: 0 | Status: SHIPPED | OUTPATIENT
Start: 2020-04-09 | End: 2020-05-04

## 2020-04-09 RX ORDER — SULFAMETHOXAZOLE AND TRIMETHOPRIM 400; 80 MG/1; MG/1
TABLET ORAL
Qty: 90 TAB | Refills: 0 | OUTPATIENT
Start: 2020-04-09

## 2020-04-09 NOTE — TELEPHONE ENCOUNTER
Received request via: Patient    Was the patient seen in the last year in this department? Yes    Does the patient have an active prescription (recently filled or refills available) for medication(s) requested? No     Patient out of town

## 2020-04-10 RX ORDER — ALBUTEROL SULFATE 90 UG/1
AEROSOL, METERED RESPIRATORY (INHALATION)
Qty: 54 G | Refills: 0 | Status: SHIPPED | OUTPATIENT
Start: 2020-04-10 | End: 2020-06-22

## 2020-04-10 RX ORDER — ALBUTEROL SULFATE 90 UG/1
AEROSOL, METERED RESPIRATORY (INHALATION)
Qty: 18 G | Refills: 1 | Status: SHIPPED | OUTPATIENT
Start: 2020-04-10 | End: 2020-04-10

## 2020-04-28 RX ORDER — NYSTATIN 100000 [USP'U]/G
POWDER TOPICAL
Qty: 120 G | Refills: 0 | Status: SHIPPED | OUTPATIENT
Start: 2020-04-28 | End: 2020-07-30

## 2020-05-03 DIAGNOSIS — Z79.890 POSTMENOPAUSAL HRT (HORMONE REPLACEMENT THERAPY): ICD-10-CM

## 2020-05-04 RX ORDER — ESTRADIOL 0.5 MG/1
TABLET ORAL
Qty: 90 TAB | Refills: 0 | Status: SHIPPED | OUTPATIENT
Start: 2020-05-04 | End: 2021-01-18 | Stop reason: SDUPTHER

## 2020-05-15 RX ORDER — LEVOTHYROXINE SODIUM 0.07 MG/1
TABLET ORAL
Qty: 90 TAB | Refills: 0 | Status: SHIPPED | OUTPATIENT
Start: 2020-05-15 | End: 2020-08-20

## 2020-05-30 NOTE — ASSESSMENT & PLAN NOTE
Add lisinopril   Seizure like activity noted, primary care team notified and EEG button pressed. Ativan given 2mg, versed gtt started

## 2020-07-02 RX ORDER — FLUOXETINE HYDROCHLORIDE 20 MG/1
CAPSULE ORAL
Qty: 90 CAP | Refills: 0 | Status: SHIPPED | OUTPATIENT
Start: 2020-07-02 | End: 2021-01-18 | Stop reason: SDUPTHER

## 2020-07-28 ENCOUNTER — OFFICE VISIT (OUTPATIENT)
Dept: MEDICAL GROUP | Facility: PHYSICIAN GROUP | Age: 65
End: 2020-07-28
Payer: MEDICARE

## 2020-07-28 ENCOUNTER — NURSE TRIAGE (OUTPATIENT)
Dept: HEALTH INFORMATION MANAGEMENT | Facility: OTHER | Age: 65
End: 2020-07-28

## 2020-07-28 VITALS
TEMPERATURE: 97.6 F | SYSTOLIC BLOOD PRESSURE: 142 MMHG | HEIGHT: 64 IN | DIASTOLIC BLOOD PRESSURE: 72 MMHG | OXYGEN SATURATION: 95 % | RESPIRATION RATE: 16 BRPM | WEIGHT: 254.4 LBS | BODY MASS INDEX: 43.43 KG/M2 | HEART RATE: 74 BPM

## 2020-07-28 DIAGNOSIS — J01.00 ACUTE MAXILLARY SINUSITIS, RECURRENCE NOT SPECIFIED: Primary | ICD-10-CM

## 2020-07-28 DIAGNOSIS — R06.02 SHORTNESS OF BREATH: ICD-10-CM

## 2020-07-28 DIAGNOSIS — H61.23 BILATERAL IMPACTED CERUMEN: ICD-10-CM

## 2020-07-28 DIAGNOSIS — R30.0 DYSURIA: ICD-10-CM

## 2020-07-28 LAB
APPEARANCE UR: CLEAR
BILIRUB UR STRIP-MCNC: NEGATIVE MG/DL
COLOR UR AUTO: YELLOW
GLUCOSE UR STRIP.AUTO-MCNC: NEGATIVE MG/DL
KETONES UR STRIP.AUTO-MCNC: NEGATIVE MG/DL
LEUKOCYTE ESTERASE UR QL STRIP.AUTO: NEGATIVE
NITRITE UR QL STRIP.AUTO: NEGATIVE
PH UR STRIP.AUTO: 6 [PH] (ref 5–8)
PROT UR QL STRIP: NEGATIVE MG/DL
RBC UR QL AUTO: NEGATIVE
SP GR UR STRIP.AUTO: 1.02
UROBILINOGEN UR STRIP-MCNC: 0.2 MG/DL

## 2020-07-28 PROCEDURE — 99214 OFFICE O/P EST MOD 30 MIN: CPT | Performed by: FAMILY MEDICINE

## 2020-07-28 PROCEDURE — 81002 URINALYSIS NONAUTO W/O SCOPE: CPT | Performed by: FAMILY MEDICINE

## 2020-07-28 RX ORDER — DOXYCYCLINE 100 MG/1
100 CAPSULE ORAL 2 TIMES DAILY
Qty: 10 CAP | Refills: 0 | Status: SHIPPED | OUTPATIENT
Start: 2020-07-28 | End: 2020-07-29

## 2020-07-28 ASSESSMENT — PATIENT HEALTH QUESTIONNAIRE - PHQ9: CLINICAL INTERPRETATION OF PHQ2 SCORE: 0

## 2020-07-28 NOTE — TELEPHONE ENCOUNTER
1. Caller Name: Ashleigh Garces              Call Back Number:   [facility] PCP or Specialty Provider: Yes Dr. Nida Richardson        2.  In the last two weeks, has the patient had any new or worsening symptoms (not explained by alternative diagnosis)? No.    3.  Does patient have any comoribidities? Immunosuppressed Asthma    4.  Has the patient traveled in the last 14 days OR had any known contact with someone who is suspected or confirmed to have COVID-19?  No.    5. Disposition: Cleared by RN Triage as potential is low for COVID-19; OK to keep/schedule appointment    Note routed to [facility] Provider: JUSTINOI only.

## 2020-07-28 NOTE — PROGRESS NOTES
Chief Complaint   Patient presents with   • Sinusitis     3 months         HPI: Patient is a 65 y.o. female complaining of a couple months of illness including: chills, ear pain, facial pain, fever, rhinorrhea, sore throat, post-nasal drip. Headache.  Mucus is: green.  Similarly ill exposures: no.  Treatments tried: zyrtec  She  reports that she has been smoking cigarettes. She has a 13.20 pack-year smoking history. She has never used smokeless tobacco..    She has been getting bad allergies. The zyrtec is helping a little bit.     She would like to get a urine test. She is getting pain in the the suprapubic region. She is also having some L SI joint pain. She has dysuria sometimes. She denies increased frequency. She is not taking azo right now.    She also mentions she has been having wheezing. She is using her albuterol every other day.     ROS:  No cough, nausea, changes in bowel movements or skin rash.      I reviewed the patient's medications, allergies and medical history:  Current Outpatient Medications   Medication Sig Dispense Refill   • beclomethasone (QVAR) 40 MCG/ACT inhaler Inhale 1 Puff by mouth 2 Times a Day. 1 Each 2   • doxycycline (MONODOX) 100 MG capsule Take 1 Cap by mouth 2 times a day for 5 days. 10 Cap 0   • mupirocin (BACTROBAN) 2 % Ointment APPLY TO THE AFFECTED AREA EVERY DAY 22 g 0   • FLUoxetine (PROZAC) 20 MG Cap TAKE ONE CAPSULE BY MOUTH EVERY DAY 90 Cap 0   • albuterol 108 (90 Base) MCG/ACT Aero Soln inhalation aerosol INHALE 2 PUFFS BY MOUTH EVERY 6 HOURS AS NEEDED FOR SHORTNESS OF BREATH 2 Inhaler 2   • levothyroxine (SYNTHROID) 75 MCG Tab TAKE 1 TABLET BY MOUTH EVERY MORNING 90 Tab 0   • estradiol (ESTRACE) 0.5 MG tablet TAKE 1 TABLET BY MOUTH EVERY DAY 90 Tab 0   • esomeprazole (NEXIUM) 20 MG capsule TAKE 1 CAPSULE BY MOUTH EVERY DAY 90 Cap 0   • nystatin (NYSTOP) powder APPLY TO AFFECTED AREA EVERY DAY AS NEEDED 120 g 0   • furosemide (LASIX) 20 MG Tab TAKE 1 TABLET BY MOUTH  "EVERY DAY 90 Tab 0   • morphine ER (MS CONTIN) 60 MG Tab CR tablet TK 1 T PO Q 8 H THEN TK 1 T PO QHS FOR 30 DAYS  0   • NARCAN 4 MG/0.1ML Liquid ISRAEL REP ALN  0   • ALPRAZolam (XANAX) 1 MG Tab Take 1 mg by mouth 2 Times a Day.  0   • oxyCODONE (OXY-IR) 30 MG immediate release tablet Take 30 mg by mouth every four hours as needed.  0   • baclofen (LIORESAL) 10 MG Tab Take 10 mg by mouth every 8 hours as needed.  5   • amitriptyline (ELAVIL) 25 MG Tab Take 25 mg by mouth 2 times a day as needed.  5   • diphenhydrAMINE (BENADRYL) 25 MG Tab Take 25 mg by mouth every 6 hours as needed for Sleep.       No current facility-administered medications for this visit.      Contrast media with iodine [iodine]; Naproxen; Asa [aspirin]; Cephalosporins; Compazine; Lipitor [atorvastatin]; Macrolides; Phenothiazines; Sympathomimetics; and Toradol  Past Medical History:   Diagnosis Date   • Arthritis    • ASTHMA    • Bipolar disorder (HCC)    • Bursitis    • Chronic low back pain    • Congestive heart failure (HCC)    • Dyslipidemia    • Fall     last fall a month ago   • GERD (gastroesophageal reflux disease)    • Hypothyroid    • Migraine    • Obesity    • Sinusitis; chronic    • Unspecified urinary incontinence         EXAM:  /72 (BP Location: Right arm, Patient Position: Sitting, BP Cuff Size: Adult)   Pulse 74   Temp 36.4 °C (97.6 °F) (Temporal)   Resp 16   Ht 1.626 m (5' 4\")   Wt 115.4 kg (254 lb 6.4 oz)   SpO2 95%   General: Alert, no conversational dyspnea or audible wheeze, non-toxic appearance.  Eyes: PERRL, conjunctiva normal, no eye discharge.  Ears: Normal pinnae,TM's not visualized due to cerumen bilaterally.  Nares: Patent with thick mucus.  Sinuses: tender over maxillary / frontal sinuses.  Throat: Erythematous injection without exudate.   Neck: Supple, with no adenopathy.  Lungs: Normal effort. Clear bilaterally except for diffuse expiratory wheeze.  Heart: Regular rate without murmur.  Skin: Warm and dry " without rash.     ASSESSMENT:   1. Acute maxillary sinusitis, recurrence not specified  1. Educated patient that majority of upper respiratory infections are viral and do not need antibiotics. As symptoms have been present for months, will treat with antibiotics. Doxycycline chosen due to allergy list.  2. Twice daily use of nasal saline rinse or Neti-Pot.  3. OTC anti-pyretics and decongestants as needed.  4. Follow-up in office or urgent care for worsening symptoms, difficulty breathing, lack of expected recovery, or should new symptoms or problems arise  - doxycycline (MONODOX) 100 MG capsule; Take 1 Cap by mouth 2 times a day for 5 days.  Dispense: 10 Cap; Refill: 0    2. Dysuria  This is a new condition.  She has a longstanding history of interstitial cystitis but get symptoms of UTI often.  In office UA showed no infection. Likely her symptoms are 2/2 her interstitial cystitis.  - POCT Urinalysis    3. SOB (shortness of breath)  This is a chronic condition, stable.  She has PFTs from 2007 that were normal but she states that she has asthma and will use albuterol as needed for her symptoms.  She has been using albuterol every other day and she does have some expiratory wheeze on exam today so were going to add a daily ICS to try to get better control of her shortness of breath.  -Start Qvar 40 mcg twice daily    4. Bilateral impacted cerumen  This is a new condition.  She notes some difficulty with her hearing and on exam she has bilateral cerumen impaction.  Ear lavage performed in the office today, see procedure note for details.

## 2020-07-28 NOTE — PATIENT INSTRUCTIONS
During today's visit:    1. I prescribed doxycycline for your sinus infection. If this does not help, let me know and we will send you to ENT.    2. For your asthma, we are starting an inhaler you will use twice a day so you don't have to use the albuterol more than 2 times per week.    3. Your urine test today shows no urine infection.    4.  We could not completely clean out your ears today.  Therefore, I have sent in some eardrops.  He will put the drops in your right ear twice a day for no more than 4 days.  This will soften up the earwax and hopefully it will come out on its own.

## 2020-07-28 NOTE — PROCEDURES
Procedure: Cerumen Removal  Risks and benefits of procedure discussed with patient.  Cerumen removed with  lavage   Patient tolerated the procedure well  Pt educated about proper care of ear canal. Q-tip cleaning discouraged, use of debrox and warm water lavage discussed.  Only left ear lavaged successfully. Will send in debrox drops for the right ear.

## 2020-07-29 ENCOUNTER — TELEPHONE (OUTPATIENT)
Dept: MEDICAL GROUP | Facility: PHYSICIAN GROUP | Age: 65
End: 2020-07-29

## 2020-07-29 RX ORDER — LEVOFLOXACIN 500 MG/1
500 TABLET, FILM COATED ORAL DAILY
Qty: 7 TAB | Refills: 0 | Status: SHIPPED | OUTPATIENT
Start: 2020-07-29 | End: 2020-07-29 | Stop reason: SDUPTHER

## 2020-07-29 RX ORDER — LEVOFLOXACIN 500 MG/1
500 TABLET, FILM COATED ORAL DAILY
Qty: 7 TAB | Refills: 0 | Status: SHIPPED | OUTPATIENT
Start: 2020-07-29 | End: 2020-07-30

## 2020-07-29 NOTE — TELEPHONE ENCOUNTER
VOICEMAIL  1. Caller Name: Madalyn beckham                    Call Back Number: [phone]    2. Message: Ren[facility] called stating doxycycline is causing nausea/vomitting.  Please send in aleternative & resend ear drops (not received by pharmacy).  Madalyn requesting a call once these have been sent to [facility] pharmacy.     3. Patient approves office to leave a detailed voicemail/MyChart message: yes

## 2020-07-29 NOTE — TELEPHONE ENCOUNTER
I resent the eardrop prescription to the pharmacy regimen.  I sent in a prescription for levofloxacin that she will use instead of doxycycline.  However, if this medication does not improve her symptoms she may want to consider seeing urgent care up there.

## 2020-07-29 NOTE — TELEPHONE ENCOUNTER
Phone Number Called: [phone]    Call outcome: Spoke to patient regarding message below.    Message: I spoke with patient personally as chapincito is not listed as someone we can communicate with.  Patient understands we sent in new antibiotic and resent ear drops however patient and chapincito are requesting amoxicillin or cipro as patient has tolerated this in the past.  Also states Qvar was not covered and needs refill of Nystatin powder.  They would like a call back tomorrow when these have been sent to pharmacy.

## 2020-07-30 RX ORDER — AMOXICILLIN AND CLAVULANATE POTASSIUM 875; 125 MG/1; MG/1
1 TABLET, FILM COATED ORAL 2 TIMES DAILY
Qty: 10 TAB | Refills: 0 | Status: SHIPPED | OUTPATIENT
Start: 2020-07-30 | End: 2020-08-04

## 2020-07-30 RX ORDER — DEXAMETHASONE 4 MG/1
1 TABLET ORAL 2 TIMES DAILY
Qty: 12 G | Refills: 2 | Status: SHIPPED | OUTPATIENT
Start: 2020-07-30 | End: 2022-03-04 | Stop reason: SDUPTHER

## 2020-07-30 NOTE — TELEPHONE ENCOUNTER
Phone Number Called: [phone]    Call outcome: Spoke with Granddaughter Dionna    Message: Advised the Pharmacy had received the Rx, but INS does not cover as the med is now an OTC.  Provided the name and spelling of both the Brand and Generic names for the medication to the granddaughter, and informed should be able to  all the meds plus the OTC at the pharmacy today.

## 2020-07-30 NOTE — TELEPHONE ENCOUNTER
I have sent in a prescription for augmentin, which is a combination of amoxicillin and another antibiotic.  However, she should be careful because she has a documented allergy to cephalosporins which means she could develop allergy symptoms to amoxicillin.  Ciprofloxacin is never used for sinus infection and it will not clear her symptoms.  I have refilled the nystatin powder and we will try a different inhaler.

## 2020-07-30 NOTE — TELEPHONE ENCOUNTER
Phone Number Called: [phone]    Call outcome: Spoke to patient regarding message below.    Message: Pt was ill, and gave verbal permission to speak with her Granddaughter today, Dionna.  Informed Granddaughter of the message below.  Granddaughter asked about ear drops.  I checked the record to see Dr Richardson had ordered Debrox ear drop solution.  Informed the granddaughter would call the pharmacy to make sure they had received.

## 2020-07-30 NOTE — TELEPHONE ENCOUNTER
Phone Number Called: [phone]    Call outcome: Spoke with Pharmacy deon Miller    Message: Angela stated had received the Rx, but medication is now an OTC.  INS will not cover OTC's.

## 2020-08-07 ENCOUNTER — TELEPHONE (OUTPATIENT)
Dept: MEDICAL GROUP | Facility: PHYSICIAN GROUP | Age: 65
End: 2020-08-07

## 2020-08-20 RX ORDER — LEVOTHYROXINE SODIUM 0.07 MG/1
TABLET ORAL
Qty: 90 TAB | Refills: 0 | Status: SHIPPED | OUTPATIENT
Start: 2020-08-20 | End: 2020-10-28 | Stop reason: SDUPTHER

## 2020-10-28 RX ORDER — LEVOTHYROXINE SODIUM 0.07 MG/1
TABLET ORAL
Qty: 90 TAB | Refills: 0 | Status: SHIPPED | OUTPATIENT
Start: 2020-10-28 | End: 2020-11-17 | Stop reason: SDUPTHER

## 2020-11-17 ENCOUNTER — TELEPHONE (OUTPATIENT)
Dept: MEDICAL GROUP | Facility: PHYSICIAN GROUP | Age: 65
End: 2020-11-17

## 2020-11-17 RX ORDER — LEVOTHYROXINE SODIUM 0.07 MG/1
TABLET ORAL
Qty: 90 TAB | Refills: 0 | Status: SHIPPED | OUTPATIENT
Start: 2020-11-17 | End: 2021-01-18 | Stop reason: SDUPTHER

## 2020-11-17 NOTE — TELEPHONE ENCOUNTER
I've refilled the levothyroxine. However, I strongly recommend she establish with a doctor in [place] who can take over these prescriptions. She needs to get lab work periodically to make sure the thyroid dose is still accurate, among other things.

## 2020-11-17 NOTE — TELEPHONE ENCOUNTER
Phone Number Called: [phone] (home)       Call outcome: Spoke to patient regarding message below.    Message: Patient clarified she is only in [place], [place] to visit her daughter temporarily, and still resides in [place], [place].

## 2020-11-17 NOTE — TELEPHONE ENCOUNTER
Pt requesting refill on her levothyroxine. States that she lost the rest of her last prescription. She is requesting that this prescription be sent to the [facility] on [address] In [place], [place].     Routing to Dr. Richardson

## 2020-11-19 RX ORDER — ALBUTEROL SULFATE 90 UG/1
AEROSOL, METERED RESPIRATORY (INHALATION)
Qty: 36 G | Refills: 3 | Status: SHIPPED | OUTPATIENT
Start: 2020-11-19 | End: 2023-02-21 | Stop reason: SDUPTHER

## 2020-11-24 RX ORDER — FUROSEMIDE 20 MG/1
20 TABLET ORAL
Qty: 90 TAB | Refills: 0 | Status: SHIPPED | OUTPATIENT
Start: 2020-11-24 | End: 2021-03-01

## 2020-11-24 NOTE — TELEPHONE ENCOUNTER
She states she is only visiting and has not permanently moved there.  She was just in [place] per our conversation when she called requesting this refill.  Pradip did discuss this with her after her last refill request -see prev. enc.

## 2021-01-18 ENCOUNTER — TELEPHONE (OUTPATIENT)
Dept: MEDICAL GROUP | Facility: PHYSICIAN GROUP | Age: 66
End: 2021-01-18

## 2021-01-18 DIAGNOSIS — Z79.890 POSTMENOPAUSAL HRT (HORMONE REPLACEMENT THERAPY): ICD-10-CM

## 2021-01-18 RX ORDER — LEVOTHYROXINE SODIUM 0.07 MG/1
TABLET ORAL
Qty: 90 TAB | Refills: 0 | Status: SHIPPED | OUTPATIENT
Start: 2021-01-18 | End: 2021-09-23

## 2021-01-18 RX ORDER — ESTRADIOL 0.5 MG/1
0.5 TABLET ORAL
Qty: 90 TAB | Refills: 0 | Status: SHIPPED | OUTPATIENT
Start: 2021-01-18 | End: 2021-08-03

## 2021-01-18 RX ORDER — FLUOXETINE HYDROCHLORIDE 20 MG/1
20 CAPSULE ORAL
Qty: 90 CAP | Refills: 0 | Status: SHIPPED | OUTPATIENT
Start: 2021-01-18 | End: 2022-12-06 | Stop reason: SDUPTHER

## 2021-01-18 NOTE — TELEPHONE ENCOUNTER
Phone Number Called: [phone] (home)       Call outcome: Left detailed message for patient. Informed to call back with any additional questions.    Message: LVM stating patient will need to make an appointment to continue receiving refills on medication. Phone #[phone] provided.

## 2021-02-05 NOTE — CARE PLAN
Problem: Bronchoconstriction:  Goal: Improve in air movement and diminished wheezing  Outcome: PROGRESSING AS EXPECTED  Continue SVN        Normal rate, regular rhythm.  Heart sounds S1, S2.

## 2021-03-01 RX ORDER — FUROSEMIDE 20 MG/1
TABLET ORAL
Qty: 90 TABLET | Refills: 0 | Status: SHIPPED | OUTPATIENT
Start: 2021-03-01 | End: 2021-05-06 | Stop reason: SDUPTHER

## 2021-03-03 DIAGNOSIS — Z23 NEED FOR VACCINATION: ICD-10-CM

## 2021-05-06 RX ORDER — FUROSEMIDE 20 MG/1
20 TABLET ORAL
Qty: 90 TABLET | Refills: 0 | Status: SHIPPED | OUTPATIENT
Start: 2021-05-06 | End: 2021-05-28

## 2021-05-28 RX ORDER — FUROSEMIDE 20 MG/1
20 TABLET ORAL
Qty: 90 TABLET | Refills: 0 | Status: SHIPPED | OUTPATIENT
Start: 2021-05-28 | End: 2021-10-28

## 2021-06-01 ENCOUNTER — TELEPHONE (OUTPATIENT)
Dept: MEDICAL GROUP | Facility: PHYSICIAN GROUP | Age: 66
End: 2021-06-01

## 2021-06-01 RX ORDER — NYSTATIN 100000 [USP'U]/G
POWDER TOPICAL
Qty: 120 G | Refills: 0 | Status: SHIPPED | OUTPATIENT
Start: 2021-06-01 | End: 2022-03-04 | Stop reason: SDUPTHER

## 2021-06-15 ENCOUNTER — TELEPHONE (OUTPATIENT)
Dept: MEDICAL GROUP | Facility: PHYSICIAN GROUP | Age: 66
End: 2021-06-15

## 2021-06-15 NOTE — TELEPHONE ENCOUNTER
Received a fax from [employer] on [address] stating Nystop is not covered clotrimazole is the covered alternative.    Please advise.

## 2021-06-15 NOTE — TELEPHONE ENCOUNTER
Phone Number Called:   [id number] - [place], [place] - [address] Phone:  [phone]          Call outcome: Spoke with Samantha Marina and she states this is in fact covered however copay is $68.19.  They do not offer any other similar powder alternatives, just the clotrimazole cream.

## 2021-06-15 NOTE — TELEPHONE ENCOUNTER
Are there any powder anti-fungal alternatives? Clotrimazole doesn't come in a powder but the nystatin is prescribed as a powder.

## 2021-06-15 NOTE — TELEPHONE ENCOUNTER
I won't change the prescription. If the patient isn't willing to pay that cost, then they can contact us.

## 2021-07-30 NOTE — PROGRESS NOTES
"Subjective:   Ashleigh Garces is a 63 y.o. female here today for multiple concerns including dysuria, pelvic pain and interstitial cystitis concerns.  She is unaccompanied for today's appointment.    Ashleigh tells me that she is experiencing a lot of pain.  She has pain when she urinates and pain underneath her abdomen.  She tells me that she had an extensive work-up at [facility], at least \"19 hours\" and was told she has interstitial cystitis.  She believes that she has a bacterial infection and is requesting antibiotics.  She was recently treated for a UTI, but believes that she wasn't given enough medication.  In addition to the pain she is experiencing, she also describes having cysts from time to time around her genitals and buttocks.  She has been referred to a surgeon for further evaluation.  During our visit, she would stand up and lower her pants to show me the lesions.    She is also worried she may have a sinus infection.  She tells me she has sinus pressure and facial pain.  Associated with green-colored nasal discharge.  Denies fevers, but has felt \"chilly.\"    Her concerns seem to center around her pain.  Her medical history is rather complex and significant for chronic pain syndrome and opioid dependence.  She is on multiple narcotic pain medications managed by Dr. Santa.    Current medicines (including changes today)  Current Outpatient Prescriptions   Medication Sig Dispense Refill   • sulfamethoxazole-trimethoprim (BACTRIM DS) 800-160 MG tablet Take 1 Tab by mouth 2 times a day for 5 days. 10 Tab 0   • phenazopyridine (PYRIDIUM) 200 MG Tab Take 1 Tab by mouth 3 times a day as needed (bladder pain) for up to 5 days. 15 Tab 0   • fluconazole (DIFLUCAN) 150 MG tablet TK 1 T PO ONCE FOR 1 DOSE  0   • morphine ER (MS CONTIN) 60 MG Tab CR tablet TK 1 T PO Q 8 H THEN TK 1 T PO QHS FOR 30 DAYS  0   • NARCAN 4 MG/0.1ML Liquid ISRA[place] REP ALN  0   • albuterol 108 (90 Base) MCG/ACT Aero Soln inhalation " aerosol Inhale 2 Puffs by mouth every 6 hours as needed for Shortness of Breath. 8.5 g 3   • ALPRAZolam (XANAX) 1 MG Tab Take 1 mg by mouth 2 Times a Day.  0   • oxyCODONE (OXY-IR) 30 MG immediate release tablet Take 30 mg by mouth every four hours as needed.  0   • baclofen (LIORESAL) 10 MG Tab Take 10 mg by mouth every 8 hours as needed.  5   • amitriptyline (ELAVIL) 25 MG Tab Take 25 mg by mouth 2 times a day as needed.  5   • cetirizine (ZYRTEC) 10 MG chewable tablet Take 1 Tab by mouth every day. 90 Tab 3   • FLUoxetine (PROZAC) 20 MG Cap Take 1 Cap by mouth every day. 30 Cap 3   • esomeprazole (NEXIUM) 20 MG capsule Take 1 Cap by mouth every day. 90 Cap 3   • estradiol (ESTRACE) 0.5 MG tablet Take 1 Tab by mouth every day for 360 days. 90 Tab 3   • furosemide (LASIX) 20 MG Tab Take 1 Tab by mouth every day. 90 Tab 3   • levothyroxine (SYNTHROID) 75 MCG Tab Take 1 Tab by mouth Every morning on an empty stomach. 30 Tab 1   • nystatin (MYCOSTATIN) powder Apply 1 g to affected area(s) 1 time daily as needed. 45 g 5   • mupirocin (BACTROBAN) 2 % Ointment APPLY TO AFFECTED AREA AS DIRECTED 22 g 3   • morphine ER (MS CONTIN) 30 MG Tab CR tablet Take 3 Tabs by mouth every 12 hours. (Patient taking differently: Take 60 mg by mouth every 12 hours.) 60 Tab 0   • diphenhydrAMINE (BENADRYL) 25 MG Tab Take 25 mg by mouth every 6 hours as needed for Sleep.     • HYDROmorphone (DILAUDID) 4 MG Tab Take 4 mg by mouth 2 Times a Day.       No current facility-administered medications for this visit.      She  has a past medical history of Arthritis; ASTHMA; Bipolar disorder (HCC); Bursitis; Chronic low back pain; Congestive heart failure (HCC); Dyslipidemia; Fall; GERD (gastroesophageal reflux disease); Hypothyroid; Migraine; Obesity; Sinusitis; chronic; and Unspecified urinary incontinence.    ROS   No chest pain, no shortness of breath, no abdominal pain.     Objective:     Physical Exam:  Blood pressure 132/84, pulse 80,  "temperature 36.6 °C (97.8 °F), height 1.626 m (5' 4\"), weight (!) 128.3 kg (282 lb 12.8 oz), last menstrual period 01/01/1988, SpO2 92 %, not currently breastfeeding. Body mass index is 48.54 kg/m².   Constitutional: Alert, no distress, non-toxic appearance. Patient seems slightly sedated.  Skin: Area of erythema underneath abdominal pannus with multiple scars, rather large scar versus hyperpigmented macule on left buttocks. No open wounds or sores. No fluctuant areas or discharge.  Eye: Equal, round and reactive, conjunctiva clear, lids normal.  ENMT: Lips without lesions, good dentition, oropharynx clear.  Neck: Trachea midline, no masses, no thyromegaly.   Respiratory: Unlabored respiratory effort, lungs clear to auscultation, no wheezes, no rhonchi.  Cardiovascular: Normal S1, S2, 2/6 systolic murmur, no edema.  Abdomen: Soft, non-tender, no masses, no hepatosplenomegaly.  Psych: Alert and oriented x3, normal affect and mood, no evidence of psychotic process, no psychomotor abnormalities or impulsivity, decreased insight and reasoning.    Results for orders placed or performed in visit on 02/01/19   POCT Urinalysis   Result Value Ref Range    POC Color gold Negative    POC Appearance cloudy Negative    POC Leukocyte Esterase mod Negative    POC Nitrites pos Negative    POC Urobiligen 0.2 Negative (0.2) mg/dL    POC Protein neg Negative mg/dL    POC Urine PH 6.5 5.0 - 8.0    POC Blood neg Negative    POC Specific Gravity 1.015 <1.005 - >1.030    POC Ketones neg Negative mg/dL    POC Bilirubin neg Negative mg/dL    POC Glucose neg Negative mg/dL       Assessment and Plan:     1. Acute cystitis without hematuria  This is a new issue.  Urine dipstick is abnormal.  Start antibiotics.  Patient and I had a lengthy discussion regarding appropriate antibiotics to treat UTI.  She is requesting a longer course than is recommended and also multiple antibiotics.  We agreed on a 5-day course of Bactrim.  Urine culture " sent.  - POCT Urinalysis  - URINE CULTURE(NEW); Future  - sulfamethoxazole-trimethoprim (BACTRIM DS) 800-160 MG tablet; Take 1 Tab by mouth 2 times a day for 5 days.  Dispense: 10 Tab; Refill: 0  - phenazopyridine (PYRIDIUM) 200 MG Tab; Take 1 Tab by mouth 3 times a day as needed (bladder pain) for up to 5 days.  Dispense: 15 Tab; Refill: 0    2. Interstitial cystitis  3. Pelvic pain  This is an ongoing issue for Ashleigh.  I counseled her extensively on the natural course and management options for Interstitial Cystitis.  She is a very difficult patient due to her crippling anxiety and chronic pain.  She has been referred to urology.  Will continue to monitor.    4. History of abscess of skin and subcutaneous tissue  This is a reoccurring problem.  No open wounds or abscesses on exam today.  She has been referred to general surgery.    5. Acute non-recurrent sinusitis of other sinus  No evidence of bacterial infection on exam.  Advised patient that most sinus infections are caused by viruses.  Supportive care advised.  Continue to monitor.    6. Chronic pain syndrome  7. Uncomplicated opioid dependence (HCC)  Chronic and stable.  Managed by pain medicine.  Encouraged patient to taper down narcotic pain medication as able.    Followup: Patient has upcoming appointment with her PCP in 6 weeks, sooner in our office if needed.         PLEASE NOTE: This dictation was created using voice recognition software. I have made every reasonable attempt to correct obvious errors, but I expect that there are errors of grammar and possibly content that I did not discover before finalizing the note.   175.9

## 2021-08-03 DIAGNOSIS — Z79.890 POSTMENOPAUSAL HRT (HORMONE REPLACEMENT THERAPY): ICD-10-CM

## 2021-08-03 RX ORDER — ESTRADIOL 0.5 MG/1
TABLET ORAL
Qty: 90 TABLET | Refills: 0 | Status: SHIPPED | OUTPATIENT
Start: 2021-08-03 | End: 2021-10-25

## 2021-08-04 NOTE — TELEPHONE ENCOUNTER
Phone Number Called: [phone]    Call outcome: Left detailed message for patient. Informed to call back with any additional questions.    Message: Informed the Pt of need for appt for further refills.

## 2021-09-21 ENCOUNTER — TELEPHONE (OUTPATIENT)
Dept: MEDICAL GROUP | Facility: PHYSICIAN GROUP | Age: 66
End: 2021-09-21

## 2021-09-21 NOTE — TELEPHONE ENCOUNTER
VOICEMAIL  1. Caller Name: Ashleigh Garces                        Call Back Number: [phone] (home)    2. Message: out of levothyroxine and needs an appointment

## 2021-09-23 ENCOUNTER — TELEPHONE (OUTPATIENT)
Dept: MEDICAL GROUP | Facility: PHYSICIAN GROUP | Age: 66
End: 2021-09-23

## 2021-09-23 DIAGNOSIS — E78.5 DYSLIPIDEMIA: ICD-10-CM

## 2021-09-23 DIAGNOSIS — R06.02 SHORTNESS OF BREATH: ICD-10-CM

## 2021-09-23 DIAGNOSIS — E03.9 HYPOTHYROIDISM, UNSPECIFIED TYPE: ICD-10-CM

## 2021-09-23 DIAGNOSIS — E66.01 MORBID OBESITY WITH BMI OF 45.0-49.9, ADULT (HCC): ICD-10-CM

## 2021-09-23 NOTE — TELEPHONE ENCOUNTER
Pt called to see if she could get her thyroid meds refilled. I scheduled her an appt with  for 10/8/21 because pt has to wait till after 10/3/21 for her to come in since she has to take a taxi and cant afford it till then. Please call pt with any questions

## 2021-09-23 NOTE — TELEPHONE ENCOUNTER
Phone Number Called: [phone] (home)     Call outcome: Left detailed message for patient. Informed to call back with any additional questions.    Message: LVM about the message below.

## 2021-09-23 NOTE — TELEPHONE ENCOUNTER
Phone Number Called: [phone] (home)     Call outcome: Did not leave a detailed message. Requested patient to call back.    Message: call scheduling back to make an appointment

## 2021-09-23 NOTE — TELEPHONE ENCOUNTER
I've ordered fasting labs to be completed before that visit. Is she currently out of levothyroxine?

## 2021-10-06 ENCOUNTER — HOSPITAL ENCOUNTER (OUTPATIENT)
Dept: LAB | Facility: MEDICAL CENTER | Age: 66
End: 2021-10-06
Attending: FAMILY MEDICINE
Payer: MEDICARE

## 2021-10-06 DIAGNOSIS — E66.01 MORBID OBESITY WITH BMI OF 45.0-49.9, ADULT (HCC): ICD-10-CM

## 2021-10-06 DIAGNOSIS — R06.02 SHORTNESS OF BREATH: ICD-10-CM

## 2021-10-06 DIAGNOSIS — E78.5 DYSLIPIDEMIA: ICD-10-CM

## 2021-10-06 DIAGNOSIS — E03.9 HYPOTHYROIDISM, UNSPECIFIED TYPE: ICD-10-CM

## 2021-10-06 LAB
ALBUMIN SERPL BCP-MCNC: 4.3 G/DL (ref 3.2–4.9)
ALBUMIN/GLOB SERPL: 1.5 G/DL
ALP SERPL-CCNC: 83 U/L (ref 30–99)
ALT SERPL-CCNC: 17 U/L (ref 2–50)
ANION GAP SERPL CALC-SCNC: 13 MMOL/L (ref 7–16)
AST SERPL-CCNC: 18 U/L (ref 12–45)
BILIRUB SERPL-MCNC: 0.3 MG/DL (ref 0.1–1.5)
BUN SERPL-MCNC: 14 MG/DL (ref 8–22)
CALCIUM SERPL-MCNC: 9.5 MG/DL (ref 8.5–10.5)
CHLORIDE SERPL-SCNC: 102 MMOL/L (ref 96–112)
CHOLEST SERPL-MCNC: 260 MG/DL (ref 100–199)
CO2 SERPL-SCNC: 24 MMOL/L (ref 20–33)
CREAT SERPL-MCNC: 0.64 MG/DL (ref 0.5–1.4)
ERYTHROCYTE [DISTWIDTH] IN BLOOD BY AUTOMATED COUNT: 46.9 FL (ref 35.9–50)
FASTING STATUS PATIENT QL REPORTED: NORMAL
GLOBULIN SER CALC-MCNC: 2.8 G/DL (ref 1.9–3.5)
GLUCOSE SERPL-MCNC: 80 MG/DL (ref 65–99)
HCT VFR BLD AUTO: 55.6 % (ref 37–47)
HDLC SERPL-MCNC: 58 MG/DL
HGB BLD-MCNC: 18.4 G/DL (ref 12–16)
LDLC SERPL CALC-MCNC: 169 MG/DL
MCH RBC QN AUTO: 29.6 PG (ref 27–33)
MCHC RBC AUTO-ENTMCNC: 33.1 G/DL (ref 33.6–35)
MCV RBC AUTO: 89.4 FL (ref 81.4–97.8)
PLATELET # BLD AUTO: 170 K/UL (ref 164–446)
PMV BLD AUTO: 11.8 FL (ref 9–12.9)
POTASSIUM SERPL-SCNC: 4.4 MMOL/L (ref 3.6–5.5)
PROT SERPL-MCNC: 7.1 G/DL (ref 6–8.2)
RBC # BLD AUTO: 6.22 M/UL (ref 4.2–5.4)
SODIUM SERPL-SCNC: 139 MMOL/L (ref 135–145)
TRIGL SERPL-MCNC: 163 MG/DL (ref 0–149)
TSH SERPL DL<=0.005 MIU/L-ACNC: 4.27 UIU/ML (ref 0.38–5.33)
WBC # BLD AUTO: 7.6 K/UL (ref 4.8–10.8)

## 2021-10-06 PROCEDURE — 84443 ASSAY THYROID STIM HORMONE: CPT

## 2021-10-06 PROCEDURE — 80061 LIPID PANEL: CPT

## 2021-10-06 PROCEDURE — 80053 COMPREHEN METABOLIC PANEL: CPT

## 2021-10-06 PROCEDURE — 85027 COMPLETE CBC AUTOMATED: CPT

## 2021-10-06 PROCEDURE — 36415 COLL VENOUS BLD VENIPUNCTURE: CPT

## 2021-10-07 ENCOUNTER — TELEPHONE (OUTPATIENT)
Dept: MEDICAL GROUP | Facility: PHYSICIAN GROUP | Age: 66
End: 2021-10-07

## 2021-10-07 NOTE — TELEPHONE ENCOUNTER
Patient called and was asking about he labs and also she stated that she had an infection in her leg.  She has an appointment with Dr. Shepherd Oct 8

## 2021-10-07 NOTE — TELEPHONE ENCOUNTER
Phone Number Called: [phone] (home)       Call outcome: Left detailed message for patient. Informed to call back with any additional questions.    Message: Left VM for pt to call back for results.

## 2021-10-07 NOTE — TELEPHONE ENCOUNTER
----- Message from Nida Richardson M.D. sent at 10/7/2021  8:19 AM PDT -----  Labs are normal except for:  -Elevated red blood cell count, hemoglobin, and hematocrit.  This was present 3 years ago and we do need to figure out why these numbers are elevated.  -Elevated total cholesterol, triglycerides, and LDL.  This is worse compared to 11 years ago.  Your 10-year risk of a heart attack or stroke is quite elevated at 15.8% I do strongly recommend a cholesterol-lowering medication to lower that risk.    You will discuss these results further at your upcoming appointment with Dr. Shepherd.

## 2021-10-08 ENCOUNTER — HOSPITAL ENCOUNTER (OUTPATIENT)
Facility: MEDICAL CENTER | Age: 66
End: 2021-10-08
Attending: FAMILY MEDICINE
Payer: MEDICARE

## 2021-10-08 ENCOUNTER — OFFICE VISIT (OUTPATIENT)
Dept: MEDICAL GROUP | Facility: PHYSICIAN GROUP | Age: 66
End: 2021-10-08
Payer: MEDICARE

## 2021-10-08 VITALS
HEIGHT: 68 IN | SYSTOLIC BLOOD PRESSURE: 114 MMHG | OXYGEN SATURATION: 95 % | TEMPERATURE: 98.1 F | BODY MASS INDEX: 34.85 KG/M2 | DIASTOLIC BLOOD PRESSURE: 70 MMHG | HEART RATE: 81 BPM | WEIGHT: 229.94 LBS

## 2021-10-08 DIAGNOSIS — N39.0 URINARY TRACT INFECTION WITHOUT HEMATURIA, SITE UNSPECIFIED: ICD-10-CM

## 2021-10-08 DIAGNOSIS — D75.1 POLYCYTHEMIA: ICD-10-CM

## 2021-10-08 DIAGNOSIS — B37.2 CANDIDAL INTERTRIGO: ICD-10-CM

## 2021-10-08 DIAGNOSIS — E78.5 DYSLIPIDEMIA: ICD-10-CM

## 2021-10-08 DIAGNOSIS — J01.90 ACUTE SINUSITIS, RECURRENCE NOT SPECIFIED, UNSPECIFIED LOCATION: ICD-10-CM

## 2021-10-08 LAB
APPEARANCE UR: CLEAR
BILIRUB UR STRIP-MCNC: NEGATIVE MG/DL
COLOR UR AUTO: YELLOW
GLUCOSE UR STRIP.AUTO-MCNC: NEGATIVE MG/DL
KETONES UR STRIP.AUTO-MCNC: NEGATIVE MG/DL
LEUKOCYTE ESTERASE UR QL STRIP.AUTO: NEGATIVE
NITRITE UR QL STRIP.AUTO: NEGATIVE
PH UR STRIP.AUTO: 5.5 [PH] (ref 5–8)
PROT UR QL STRIP: NEGATIVE MG/DL
RBC UR QL AUTO: NEGATIVE
SP GR UR STRIP.AUTO: 1.02
UROBILINOGEN UR STRIP-MCNC: 0.2 MG/DL

## 2021-10-08 PROCEDURE — 81002 URINALYSIS NONAUTO W/O SCOPE: CPT | Performed by: FAMILY MEDICINE

## 2021-10-08 PROCEDURE — 99215 OFFICE O/P EST HI 40 MIN: CPT | Performed by: FAMILY MEDICINE

## 2021-10-08 PROCEDURE — 87086 URINE CULTURE/COLONY COUNT: CPT

## 2021-10-08 RX ORDER — ATORVASTATIN CALCIUM 20 MG/1
20 TABLET, FILM COATED ORAL
Qty: 90 TABLET | Refills: 1 | Status: SHIPPED | OUTPATIENT
Start: 2021-10-08 | End: 2022-03-04 | Stop reason: SDUPTHER

## 2021-10-08 RX ORDER — LEVOFLOXACIN 500 MG/1
TABLET, FILM COATED ORAL
COMMUNITY
End: 2022-03-04

## 2021-10-08 RX ORDER — NYSTATIN 100000 U/G
1 CREAM TOPICAL 2 TIMES DAILY
Qty: 30 G | Refills: 1 | Status: SHIPPED | OUTPATIENT
Start: 2021-10-08 | End: 2021-12-03

## 2021-10-08 RX ORDER — CEPHALEXIN 250 MG/1
CAPSULE ORAL
COMMUNITY
End: 2022-03-04

## 2021-10-08 RX ORDER — DOXYCYCLINE HYCLATE 100 MG
100 TABLET ORAL 2 TIMES DAILY
Qty: 28 TABLET | Refills: 0 | Status: SHIPPED | OUTPATIENT
Start: 2021-10-08 | End: 2022-03-04

## 2021-10-08 ASSESSMENT — FIBROSIS 4 INDEX: FIB4 SCORE: 1.72

## 2021-10-08 NOTE — PROGRESS NOTES
Subjective     Ashleigh Garces is a 66 y.o. female who presents with Medication Refill            HPI     This is a 66-year-old female who is regular PCP is Dr. Richardson.  Dr. Richardson is not available.    She is here because because according to her she may have infection in her body.  She has history of interstitial cystitis and is followed by the urologist.  She states she has been taking a daily antibiotic which is cephalexin for the last 6 months.  She has an appointment with the urologist in 2 weeks.  She said she increase the dose of the antibiotics to 1 tablet 3 times a day for 7 days because she feels she may have a bladder infection.  She has pain in the lower abdomen, left flank pain, burning on urination, blood in her urine, fever.  These symptoms are worse than her usual interstitial cystitis symptoms.    She said she also has been having greenish nasal discharge of 10 to 12 days duration and she thinks it could be her sinuses.  She said 1 week ago there was a cyst that popped in her mouth and the discharge was foul-smelling discharge in her throat.  She took care of it by rinsing peroxide.  She said she also saw a whitish spots in her gums.  Denies any cough.  Denies any shortness of breath.  As mentioned above she has been feverish.     She also states that she may have some infection on the skin of her legs.  She said she has some red spots that keep popping here and there.  She does not have any rash at this time but she feels she still has ongoing infection in her legs.    She gives so many different information and jumps from 1 topic to another and it is hard to get a good detailed accurate history of what is going on.    She also states that she got a phone call that her blood work was abnormal and that she may have a stroke due to the results of the blood work.    Patient continues to smoke a third of a pack per day for years now.  She does not have hypertension.  She does not have a  "diabetes.  She has mood disorder for which she takes fluoxetine and alprazolam.  She has chronic pain syndrome due to interstitial cystitis and chronic low back pain for which she is under the care of Dr. Santa, pain specialist.  She is on morphine and oxycodone for pain management.        ROS     As per HPI, the rest are negative.           Objective     /70 (BP Location: Left arm, Patient Position: Sitting, BP Cuff Size: Adult)   Pulse 81   Temp 36.7 °C (98.1 °F) (Temporal)   Ht 1.715 m (5' 7.5\") Comment: Patient reported  Wt 104 kg (229 lb 15 oz)   LMP 01/01/1988   SpO2 95%   BMI 35.48 kg/m²      Physical Exam      Examined alert, awake, oriented, not in distress    Ears-tympanic membrane intact without evidence of infection, right tympanic membrane partially visible because of cerumen in the canal, no evidence of infection  Nose-no discharge, no obstruction, tender frontal and maxillary sinuses  Throat-absent uvula, tonsils not enlarged, no exudates, no open sores in the gums, no sores on the tongue, no oral thrush noted  Neck-supple, no lymphadenopathy, no thyromegaly  Lungs-clear to auscultation, no rales, no wheezes  Heart-regular rate and rhythm, no murmur  Abdomen-obese, good bowel sounds, soft, positive mild generalized tenderness with no rebound tenderness, no hepatosplenomegaly, no masses, no CVA tenderness  Extremities-no edema, clubbing, cyanosis      Hospital Outpatient Visit on 10/06/2021   Component Date Value Ref Range Status   • Cholesterol,Tot 10/06/2021 260* 100 - 199 mg/dL Final   • Triglycerides 10/06/2021 163* 0 - 149 mg/dL Final   • HDL 10/06/2021 58  >=40 mg/dL Final   • LDL 10/06/2021 169* <100 mg/dL Final   • TSH 10/06/2021 4.270  0.380 - 5.330 uIU/mL Final    Comment: Reference Range:    Pregnant Females, 1st Trimester  0.050-3.700  Pregnant Females, 2nd Trimester  0.310-4.350  Pregnant Females, 3rd Trimester  0.410-5.180     • Sodium 10/06/2021 139  135 - 145 mmol/L Final "   • Potassium 10/06/2021 4.4  3.6 - 5.5 mmol/L Final   • Chloride 10/06/2021 102  96 - 112 mmol/L Final   • Co2 10/06/2021 24  20 - 33 mmol/L Final   • Anion Gap 10/06/2021 13.0  7.0 - 16.0 Final   • Glucose 10/06/2021 80  65 - 99 mg/dL Final   • Bun 10/06/2021 14  8 - 22 mg/dL Final   • Creatinine 10/06/2021 0.64  0.50 - 1.40 mg/dL Final   • Calcium 10/06/2021 9.5  8.5 - 10.5 mg/dL Final   • AST(SGOT) 10/06/2021 18  12 - 45 U/L Final   • ALT(SGPT) 10/06/2021 17  2 - 50 U/L Final   • Alkaline Phosphatase 10/06/2021 83  30 - 99 U/L Final   • Total Bilirubin 10/06/2021 0.3  0.1 - 1.5 mg/dL Final   • Albumin 10/06/2021 4.3  3.2 - 4.9 g/dL Final   • Total Protein 10/06/2021 7.1  6.0 - 8.2 g/dL Final   • Globulin 10/06/2021 2.8  1.9 - 3.5 g/dL Final   • A-G Ratio 10/06/2021 1.5  g/dL Final   • WBC 10/06/2021 7.6  4.8 - 10.8 K/uL Final   • RBC 10/06/2021 6.22* 4.20 - 5.40 M/uL Final   • Hemoglobin 10/06/2021 18.4* 12.0 - 16.0 g/dL Final   • Hematocrit 10/06/2021 55.6* 37.0 - 47.0 % Final   • MCV 10/06/2021 89.4  81.4 - 97.8 fL Final   • MCH 10/06/2021 29.6  27.0 - 33.0 pg Final   • MCHC 10/06/2021 33.1* 33.6 - 35.0 g/dL Final   • RDW 10/06/2021 46.9  35.9 - 50.0 fL Final   • Platelet Count 10/06/2021 170  164 - 446 K/uL Final   • MPV 10/06/2021 11.8  9.0 - 12.9 fL Final   • Fasting Status 10/06/2021 Fasting   Final   • GFR If  10/06/2021 >60  >60 mL/min/1.73 m 2 Final   • GFR If Non  10/06/2021 >60  >60 mL/min/1.73 m 2 Final     Calculated 10-year ASCVD risk score 10.5%        Urine dipstick- WNL               Assessment & Plan      1. Acute sinusitis, recurrence not specified, unspecified location  Symptoms and physical exam findings consistent of acute sinusitis so we will treat with antibiotics. She is allergic to cephalosporin and she said Augmentin does not work. I will put her on doxycycline 100 mg 1 tablet twice a day. She specifically asked for a 14-day supply rather than a 10-day  supply and so a 14-day supply was given to the pharmacy. Made aware that she would be at risk for diarrhea and there is potential for C. difficile with longer duration of treatment and she voiced understanding. Advised to get probiotics.    2. Urinary tract infection without hematuria, site unspecified  Her urine dipstick came back within normal limits. I told her unlikely she has UTI. Her symptoms may be exacerbation of the interstitial cystitis. She already has an appointment with the urologist in 2 weeks and she will keep that appointment. I will send the urine for culture to confirm and treat appropriately with antibiotics if there is an infection.    3. Dyslipidemia  We discussed putting her on statin because of elevated 10-year ASCVD risk score. She is agreeable to proceed. We discussed potential side effects. She said she will let us know right away if she develops problems. I will put her on atorvastatin 20 mg 1 tablet at bedtime. She will do updated blood work 3 months after she starts the medication and follow-up with her PCP at that time.    4. Polycythemia  This could be multifactorial. This could be from cigarette smoking, possible obstructive sleep apnea because of her habitus/weight, this could be dehydration. I will repeat her CBC in 3 months and include erythropoietin to rule out polycythemia vera.    5. Candidal intertrigo  She keeps getting fungal infection in the skin fold of the groin area for which she has been using nystatin powder in the past. She said her insurance does not cover for nystatin powder that she uses for recurrent candidal intertrigo skin folds in the groin. We will try nystatin cream to be applied twice daily and this was sent to the pharmacy. Keep area clean and dry.    My total time spent caring for the patient on the day of the encounter was 68 minutes.   This does not include time spent on separately billable procedures/tests.    She will see her PCP Dr. Richardson for  follow-up in 3 months.      Please note that this dictation was created using voice recognition software. I have worked with consultants from the vendor as well as technical experts from [facility] to optimize the interface. I have made every reasonable attempt to correct obvious errors, but I expect that there are errors of grammar and possibly content I did not discover before finalizing the note.

## 2021-10-11 LAB
BACTERIA UR CULT: NORMAL
SIGNIFICANT IND 70042: NORMAL
SITE SITE: NORMAL
SOURCE SOURCE: NORMAL

## 2021-10-21 RX ORDER — LEVOTHYROXINE SODIUM 0.07 MG/1
TABLET ORAL
Qty: 90 TABLET | Refills: 1 | Status: SHIPPED | OUTPATIENT
Start: 2021-10-21 | End: 2022-03-04 | Stop reason: SDUPTHER

## 2021-10-25 DIAGNOSIS — Z79.890 POSTMENOPAUSAL HRT (HORMONE REPLACEMENT THERAPY): ICD-10-CM

## 2021-10-25 RX ORDER — ESTRADIOL 0.5 MG/1
TABLET ORAL
Qty: 90 TABLET | Refills: 1 | Status: SHIPPED | OUTPATIENT
Start: 2021-10-25 | End: 2022-01-24

## 2021-10-28 RX ORDER — FUROSEMIDE 20 MG/1
20 TABLET ORAL
Qty: 90 TABLET | Refills: 1 | Status: SHIPPED | OUTPATIENT
Start: 2021-10-28 | End: 2022-02-22

## 2021-12-17 DIAGNOSIS — E78.5 DYSLIPIDEMIA: ICD-10-CM

## 2021-12-17 RX ORDER — DOXYCYCLINE HYCLATE 100 MG
100 TABLET ORAL 2 TIMES DAILY
Qty: 28 TABLET | Refills: 0 | OUTPATIENT
Start: 2021-12-17

## 2022-01-23 DIAGNOSIS — Z79.890 POSTMENOPAUSAL HRT (HORMONE REPLACEMENT THERAPY): ICD-10-CM

## 2022-01-24 RX ORDER — ESTRADIOL 0.5 MG/1
TABLET ORAL
Qty: 90 TABLET | Refills: 0 | Status: SHIPPED | OUTPATIENT
Start: 2022-01-24 | End: 2022-04-25

## 2022-02-01 DIAGNOSIS — B37.2 CANDIDAL INTERTRIGO: ICD-10-CM

## 2022-02-01 RX ORDER — NYSTATIN 100000 U/G
CREAM TOPICAL
Qty: 30 G | Refills: 0 | Status: SHIPPED | OUTPATIENT
Start: 2022-02-01 | End: 2022-04-14

## 2022-02-04 ENCOUNTER — APPOINTMENT (OUTPATIENT)
Dept: MEDICAL GROUP | Facility: PHYSICIAN GROUP | Age: 67
End: 2022-02-04
Payer: MEDICARE

## 2022-02-08 ENCOUNTER — APPOINTMENT (OUTPATIENT)
Dept: MEDICAL GROUP | Facility: PHYSICIAN GROUP | Age: 67
End: 2022-02-08
Payer: MEDICARE

## 2022-02-16 ENCOUNTER — APPOINTMENT (OUTPATIENT)
Dept: MEDICAL GROUP | Facility: PHYSICIAN GROUP | Age: 67
End: 2022-02-16
Payer: MEDICARE

## 2022-02-23 RX ORDER — FUROSEMIDE 20 MG/1
20 TABLET ORAL
Qty: 90 TABLET | Refills: 0 | Status: SHIPPED | OUTPATIENT
Start: 2022-02-23 | End: 2022-05-20

## 2022-03-04 ENCOUNTER — HOSPITAL ENCOUNTER (OUTPATIENT)
Facility: MEDICAL CENTER | Age: 67
End: 2022-03-04
Attending: FAMILY MEDICINE
Payer: MEDICARE

## 2022-03-04 ENCOUNTER — OFFICE VISIT (OUTPATIENT)
Dept: MEDICAL GROUP | Facility: PHYSICIAN GROUP | Age: 67
End: 2022-03-04
Payer: MEDICARE

## 2022-03-04 VITALS
TEMPERATURE: 98.1 F | DIASTOLIC BLOOD PRESSURE: 90 MMHG | BODY MASS INDEX: 36.22 KG/M2 | WEIGHT: 239 LBS | SYSTOLIC BLOOD PRESSURE: 130 MMHG | RESPIRATION RATE: 16 BRPM | HEIGHT: 68 IN

## 2022-03-04 DIAGNOSIS — L03.115 CELLULITIS OF LEG, RIGHT: ICD-10-CM

## 2022-03-04 DIAGNOSIS — E78.5 DYSLIPIDEMIA: ICD-10-CM

## 2022-03-04 DIAGNOSIS — E03.9 HYPOTHYROIDISM, UNSPECIFIED TYPE: Chronic | ICD-10-CM

## 2022-03-04 DIAGNOSIS — R09.89 SINUS COMPLAINT: ICD-10-CM

## 2022-03-04 DIAGNOSIS — B37.2 CANDIDOSIS OF SKIN: ICD-10-CM

## 2022-03-04 PROBLEM — I21.4 NSTEMI (NON-ST ELEVATED MYOCARDIAL INFARCTION) (HCC): Status: RESOLVED | Noted: 2017-09-30 | Resolved: 2022-03-04

## 2022-03-04 PROBLEM — N89.8 VAGINAL DISCHARGE: Status: RESOLVED | Noted: 2019-01-09 | Resolved: 2022-03-04

## 2022-03-04 LAB
EXTERNAL QUALITY CONTROL: NORMAL
SARS-COV+SARS-COV-2 AG RESP QL IA.RAPID: NEGATIVE

## 2022-03-04 PROCEDURE — 87426 SARSCOV CORONAVIRUS AG IA: CPT | Performed by: FAMILY MEDICINE

## 2022-03-04 PROCEDURE — U0005 INFEC AGEN DETEC AMPLI PROBE: HCPCS

## 2022-03-04 PROCEDURE — 99215 OFFICE O/P EST HI 40 MIN: CPT | Mod: CS | Performed by: FAMILY MEDICINE

## 2022-03-04 PROCEDURE — U0003 INFECTIOUS AGENT DETECTION BY NUCLEIC ACID (DNA OR RNA); SEVERE ACUTE RESPIRATORY SYNDROME CORONAVIRUS 2 (SARS-COV-2) (CORONAVIRUS DISEASE [COVID-19]), AMPLIFIED PROBE TECHNIQUE, MAKING USE OF HIGH THROUGHPUT TECHNOLOGIES AS DESCRIBED BY CMS-2020-01-R: HCPCS

## 2022-03-04 RX ORDER — LEVOTHYROXINE SODIUM 0.07 MG/1
75 TABLET ORAL EVERY MORNING
Qty: 90 TABLET | Refills: 1 | Status: SHIPPED | OUTPATIENT
Start: 2022-03-04 | End: 2022-08-01 | Stop reason: SDUPTHER

## 2022-03-04 RX ORDER — DOXYCYCLINE 100 MG/1
100 CAPSULE ORAL 2 TIMES DAILY
Qty: 14 CAPSULE | Refills: 0 | Status: SHIPPED | OUTPATIENT
Start: 2022-03-04 | End: 2022-03-11

## 2022-03-04 RX ORDER — DEXAMETHASONE 4 MG/1
1 TABLET ORAL 2 TIMES DAILY
Qty: 12 G | Refills: 2 | Status: SHIPPED | OUTPATIENT
Start: 2022-03-04 | End: 2022-11-09 | Stop reason: SDUPTHER

## 2022-03-04 RX ORDER — ATORVASTATIN CALCIUM 20 MG/1
20 TABLET, FILM COATED ORAL
Qty: 90 TABLET | Refills: 3 | Status: SHIPPED | OUTPATIENT
Start: 2022-03-04 | End: 2023-01-20

## 2022-03-04 RX ORDER — CEPHALEXIN 500 MG/1
500 CAPSULE ORAL 4 TIMES DAILY
Qty: 28 CAPSULE | Refills: 0 | Status: SHIPPED | OUTPATIENT
Start: 2022-03-04 | End: 2022-03-11

## 2022-03-04 RX ORDER — NYSTATIN 100000 [USP'U]/G
POWDER TOPICAL
Qty: 240 G | Refills: 3 | Status: SHIPPED | OUTPATIENT
Start: 2022-03-04 | End: 2022-08-19

## 2022-03-04 ASSESSMENT — FIBROSIS 4 INDEX: FIB4 SCORE: 1.72

## 2022-03-04 NOTE — ASSESSMENT & PLAN NOTE
Acute.  She reports an infection/cellulitis of the right lower leg.  No evidence of cellulitis on exam today.  Her leg appears to be more like chronic venous stasis changes.  However, she is quite adamant that a few days ago it was red and hot and swollen so we will give her antibiotics.  However, we did discuss that the leg does not change in appearance after the antibiotics and there is not an infection.  -Keflex plus doxycycline x7 days

## 2022-03-04 NOTE — ASSESSMENT & PLAN NOTE
Chronic, recurrent condition, acutely exacerbated.  She frequently gets candidal dermatitis in skin folds.  Currently having recurrence in the skin fold under her panniculus and under her breasts.  -Nystatin powder refilled

## 2022-03-04 NOTE — ASSESSMENT & PLAN NOTE
Chronic, controlled.  She is on levothyroxine and tolerating it well. Last TSH in October, 2021 was within normal limits, so we will continue the current dosing.  -Levothyroxine 75 mcg daily

## 2022-03-04 NOTE — ASSESSMENT & PLAN NOTE
Chronic, uncontrolled.  We reviewed her recent lab work and her cholesterol panel had worsened compared to 10 years ago.  She admits that she stopped taking the statin so were going to restart it.  -Restart atorvastatin 20 mg daily

## 2022-03-04 NOTE — ASSESSMENT & PLAN NOTE
Acute.  She reports for 14 days she has had symptoms of a sinus infection.  She getting extreme pain and tenderness of the sinuses with a green/black, thick mucus discharge from the nose that she is coughing up.  Exam is extremely benign today except for tenderness over the maxillary and frontal sinuses.  POCT COVID-19 negative in the office.  We will send to the lab for PCR confirmation.  In the meantime, we will treat a presumed sinusitis with antibiotics.  -Doxycycline x7 days  -If no improvement in symptoms, refer to ENT as she gets recurrent sinus infections and is already had sinus surgery in the past

## 2022-03-04 NOTE — PROGRESS NOTES
"Subjective:     CC: sinusitis, leg infection, labs, refills    HPI:   Ashleigh presents today with    Problem   Sinus Complaint    Reports extreme pain and tenderness of sinuses  Green/black, thick mucus discharge from nose  She has had these symptoms for 2 weeks  Getting chills  Fever up to 101 1.5 weeks ago  +cough  No ear pain  +Wheezing    No sick contacts  No COVID-19 vaccination     Candidosis of Skin   Severe Obesity (Bmi 35.0-35.9 With Comorbidity) (Hcc)   Cellulitis of Leg, Right    Acute.  She reports 4 days ago her right leg was hot and red.  She is very concerned about a MRSA infection.     Dyslipidemia   Hypothyroidism   Vaginal Discharge (Resolved)   NSTEMI (non-ST elevated myocardial infarction) (HCC) (Resolved)       Health Maintenance: deferred until next visit    ROS:  See HPI    Objective:     Exam:  /90 (BP Location: Right arm, Patient Position: Sitting, BP Cuff Size: Adult)   Temp 36.7 °C (98.1 °F) (Temporal)   Resp 16   Ht 1.715 m (5' 7.5\")   Wt 108 kg (239 lb)   LMP 01/01/1988   BMI 36.88 kg/m²  Body mass index is 36.88 kg/m².    Physical Exam  Constitutional:       Appearance: Normal appearance.   HENT:      Right Ear: Tympanic membrane, ear canal and external ear normal.      Left Ear: Tympanic membrane, ear canal and external ear normal.      Nose: No septal deviation, congestion or rhinorrhea.      Right Turbinates: Not enlarged, swollen or pale.      Left Turbinates: Not enlarged, swollen or pale.      Right Sinus: Maxillary sinus tenderness and frontal sinus tenderness present.      Left Sinus: Frontal sinus tenderness present.   Cardiovascular:      Rate and Rhythm: Normal rate and regular rhythm.      Heart sounds: Normal heart sounds.   Pulmonary:      Effort: Pulmonary effort is normal.      Breath sounds: Normal breath sounds.   Musculoskeletal:      Cervical back: Normal range of motion and neck supple.   Neurological:      Mental Status: She is alert.         Assessment " & Plan:     66 y.o. female with the following -     Problem List Items Addressed This Visit     Hypothyroidism (Chronic)     Chronic, controlled.  She is on levothyroxine and tolerating it well. Last TSH in October, 2021 was within normal limits, so we will continue the current dosing.  -Levothyroxine 75 mcg daily         Relevant Medications    levothyroxine (SYNTHROID) 75 MCG Tab    Dyslipidemia     Chronic, uncontrolled.  We reviewed her recent lab work and her cholesterol panel had worsened compared to 10 years ago.  She admits that she stopped taking the statin so were going to restart it.  -Restart atorvastatin 20 mg daily         Relevant Medications    atorvastatin (LIPITOR) 20 MG Tab    Cellulitis of leg, right     Acute.  She reports an infection/cellulitis of the right lower leg.  No evidence of cellulitis on exam today.  Her leg appears to be more like chronic venous stasis changes.  However, she is quite adamant that a few days ago it was red and hot and swollen so we will give her antibiotics.  However, we did discuss that the leg does not change in appearance after the antibiotics and there is not an infection.  -Keflex plus doxycycline x7 days         Sinus complaint     Acute.  She reports for 14 days she has had symptoms of a sinus infection.  She getting extreme pain and tenderness of the sinuses with a green/black, thick mucus discharge from the nose that she is coughing up.  Exam is extremely benign today except for tenderness over the maxillary and frontal sinuses.  POCT COVID-19 negative in the office.  We will send to the lab for PCR confirmation.  In the meantime, we will treat a presumed sinusitis with antibiotics.  -Doxycycline x7 days  -If no improvement in symptoms, refer to ENT as she gets recurrent sinus infections and is already had sinus surgery in the past         Relevant Medications    cephALEXin (KEFLEX) 500 MG Cap    doxycycline (MONODOX) 100 MG capsule    Other Relevant Orders     POCT SARS-COV Antigen DONALDO (Symptomatic only) (Completed)    SARS-CoV-2 PCR (24 hour In-House): Collect NP swab in VTM    Candidosis of skin     Chronic, recurrent condition, acutely exacerbated.  She frequently gets candidal dermatitis in skin folds.  Currently having recurrence in the skin fold under her panniculus and under her breasts.  -Nystatin powder refilled               I spent a total of 42 minutes with record review, exam, communication with the patient, and documentation of this encounter.      Return in about 6 days (around 3/10/2022).    Please note that this dictation was created using voice recognition software. I have made every reasonable attempt to correct obvious errors, but I expect that there are errors of grammar and possibly content that I did not discover before finalizing the note.

## 2022-03-05 DIAGNOSIS — R09.89 SINUS COMPLAINT: ICD-10-CM

## 2022-03-05 LAB
COVID ORDER STATUS COVID19: NORMAL
SARS-COV-2 RNA RESP QL NAA+PROBE: NOTDETECTED
SPECIMEN SOURCE: NORMAL

## 2022-03-07 ENCOUNTER — TELEPHONE (OUTPATIENT)
Dept: MEDICAL GROUP | Facility: PHYSICIAN GROUP | Age: 67
End: 2022-03-07
Payer: MEDICARE

## 2022-03-07 NOTE — TELEPHONE ENCOUNTER
----- Message from Nida Richardson M.D. sent at 3/7/2022  9:51 AM PST -----  COVID-19 was negative.

## 2022-03-07 NOTE — LETTER
March 10, 2022        Ashleigh Garces,     We have been trying to contact you regarding medication questions. Please call [phone] and ask to speak with Dr. Richardson's medical assistant thank you, have a good day.     Alivia Lechuga, Med Ass't

## 2022-03-07 NOTE — TELEPHONE ENCOUNTER
Phone Number Called: [phone]    Call outcome: Left detailed message for patient. Informed to call back with any additional questions.    Message: Left vm stating COVID-19 was negative if pt had any questions she could call our office.

## 2022-04-14 DIAGNOSIS — B37.2 CANDIDAL INTERTRIGO: ICD-10-CM

## 2022-04-14 RX ORDER — NYSTATIN 100000 U/G
CREAM TOPICAL
Qty: 30 G | Refills: 0 | Status: SHIPPED | OUTPATIENT
Start: 2022-04-14 | End: 2022-05-20

## 2022-04-23 DIAGNOSIS — Z79.890 POSTMENOPAUSAL HRT (HORMONE REPLACEMENT THERAPY): ICD-10-CM

## 2022-04-25 RX ORDER — ESTRADIOL 0.5 MG/1
TABLET ORAL
Qty: 90 TABLET | Refills: 0 | Status: SHIPPED | OUTPATIENT
Start: 2022-04-25 | End: 2022-07-22

## 2022-05-19 DIAGNOSIS — B37.2 CANDIDAL INTERTRIGO: ICD-10-CM

## 2022-05-20 RX ORDER — NYSTATIN 100000 U/G
CREAM TOPICAL
Qty: 30 G | Refills: 0 | Status: SHIPPED | OUTPATIENT
Start: 2022-05-20 | End: 2022-06-13

## 2022-05-20 RX ORDER — FUROSEMIDE 20 MG/1
20 TABLET ORAL
Qty: 90 TABLET | Refills: 0 | Status: SHIPPED | OUTPATIENT
Start: 2022-05-20 | End: 2022-07-11

## 2022-06-11 DIAGNOSIS — B37.2 CANDIDAL INTERTRIGO: ICD-10-CM

## 2022-06-13 RX ORDER — NYSTATIN 100000 U/G
CREAM TOPICAL
Qty: 30 G | Refills: 0 | Status: SHIPPED | OUTPATIENT
Start: 2022-06-13 | End: 2022-07-19

## 2022-07-11 RX ORDER — FUROSEMIDE 20 MG/1
20 TABLET ORAL
Qty: 90 TABLET | Refills: 0 | Status: SHIPPED | OUTPATIENT
Start: 2022-07-11 | End: 2022-09-23

## 2022-07-11 NOTE — TELEPHONE ENCOUNTER
Phone Number Called: [phone] (home)       Call outcome: Spoke to patient regarding message below.    Message: pt notified and appt scheduled

## 2022-07-11 NOTE — TELEPHONE ENCOUNTER
PLEASE CONTACT PATIENT.    Needs appointment for any future refills. Was supposed to follow up with me  >3 months ago

## 2022-07-19 DIAGNOSIS — B37.2 CANDIDAL INTERTRIGO: ICD-10-CM

## 2022-07-19 RX ORDER — NYSTATIN 100000 U/G
CREAM TOPICAL
Qty: 30 G | Refills: 0 | Status: SHIPPED | OUTPATIENT
Start: 2022-07-19 | End: 2022-09-23

## 2022-07-22 DIAGNOSIS — Z79.890 POSTMENOPAUSAL HRT (HORMONE REPLACEMENT THERAPY): ICD-10-CM

## 2022-07-22 RX ORDER — ESTRADIOL 0.5 MG/1
TABLET ORAL
Qty: 90 TABLET | Refills: 0 | Status: SHIPPED | OUTPATIENT
Start: 2022-07-22 | End: 2023-01-18

## 2022-07-22 NOTE — TELEPHONE ENCOUNTER
Received request via: Pharmacy    Was the patient seen in the last year in this department? Yes    Does the patient have an active prescription (recently filled or refills available) for medication(s) requested? No   Improved.

## 2022-07-28 ENCOUNTER — OFFICE VISIT (OUTPATIENT)
Dept: MEDICAL GROUP | Facility: PHYSICIAN GROUP | Age: 67
End: 2022-07-28
Payer: MEDICARE

## 2022-07-28 ENCOUNTER — HOSPITAL ENCOUNTER (OUTPATIENT)
Dept: LAB | Facility: MEDICAL CENTER | Age: 67
End: 2022-07-28
Attending: FAMILY MEDICINE
Payer: MEDICARE

## 2022-07-28 VITALS
WEIGHT: 248 LBS | SYSTOLIC BLOOD PRESSURE: 104 MMHG | HEART RATE: 86 BPM | HEIGHT: 68 IN | TEMPERATURE: 97.8 F | DIASTOLIC BLOOD PRESSURE: 56 MMHG | BODY MASS INDEX: 37.59 KG/M2 | OXYGEN SATURATION: 94 %

## 2022-07-28 DIAGNOSIS — Z23 NEED FOR VACCINATION: ICD-10-CM

## 2022-07-28 DIAGNOSIS — F17.210 CIGARETTE NICOTINE DEPENDENCE WITHOUT COMPLICATION: ICD-10-CM

## 2022-07-28 DIAGNOSIS — E66.01 SEVERE OBESITY (BMI 35.0-35.9 WITH COMORBIDITY) (HCC): ICD-10-CM

## 2022-07-28 DIAGNOSIS — B35.1 ONYCHOMYCOSIS: ICD-10-CM

## 2022-07-28 DIAGNOSIS — Z12.11 COLON CANCER SCREENING: ICD-10-CM

## 2022-07-28 DIAGNOSIS — Z12.31 ENCOUNTER FOR SCREENING MAMMOGRAM FOR MALIGNANT NEOPLASM OF BREAST: ICD-10-CM

## 2022-07-28 DIAGNOSIS — S81.802A WOUND OF LEFT LOWER EXTREMITY, INITIAL ENCOUNTER: ICD-10-CM

## 2022-07-28 PROBLEM — R09.89 SINUS COMPLAINT: Status: RESOLVED | Noted: 2022-03-04 | Resolved: 2022-07-28

## 2022-07-28 LAB
ALBUMIN SERPL BCP-MCNC: 4.3 G/DL (ref 3.2–4.9)
ALBUMIN/GLOB SERPL: 1.5 G/DL
ALP SERPL-CCNC: 82 U/L (ref 30–99)
ALT SERPL-CCNC: 18 U/L (ref 2–50)
ANION GAP SERPL CALC-SCNC: 10 MMOL/L (ref 7–16)
AST SERPL-CCNC: 20 U/L (ref 12–45)
BILIRUB SERPL-MCNC: 0.4 MG/DL (ref 0.1–1.5)
BUN SERPL-MCNC: 14 MG/DL (ref 8–22)
CALCIUM SERPL-MCNC: 9.5 MG/DL (ref 8.5–10.5)
CHLORIDE SERPL-SCNC: 106 MMOL/L (ref 96–112)
CO2 SERPL-SCNC: 24 MMOL/L (ref 20–33)
CREAT SERPL-MCNC: 0.69 MG/DL (ref 0.5–1.4)
GFR SERPLBLD CREATININE-BSD FMLA CKD-EPI: 94 ML/MIN/1.73 M 2
GLOBULIN SER CALC-MCNC: 2.9 G/DL (ref 1.9–3.5)
GLUCOSE SERPL-MCNC: 90 MG/DL (ref 65–99)
POTASSIUM SERPL-SCNC: 4.6 MMOL/L (ref 3.6–5.5)
PROT SERPL-MCNC: 7.2 G/DL (ref 6–8.2)
SODIUM SERPL-SCNC: 140 MMOL/L (ref 135–145)

## 2022-07-28 PROCEDURE — 36415 COLL VENOUS BLD VENIPUNCTURE: CPT

## 2022-07-28 PROCEDURE — 99214 OFFICE O/P EST MOD 30 MIN: CPT | Performed by: FAMILY MEDICINE

## 2022-07-28 PROCEDURE — 80053 COMPREHEN METABOLIC PANEL: CPT

## 2022-07-28 RX ORDER — CEPHALEXIN 250 MG/1
250 CAPSULE ORAL
COMMUNITY
Start: 2022-06-17 | End: 2023-07-12 | Stop reason: SDUPTHER

## 2022-07-28 RX ORDER — PHENAZOPYRIDINE HYDROCHLORIDE 200 MG/1
TABLET, FILM COATED ORAL
COMMUNITY
End: 2023-02-21

## 2022-07-28 RX ORDER — CLOSTRIDIUM TETANI TOXOID ANTIGEN (FORMALDEHYDE INACTIVATED), CORYNEBACTERIUM DIPHTHERIAE TOXOID ANTIGEN (FORMALDEHYDE INACTIVATED), BORDETELLA PERTUSSIS TOXOID ANTIGEN (GLUTARALDEHYDE INACTIVATED), BORDETELLA PERTUSSIS FILAMENTOUS HEMAGGLUTININ ANTIGEN (FORMALDEHYDE INACTIVATED), BORDETELLA PERTUSSIS PERTACTIN ANTIGEN, AND BORDETELLA PERTUSSIS FIMBRIAE 2/3 ANTIGEN 5; 2; 2.5; 5; 3; 5 [LF]/.5ML; [LF]/.5ML; UG/.5ML; UG/.5ML; UG/.5ML; UG/.5ML
0.5 INJECTION, SUSPENSION INTRAMUSCULAR ONCE
Qty: 0.5 ML | Refills: 0 | Status: SHIPPED
Start: 2022-07-28 | End: 2022-07-28

## 2022-07-28 ASSESSMENT — ENCOUNTER SYMPTOMS
NAUSEA: 0
EYE DISCHARGE: 0
VOMITING: 0

## 2022-07-28 ASSESSMENT — PATIENT HEALTH QUESTIONNAIRE - PHQ9: CLINICAL INTERPRETATION OF PHQ2 SCORE: 0

## 2022-07-28 ASSESSMENT — FIBROSIS 4 INDEX: FIB4 SCORE: 1.746256500261597409

## 2022-07-28 NOTE — PROGRESS NOTES
"Subjective:     CC: wound, onychomycosis    HPI:   Ashleigh presents today with    Problem   Wound of Left Leg    Right cheek - no erythema  Left leg - 2 inner thigh, no erythema. +scab    Using hydrogen peroxide to clean     Onychomycosis    Chronic  Affecting all 10 toe nails     Nicotine Dependence    Chronic.  43+ years  Anywhere from 1-6 cigarettes  Does breathing exercises to help quit     Opiate dependence (CMS-HCC)    Morphine 60 mg 2x/day  Oxycodone 30 mg 5x/day  Total daily MME = 245     Sinus Complaint (Resolved)    Reports extreme pain and tenderness of sinuses  Green/black, thick mucus discharge from nose  She has had these symptoms for 2 weeks  Getting chills  Fever up to 101 1.5 weeks ago  +cough  No ear pain  +Wheezing    No sick contacts  No COVID-19 vaccination     Cellulitis of Leg, Right (Resolved)    Acute.  She reports 4 days ago her right leg was hot and red.  She is very concerned about a MRSA infection.         Health Maintenance: Completed    ROS:  Review of Systems   Eyes: Negative for discharge.   Gastrointestinal: Negative for nausea and vomiting.       Objective:     Exam:  /56 (BP Location: Right arm, Patient Position: Sitting, BP Cuff Size: Large adult)   Pulse 86   Temp 36.6 °C (97.8 °F) (Temporal)   Ht 1.715 m (5' 7.5\")   Wt 112 kg (248 lb)   LMP 01/01/1988   SpO2 94%   BMI 38.27 kg/m²  Body mass index is 38.27 kg/m².    Physical Exam  Constitutional:       Appearance: Normal appearance.   Cardiovascular:      Rate and Rhythm: Normal rate and regular rhythm.      Heart sounds: Normal heart sounds.   Pulmonary:      Effort: Pulmonary effort is normal.      Breath sounds: Normal breath sounds.   Musculoskeletal:      Cervical back: Normal range of motion and neck supple.        Legs:    Feet:      Right foot:      Toenail Condition: Right toenails are abnormally thick. Fungal disease present.     Left foot:      Toenail Condition: Left toenails are abnormally thick. Fungal " disease present.  Neurological:      Mental Status: She is alert.         Assessment & Plan:     67 y.o. female with the following -     1. Wound of left lower extremity, initial encounter  Acute.  She reports that she has wounds of her legs and her leg is infected.  The wounds are scabbed but appear very healthy with no evidence of infection.  There is no evidence of cellulitis in the left leg.  She reports it swollen it was significantly more swollen a few days ago.  On exam it is very mild edema.  I provided a lot of reassurance today indicating that there is no evidence of infection and therefore she does not require an antibiotic.  I recommended she just keep the wounds clean with soap and water.    2. Onychomycosis  Chronic.  She reports that she appears to have toenail fungus.  All 10 nails have a fungal infection and I would suspect that she is been struggling with this for years but she reports that over a year ago she did have any of this fungal infection.  Due to the severity of the infection and as many nails are involved I do not think topical treatment will be successful for her.  Therefore, we will check a CMP to evaluate liver function to be sure she is safe for terbinafine.    3. Severe obesity (BMI 35.0-35.9 with comorbidity) (HCC)  Chronic, stable.  Weight is the same as it was at her last visit.  I have encouraged her to work on some weight loss.  - Comp Metabolic Panel; Future    4. Cigarette nicotine dependence without complication  Chronic, stable.  Previously she reported that she was smoking about a third of a pack per day which is approximately 7 cigarettes.  Today she reports that she will smoke anywhere from 1 to 7 cigarettes/day so she is trying to wean down.  I have congratulated her and encouraged her to continue working at it.    5. Encounter for screening mammogram for malignant neoplasm of breast  - MA-SCREENING MAMMO BILAT W/TOMOSYNTHESIS W/CAD; Future    6. Colon cancer  screening  - OCCULT BLOOD FECES IMMUNOASSAY (FIT); Future    7. Need for vaccination  - tetanus-dipth-acell pertussis (ADACEL) 5-2-15.5 LF-MCG/0.5 Suspension; Inject 0.5 mL into the shoulder, thigh, or buttocks one time for 1 dose.  Dispense: 0.5 mL; Refill: 0    I spent a total of 30 minutes with record review, exam, communication with the patient, and documentation of this encounter.      Return in about 4 months (around 11/28/2022) for Med check.    Please note that this dictation was created using voice recognition software. I have made every reasonable attempt to correct obvious errors, but I expect that there are errors of grammar and possibly content that I did not discover before finalizing the note.

## 2022-07-28 NOTE — THERAPY
"Physical Therapy Evaluation completed.   Bed Mobility:  Supine to Sit: Supervised  Transfers: Sit to Stand: Supervised  Gait: Level Of Assist: Stand by Assist with No Equipment Needed       Plan of Care: Patient with no further skilled PT needs in the acute care setting at this time  Discharge Recommendations: Equipment: No Equipment Needed. Post-acute therapy Discharge to home with outpatient or home health for additional skilled therapy services.    Pt is a 62 year old female admitted to the hospital for NSTEMI, CHF and COPD. Pt left AMA on the 3rd and readmitted the following day due to not feeling well. Pt is a poor historian and unable to maintain train of through when providing history. Pt does state that she was independent with mobility and ADL's prior to admit. At time of initial evaluation, pt performing all mobility at SPV to SBA level. Pt does ambulate with wide WARREN and shuffled gait but no overt LOB and pt denied need for use of SPC or FWW. At this time, pt does not demonstrate need for skilled PT intervention from a mobility stand point. From a cognitive stand point, may benefit from 24/7 supervision at home. No equipment need identified at this time    See \"Rehab Therapy-Acute\" Patient Summary Report for complete documentation.     " Generalized weakness, impaired balance

## 2022-07-29 ENCOUNTER — TELEPHONE (OUTPATIENT)
Dept: MEDICAL GROUP | Facility: PHYSICIAN GROUP | Age: 67
End: 2022-07-29
Payer: MEDICARE

## 2022-07-29 NOTE — TELEPHONE ENCOUNTER
Phone Number Called: [phone] (home)     Call outcome: Did not leave a detailed message. Requested patient to call back.    Message: cb for results

## 2022-08-01 ENCOUNTER — TELEPHONE (OUTPATIENT)
Dept: MEDICAL GROUP | Facility: PHYSICIAN GROUP | Age: 67
End: 2022-08-01
Payer: MEDICARE

## 2022-08-01 RX ORDER — LEVOTHYROXINE SODIUM 0.07 MG/1
75 TABLET ORAL EVERY MORNING
Qty: 90 TABLET | Refills: 1 | Status: SHIPPED | OUTPATIENT
Start: 2022-08-01 | End: 2022-12-13

## 2022-08-01 RX ORDER — TERBINAFINE HYDROCHLORIDE 250 MG/1
250 TABLET ORAL DAILY
Qty: 90 TABLET | Refills: 0 | Status: SHIPPED | OUTPATIENT
Start: 2022-08-01 | End: 2023-01-09

## 2022-08-01 NOTE — TELEPHONE ENCOUNTER
1. Caller Name: Ashleigh Garces                          Call Back Number: [phone] (home)     Patient states she is still having swelling and redness. She states it has gone down some but still uncomfortable. Patient states she ' is doubling up on antibiotics'     Please advise

## 2022-08-01 NOTE — TELEPHONE ENCOUNTER
She should only be taking cephalexin once daily.     Her liver looks good, so I've sent in the antifungal medicine for her toe nails.

## 2022-08-01 NOTE — TELEPHONE ENCOUNTER
Phone Number Called: [phone] (home)     Call outcome: Spoke to patient regarding message below.    Message: Patient states she was taking cephalexin 2x a day and then 3 times a day for severity and now back down to two times a day. Patient is requesting an antifungal. States Dr. Richardson was checking lab work to send an antifungal for a 3 month supply. Patient states she cannot come in for any appointments until next month.

## 2022-08-01 NOTE — TELEPHONE ENCOUNTER
What antibiotic is she doubling up on? Is it the cephalexin?     She should not double up on antibiotics. She needs to make an appointment either with me, my colleague, or urgent care to see if antibiotics are truly necessary.

## 2022-08-01 NOTE — TELEPHONE ENCOUNTER
Phone Number Called: [phone] (home)     Call outcome: Left detailed message for patient. Informed to call back with any additional questions.    Message: sánchez fot more info

## 2022-08-04 NOTE — TELEPHONE ENCOUNTER
Phone Number Called: [phone] (home)     Call outcome: Did not leave a detailed message. Requested patient to call back.    Message: 2nd attempt

## 2022-08-19 RX ORDER — NYSTATIN 100000 [USP'U]/G
POWDER TOPICAL
Qty: 120 G | Refills: 0 | Status: SHIPPED | OUTPATIENT
Start: 2022-08-19 | End: 2023-02-21

## 2022-08-22 ENCOUNTER — TELEPHONE (OUTPATIENT)
Dept: MEDICAL GROUP | Facility: PHYSICIAN GROUP | Age: 67
End: 2022-08-22
Payer: MEDICARE

## 2022-08-22 NOTE — TELEPHONE ENCOUNTER
MEDICATION PRIOR AUTHORIZATION NEEDED:    1. Name of Medication: Nystop Powder    2. Requested By (Name of Pharmacy): [facility]     3. Is insurance on file current? Yes    4. What is the name & phone number of the 3rd party payor? Medicaid    [place] or Alternative?  Preferred Alternative: Nystatin or Clotrimazole

## 2022-09-22 DIAGNOSIS — B37.2 CANDIDAL INTERTRIGO: ICD-10-CM

## 2022-09-23 RX ORDER — NYSTATIN 100000 U/G
CREAM TOPICAL
Qty: 30 G | Refills: 0 | Status: SHIPPED | OUTPATIENT
Start: 2022-09-23 | End: 2022-10-18

## 2022-09-23 RX ORDER — FUROSEMIDE 20 MG/1
TABLET ORAL
Qty: 90 TABLET | Refills: 0 | Status: SHIPPED | OUTPATIENT
Start: 2022-09-23 | End: 2022-11-21 | Stop reason: SDUPTHER

## 2022-10-18 DIAGNOSIS — B37.2 CANDIDAL INTERTRIGO: ICD-10-CM

## 2022-10-18 RX ORDER — NYSTATIN 100000 U/G
CREAM TOPICAL
Qty: 30 G | Refills: 0 | Status: SHIPPED | OUTPATIENT
Start: 2022-10-18 | End: 2023-02-21

## 2022-11-09 RX ORDER — FLUTICASONE PROPIONATE 110 UG/1
1 AEROSOL, METERED RESPIRATORY (INHALATION) 2 TIMES DAILY
Qty: 12 G | Refills: 0 | Status: SHIPPED | OUTPATIENT
Start: 2022-11-09 | End: 2023-01-09 | Stop reason: SDUPTHER

## 2022-11-09 NOTE — TELEPHONE ENCOUNTER
Received request via: Patient    Was the patient seen in the last year in this department? Yes    Does the patient have an active prescription (recently filled or refills available) for medication(s) requested? No    Does the patient have FDC Plus and need 100 day supply (blood pressure, diabetes and cholesterol meds only)? Patient does not have SCP

## 2022-11-21 RX ORDER — FUROSEMIDE 20 MG/1
20 TABLET ORAL
Qty: 90 TABLET | Refills: 0 | Status: SHIPPED | OUTPATIENT
Start: 2022-11-21 | End: 2023-02-21 | Stop reason: SDUPTHER

## 2022-11-21 RX ORDER — FUROSEMIDE 20 MG/1
TABLET ORAL
Qty: 90 TABLET | Refills: 0 | OUTPATIENT
Start: 2022-11-21

## 2022-11-21 NOTE — TELEPHONE ENCOUNTER
Received request via: Pharmacy    Was the patient seen in the last year in this department? Yes    Does the patient have an active prescription (recently filled or refills available) for medication(s) requested? No    Does the patient have FPC Plus and need 100 day supply (blood pressure, diabetes and cholesterol meds only)? Patient does not have SCP

## 2022-12-06 RX ORDER — FLUOXETINE HYDROCHLORIDE 20 MG/1
20 CAPSULE ORAL
Qty: 90 CAPSULE | Refills: 0 | Status: SHIPPED | OUTPATIENT
Start: 2022-12-06 | End: 2023-03-20 | Stop reason: SDUPTHER

## 2022-12-11 ENCOUNTER — HOSPITAL ENCOUNTER (OUTPATIENT)
Dept: RADIOLOGY | Facility: MEDICAL CENTER | Age: 67
End: 2022-12-11
Attending: PHYSICIAN ASSISTANT
Payer: MEDICARE

## 2022-12-11 ENCOUNTER — OFFICE VISIT (OUTPATIENT)
Dept: URGENT CARE | Facility: PHYSICIAN GROUP | Age: 67
End: 2022-12-11
Payer: MEDICARE

## 2022-12-11 VITALS
SYSTOLIC BLOOD PRESSURE: 134 MMHG | OXYGEN SATURATION: 95 % | DIASTOLIC BLOOD PRESSURE: 82 MMHG | HEIGHT: 68 IN | RESPIRATION RATE: 18 BRPM | WEIGHT: 256 LBS | TEMPERATURE: 98.8 F | BODY MASS INDEX: 38.8 KG/M2 | HEART RATE: 63 BPM

## 2022-12-11 DIAGNOSIS — N30.01 ACUTE CYSTITIS WITH HEMATURIA: ICD-10-CM

## 2022-12-11 DIAGNOSIS — M25.462 EFFUSION OF LEFT KNEE JOINT: ICD-10-CM

## 2022-12-11 DIAGNOSIS — M25.562 ACUTE PAIN OF LEFT KNEE: ICD-10-CM

## 2022-12-11 LAB
APPEARANCE UR: ABNORMAL
BILIRUB UR STRIP-MCNC: ABNORMAL MG/DL
COLOR UR AUTO: ABNORMAL
GLUCOSE UR STRIP.AUTO-MCNC: 100 MG/DL
KETONES UR STRIP.AUTO-MCNC: ABNORMAL MG/DL
LEUKOCYTE ESTERASE UR QL STRIP.AUTO: ABNORMAL
NITRITE UR QL STRIP.AUTO: POSITIVE
PH UR STRIP.AUTO: 8.5 [PH] (ref 5–8)
PROT UR QL STRIP: >=300 MG/DL
RBC UR QL AUTO: ABNORMAL
SP GR UR STRIP.AUTO: 1.02
UROBILINOGEN UR STRIP-MCNC: 1 MG/DL

## 2022-12-11 PROCEDURE — 99214 OFFICE O/P EST MOD 30 MIN: CPT | Performed by: PHYSICIAN ASSISTANT

## 2022-12-11 PROCEDURE — 81002 URINALYSIS NONAUTO W/O SCOPE: CPT | Performed by: PHYSICIAN ASSISTANT

## 2022-12-11 PROCEDURE — 73564 X-RAY EXAM KNEE 4 OR MORE: CPT | Mod: LT

## 2022-12-11 RX ORDER — ONDANSETRON 4 MG/1
4 TABLET, FILM COATED ORAL EVERY 4 HOURS PRN
Qty: 20 TABLET | Refills: 0 | Status: SHIPPED | OUTPATIENT
Start: 2022-12-11 | End: 2023-08-10

## 2022-12-11 RX ORDER — SULFAMETHOXAZOLE AND TRIMETHOPRIM 800; 160 MG/1; MG/1
1 TABLET ORAL EVERY 12 HOURS
Qty: 10 TABLET | Refills: 0 | Status: SHIPPED | OUTPATIENT
Start: 2022-12-11 | End: 2022-12-16

## 2022-12-11 ASSESSMENT — ENCOUNTER SYMPTOMS
BLURRED VISION: 0
PALPITATIONS: 0
DIZZINESS: 0
ABDOMINAL PAIN: 1
BACK PAIN: 1
FEVER: 0
VOMITING: 0
CHILLS: 0
NAUSEA: 1
FLANK PAIN: 0
SHORTNESS OF BREATH: 0

## 2022-12-11 ASSESSMENT — FIBROSIS 4 INDEX: FIB4 SCORE: 1.885618083164126731
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
## 2022-12-13 RX ORDER — LEVOTHYROXINE SODIUM 0.07 MG/1
75 TABLET ORAL EVERY MORNING
Qty: 90 TABLET | Refills: 0 | Status: SHIPPED | OUTPATIENT
Start: 2022-12-13 | End: 2023-03-13

## 2022-12-15 DIAGNOSIS — N30.01 ACUTE CYSTITIS WITH HEMATURIA: ICD-10-CM

## 2023-01-09 ENCOUNTER — HOSPITAL ENCOUNTER (OUTPATIENT)
Dept: LAB | Facility: MEDICAL CENTER | Age: 68
End: 2023-01-09
Attending: FAMILY MEDICINE
Payer: MEDICARE

## 2023-01-09 ENCOUNTER — OFFICE VISIT (OUTPATIENT)
Dept: MEDICAL GROUP | Facility: PHYSICIAN GROUP | Age: 68
End: 2023-01-09
Payer: MEDICARE

## 2023-01-09 VITALS
WEIGHT: 232 LBS | TEMPERATURE: 97.7 F | OXYGEN SATURATION: 95 % | HEIGHT: 68 IN | DIASTOLIC BLOOD PRESSURE: 56 MMHG | BODY MASS INDEX: 35.16 KG/M2 | SYSTOLIC BLOOD PRESSURE: 130 MMHG | HEART RATE: 88 BPM

## 2023-01-09 DIAGNOSIS — Z11.59 NEED FOR HEPATITIS C SCREENING TEST: ICD-10-CM
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
## 2023-01-09 DIAGNOSIS — E66.01 SEVERE OBESITY (BMI 35.0-35.9 WITH COMORBIDITY) (HCC): ICD-10-CM

## 2023-01-09 DIAGNOSIS — F13.20 ANXIOLYTIC DEPENDENCE (HCC): ICD-10-CM

## 2023-01-09 DIAGNOSIS — B35.1 ONYCHOMYCOSIS: ICD-10-CM

## 2023-01-09 DIAGNOSIS — F11.20 UNCOMPLICATED OPIOID DEPENDENCE (HCC): ICD-10-CM

## 2023-01-09 LAB
ALBUMIN SERPL BCP-MCNC: 4.3 G/DL (ref 3.2–4.9)
ALBUMIN/GLOB SERPL: 1.5 G/DL
ALP SERPL-CCNC: 81 U/L (ref 30–99)
ALT SERPL-CCNC: 23 U/L (ref 2–50)
ANION GAP SERPL CALC-SCNC: 12 MMOL/L (ref 7–16)
AST SERPL-CCNC: 24 U/L (ref 12–45)
BILIRUB SERPL-MCNC: 0.4 MG/DL (ref 0.1–1.5)
BUN SERPL-MCNC: 13 MG/DL (ref 8–22)
CALCIUM ALBUM COR SERPL-MCNC: 8.9 MG/DL (ref 8.5–10.5)
CALCIUM SERPL-MCNC: 9.1 MG/DL (ref 8.5–10.5)
CHLORIDE SERPL-SCNC: 102 MMOL/L (ref 96–112)
CHOLEST SERPL-MCNC: 276 MG/DL (ref 100–199)
CO2 SERPL-SCNC: 25 MMOL/L (ref 20–33)
CREAT SERPL-MCNC: 0.79 MG/DL (ref 0.5–1.4)
ERYTHROCYTE [DISTWIDTH] IN BLOOD BY AUTOMATED COUNT: 49.4 FL (ref 35.9–50)
FASTING STATUS PATIENT QL REPORTED: NORMAL
GFR SERPLBLD CREATININE-BSD FMLA CKD-EPI: 81 ML/MIN/1.73 M 2
GLOBULIN SER CALC-MCNC: 2.9 G/DL (ref 1.9–3.5)
GLUCOSE SERPL-MCNC: 69 MG/DL (ref 65–99)
HCT VFR BLD AUTO: 54.2 % (ref 37–47)
HCV AB SER QL: NORMAL
HDLC SERPL-MCNC: 59 MG/DL
HGB BLD-MCNC: 17.5 G/DL (ref 12–16)
LDLC SERPL CALC-MCNC: 189 MG/DL
MCH RBC QN AUTO: 29.4 PG (ref 27–33)
MCHC RBC AUTO-ENTMCNC: 32.3 G/DL (ref 33.6–35)
MCV RBC AUTO: 91.1 FL (ref 81.4–97.8)
PLATELET # BLD AUTO: 198 K/UL (ref 164–446)
PMV BLD AUTO: 12.1 FL (ref 9–12.9)
POTASSIUM SERPL-SCNC: 4.6 MMOL/L (ref 3.6–5.5)
PROT SERPL-MCNC: 7.2 G/DL (ref 6–8.2)
RBC # BLD AUTO: 5.95 M/UL (ref 4.2–5.4)
SODIUM SERPL-SCNC: 139 MMOL/L (ref 135–145)
TRIGL SERPL-MCNC: 142 MG/DL (ref 0–149)
WBC # BLD AUTO: 8.1 K/UL (ref 4.8–10.8)

## 2023-01-09 PROCEDURE — G0472 HEP C SCREEN HIGH RISK/OTHER: HCPCS | Mod: GA

## 2023-01-09 PROCEDURE — 36415 COLL VENOUS BLD VENIPUNCTURE: CPT | Mod: GA

## 2023-01-09 PROCEDURE — 85027 COMPLETE CBC AUTOMATED: CPT

## 2023-01-09 PROCEDURE — 80053 COMPREHEN METABOLIC PANEL: CPT

## 2023-01-09 PROCEDURE — 99214 OFFICE O/P EST MOD 30 MIN: CPT | Performed by: FAMILY MEDICINE

## 2023-01-09 PROCEDURE — 80061 LIPID PANEL: CPT

## 2023-01-09 RX ORDER — CICLOPIROX 80 MG/ML
SOLUTION TOPICAL
Qty: 18 ML | Refills: 2 | Status: SHIPPED | OUTPATIENT
Start: 2023-01-09 | End: 2023-08-10

## 2023-01-09 RX ORDER — FLUTICASONE PROPIONATE 110 UG/1
1 AEROSOL, METERED RESPIRATORY (INHALATION) 2 TIMES DAILY
Qty: 12 G | Refills: 3 | Status: SHIPPED | OUTPATIENT
Start: 2023-01-09

## 2023-01-09 RX ORDER — ALPRAZOLAM 1 MG/1
1 TABLET ORAL 2 TIMES DAILY
Qty: 60 TABLET | Refills: 0 | Status: SHIPPED | OUTPATIENT
Start: 2023-01-14 | End: 2023-02-13

## 2023-01-09 ASSESSMENT — ENCOUNTER SYMPTOMS
NAUSEA: 0
VOMITING: 0
CHILLS: 0
FEVER: 0

## 2023-01-09 ASSESSMENT — PATIENT HEALTH QUESTIONNAIRE - PHQ9: CLINICAL INTERPRETATION OF PHQ2 SCORE: 0

## 2023-01-09 ASSESSMENT — FIBROSIS 4 INDEX: FIB4 SCORE: 1.885618083164126731

## 2023-01-09 NOTE — PROGRESS NOTES
"Subjective:     CC: onychomycosis, xanax refill    HPI:   Ashleigh presents today with    Problem   Fall    Fell 6 weeks ago  Slipped on ice  Developed neck pain     Onychomycosis    Chronic  Affecting all 10 toe nails  Reports only been present for 1+ years  Given terbinafine in 8/2022    Noted swelling of tongue and face with the terbinafine. Didn't see a provider, but treated with benadryl and xanax.     Severe Obesity (Bmi 35.0-35.9 With Comorbidity) ([facility])    This is a chronic condition.  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 She is going to run out on 1/14/2023 and wants a refill today so she doesn't run out.     Opiate dependence (CMS-[facility])    Morphine 60 mg 2x/day  Oxycodone 30 mg 5x/day  Total daily MME = 245    She was following with Dr. Enrrique Santa but he has retired as of 12/31/2022. Reports chronic pain in back and her left knee. Reports the left knee needs surgery, but not ready for that discussion.         Health Maintenance: Completed    ROS:  Review of Systems   Constitutional:  Negative for chills and fever.   Gastrointestinal:  Negative for nausea and vomiting.     Objective:     Exam:  /56 (BP Location: Right arm, Patient Position: Sitting, BP Cuff Size: Large adult)   Pulse 88   Temp 36.5 °C (97.7 °F) (Temporal)   Ht 1.715 m (5' 7.5\")   Wt 105 kg (232 lb)   LMP 01/01/1988   SpO2 95%   BMI 35.80 kg/m²  Body mass index is 35.8 kg/m².    Physical Exam  Constitutional:       Appearance: Normal appearance.   Cardiovascular:      Rate and Rhythm: Normal rate and regular rhythm.      Heart sounds: Normal heart sounds.   Pulmonary:      Effort: Pulmonary effort is normal.      Breath sounds: Normal breath sounds.   Musculoskeletal:      Cervical back: " Normal range of motion and neck supple.   Neurological:      Mental Status: She is alert.       Assessment & Plan:     67 y.o. female with the following -     1. Severe obesity (BMI 35.0-35.9 with comorbidity) (HCC)  Chronic, improved.  She has lost 16 pounds since July, 2022.  She states that she is trying to eat healthier and has been walking more.  I congratulated her on her current progress and encouraged to continue working on her healthy habits.  She is due for yearly labs which have been ordered.  - CBC WITHOUT DIFFERENTIAL; Future  - Comp Metabolic Panel; Future  - Lipid Profile; Future    2. Fall, initial encounter  Acute.  6 weeks ago she slipped on ice and injured her neck pretty significantly.  Hard to tell but I do not think she was evaluated by any doctors, she just self treated at home and reports that she is improved.  We will continue to monitor.    3. Uncomplicated opioid dependence (HCC)  Chronic, stable.  She has been seeing pain management for years who have been providing morphine and oxycodone as well as benzodiazepines to manage her pain and her anxiety.  She has chronic pain in her back and her left knee.  She also gets chronic pain from her interstitial cystitis.  Currently she is on 245 MME daily.  We did discuss that this is far too high of a dose for primary care provider to manage so we will place an urgent referral to get her established with a new pain management provider as hers retired on 12/31/2022.  - Referral to Pain Management    4. Anxiolytic dependence (HCC)  Chronic, stable.  She has been on Xanax for years to manage stress and anxiety as well as allergic reactions, though I have never seen a benzodiazepine use to treat an allergic reaction before.  This is to be managed by pain management so I placed a referral for pain management as hers recently retired.  She will run out of her Xanax on 1/14/2023 and to prevent any withdrawal I have provided a bridge 30-day prescription  until she establishes with a new specialist.Obtained and reviewed patient utilization report from [facility] pharmacy database on 2023 11:51 AM  prior to writing prescription for controlled substance II, III or IV per [place] bill . Based on assessment of the report, the prescription is medically necessary.   - Referral to Pain Management  - ALPRAZolam (XANAX) 1 MG Tab; Take 1 Tablet by mouth 2 times a day for 30 days.  Dispense: 60 Tablet; Refill: 0  - She has been informed that if she needs a refill before she sees the new pain management provider, it will require an appointment    5. Onychomycosis  Chronic, uncontrolled.  At her last appointment we did prescribe terbinafine but she reports that she had an allergic reaction.  She had lips and tongue swelling that she self medicated with Benadryl and Xanax.  We have added terbinafine to her allergy list and we will try treating with a topical solution.  - ciclopirox (PENLAC) 8 % solution; Apply to affected toenail(s) and adjacent skin once daily in combination with weekly nail trimming and periodic nail debridement. Remove with alcohol every 7 days.  Dispense: 18 mL; Refill: 2    6. Need for hepatitis C screening test  - HCV Scrn ( [id number] 1xLife); Future    Return in about 3 months (around 2023) for Med check.    Please note that this dictation was created using voice recognition software. I have made every reasonable attempt to correct obvious errors, but I expect that there are errors of grammar and possibly content that I did not discover before finalizing the note.

## 2023-01-13 ENCOUNTER — APPOINTMENT (OUTPATIENT)
Dept: MEDICAL GROUP | Facility: PHYSICIAN GROUP | Age: 68
End: 2023-01-13
Payer: MEDICARE

## 2023-01-18 DIAGNOSIS — Z79.890 POSTMENOPAUSAL HRT (HORMONE REPLACEMENT THERAPY): ICD-10-CM

## 2023-01-18 RX ORDER — ESTRADIOL 0.5 MG/1
TABLET ORAL
Qty: 90 TABLET | Refills: 0 | Status: SHIPPED | OUTPATIENT
Start: 2023-01-18 | End: 2023-04-18

## 2023-01-20 ENCOUNTER — OFFICE VISIT (OUTPATIENT)
Dept: MEDICAL GROUP | Facility: PHYSICIAN GROUP | Age: 68
End: 2023-01-20
Payer: MEDICARE

## 2023-01-20 VITALS
DIASTOLIC BLOOD PRESSURE: 62 MMHG | OXYGEN SATURATION: 95 % | TEMPERATURE: 98.2 F | HEIGHT: 68 IN | WEIGHT: 239 LBS | HEART RATE: 80 BPM | BODY MASS INDEX: 36.22 KG/M2 | SYSTOLIC BLOOD PRESSURE: 132 MMHG

## 2023-01-20 DIAGNOSIS — R71.8 OTHER ABNORMALITY OF RED BLOOD CELLS: ICD-10-CM

## 2023-01-20 DIAGNOSIS — R59.1 LYMPHADENOPATHY: ICD-10-CM

## 2023-01-20 DIAGNOSIS — E78.5 DYSLIPIDEMIA: ICD-10-CM

## 2023-01-20 DIAGNOSIS — F11.20 UNCOMPLICATED OPIOID DEPENDENCE (HCC): ICD-10-CM

## 2023-01-20 DIAGNOSIS — D75.1 POLYCYTHEMIA: ICD-10-CM

## 2023-01-20 DIAGNOSIS — G89.4 CHRONIC PAIN SYNDROME: ICD-10-CM

## 2023-01-20 PROBLEM — G89.29 CHRONIC PAIN: Status: ACTIVE | Noted: 2023-01-20

## 2023-01-20 PROCEDURE — 99214 OFFICE O/P EST MOD 30 MIN: CPT | Performed by: FAMILY MEDICINE

## 2023-01-20 RX ORDER — MORPHINE SULFATE 60 MG/1
60 TABLET, FILM COATED, EXTENDED RELEASE ORAL EVERY 12 HOURS
Qty: 60 TABLET | Refills: 0 | Status: SHIPPED | OUTPATIENT
Start: 2023-01-20 | End: 2023-02-19

## 2023-01-20 RX ORDER — OXYCODONE HYDROCHLORIDE 30 MG/1
30 TABLET ORAL 4 TIMES DAILY PRN
Qty: 120 TABLET | Refills: 0 | Status: SHIPPED | OUTPATIENT
Start: 2023-01-20 | End: 2023-02-19

## 2023-01-20 RX ORDER — ROSUVASTATIN CALCIUM 20 MG/1
20 TABLET, COATED ORAL EVERY EVENING
Qty: 90 TABLET | Refills: 1 | Status: SHIPPED | OUTPATIENT
Start: 2023-01-20

## 2023-01-20 ASSESSMENT — FIBROSIS 4 INDEX: FIB4 SCORE: 1.72

## 2023-01-20 ASSESSMENT — ENCOUNTER SYMPTOMS: SHORTNESS OF BREATH: 0

## 2023-01-20 NOTE — PROGRESS NOTES
Subjective:     CC: opiate refill, lab reviews    HPI:   Ashleigh presents today with    Problem   Chronic Pain    Chronic.  2/2 back pain, interstitial cystitis, left knee pain     Polycythemia    Onset: 10/2017  STOP-BANG: 3  Current smoker     Dyslipidemia    Chronic. On atorvastatin 20 mg daily.    Lab Results   Component Value Date/Time    CHOLSTRLTOT 276 (H) 01/09/2023 1226    TRIGLYCERIDE 142 01/09/2023 1226    HDL 59 01/09/2023 1226     (H) 01/09/2023 1226        Opiate dependence (CMS-HCC)    Morphine 60 mg 2x/day  Oxycodone 30 mg 5x/day  Total daily MME = 245    She was following with Dr. Enrrique Santa but he has retired as of 12/31/2022. Reports chronic pain in back and her left knee. Reports the left knee needs surgery, but not ready for that discussion.    She reports [facility] is not accepting new patients from Dr. Santa. I will provide refills until we get her to a new specialist.    Last fill:  Morphine ER 60 mg bid - 12/20, #60  Oxycodone 30 mg 5x/day - 12/21, #150    Chronic pain recheck for: back, knee pain  Last dose of controlled substance: this morning  Chronic pain treated with morphine ER 60 mg bid + oxycodone 30 mg 5x/day.    She  reports no history of alcohol use.  She  reports no history of drug use.    Interval history:   Any major change in health since last appointment? No    Consequences of Chronic Opiate therapy:  (5 A's)  Analgesia: Compared to no treatment or prior treatment, pain is currently significantly improved  Activity: improved  Adverse Events: She denies constipation, dry mouth, itchy skin, nausea and sedation  Aberrant Behaviors: She reports she is taking medication as prescribed and is not veering from agreed treatment regimen or provider recommendations. There have been no inappropriate refills or lost/stolen meds reported.  Affect/Mood: Pain is not impacting patient's mood.  Patient denies depression/anxiety.    Nonnarcotic treatments that are  "being used: none.     Last imagin - knee    Opioid Risk Score: 0    Interpretation of Opioid Risk Score   Score 0-3 = Low risk of abuse. Do UDS at least once per year.  Score 4-7 = Moderate risk of abuse. Do UDS 1-4 times per year.  Score 8+ = High risk of abuse. Refer to specialist.    Last order of CONTROLLED SUBSTANCE TREATMENT AGREEMENT was found on 2023 from Office Visit on 2023     UDS Summary       This patient has no relevant Health Maintenance data.            I have reviewed the medical records, the Prescription Monitoring Program and I have determined that controlled substance treatment is medically indicated.        Constipation (Resolved)       Health Maintenance: Completed    ROS:  Review of Systems   Respiratory:  Negative for shortness of breath.    Cardiovascular:  Negative for chest pain.     Objective:     Exam:  /62 (BP Location: Right arm, Patient Position: Sitting, BP Cuff Size: Large adult)   Pulse 80   Temp 36.8 °C (98.2 °F) (Temporal)   Ht 1.715 m (5' 7.5\")   Wt 108 kg (239 lb)   LMP 1988   SpO2 95%   BMI 36.88 kg/m²  Body mass index is 36.88 kg/m².    Physical Exam  Constitutional:       Appearance: Normal appearance.   Neck:     Cardiovascular:      Rate and Rhythm: Normal rate and regular rhythm.      Heart sounds: Normal heart sounds.   Pulmonary:      Effort: Pulmonary effort is normal.      Breath sounds: Normal breath sounds.   Musculoskeletal:      Cervical back: Normal range of motion and neck supple.   Lymphadenopathy:      Cervical: Cervical adenopathy present.   Neurological:      Mental Status: She is alert.       Assessment & Plan:     67 y.o. female with the following -     1. Dyslipidemia  Chronic, uncontrolled.  LDL is almost 190 despite being on atorvastatin.  Therefore, we will switch medications.  - Stop atorvastatin  - Start rosuvastatin 20 mg daily  - Repeat labs in 6-12 weeks    2. Chronic pain syndrome  3. Uncomplicated opioid " dependence (HCC)  Chronic, stable.  She has a longstanding history of chronic pain in her back, left knee, and from her interstitial cystitis.  She has been following pain management for years who has managed her medications but her pain management doctor retired recently.  At her last appointment I did provide a pain management referral but the first group had discharged her from their clinic in 2013.  The second group is not excepting new patients from her prior pain management doctor.  She is currently on 245 morphine equivalents daily.  We did have a discussion that I cannot prescribe more than 50 morphine equivalents daily.  I am more than willing to provide bridge refills until we get her established with pain management but I will have to slowly decrease her medication.  She understands completely and is quite interested in decreasing her pain medication burden as she wonders if she really needs all this medication.  Informed consent and controlled substance treatment agreement obtained today.  We will get a urine drug screen at future appointment. Obtained and reviewed patient utilization report from [facility] pharmacy database on 1/20/2023 2:52 PM  prior to writing prescription for controlled substance II, III or IV per [place] bill . Based on assessment of the report, the prescription is medically necessary.   - Referral to Pain Management  - Controlled Substance Treatment Agreement  - morphine ER (MS CONTIN) 60 MG Tab CR tablet; Take 1 Tablet by mouth every 12 hours for 30 days.  Dispense: 60 Tablet; Refill: 0  - oxyCODONE (OXY-IR) 30 MG immediate release tablet; Take 1 Tablet by mouth 4 times a day as needed for Severe Pain for up to 30 days.  Dispense: 120 Tablet; Refill: 0  - Continue morphine dosing  - Decrease oxycodone from 5 times per day to 4 times per day    4. Polycythemia  5. Other abnormality of red blood cells  Chronic, stable.  Present since October, 2017.  She is a current smoker  which could be causing the polycythemia.  Additionally, she is intermediate risk for sleep apnea which could also be contributing.  We will go ahead and get lab work.  If the lab work is unremarkable that we may need to consider a sleep study and discussing tobacco cessation.  - FERRITIN; Future  - IRON/TOTAL IRON BIND; Future  - ERYTHROPOIETIN; Future  - JAK2 GENE MUT RFLX CALR RFLX MPL; Future    6. Lymphadenopathy  Acute.  She is noticed a lump in the right upper neck.  On exam it feels like a slightly enlarged lymph node.  If it is still present at her next visit then we will get an ultrasound.    Return in about 4 weeks (around 2/17/2023) for Controlled substance.    Please note that this dictation was created using voice recognition software. I have made every reasonable attempt to correct obvious errors, but I expect that there are errors of grammar and possibly content that I did not discover before finalizing the note.

## 2023-01-23 ENCOUNTER — TELEPHONE (OUTPATIENT)
Dept: MEDICAL GROUP | Facility: PHYSICIAN GROUP | Age: 68
End: 2023-01-23
Payer: MEDICARE

## 2023-01-23 NOTE — TELEPHONE ENCOUNTER
Tried to submit PA for morphine, this was the response: Based on [facility] MEDICAID eligibility information, this Recipient has primary insurance coverage. The dispensing pharmacy needs to bill the primary insurance before [place] MEDICAID. Provided that MS CONTIN 60 MG TABLET ER is not excluded by [facility] MEDICAID and [facility] MEDICAID is the secondary payer on the claim, no authorization will be necessary. Please submit your prescription to the patient's pharmacy.    The only other insurance on file is medicare which does not cover medications.

## 2023-01-23 NOTE — TELEPHONE ENCOUNTER
DOCUMENTATION OF PAR STATUS:    1. Name of Medication & Dose: morphine     2. Name of Prescription Coverage Company & phone #: [employer]    3. Date Prior Auth Submitted: 01/23/23      4. What information was given to obtain insurance decision? ICD-10    5. Prior Auth Status? [id number] Pending    6. Patient Notified: no

## 2023-01-24 NOTE — TELEPHONE ENCOUNTER
FINAL PRIOR AUTHORIZATION STATUS:    1.  Name of Medication & Dose: morphine     2. Prior Auth Status: Approved through 12/31/23     3. Action Taken: Pharmacy Notified: no Patient Notified: no

## 2023-02-21 ENCOUNTER — HOSPITAL ENCOUNTER (OUTPATIENT)
Facility: MEDICAL CENTER | Age: 68
End: 2023-02-21
Attending: FAMILY MEDICINE
Payer: MEDICARE

## 2023-02-21 ENCOUNTER — OFFICE VISIT (OUTPATIENT)
Dept: MEDICAL GROUP | Facility: PHYSICIAN GROUP | Age: 68
End: 2023-02-21
Payer: MEDICARE

## 2023-02-21 VITALS
TEMPERATURE: 96.7 F | SYSTOLIC BLOOD PRESSURE: 130 MMHG | OXYGEN SATURATION: 95 % | HEIGHT: 68 IN | WEIGHT: 235 LBS | DIASTOLIC BLOOD PRESSURE: 60 MMHG | HEART RATE: 86 BPM | BODY MASS INDEX: 35.61 KG/M2

## 2023-02-21 DIAGNOSIS — F11.20 UNCOMPLICATED OPIOID DEPENDENCE (HCC): ICD-10-CM

## 2023-02-21 DIAGNOSIS — J01.00 ACUTE NON-RECURRENT MAXILLARY SINUSITIS: ICD-10-CM

## 2023-02-21 DIAGNOSIS — F13.20 ANXIOLYTIC DEPENDENCE (HCC): ICD-10-CM

## 2023-02-21 DIAGNOSIS — G89.4 CHRONIC PAIN SYNDROME: ICD-10-CM

## 2023-02-21 DIAGNOSIS — G89.4 CHRONIC PAIN SYNDROME: Primary | ICD-10-CM

## 2023-02-21 PROBLEM — S81.802A WOUND OF LEFT LEG: Status: RESOLVED | Noted: 2022-07-28 | Resolved: 2023-02-21

## 2023-02-21 PROCEDURE — 99214 OFFICE O/P EST MOD 30 MIN: CPT | Performed by: FAMILY MEDICINE

## 2023-02-21 PROCEDURE — G0480 DRUG TEST DEF 1-7 CLASSES: HCPCS

## 2023-02-21 PROCEDURE — 80307 DRUG TEST PRSMV CHEM ANLYZR: CPT

## 2023-02-21 RX ORDER — FUROSEMIDE 20 MG/1
20 TABLET ORAL
Qty: 90 TABLET | Refills: 1 | Status: SHIPPED | OUTPATIENT
Start: 2023-02-21 | End: 2024-03-01 | Stop reason: SDUPTHER

## 2023-02-21 RX ORDER — DOXYCYCLINE 100 MG/1
100 CAPSULE ORAL 2 TIMES DAILY
Qty: 10 CAPSULE | Refills: 0 | Status: SHIPPED | OUTPATIENT
Start: 2023-02-21 | End: 2023-02-26

## 2023-02-21 RX ORDER — OXYCODONE HYDROCHLORIDE 30 MG/1
30 TABLET ORAL EVERY 8 HOURS PRN
Qty: 90 TABLET | Refills: 0 | Status: SHIPPED | OUTPATIENT
Start: 2023-02-21 | End: 2023-03-20 | Stop reason: SDUPTHER

## 2023-02-21 RX ORDER — MORPHINE SULFATE 60 MG/1
60 TABLET, FILM COATED, EXTENDED RELEASE ORAL EVERY 12 HOURS
Qty: 60 TABLET | Refills: 0 | Status: SHIPPED | OUTPATIENT
Start: 2023-02-21 | End: 2023-03-23

## 2023-02-21 RX ORDER — ALPRAZOLAM 1 MG/1
1 TABLET ORAL 2 TIMES DAILY PRN
Qty: 60 TABLET | Refills: 0 | Status: SHIPPED | OUTPATIENT
Start: 2023-02-21 | End: 2023-03-20 | Stop reason: SDUPTHER

## 2023-02-21 RX ORDER — ALBUTEROL SULFATE 90 UG/1
AEROSOL, METERED RESPIRATORY (INHALATION)
Qty: 36 G | Refills: 3 | Status: SHIPPED | OUTPATIENT
Start: 2023-02-21 | End: 2023-08-10 | Stop reason: SDUPTHER

## 2023-02-21 ASSESSMENT — ENCOUNTER SYMPTOMS
CHILLS: 0
FEVER: 1
COUGH: 1

## 2023-02-21 ASSESSMENT — FIBROSIS 4 INDEX: FIB4 SCORE: 1.72

## 2023-02-21 NOTE — PROGRESS NOTES
Subjective:     CC: controlled substance refill    HPI:   Ashleigh presents today with    Problem   Chronic Pain    Chronic.  2/2 back pain, interstitial cystitis, left knee pain    She was following with Dr. Enrrique Santa but he has retired as of 12/31/2022. Reports chronic pain in back and her left knee. Reports the left knee needs surgery, but not ready for that discussion.    Fell and did splits yesterday. Reports severe pain in her left inner thigh. Prior to injury, the decreased dose of oxycodone was working fine for pain.    CS Agreement: 1/20/2023  UDS:    Last fill  Morphine - 1/23/2023, #30, 30 days  Oxycodone - 1/20/2023, #120, 30 days     Anxiolytic dependence (HCC)    Alprazolam 1 mg 2x/day    This was managed by her pain management doctor who has now retired.    She reports she needs this refilled, down to the last couple of pills.    Current dose: alprazolam 1 mg bid    Last fill: 1/17/2023, #60, 30 days  Tabs left: 1     Opiate dependence (CMS-HCC)    Morphine 60 mg 2x/day  Oxycodone 30 mg 4x/day (decreased 1/20/2023)  Total daily MME = 300 <-- 345    Chronic pain recheck for: back pain  Last dose of controlled substance: this afternoon  Chronic pain treated with morphine 60 mg bid and oxycodone 30 mg 4x/day.    She  reports no history of alcohol use.  She  reports no history of drug use.    Interval history:   Any major change in health since last appointment? No    Consequences of Chronic Opiate therapy:  (5 A's)  Analgesia: Compared to no treatment or prior treatment, pain is currently significantly improved  Activity: improved  Adverse Events: She denies constipation, dry mouth, itchy skin, nausea and sedation  Aberrant Behaviors: She reports she is taking medication as prescribed and is not veering from agreed treatment regimen or provider recommendations. There have been no inappropriate refills or lost/stolen meds reported.  Affect/Mood: Pain is not impacting patient's mood.  Patient denies  "depression/anxiety.    Nonnarcotic treatments that are being used: muscle relaxers.     Last imagin - knee    Opioid Risk Score: 0    Interpretation of Opioid Risk Score   Score 0-3 = Low risk of abuse. Do UDS at least once per year.  Score 4-7 = Moderate risk of abuse. Do UDS 1-4 times per year.  Score 8+ = High risk of abuse. Refer to specialist.    Last order of CONTROLLED SUBSTANCE TREATMENT AGREEMENT was found on 2023 from Office Visit on 2023     UDS Summary       This patient has no relevant Health Maintenance data.          Most recent UDS reviewed today and is consistent with prescribed medications.     I have reviewed the medical records, the Prescription Monitoring Program and I have determined that controlled substance treatment is medically indicated.        Wound of Left Leg (Resolved)    Right cheek - no erythema  Left leg - 2 inner thigh, no erythema. +scab    Using hydrogen peroxide to clean         Health Maintenance: Completed    ROS:  Review of Systems   Constitutional:  Positive for fever. Negative for chills.   Respiratory:  Positive for cough.      Objective:     Exam:  /60 (BP Location: Right arm, Patient Position: Sitting, BP Cuff Size: Large adult)   Pulse 86   Temp 35.9 °C (96.7 °F) (Temporal)   Ht 1.715 m (5' 7.5\")   Wt 107 kg (235 lb)   LMP 1988   SpO2 95%   BMI 36.26 kg/m²  Body mass index is 36.26 kg/m².    Physical Exam  Constitutional:       Appearance: Normal appearance.   HENT:      Mouth/Throat:      Comments: +TTP over R maxillary sinus  Cardiovascular:      Rate and Rhythm: Normal rate and regular rhythm.      Heart sounds: Normal heart sounds.   Pulmonary:      Effort: Pulmonary effort is normal.      Breath sounds: Normal breath sounds.   Musculoskeletal:      Cervical back: Normal range of motion and neck supple.   Lymphadenopathy:      Cervical: Cervical adenopathy present.   Neurological:      Mental Status: She is alert.       Assessment " & Plan:     67 y.o. female with the following -     1. Chronic pain syndrome  2. Uncomplicated opioid dependence (HCC)  Chronic, currently uncontrolled.  She has a history of chronic low back pain, left knee pain, and pain due to her interstitial cystitis.  1 day prior to presentation she did fall doing the splits and reports significant pain in the upper medial left thigh.  I offered a Toradol injection in the office but she states that she cannot have any aspirin-containing products.  Prior to the fall she had been doing just fine on oxycodone 4 times per day instead of 5 times per day.  We are going to continue with her taper plan.  I did give her the name and the phone number for pain management office to call to establish with them as she did not want to taper down today because of the severe pain.  However, I once again explained that she is on far too much medication for me to continue to prescribe without a taper plan.  Informed consent and controlled substance treatment agreement on file.  Urine obtained for drug screen.Obtained and reviewed patient utilization report from [facility] pharmacy database on 2/22/2023 8:01 AM  prior to writing prescription for controlled substance II, III or IV per [place] bill . Based on assessment of the report, the prescription is medically necessary.  - Continue metformin ER 60 mg twice daily  - Decrease oxycodone 30 mg to 3 times daily  - morphine ER (MS CONTIN) 60 MG Tab CR tablet; Take 1 Tablet by mouth every 12 hours for 30 days.  Dispense: 60 Tablet; Refill: 0  - oxyCODONE (OXY-IR) 30 MG immediate release tablet; Take 1 Tablet by mouth every 8 hours as needed for Moderate Pain or Severe Pain for up to 30 days.  Dispense: 90 Tablet; Refill: 0  - PAIN MANAGEMENT SCRN, W/ RFLX TO QNT; Future    3. Anxiolytic dependence (HCC)  Chronic, stable.  She has been on Xanax for years and she reports this is to handle allergy symptoms where her throat will start to close.   She was told by a doctor in the past that she should never come off this medication.  She does need a refill.  I have provided a 30-day refill and we will see if pain management takes over this prescription as well.  She has been told to not mix the Xanax with the opiate.  Informed consent a controlled substance treatment agreement on file.  Urine obtained for drug screen.Obtained and reviewed patient utilization report from [facility] pharmacy database on 2/22/2023 8:02 AM  prior to writing prescription for controlled substance II, III or IV per [place] bill . Based on assessment of the report, the prescription is medically necessary.   - ALPRAZolam (XANAX) 1 MG Tab; Take 1 Tablet by mouth 2 times a day as needed for Sleep for up to 30 days.  Dispense: 60 Tablet; Refill: 0  - PAIN MANAGEMENT SCRN, W/ RFLX TO QNT; Future    4. Acute non-recurrent maxillary sinusitis  Acute.  She reports 2 weeks of significant sinus symptoms with pain over the maxillary sinuses.  Since it has been 2 weeks of symptoms we will treat with antibiotics for presumed acute sinusitis.  - doxycycline (MONODOX) 100 MG capsule; Take 1 Capsule by mouth 2 times a day for 5 days.  Dispense: 10 Capsule; Refill: 0    Return in about 4 weeks (around 3/21/2023) for Controlled substance.    Please note that this dictation was created using voice recognition software. I have made every reasonable attempt to correct obvious errors, but I expect that there are errors of grammar and possibly content that I did not discover before finalizing the note.

## 2023-02-23 ENCOUNTER — TELEPHONE (OUTPATIENT)
Dept: MEDICAL GROUP | Facility: PHYSICIAN GROUP | Age: 68
End: 2023-02-23
Payer: MEDICARE

## 2023-02-23 LAB
AMPHET CTO UR CFM-MCNC: NEGATIVE NG/ML
BARBITURATES CTO UR CFM-MCNC: NEGATIVE NG/ML
BENZODIAZ CTO UR CFM-MCNC: POSITIVE NG/ML
BUPRENORPHINE UR-MCNC: NEGATIVE NG/ML
CANNABINOIDS CTO UR CFM-MCNC: NEGATIVE NG/ML
CARISOPRODOL UR-MCNC: NEGATIVE NG/ML
COCAINE CTO UR CFM-MCNC: NEGATIVE NG/ML
DRUG SCREEN COMMENT UR-IMP: NORMAL
ETHYL GLUCURONIDE UR QL SCN: NEGATIVE NG/ML
FENTANYL UR-MCNC: NEGATIVE NG/ML
MEPERIDINE CTO UR SCN-MCNC: NEGATIVE NG/ML
METHADONE CTO UR CFM-MCNC: NEGATIVE NG/ML
OPIATES UR QL SCN: POSITIVE NG/ML
OXYCDOXYM URSCRN 2005102: POSITIVE NG/ML
PCP CTO UR CFM-MCNC: NEGATIVE NG/ML
PROPOXYPH CTO UR CFM-MCNC: NEGATIVE NG/ML
TAPENTADOL UR-MCNC: NEGATIVE NG/ML
TRAMADOL CTO UR SCN-MCNC: NEGATIVE NG/ML
ZOLPIDEM UR-MCNC: NEGATIVE NG/ML

## 2023-02-23 NOTE — TELEPHONE ENCOUNTER
Phone Number Called: [phone] (home)       Call outcome: Spoke to patient regarding message below.    Message: pt requesting imaging order of left leg and right temple. Had a fall on Sunday and is in a lot of pain.

## 2023-02-24 ENCOUNTER — TELEPHONE (OUTPATIENT)
Dept: MEDICAL GROUP | Facility: PHYSICIAN GROUP | Age: 68
End: 2023-02-24
Payer: MEDICARE

## 2023-02-24 NOTE — TELEPHONE ENCOUNTER
Phone Number Called: [phone] (home)       Call outcome: Did not leave a detailed message. Requested patient to call back.    Message: LVM 1st attempt

## 2023-02-24 NOTE — LETTER
February 28, 2023        Ashleigh Garces,       We have been trying to get in contact with you regarding recent lab results. Please call [phone] and ask to speak with Dr. Richardson's medical assistant. Thank you,                               Alivia Lechuga, Med Ass't

## 2023-02-24 NOTE — TELEPHONE ENCOUNTER
----- Message from Nida Richardson M.D. sent at 2/24/2023  8:53 AM PST -----  The recent urine drug screen did show oxycodone and benzodiazepines in her urine.  This is expected based on her prescriptions.

## 2023-02-25 LAB
1OH-MIDAZOLAM UR CFM-MCNC: <20 NG/ML
6MAM UR CFM-MCNC: <10 NG/ML
7AMINOCLONAZEPAM UR CFM-MCNC: <5 NG/ML
A-OH ALPRAZ UR CFM-MCNC: 481 NG/ML
ALPRAZ UR CFM-MCNC: 404 NG/ML
CHLORDIAZEP UR CFM-MCNC: <20 NG/ML
CLONAZEPAM UR CFM-MCNC: <5 NG/ML
CODEINE UR CFM-MCNC: <20 NG/ML
DIAZEPAM UR CFM-MCNC: <20 NG/ML
HYDROCODONE UR CFM-MCNC: <20 NG/ML
HYDROMORPHONE UR CFM-MCNC: 35 NG/ML
LORAZEPAM UR CFM-MCNC: <20 NG/ML
MIDAZOLAM UR CFM-MCNC: <20 NG/ML
MORPHINE UR CFM-MCNC: 1244 NG/ML
NORDIAZEPAM UR CFM-MCNC: <20 NG/ML
NORHYDROCODONE UR CFM-MCNC: <20 NG/ML
NOROXYCODONE UR CFM-MCNC: >4000 NG/ML
OPIATES UR NOROXYM Q0836: 212 NG/ML
OXAZEPAM UR CFM-MCNC: <20 NG/ML
OXYCODONE UR CFM-MCNC: >4000 NG/ML
OXYMORPHONE UR CFM-MCNC: 34 NG/ML
TEMAZEPAM UR CFM-MCNC: <20 NG/ML

## 2023-02-25 PROCEDURE — G0480 DRUG TEST DEF 1-7 CLASSES: HCPCS

## 2023-03-15 RX ORDER — ONDANSETRON 4 MG/1
TABLET, FILM COATED ORAL
Qty: 20 TABLET | Refills: 0 | OUTPATIENT
Start: 2023-03-15

## 2023-03-17 ENCOUNTER — TELEPHONE (OUTPATIENT)
Dept: MEDICAL GROUP | Facility: PHYSICIAN GROUP | Age: 68
End: 2023-03-17
Payer: MEDICARE

## 2023-03-17 NOTE — TELEPHONE ENCOUNTER
VOICEMAIL  1. Caller Name: Anastacia                      Call Back Number: [phone]     2. Message: Pt left vm stating her whole house is sick with Strep Throat. Reports she has sore throat and a terrible cough. Requesting PCP to send in Doxycycline.     3. Patient approves office to leave a detailed voicemail/MyChart message: N\A

## 2023-03-20 ENCOUNTER — OFFICE VISIT (OUTPATIENT)
Dept: MEDICAL GROUP | Facility: PHYSICIAN GROUP | Age: 68
End: 2023-03-20
Payer: MEDICARE

## 2023-03-20 VITALS
WEIGHT: 242.6 LBS | TEMPERATURE: 98.4 F | RESPIRATION RATE: 16 BRPM | HEART RATE: 86 BPM | SYSTOLIC BLOOD PRESSURE: 132 MMHG | BODY MASS INDEX: 36.77 KG/M2 | DIASTOLIC BLOOD PRESSURE: 80 MMHG | OXYGEN SATURATION: 97 % | HEIGHT: 68 IN

## 2023-03-20 DIAGNOSIS — G89.4 CHRONIC PAIN SYNDROME: ICD-10-CM

## 2023-03-20 DIAGNOSIS — J02.9 SORE THROAT: ICD-10-CM

## 2023-03-20 DIAGNOSIS — B37.2 CANDIDOSIS OF SKIN: ICD-10-CM

## 2023-03-20 DIAGNOSIS — F11.20 UNCOMPLICATED OPIOID DEPENDENCE (HCC): ICD-10-CM

## 2023-03-20 DIAGNOSIS — F13.20 ANXIOLYTIC DEPENDENCE (HCC): ICD-10-CM

## 2023-03-20 DIAGNOSIS — F39 MOOD DISORDER (HCC): ICD-10-CM

## 2023-03-20 LAB
INT CON NEG: NEGATIVE
INT CON POS: POSITIVE
S PYO AG THROAT QL: NEGATIVE

## 2023-03-20 PROCEDURE — 87880 STREP A ASSAY W/OPTIC: CPT | Performed by: FAMILY MEDICINE

## 2023-03-20 PROCEDURE — 99215 OFFICE O/P EST HI 40 MIN: CPT | Performed by: FAMILY MEDICINE

## 2023-03-20 RX ORDER — AMOXICILLIN 500 MG/1
1000 CAPSULE ORAL DAILY
Qty: 20 CAPSULE | Refills: 0 | Status: SHIPPED | OUTPATIENT
Start: 2023-03-20 | End: 2023-03-30

## 2023-03-20 RX ORDER — MORPHINE SULFATE 50 MG/1
50 CAPSULE, EXTENDED RELEASE ORAL 2 TIMES DAILY
Qty: 60 CAPSULE | Refills: 0 | Status: SHIPPED | OUTPATIENT
Start: 2023-04-08 | End: 2023-04-10 | Stop reason: RX

## 2023-03-20 RX ORDER — FLUOXETINE HYDROCHLORIDE 20 MG/1
20 CAPSULE ORAL
Qty: 90 CAPSULE | Refills: 3 | Status: SHIPPED | OUTPATIENT
Start: 2023-03-20

## 2023-03-20 RX ORDER — BENZONATATE 100 MG/1
100-200 CAPSULE ORAL 3 TIMES DAILY PRN
Qty: 60 CAPSULE | Refills: 0 | Status: SHIPPED | OUTPATIENT
Start: 2023-03-20 | End: 2023-08-10

## 2023-03-20 RX ORDER — CETIRIZINE HYDROCHLORIDE 10 MG/1
10 TABLET ORAL DAILY
Qty: 90 TABLET | Refills: 1 | Status: SHIPPED | OUTPATIENT
Start: 2023-03-20

## 2023-03-20 RX ORDER — OXYCODONE HYDROCHLORIDE 30 MG/1
30 TABLET ORAL EVERY 8 HOURS PRN
Qty: 90 TABLET | Refills: 0 | Status: SHIPPED | OUTPATIENT
Start: 2023-03-22 | End: 2023-04-20 | Stop reason: SDUPTHER

## 2023-03-20 RX ORDER — ALPRAZOLAM 1 MG/1
1 TABLET ORAL 2 TIMES DAILY PRN
Qty: 60 TABLET | Refills: 0 | Status: SHIPPED | OUTPATIENT
Start: 2023-03-22 | End: 2023-04-20 | Stop reason: SDUPTHER

## 2023-03-20 RX ORDER — NYSTATIN 100000 [USP'U]/G
1 POWDER TOPICAL 3 TIMES DAILY
Qty: 60 G | Refills: 3 | Status: SHIPPED | OUTPATIENT
Start: 2023-03-20 | End: 2023-05-15 | Stop reason: SDUPTHER

## 2023-03-20 ASSESSMENT — FIBROSIS 4 INDEX: FIB4 SCORE: 1.72

## 2023-03-20 NOTE — PROGRESS NOTES
Subjective:     CC: controlled substance refill, sore throat    HPI:   Ashleigh presents today with    Problem   Sore Throat    Everyone in home has strep throat  She has a sore throat  +Cough, no fevers, +chills  Feels it has gotten into her lungs  Symptoms started a week ago     Chronic Pain    Chronic.  2/2 back pain, interstitial cystitis, left knee pain    She was following with Dr. Enrrique Santa but he has retired as of 12/31/2022. Reports chronic pain in back and her left knee. Reports the left knee needs surgery, but not ready for that discussion.    CS Agreement: 1/20/2023  UDS: 2/21/2023    Last fill  Morphine ER 60 mg - 3/10/2023, #60, 30 days  Oxycodone IR 30 mg - 2/21/2023, #90, 30 days    Previous pain management  Dr. Santa - retired  [facility] - discharged 2013  [facility] - won't take new patients from Dr. Santa  [facility] [place] - won't prescribe opiates if she continues xanax     Candidosis of Skin    Chronic, recurrent. She will get candidal infections under her breasts. She uses nystatin powder as needed and it helps.      Anxiolytic dependence (HCC)    Alprazolam 1 mg 2x/day    This was managed by her pain management doctor who has now retired. Reports she was told by a doctor in the past that she should always have benadryl & xanax available as needed for allergic reaction that causes the throat to close.    She reports she needs this refilled, down to the last couple of pills.    Current dose: alprazolam 1 mg bid    Last fill: 2/21/2023, #60, 30 days  Tabs left: 2     Opiate dependence (CMS-HCC)    Morphine 60 mg 2x/day  Oxycodone 30 mg 3x/day (decreased **)  Total daily MME = 300 <-- 345    Chronic pain recheck for: back pain  Last dose of controlled substance: this morning  Chronic pain treated with morphine 60 mg bid and oxycodone 30 mg 4x/day.    She  reports no history of alcohol use.  She  reports no history of drug use.    Interval history:   Any major change in  "health since last appointment? No    Consequences of Chronic Opiate therapy:  (5 A's)  Analgesia: Compared to no treatment or prior treatment, pain is currently significantly improved  Activity: improved  Adverse Events: She denies constipation, dry mouth, itchy skin, nausea and sedation  Aberrant Behaviors: She reports she is taking medication as prescribed and is not veering from agreed treatment regimen or provider recommendations. There have been no inappropriate refills or lost/stolen meds reported.  Affect/Mood: Pain is not impacting patient's mood.  Patient denies depression/anxiety.    Nonnarcotic treatments that are being used: muscle relaxers.     Last imagin - knee    Opioid Risk Score: 0    Interpretation of Opioid Risk Score   Score 0-3 = Low risk of abuse. Do UDS at least once per year.  Score 4-7 = Moderate risk of abuse. Do UDS 1-4 times per year.  Score 8+ = High risk of abuse. Refer to specialist.    UDS Summary       This patient has no relevant Health Maintenance data.            Most recent UDS reviewed today and is consistent with prescribed medications.     I have reviewed the medical records, the Prescription Monitoring Program and I have determined that controlled substance treatment is medically indicated.                Health Maintenance: Completed    ROS:  See HPI    Objective:     Exam:  /80 (BP Location: Left arm, Patient Position: Sitting, BP Cuff Size: Large adult)   Pulse 86   Temp 36.9 °C (98.4 °F) (Temporal)   Resp 16   Ht 1.715 m (5' 7.5\")   Wt 110 kg (242 lb 9.6 oz)   LMP 1988   SpO2 97%   BMI 37.44 kg/m²  Body mass index is 37.44 kg/m².    Physical Exam  Constitutional:       Appearance: Normal appearance.   Cardiovascular:      Rate and Rhythm: Normal rate and regular rhythm.      Heart sounds: Normal heart sounds.   Pulmonary:      Effort: Pulmonary effort is normal.      Breath sounds: Normal breath sounds.   Musculoskeletal:      Cervical back: " Normal range of motion and neck supple.   Neurological:      Mental Status: She is alert.       Assessment & Plan:     67 y.o. female with the following -     1. Chronic pain syndrome  2. Uncomplicated opioid dependence (HCC)  Chronic, stable.  She has a history of chronic low back pain, left knee pain, and pain secondary to her interstitial cystitis.  She has been on opiates for many years to manage this pain.  Her pain management provider has since retired and we are struggling to find a new pain management provider for her sleep and providing refills.  We had had a discussion at a previous appointment that I can only taper her down to minimum 50 morphine equivalents or less daily.  We been working on that taper and she has been managing it successfully while we continue to try to find a new pain management provider.  Informed consent and controlled substance treatment agreement on file.  Urine up-to-date and appropriate.Obtained and reviewed patient utilization report from [facility] pharmacy database on 3/20/2023 4:18 PM  prior to writing prescription for controlled substance II, III or IV per [place] bill . Based on assessment of the report, the prescription is medically necessary.   -Decrease morphine ER to 50 mg twice daily  - Continue oxycodone 30 mg 3 times daily  - morphine (DARYL) 50 MG SR capsule; Take 1 Capsule by mouth 2 times a day for 30 days.  Dispense: 60 Capsule; Refill: 0  - oxyCODONE (OXY-IR) 30 MG immediate release tablet; Take 1 Tablet by mouth every 8 hours as needed for Moderate Pain or Severe Pain for up to 30 days.  Dispense: 90 Tablet; Refill: 0  - Referral to Pain Management    3. Anxiolytic dependence (HCC)  Chronic, stable.  She has been on Xanax for years.  She reports that this is to manage allergy symptoms.  She states she was told by a doctor in the past that she should always have Benadryl and Xanax available in case she has an allergic reaction that causes her throat to  close.  Therefore, she has 0 interest in ever discussing decreasing the dosing or tapering her off this medication in the future.  In fact, she was referred to [facility] for opiate management but their policy is they do not prescribe opiates when someone is on concurrent benzos and since she had no interest in coming off she can no longer follow-up with them for opiate prescriptions.  She does need a refill which will be provided today.  We did discuss that there is a black box warning about mixing opiates and benzos and that we do not recommend patients ever take the medications together.  She reports that she is extremely cautious and that she states that at risk does not apply to her. Informed consent and controlled substance treatment agreement on file.  Urine drug screen up-to-date and appropriate.Obtained and reviewed patient utilization report from [facility] pharmacy database on 3/20/2023 4:20 PM  prior to writing prescription for controlled substance II, III or IV per [place] bill . Based on assessment of the report, the prescription is medically necessary.   - ALPRAZolam (XANAX) 1 MG Tab; Take 1 Tablet by mouth 2 times a day as needed for Sleep for up to 30 days.  Dispense: 60 Tablet; Refill: 0    4. Sore throat  Acute.  She reports everyone in the home is being treated with amoxicillin for strep throat infection.  She had a sore throat and cough with some chills for the last week.  Strep was negative in the office today but I did provide a prescription that she can fill in the future if she is not better.  - POCT Rapid Strep A  - amoxicillin (AMOXIL) 500 MG Cap; Take 2 Capsules by mouth every day for 10 days.  Dispense: 20 Capsule; Refill: 0    5. Candidosis of skin  Chronic, stable.  She does get intermittent episodes of candidal skin infections under her breast.  Generally Gold[employer] powder will help prevent it but occasionally she does need nystatin powder.  Refill has been provided  today.    HCC Gap Form    Diagnosis to address: F39 - Mood disorder (HCC)  Assessment and plan: Chronic, stable. Continue with current defined treatment plan: fluoxetine 20 mg daily. Follow-up at least annually.  Last edited 03/20/23 16:03 PDT by Nida Richardson M.D.       I spent a total of 45 minutes with record review, exam, communication with the patient regarding opiate taper and opiate + benzo risks, and documentation of this encounter.    Return in about 4 weeks (around 4/17/2023) for Controlled substance (before 4/21).    Please note that this dictation was created using voice recognition software. I have made every reasonable attempt to correct obvious errors, but I expect that there are errors of grammar and possibly content that I did not discover before finalizing the note.

## 2023-03-22 ENCOUNTER — TELEPHONE (OUTPATIENT)
Dept: MEDICAL GROUP | Facility: PHYSICIAN GROUP | Age: 68
End: 2023-03-22
Payer: MEDICARE

## 2023-03-22 NOTE — TELEPHONE ENCOUNTER
FINAL PRIOR AUTHORIZATION STATUS:    1.  Name of Medication & Dose: Oxycodone     2. Prior Auth Status: Based on [place] information, this Recipient has primary insurance coverage. The dispensing pharmacy needs to bill the primary insurance before [place]. Provided that OXYCODONE HCL 30 MG TABLET is not excluded by [place] and [place] [employer] is the secondary payer on the claim, no authorization will be necessary. Please submit your prescription to the patient's pharmacy.    3. Action Taken: Pharmacy Notified: no Patient Notified: no

## 2023-03-23 NOTE — TELEPHONE ENCOUNTER
DOCUMENTATION OF PAR STATUS:    1. Name of Medication & Dose: Oxycodone     2. Name of Prescription Coverage Company & phone #: [employer]    3. Date Prior Auth Submitted: 03/23/23      4. What information was given to obtain insurance decision? ICD-10    5. Prior Auth Status? Pending    6. Patient Notified: no

## 2023-03-23 NOTE — TELEPHONE ENCOUNTER
FINAL PRIOR AUTHORIZATION STATUS:    1.  Name of Medication & Dose: Oxycodone     2. Prior Auth Status: Approved through 12/31/23     3. Action Taken: Pharmacy Notified: no Patient Notified: yes

## 2023-04-10 ENCOUNTER — TELEPHONE (OUTPATIENT)
Dept: MEDICAL GROUP | Facility: PHYSICIAN GROUP | Age: 68
End: 2023-04-10
Payer: MEDICARE

## 2023-04-10 DIAGNOSIS — G89.4 CHRONIC PAIN SYNDROME: ICD-10-CM

## 2023-04-10 DIAGNOSIS — F11.20 UNCOMPLICATED OPIOID DEPENDENCE (HCC): ICD-10-CM

## 2023-04-10 RX ORDER — MORPHINE SULFATE 30 MG/1
30 CAPSULE, EXTENDED RELEASE ORAL 2 TIMES DAILY
Qty: 60 CAPSULE | Refills: 0 | Status: SHIPPED | OUTPATIENT
Start: 2023-04-10 | End: 2023-04-20

## 2023-04-10 RX ORDER — MORPHINE SULFATE 20 MG/1
20 CAPSULE, EXTENDED RELEASE ORAL 2 TIMES DAILY
Qty: 60 CAPSULE | Refills: 0 | Status: SHIPPED | OUTPATIENT
Start: 2023-04-10 | End: 2023-04-20

## 2023-04-10 NOTE — TELEPHONE ENCOUNTER
VOICEMAIL  1. Caller Name: Ashleigh Garces                        Call Back Number: [phone] (home)       2. Message: [facility] not have the morphine 50 mg ER tablet in stock. They have the 20 mg ER and 30 mg ER so they are requesting new scripts sent in with the separate dosages.    3. Patient approves office to leave a detailed voicemail/MyChart message: no

## 2023-04-10 NOTE — TELEPHONE ENCOUNTER
I've cancelled the morphine ER 50 mg prescription and sent in a prescription for 20 mg and 30 mg that she will take together twice daily for a 50 mg total dose.

## 2023-04-11 ENCOUNTER — TELEPHONE (OUTPATIENT)
Dept: MEDICAL GROUP | Facility: PHYSICIAN GROUP | Age: 68
End: 2023-04-11
Payer: MEDICARE

## 2023-04-11 NOTE — TELEPHONE ENCOUNTER
MEDICATION PRIOR AUTHORIZATION NEEDED:    1. Name of Medication: Morphine 30 mg ER    2. Requested By (Name of Pharmacy): [facility]     3. Is insurance on file current? yes    4. What is the name & phone number of the 3rd party payor? Medicaid      PA or Alternative?      Key: [id number]  Last Name: Dez  : 53

## 2023-04-11 NOTE — TELEPHONE ENCOUNTER
DOCUMENTATION OF PAR STATUS:    1. Name of Medication & Dose: Morphine 20 mg ER     2. Name of Prescription Coverage Company & phone #: [employer]    3. Date Prior Auth Submitted: 04/11/23      4. What information was given to obtain insurance decision? ICD-10    5. Prior Auth Status? Pending    6. Patient Notified: no

## 2023-04-11 NOTE — TELEPHONE ENCOUNTER
FINAL PRIOR AUTHORIZATION STATUS:    1.  Name of Medication & Dose: Morphine 30 mg ER     2. Prior Auth Status: Denied.  Reason: scanned into media    3. Action Taken: Pharmacy Notified: no Patient Notified: no

## 2023-04-11 NOTE — TELEPHONE ENCOUNTER
MEDICATION PRIOR AUTHORIZATION NEEDED:    1. Name of Medication: Morphine 20 mg ER    2. Requested By (Name of Pharmacy): [facility]     3. Is insurance on file current? yes    4. What is the name & phone number of the 3rd party payor? Medicaid      [place] or Alternative?      Key: [id number]  Last Name: Dez  : 53

## 2023-04-11 NOTE — TELEPHONE ENCOUNTER
FINAL PRIOR AUTHORIZATION STATUS:    1.  Name of Medication & Dose: Morphine 20 mg ER     2. Prior Auth Status: Denied.  Reason: scanned into media    3. Action Taken: Pharmacy Notified: no Patient Notified: no

## 2023-04-11 NOTE — TELEPHONE ENCOUNTER
DOCUMENTATION OF PAR STATUS:    1. Name of Medication & Dose: Morphine 30 MG ER     2. Name of Prescription Coverage Company & phone #: [employer]    3. Date Prior Auth Submitted: 04/11/23      4. What information was given to obtain insurance decision? ICD-10    5. Prior Auth Status? Pending    6. Patient Notified: no

## 2023-04-11 NOTE — TELEPHONE ENCOUNTER
PA please. Pharmacy specifically recommended 20 mg ER and 30 mg ER tabs because they didn't have the 50 mg ER tabs in stock.

## 2023-04-11 NOTE — TELEPHONE ENCOUNTER
I have called to start an urgent appeal for these denials. I explained that the preferred alternative is not a morphine, but actually extended-release oxycodone which is not equivalent to what I'm prescribing. Tabs are covered, but tabs do not come in dosages for me to get her to the 50 mg dose she requires.    20 mg reference # [id number]  30 mg reference # [id number]    Usually turn around time within 72 hours.

## 2023-04-12 ENCOUNTER — TELEPHONE (OUTPATIENT)
Dept: MEDICAL GROUP | Facility: PHYSICIAN GROUP | Age: 68
End: 2023-04-12
Payer: MEDICARE

## 2023-04-12 NOTE — TELEPHONE ENCOUNTER
FINAL PRIOR AUTHORIZATION STATUS:    1.  Name of Medication & Dose: Morphine Sulfate 30 mg     2. Prior Auth Status: Approved through Dec 31.2023     3. Action Taken: Pharmacy Notified: no Patient Notified: no

## 2023-04-18 DIAGNOSIS — Z79.890 POSTMENOPAUSAL HRT (HORMONE REPLACEMENT THERAPY): ICD-10-CM

## 2023-04-18 RX ORDER — ESTRADIOL 0.5 MG/1
TABLET ORAL
Qty: 90 TABLET | Refills: 0 | Status: SHIPPED | OUTPATIENT
Start: 2023-04-18 | End: 2023-07-18

## 2023-04-20 ENCOUNTER — OFFICE VISIT (OUTPATIENT)
Dept: MEDICAL GROUP | Facility: PHYSICIAN GROUP | Age: 68
End: 2023-04-20
Payer: MEDICARE

## 2023-04-20 VITALS
HEART RATE: 85 BPM | HEIGHT: 68 IN | WEIGHT: 228 LBS | SYSTOLIC BLOOD PRESSURE: 116 MMHG | TEMPERATURE: 98.2 F | BODY MASS INDEX: 34.56 KG/M2 | DIASTOLIC BLOOD PRESSURE: 64 MMHG | OXYGEN SATURATION: 92 %

## 2023-04-20 DIAGNOSIS — F13.20 ANXIOLYTIC DEPENDENCE (HCC): ICD-10-CM

## 2023-04-20 DIAGNOSIS — G89.4 CHRONIC PAIN SYNDROME: Primary | ICD-10-CM

## 2023-04-20 DIAGNOSIS — F11.20 UNCOMPLICATED OPIOID DEPENDENCE (HCC): ICD-10-CM

## 2023-04-20 DIAGNOSIS — L98.9 BUMPS ON SKIN: ICD-10-CM

## 2023-04-20 PROBLEM — J02.9 SORE THROAT: Status: RESOLVED | Noted: 2023-03-20 | Resolved: 2023-04-20

## 2023-04-20 PROCEDURE — 99214 OFFICE O/P EST MOD 30 MIN: CPT | Performed by: FAMILY MEDICINE

## 2023-04-20 RX ORDER — MORPHINE SULFATE 50 MG/1
50 CAPSULE, EXTENDED RELEASE ORAL 2 TIMES DAILY
Qty: 60 CAPSULE | Refills: 0 | Status: SHIPPED | OUTPATIENT
Start: 2023-05-11 | End: 2023-05-15

## 2023-04-20 RX ORDER — MORPHINE SULFATE 50 MG/1
50 CAPSULE, EXTENDED RELEASE ORAL 2 TIMES DAILY
COMMUNITY
Start: 2023-04-12 | End: 2023-04-20 | Stop reason: SDUPTHER

## 2023-04-20 RX ORDER — OXYCODONE HYDROCHLORIDE 30 MG/1
30 TABLET ORAL EVERY 8 HOURS PRN
Qty: 90 TABLET | Refills: 0 | Status: SHIPPED | OUTPATIENT
Start: 2023-04-20 | End: 2023-05-15 | Stop reason: SDUPTHER

## 2023-04-20 RX ORDER — ALPRAZOLAM 1 MG/1
1 TABLET ORAL 2 TIMES DAILY PRN
Qty: 60 TABLET | Refills: 0 | Status: SHIPPED | OUTPATIENT
Start: 2023-04-20 | End: 2023-05-15 | Stop reason: SDUPTHER

## 2023-04-20 ASSESSMENT — ENCOUNTER SYMPTOMS
FEVER: 0
NAUSEA: 0
VOMITING: 0

## 2023-04-20 ASSESSMENT — FIBROSIS 4 INDEX: FIB4 SCORE: 1.72

## 2023-04-20 NOTE — PROGRESS NOTES
Subjective:     CC: controlled substance refill    HPI:   Ashleigh presents today with    Problem   Chronic Pain    Chronic.  2/2 back pain, interstitial cystitis, left knee pain    She was following with Dr. Enrrique Santa but he has retired as of 12/31/2022. Reports chronic pain in back and her left knee. Reports the left knee needs surgery, but not ready for that discussion.    She does note acute pain that starts in the left SI joint and radiates down her buttock. She can feel 2 bumps on the buttock and they did bleed.    CS Agreement: 1/20/2023  UDS: 2/21/2023    Last fill  Morphine ER 50 mg - 4/12/2023, #60, 30 days  Oxycodone IR 30 mg - 3/22/2023, #90, 30 days    Previous pain management  Dr. Santa - retired  [facility] - discharged 2013  [facility] - won't take new patients from Dr. Santa  [facility] [place] - won't prescribe opiates if she continues xanax     Anxiolytic dependence (HCC)    Alprazolam 1 mg 2x/day    This was managed by her pain management doctor who has now retired. Reports she was told by a doctor in the past that she should always have benadryl & xanax available as needed for allergic reaction that causes the throat to close.    She reports she needs this refilled, down to the last couple of pills.    Current dose: alprazolam 1 mg bid    Last fill: 3/20/2023, #60, 30 days  Tabs left: 0     Opiate dependence (CMS-HCC)    Morphine ER 50 mg 2x/day (decreased 3/20/2023)  Oxycodone 30 mg 3x/day   Total daily MME = 235 <-- 245 <--255 <-- 300 <-- 345    Chronic pain recheck for: back pain  Last dose of controlled substance: this morning  Chronic pain treated with morphine 50 mg bid and oxycodone 30 mg 3x/day.    She  reports no history of alcohol use.  She  reports no history of drug use.    Interval history:   Any major change in health since last appointment? No    Consequences of Chronic Opiate therapy:  (5 A's)  Analgesia: Compared to no treatment or prior treatment, pain is  "currently significantly improved  Activity: improved  Adverse Events: She denies constipation, dry mouth, itchy skin, nausea and sedation  Aberrant Behaviors: She reports she is taking medication as prescribed and is not veering from agreed treatment regimen or provider recommendations. There have been no inappropriate refills or lost/stolen meds reported.  Affect/Mood: Pain is not impacting patient's mood.  Patient denies depression/anxiety.    Nonnarcotic treatments that are being used: muscle relaxers.     Last imagin - knee    Opioid Risk Score: 0    Interpretation of Opioid Risk Score   Score 0-3 = Low risk of abuse. Do UDS at least once per year.  Score 4-7 = Moderate risk of abuse. Do UDS 1-4 times per year.  Score 8+ = High risk of abuse. Refer to specialist.    UDS Summary       This patient has no relevant Health Maintenance data.            Most recent UDS reviewed today and is consistent with prescribed medications.     I have reviewed the medical records, the Prescription Monitoring Program and I have determined that controlled substance treatment is medically indicated.        Sore Throat (Resolved)    Everyone in home has strep throat  She has a sore throat  +Cough, no fevers, +chills  Feels it has gotten into her lungs  Symptoms started a week ago         Health Maintenance: Completed    ROS:  Review of Systems   Constitutional:  Negative for fever.   Gastrointestinal:  Negative for nausea and vomiting.     Objective:     Exam:  /64 (BP Location: Right arm, Patient Position: Sitting, BP Cuff Size: Large adult)   Pulse 85   Temp 36.8 °C (98.2 °F) (Temporal)   Ht 1.715 m (5' 7.5\")   Wt 103 kg (228 lb)   LMP 1988   SpO2 92%   BMI 35.18 kg/m²  Body mass index is 35.18 kg/m².    Physical Exam  Constitutional:       Appearance: Normal appearance.   Cardiovascular:      Rate and Rhythm: Normal rate and regular rhythm.      Heart sounds: Normal heart sounds.   Pulmonary:      " Effort: Pulmonary effort is normal.      Breath sounds: Normal breath sounds.   Musculoskeletal:      Cervical back: Normal range of motion and neck supple.   Neurological:      Mental Status: She is alert.           Assessment & Plan:     67 y.o. female with the following -     1. Chronic pain syndrome  2. Uncomplicated opioid dependence (HCC)  Chronic, stable.  She has chronic pain secondary to her back, interstitial cystitis, and knee pain.  She was following with a pain management doctor who recently retired.  She has now been following with me for her prescriptions and she started out at 345 morphine milliequivalents.  With each month they have been decreasing her and she is now down to 235 morphine milliequivalents.  Unfortunately, she was out of medication for nearly 2 weeks because the extended release 50 mg tablets of morphine were not available at the pharmacy and when I tried to use 20 and 30 mg tablets her insurance would not cover it.  As a result, she was only able to  the 50 mg ER tablets 8 days ago and we have decided to postpone the taper for this month and we will restart next month if she does not establish with pain management.  She does have an appoint with pain management at the end of the month and I am hoping they can take over her prescriptions and help her continue to wean down back to Percocet which was where she was before she established with her pain management provider.  Informed consent and controlled substance treatment agreement on file.  Urine drug screen up-to-date and appropriate.Obtained and reviewed patient utilization report from [facility] pharmacy database on 4/20/2023 4:48 PM  prior to writing prescription for controlled substance II, III or IV per [place] bill . Based on assessment of the report, the prescription is medically necessary.   - morphine (DARYL) 50 MG SR capsule; Take 1 Capsule by mouth 2 times a day for 30 days.  Dispense: 60 Capsule; Refill:  0  - oxyCODONE (OXY-IR) 30 MG immediate release tablet; Take 1 Tablet by mouth every 8 hours as needed for Moderate Pain or Severe Pain for up to 30 days.  Dispense: 90 Tablet; Refill: 0  - Consent for Opiate Prescription    3. Anxiolytic dependence (HCC)  Chronic, stable.  She has been on Xanax for years to control allergies per her report.  She states that a doctor told her she needs to always have Xanax available because her throat will close an allergic reaction.  I suspect her throat actually closes due to anxiety but she is not willing to decrease or discontinue this medication.  At this point, decreasing her opiates are my #1 priority so we will continue her current dosing for now while we work on the opiate prescriptions.  Informed consent and controlled substance treatment agreement on file.  Urine drug screen up-to-date and appropriate.Obtained and reviewed patient utilization report from [facility] pharmacy database on 4/20/2023 4:49 PM  prior to writing prescription for controlled substance II, III or IV per [place] bill . Based on assessment of the report, the prescription is medically necessary.   - ALPRAZolam (XANAX) 1 MG Tab; Take 1 Tablet by mouth 2 times a day as needed for Sleep for up to 30 days.  Dispense: 60 Tablet; Refill: 0    4. Bumps on skin  Acute.  She reports that recently she been getting some pain in her left buttock and she felt 2 bumps.  On exam she does have 2 bumps palpated in the skin.  One opened and drained the other 1 does not appear to be open.  There is no fluctuance and there is no overlying erythema, warmth, or drainage so I do not think oral antibiotics will be beneficial.  I have provided topical antibiotic ointment and encouraged to use warm compresses to help these open and drain.    Return in about 4 weeks (around 5/18/2023) for Controlled substance.    Please note that this dictation was created using voice recognition software. I have made every reasonable  attempt to correct obvious errors, but I expect that there are errors of grammar and possibly content that I did not discover before finalizing the note.

## 2023-04-27 RX ORDER — AMITRIPTYLINE HYDROCHLORIDE 25 MG/1
25 TABLET, FILM COATED ORAL 2 TIMES DAILY PRN
Qty: 30 TABLET | Refills: 0 | Status: SHIPPED | OUTPATIENT
Start: 2023-04-27 | End: 2023-06-12

## 2023-05-12 ENCOUNTER — TELEPHONE (OUTPATIENT)
Dept: MEDICAL GROUP | Facility: PHYSICIAN GROUP | Age: 68
End: 2023-05-12
Payer: MEDICARE

## 2023-05-12 NOTE — TELEPHONE ENCOUNTER
FINAL PRIOR AUTHORIZATION STATUS:    1.  Name of Medication & Dose:  nystatin (NYSTOP) powder     2. Prior Auth Status: Denied.  Reason: the drug you asked for is not listed in your preferred drug list (formulary).    3. Action Taken: Pharmacy Notified: no Patient Notified: no

## 2023-05-12 NOTE — TELEPHONE ENCOUNTER
DOCUMENTATION OF PAR STATUS:    1. Name of Medication & Dose:  nystatin (NYSTOP) powder     2. Name of Prescription Coverage Company & phone #: [employer]    3. Date Prior Auth Submitted: 5/12/23    4. What information was given to obtain insurance decision? ICD-10 CODE    5. Prior Auth Status? Pending Key: [id number]    6. Patient Notified: no

## 2023-05-15 ENCOUNTER — TELEPHONE (OUTPATIENT)
Dept: MEDICAL GROUP | Facility: PHYSICIAN GROUP | Age: 68
End: 2023-05-15
Payer: MEDICARE

## 2023-05-15 ENCOUNTER — OFFICE VISIT (OUTPATIENT)
Dept: MEDICAL GROUP | Facility: PHYSICIAN GROUP | Age: 68
End: 2023-05-15
Payer: MEDICARE

## 2023-05-15 VITALS
SYSTOLIC BLOOD PRESSURE: 128 MMHG | HEART RATE: 71 BPM | WEIGHT: 232 LBS | BODY MASS INDEX: 35.16 KG/M2 | TEMPERATURE: 97.1 F | DIASTOLIC BLOOD PRESSURE: 70 MMHG | OXYGEN SATURATION: 92 % | HEIGHT: 68 IN

## 2023-05-15 DIAGNOSIS — F13.20 ANXIOLYTIC DEPENDENCE (HCC): ICD-10-CM

## 2023-05-15 DIAGNOSIS — M70.61 TROCHANTERIC BURSITIS OF RIGHT HIP: ICD-10-CM

## 2023-05-15 DIAGNOSIS — G89.4 CHRONIC PAIN SYNDROME: ICD-10-CM

## 2023-05-15 DIAGNOSIS — F11.20 UNCOMPLICATED OPIOID DEPENDENCE (HCC): ICD-10-CM

## 2023-05-15 PROCEDURE — 3074F SYST BP LT 130 MM HG: CPT | Performed by: FAMILY MEDICINE

## 2023-05-15 PROCEDURE — 3078F DIAST BP <80 MM HG: CPT | Performed by: FAMILY MEDICINE

## 2023-05-15 PROCEDURE — 99215 OFFICE O/P EST HI 40 MIN: CPT | Performed by: FAMILY MEDICINE

## 2023-05-15 PROCEDURE — 1125F AMNT PAIN NOTED PAIN PRSNT: CPT | Performed by: FAMILY MEDICINE

## 2023-05-15 RX ORDER — MORPHINE SULFATE 20 MG/1
40 CAPSULE, EXTENDED RELEASE ORAL 2 TIMES DAILY
Qty: 120 CAPSULE | Refills: 0 | Status: SHIPPED | OUTPATIENT
Start: 2023-05-15 | End: 2023-06-14 | Stop reason: SDUPTHER

## 2023-05-15 RX ORDER — OXYCODONE HYDROCHLORIDE 30 MG/1
30 TABLET ORAL EVERY 8 HOURS PRN
Qty: 90 TABLET | Refills: 0 | Status: SHIPPED | OUTPATIENT
Start: 2023-05-20 | End: 2023-06-14 | Stop reason: SDUPTHER

## 2023-05-15 RX ORDER — MORPHINE SULFATE 40 MG/1
80 CAPSULE, EXTENDED RELEASE ORAL DAILY
Qty: 60 CAPSULE | Refills: 0 | Status: CANCELLED | OUTPATIENT
Start: 2023-05-15 | End: 2023-06-14

## 2023-05-15 RX ORDER — NYSTATIN 100000 [USP'U]/G
1 POWDER TOPICAL 3 TIMES DAILY
Qty: 60 G | Refills: 3 | Status: SHIPPED | OUTPATIENT
Start: 2023-05-15 | End: 2023-08-10 | Stop reason: SDUPTHER

## 2023-05-15 RX ORDER — MORPHINE SULFATE 45 MG/1
45 CAPSULE, EXTENDED RELEASE ORAL 2 TIMES DAILY
Qty: 60 CAPSULE | Refills: 0 | Status: SHIPPED | OUTPATIENT
Start: 2023-05-15 | End: 2023-05-15

## 2023-05-15 RX ORDER — ALPRAZOLAM 1 MG/1
1 TABLET ORAL 2 TIMES DAILY PRN
Qty: 60 TABLET | Refills: 0 | Status: SHIPPED | OUTPATIENT
Start: 2023-05-19 | End: 2023-06-14 | Stop reason: SDUPTHER

## 2023-05-15 ASSESSMENT — ENCOUNTER SYMPTOMS
COUGH: 0
FEVER: 0

## 2023-05-15 ASSESSMENT — FIBROSIS 4 INDEX: FIB4 SCORE: 1.72

## 2023-05-15 NOTE — TELEPHONE ENCOUNTER
Phone Number Called:   [id number] - [place], [place] - [address]  [facility] [address]  [address] [place] [address]  Phone: [phone] Fax: [phone]      Call outcome:  spoke to pharmacy staff     Message: talked to pharmacist about canceling the patients morphine 50. Then asked what other doses of morphine they have. Pharmacy staff stated they have morphine 45 mg as well as 20 mg.     Phone Number Called:   [id number] - [place], [place] - [address]  [address] [place] [address]  Phone: [phone] Fax: [phone]       Call outcome:  spoke to pharmacy staff    Message: called on behalf of  to let the pharmacy know that they have Dr. Betancourt permission to fill the xanax and the oxycodone early because the pt is going on vacation this Wednesday. Pharmacy then also informed me that they do not in fact have the morphine 45 mg and that they only have the 20 mg.       Phone Number Called: [phone] (      Call outcome: Spoke to patient regarding message below.    Message: let pt know that  put in a new prescription for there 20 mg morphine. Also let pt t=know that they are filling the xanax and oxycodone early.

## 2023-05-15 NOTE — LETTER
[facility]  Nida Richardson M.D.  [address]  [place] [address]  Fax: [phone]   Authorization for Release/Disclosure of   Protected Health Information   Name: ASHLEIGH JOSEPH CARMEN : 1955 SSN: [id number]   Address: [facility] [place] [address] Phone:    [phone] (home) [phone] (work)   I authorize the entity listed below to release/disclose the PHI below to:   [facility]/Nida Richardson M.D. and Nida Richardson M.D.   Provider or Entity Name:  [facility]   Address   City, State, [facility]   Phone:      Fax:     Reason for request: continuity of care   Information to be released:    [  ] LAST COLONOSCOPY,  including any PATH REPORT and follow-up  [  ] LAST FIT/COLOGUARD RESULT [  ] LAST DEXA  [  ] LAST MAMMOGRAM  [  ] LAST PAP  [  ] LAST LABS [  ] RETINA EXAM REPORT  [  ] IMMUNIZATION RECORDS  [XX] Release all info      [  ] Check here and initial the line next to each item to release ALL health information INCLUDING  _____ Care and treatment for drug and / or alcohol abuse  _____ HIV testing, infection status, or AIDS  _____ Genetic Testing    DATES OF SERVICE OR TIME PERIOD TO BE DISCLOSED: _____________  I understand and acknowledge that:  * This Authorization may be revoked at any time by you in writing, except if your health information has already been used or disclosed.  * Your health information that will be used or disclosed as a result of you signing this authorization could be re-disclosed by the recipient. If this occurs, your re-disclosed health information may no longer be protected by State or Federal laws.  * You may refuse to sign this Authorization. Your refusal will not affect your ability to obtain treatment.  * This Authorization becomes effective upon signing and will  on (date) __________.      If no date is indicated, this Authorization will  one (1) year from the signature date.    Name: Ashleigh Nicolasa Garces  Signature: Date:    5/15/2023     PLEASE FAX REQUESTED RECORDS BACK TO: [phone]

## 2023-05-15 NOTE — PROGRESS NOTES
Subjective:     CC: opiate/benzo refill, hip pain    HPI:   Ashleigh presents today with    Problem   Chronic Pain    Chronic.  2/2 back pain, interstitial cystitis, left knee pain    She was following with Dr. Enrrique Santa but he has retired as of 12/31/2022. Reports chronic pain in back and her left knee. Reports the left knee needs surgery, but not ready for that discussion.    She saw [facility]. The doctor is not going to see her or overtake her prescriptions at this time. He will only see her after I get her medication doses down.     CS Agreement: 1/20/2023  UDS: 2/21/2023    Last fill  Morphine ER 50 mg - 4/12/2023, #60, 30 days  Oxycodone IR 30 mg - 4/21/2023, #90, 30 days    Previous pain management/referrals  Dr. Santa - retired  [facility] - discharged 2013  [facility] - won't take new patients from Dr. Santa  [facility] [place] - won't prescribe opiates if she continues xanax  [facility] - won't take over until we wean her down for them     Anxiolytic dependence (HCC)    Alprazolam 1 mg 2x/day    This was managed by her pain management doctor who has now retired. Reports she was told by a doctor in the past that she should always have benadryl & xanax available as needed for allergic reaction that causes the throat to close.    She reports she needs this refilled, down to the last couple of pills.    Current dose: alprazolam 1 mg bid    Last fill: 4/20/2023, #60, 30 days  Tabs left: **       Trochanteric Bursitis of Right Hip    Chronic. She is noticing lateral right hip pain. Will radiates down to the knee and to the back. She is unable to lay on that side due to the pain.       Opiate dependence (CMS-HCC)    Morphine ER 50 mg 2x/day (decreased 3/20/2023)  Oxycodone 30 mg 3x/day   Total daily MME = 235 <-- 245 <--255 <-- 300 <-- 345    Chronic pain recheck for: back pain  Last dose of controlled substance: **  Chronic pain treated with morphine 50 mg bid and  "oxycodone 30 mg 3x/day.    She  reports no history of alcohol use.  She  reports no history of drug use.    Interval history:   Any major change in health since last appointment? No    Consequences of Chronic Opiate therapy:  (5 A's)  Analgesia: Compared to no treatment or prior treatment, pain is currently significantly improved  Activity: improved  Adverse Events: She denies constipation, dry mouth, itchy skin, nausea and sedation  Aberrant Behaviors: She reports she is taking medication as prescribed and is not veering from agreed treatment regimen or provider recommendations. There have been no inappropriate refills or lost/stolen meds reported.  Affect/Mood: Pain is not impacting patient's mood.  Patient denies depression/anxiety.    Nonnarcotic treatments that are being used: muscle relaxers.     Last imagin - knee    Opioid Risk Score: 0    Interpretation of Opioid Risk Score   Score 0-3 = Low risk of abuse. Do UDS at least once per year.  Score 4-7 = Moderate risk of abuse. Do UDS 1-4 times per year.  Score 8+ = High risk of abuse. Refer to specialist.    UDS Summary       This patient has no relevant Health Maintenance data.            Most recent UDS reviewed today and is consistent with prescribed medications.     I have reviewed the medical records, the Prescription Monitoring Program and I have determined that controlled substance treatment is medically indicated.            Health Maintenance: Completed    ROS:  Review of Systems   Constitutional:  Negative for fever.   Respiratory:  Negative for cough.      Objective:     Exam:  /70 (BP Location: Right arm, Patient Position: Sitting, BP Cuff Size: Adult)   Pulse 71   Temp 36.2 °C (97.1 °F) (Temporal)   Ht 1.715 m (5' 7.5\")   Wt 105 kg (232 lb)   LMP 1988   SpO2 92%   BMI 35.80 kg/m²  Body mass index is 35.8 kg/m².    Physical Exam  Constitutional:       Appearance: Normal appearance.   Cardiovascular:      Rate and Rhythm: " Normal rate and regular rhythm.      Heart sounds: Normal heart sounds.   Pulmonary:      Effort: Pulmonary effort is normal.      Breath sounds: Examination of the right-upper field reveals wheezing. Examination of the left-upper field reveals wheezing. Examination of the right-lower field reveals wheezing. Examination of the left-lower field reveals wheezing. Wheezing present. No rhonchi or rales.   Musculoskeletal:      Cervical back: Normal range of motion and neck supple.   Neurological:      Mental Status: She is alert.             Assessment & Plan:     67 y.o. female with the following -     1. Chronic pain syndrome  2. Uncomplicated opioid dependence (HCC)  Chronic, stable.  She is currently on 235 morphine equivalents daily.  We have been slowly weaning her down.  We are going to wean her further today.  Informed consent a controlled substance treatment agreement on file.  Urine drug up-to-date and appropriate.Obtained and reviewed patient utilization report from [facility] pharmacy database on 5/15/2023 3:42 PM  prior to writing prescription for controlled substance II, III or IV per [place] bill . Based on assessment of the report, the prescription is medically necessary.   - oxyCODONE (OXY-IR) 30 MG immediate release tablet; Take 1 Tablet by mouth every 8 hours as needed for Moderate Pain or Severe Pain for up to 30 days.  Dispense: 90 Tablet; Refill: 0  - Referral to Pain Management  - Morphine Sulfate Beads 45 MG CAPSULE SR 24 HR; Take 45 mg by mouth 2 times a day for 30 days.  Dispense: 60 Capsule; Refill: 0    3. Anxiolytic dependence (HCC)  Chronic, stable.  She reports that she was told that she always needs to have Xanax in her system because of tightening of the throat.  I did briefly explain that that is likely a panic attack and that there are other anxiety medications available that would work that do not interact with opiates increase her risk for death significantly.  She might be  willing to decrease Xanax in the future but I am not certain she is willing to come off of it entirely.  For now my focus has been on decreasing her opiate levels.  Therefore, were going to continue her current dosing for now.  Informed consent a controlled substance from agreement on file.  Urine drug up-to-date and appropriate.Obtained and reviewed patient utilization report from [facility] pharmacy database on 5/15/2023 3:43 PM  prior to writing prescription for controlled substance II, III or IV per [place] bill . Based on assessment of the report, the prescription is medically necessary.   - ALPRAZolam (XANAX) 1 MG Tab; Take 1 Tablet by mouth 2 times a day as needed for Sleep for up to 30 days.  Dispense: 60 Tablet; Refill: 0    4. Trochanteric bursitis of right hip  Chronic, uncontrolled.  She have a history of bilateral trochanteric bursitis.  The right side is been particularly problematic.  She has pain over the right lateral hip that will radiate down to the knee and into her right lower back.  She is exquisitely tender over the bursa.  I have provided a prescription of exercises to do at home.  She could always consider returning for corticosteroid injection if she wants in the future.    I spent a total of 43 minutes with record review, exam, communication with the patient, and documentation of this encounter.      Return in about 4 weeks (around 6/12/2023) for Controlled substance.    Please note that this dictation was created using voice recognition software. I have made every reasonable attempt to correct obvious errors, but I expect that there are errors of grammar and possibly content that I did not discover before finalizing the note.    ADDENDUM  Initially we spoke with the pharmacy member told that the either had morphine ER 20 mg capsules or the morphine ER 45 mg capsules.  When we called the pharmacy back to ensure she can get an early refill of the oxycodone and Xanax as she will be  leaving town the day before those prescriptions are due to be filled, they informed us that they do not have the morphine ER 45 mg capsules.  Therefore, that prescription been canceled and a new prescription for the morphine ER 20 mg capsules, 40 mg twice daily has been prescribed.

## 2023-05-16 ENCOUNTER — TELEPHONE (OUTPATIENT)
Dept: MEDICAL GROUP | Facility: PHYSICIAN GROUP | Age: 68
End: 2023-05-16
Payer: MEDICARE

## 2023-05-16 NOTE — TELEPHONE ENCOUNTER
DOCUMENTATION OF PAR STATUS:    1. Name of Medication & Dose: Morphine     2. Name of Prescription Coverage Company & phone #: [employer]    3. Date Prior Auth Submitted: 05/16/23      4. What information was given to obtain insurance decision? ICD-10    5. Prior Auth Status? Pending    6. Patient Notified: no

## 2023-05-30 DIAGNOSIS — J02.9 SORE THROAT: ICD-10-CM

## 2023-05-31 RX ORDER — AMOXICILLIN 500 MG/1
1000 CAPSULE ORAL DAILY
Qty: 20 CAPSULE | Refills: 0 | OUTPATIENT
Start: 2023-05-31 | End: 2023-06-10

## 2023-05-31 RX ORDER — LEVOTHYROXINE SODIUM 0.07 MG/1
75 TABLET ORAL EVERY MORNING
Qty: 90 TABLET | Refills: 0 | Status: SHIPPED | OUTPATIENT
Start: 2023-05-31 | End: 2023-08-07

## 2023-06-12 ENCOUNTER — TELEPHONE (OUTPATIENT)
Dept: MEDICAL GROUP | Facility: PHYSICIAN GROUP | Age: 68
End: 2023-06-12
Payer: MEDICARE

## 2023-06-12 NOTE — TELEPHONE ENCOUNTER
VOICEMAIL  1. Caller Name: Ashleigh Garces                        Call Back Number: [phone]     2. Message: Patient left vm stating that her bones hurt really bad and she took extra pills of her Oxycodone and needed a refill as she is out of her medication. States that she cannot wait until her upcoming appt with PCP. She is in so much pain.     3. Patient approves office to leave a detailed voicemail/MyChart message: N\A

## 2023-06-12 NOTE — TELEPHONE ENCOUNTER
Unfortunately, I cannot provide an early refill because she increased her use of the oxycodone.  She is going to have to wait until her next refill is due.  This is per the contract that she signed and this is per state law.

## 2023-06-14 ENCOUNTER — OFFICE VISIT (OUTPATIENT)
Dept: MEDICAL GROUP | Facility: PHYSICIAN GROUP | Age: 68
End: 2023-06-14
Payer: MEDICARE

## 2023-06-14 VITALS
BODY MASS INDEX: 34.19 KG/M2 | TEMPERATURE: 97.9 F | SYSTOLIC BLOOD PRESSURE: 123 MMHG | HEART RATE: 56 BPM | WEIGHT: 225.6 LBS | HEIGHT: 68 IN | OXYGEN SATURATION: 95 % | DIASTOLIC BLOOD PRESSURE: 70 MMHG

## 2023-06-14 DIAGNOSIS — F11.20 UNCOMPLICATED OPIOID DEPENDENCE (HCC): ICD-10-CM

## 2023-06-14 DIAGNOSIS — F13.20 ANXIOLYTIC DEPENDENCE (HCC): ICD-10-CM

## 2023-06-14 DIAGNOSIS — G89.4 CHRONIC PAIN SYNDROME: ICD-10-CM

## 2023-06-14 PROCEDURE — 3074F SYST BP LT 130 MM HG: CPT | Performed by: FAMILY MEDICINE

## 2023-06-14 PROCEDURE — 99215 OFFICE O/P EST HI 40 MIN: CPT | Performed by: FAMILY MEDICINE

## 2023-06-14 PROCEDURE — 3078F DIAST BP <80 MM HG: CPT | Performed by: FAMILY MEDICINE

## 2023-06-14 RX ORDER — ALPRAZOLAM 1 MG/1
1 TABLET ORAL 2 TIMES DAILY PRN
Qty: 60 TABLET | Refills: 0 | Status: SHIPPED | OUTPATIENT
Start: 2023-06-14 | End: 2023-07-12 | Stop reason: SDUPTHER

## 2023-06-14 RX ORDER — AMITRIPTYLINE HYDROCHLORIDE 25 MG/1
50 TABLET, FILM COATED ORAL NIGHTLY
Qty: 60 TABLET | Refills: 1 | Status: SHIPPED | OUTPATIENT
Start: 2023-06-14 | End: 2023-07-07

## 2023-06-14 RX ORDER — MORPHINE SULFATE 20 MG/1
40 CAPSULE, EXTENDED RELEASE ORAL 2 TIMES DAILY
Qty: 120 CAPSULE | Refills: 0 | Status: SHIPPED | OUTPATIENT
Start: 2023-06-21 | End: 2023-07-05 | Stop reason: SDUPTHER

## 2023-06-14 RX ORDER — MORPHINE SULFATE 30 MG/1
30 TABLET, FILM COATED, EXTENDED RELEASE ORAL EVERY 12 HOURS
Qty: 60 TABLET | Refills: 0 | Status: CANCELLED | OUTPATIENT
Start: 2023-06-21 | End: 2023-07-21

## 2023-06-14 RX ORDER — BACLOFEN 10 MG/1
10 TABLET ORAL EVERY 8 HOURS PRN
Qty: 90 TABLET | Refills: 5 | Status: SHIPPED | OUTPATIENT
Start: 2023-06-14 | End: 2023-12-06 | Stop reason: SDUPTHER

## 2023-06-14 RX ORDER — OXYCODONE HYDROCHLORIDE 30 MG/1
30 TABLET ORAL EVERY 8 HOURS PRN
Qty: 90 TABLET | Refills: 0 | Status: SHIPPED | OUTPATIENT
Start: 2023-06-14 | End: 2023-07-12 | Stop reason: SDUPTHER

## 2023-06-14 ASSESSMENT — FIBROSIS 4 INDEX: FIB4 SCORE: 1.74

## 2023-06-14 ASSESSMENT — ENCOUNTER SYMPTOMS
FEVER: 1
CHILLS: 1
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
## 2023-06-15 ENCOUNTER — TELEPHONE (OUTPATIENT)
Dept: MEDICAL GROUP | Facility: PHYSICIAN GROUP | Age: 68
End: 2023-06-15
Payer: MEDICARE

## 2023-06-15 DIAGNOSIS — G89.4 CHRONIC PAIN SYNDROME: ICD-10-CM

## 2023-06-15 DIAGNOSIS — F11.20 UNCOMPLICATED OPIOID DEPENDENCE (HCC): ICD-10-CM

## 2023-06-15 DIAGNOSIS — F13.20 ANXIOLYTIC DEPENDENCE (HCC): ICD-10-CM

## 2023-06-15 NOTE — TELEPHONE ENCOUNTER
Phone Number Called: [phone]    Call outcome:  Spoke with [facility]- Referrals department     Message: Spoke with the [facility] referrals department and they stated that they could not approve Ashleigh's referral because 2 out of 3 of their providers are getting ready to retire and the 3rd provider does not prescribe opiates. They suggested trying [facility].

## 2023-06-26 ENCOUNTER — TELEPHONE (OUTPATIENT)
Dept: MEDICAL GROUP | Facility: PHYSICIAN GROUP | Age: 68
End: 2023-06-26
Payer: MEDICARE

## 2023-06-26 NOTE — TELEPHONE ENCOUNTER
VOICEMAIL  1. Caller Name: Ashleigh Garces                          Call Back Number: [phone]      2. Message: LENCHO requesting an email from  be sent over to [facility] explaining her necessity for her exercise bike and that she needs the space for her bike because it is the only thing that helps out with her legs    Please advise.

## 2023-06-27 NOTE — TELEPHONE ENCOUNTER
Phone Number Called: [phone]    Call outcome: Did not leave a detailed message. Requested patient to call back.    Message: Attempted to call but couldn't reach patient. The phone was picked up but no one would respond.

## 2023-06-30 ENCOUNTER — TELEPHONE (OUTPATIENT)
Dept: MEDICAL GROUP | Facility: PHYSICIAN GROUP | Age: 68
End: 2023-06-30
Payer: MEDICARE

## 2023-06-30 NOTE — TELEPHONE ENCOUNTER
VOICEMAIL  1. Caller Name: Ashleigh Garces                           Call Back Number: [phone]    2. Message: PT LVM stating that she thinks she has some sort of infection and is requesting a call back.     I attempted to call patient back and when she answered the phone I could not get a response back. I stayed on the line for a few minutes trying to get a response but I was unsuccessful.

## 2023-07-05 DIAGNOSIS — G89.4 CHRONIC PAIN SYNDROME: ICD-10-CM

## 2023-07-05 DIAGNOSIS — F11.20 UNCOMPLICATED OPIOID DEPENDENCE (HCC): ICD-10-CM

## 2023-07-05 RX ORDER — MORPHINE SULFATE 20 MG/1
40 CAPSULE, EXTENDED RELEASE ORAL 2 TIMES DAILY
Qty: 40 CAPSULE | Refills: 0 | Status: SHIPPED | OUTPATIENT
Start: 2023-07-05 | End: 2023-07-12

## 2023-07-05 NOTE — TELEPHONE ENCOUNTER
Contact pharmacy. Why did they only give her 80 tabs, 20 day supply, when I ordered 120 tabs, 30 day supply?

## 2023-07-05 NOTE — TELEPHONE ENCOUNTER
Received request via: Patient    Was the patient seen in the last year in this department? Yes    Does the patient have an active prescription (recently filled or refills available) for medication(s) requested?  LVM stating that they are due for their refill of morphine 20mg and would like it sent to [facility].     Does the patient have senior living Plus and need 100 day supply (blood pressure, diabetes and cholesterol meds only)? Patient does not have SCP

## 2023-07-05 NOTE — TELEPHONE ENCOUNTER
Called the pharmacy and they stated that she only got 80 tablets because they were out of stock, so they gave the patient what they had left at the time. They also stated that they do not currently have this medication in stock but they have put an order in for this coming Friday for the morphine 20 mg. The patient would just need a new order for this prescription to get a refill once the pharmacy has the medication in stock.

## 2023-07-12 ENCOUNTER — OFFICE VISIT (OUTPATIENT)
Dept: MEDICAL GROUP | Facility: PHYSICIAN GROUP | Age: 68
End: 2023-07-12
Payer: MEDICARE

## 2023-07-12 VITALS
HEIGHT: 68 IN | SYSTOLIC BLOOD PRESSURE: 132 MMHG | BODY MASS INDEX: 33.46 KG/M2 | HEART RATE: 65 BPM | DIASTOLIC BLOOD PRESSURE: 70 MMHG | TEMPERATURE: 97.6 F | WEIGHT: 220.8 LBS | OXYGEN SATURATION: 94 %

## 2023-07-12 DIAGNOSIS — F13.20 ANXIOLYTIC DEPENDENCE (HCC): ICD-10-CM

## 2023-07-12 DIAGNOSIS — G89.4 CHRONIC PAIN SYNDROME: ICD-10-CM

## 2023-07-12 DIAGNOSIS — F11.20 UNCOMPLICATED OPIOID DEPENDENCE (HCC): ICD-10-CM

## 2023-07-12 DIAGNOSIS — R69 ILLNESS: ICD-10-CM

## 2023-07-12 PROCEDURE — 3075F SYST BP GE 130 - 139MM HG: CPT | Performed by: FAMILY MEDICINE

## 2023-07-12 PROCEDURE — 3078F DIAST BP <80 MM HG: CPT | Performed by: FAMILY MEDICINE

## 2023-07-12 PROCEDURE — 99214 OFFICE O/P EST MOD 30 MIN: CPT | Performed by: FAMILY MEDICINE

## 2023-07-12 RX ORDER — OXYCODONE HYDROCHLORIDE 30 MG/1
30 TABLET ORAL EVERY 8 HOURS PRN
Qty: 90 TABLET | Refills: 0 | Status: SHIPPED | OUTPATIENT
Start: 2023-07-12 | End: 2023-08-10 | Stop reason: SDUPTHER

## 2023-07-12 RX ORDER — CEPHALEXIN 250 MG/1
250 CAPSULE ORAL
Qty: 90 CAPSULE | Refills: 1 | Status: SHIPPED | OUTPATIENT
Start: 2023-07-12 | End: 2023-11-03 | Stop reason: SDUPTHER

## 2023-07-12 RX ORDER — ALPRAZOLAM 1 MG/1
1 TABLET ORAL 2 TIMES DAILY PRN
Qty: 60 TABLET | Refills: 0 | Status: SHIPPED | OUTPATIENT
Start: 2023-07-12 | End: 2023-08-10 | Stop reason: SDUPTHER

## 2023-07-12 RX ORDER — MORPHINE SULFATE 30 MG/1
30 TABLET, FILM COATED, EXTENDED RELEASE ORAL EVERY 12 HOURS
Qty: 60 TABLET | Refills: 0 | Status: SHIPPED | OUTPATIENT
Start: 2023-07-12 | End: 2023-07-13 | Stop reason: SDUPTHER

## 2023-07-12 ASSESSMENT — FIBROSIS 4 INDEX: FIB4 SCORE: 1.74
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
## 2023-07-13 DIAGNOSIS — G89.4 CHRONIC PAIN SYNDROME: ICD-10-CM

## 2023-07-13 DIAGNOSIS — F11.20 UNCOMPLICATED OPIOID DEPENDENCE (HCC): ICD-10-CM

## 2023-07-13 RX ORDER — MORPHINE SULFATE 30 MG/1
30 TABLET, FILM COATED, EXTENDED RELEASE ORAL EVERY 12 HOURS
Qty: 60 TABLET | Refills: 0 | Status: SHIPPED | OUTPATIENT
Start: 2023-07-13 | End: 2023-08-12

## 2023-07-17 DIAGNOSIS — Z79.890 POSTMENOPAUSAL HRT (HORMONE REPLACEMENT THERAPY): ICD-10-CM

## 2023-07-18 RX ORDER — ESTRADIOL 0.5 MG/1
TABLET ORAL
Qty: 90 TABLET | Refills: 0 | Status: SHIPPED | OUTPATIENT
Start: 2023-07-18

## 2023-08-07 RX ORDER — LEVOTHYROXINE SODIUM 0.07 MG/1
75 TABLET ORAL EVERY MORNING
Qty: 90 TABLET | Refills: 0 | Status: SHIPPED | OUTPATIENT
Start: 2023-08-07 | End: 2023-10-30

## 2023-08-07 NOTE — TELEPHONE ENCOUNTER
Was the patient seen in the last year in this department? Yes   Does patient have an active prescription for medications requested? Yes  Received Request Via: Pharmacy

## 2023-08-10 ENCOUNTER — OFFICE VISIT (OUTPATIENT)
Dept: MEDICAL GROUP | Facility: PHYSICIAN GROUP | Age: 68
End: 2023-08-10
Payer: MEDICARE

## 2023-08-10 ENCOUNTER — TELEPHONE (OUTPATIENT)
Dept: MEDICAL GROUP | Facility: PHYSICIAN GROUP | Age: 68
End: 2023-08-10

## 2023-08-10 VITALS
HEART RATE: 62 BPM | SYSTOLIC BLOOD PRESSURE: 126 MMHG | HEIGHT: 68 IN | TEMPERATURE: 98 F | BODY MASS INDEX: 33.28 KG/M2 | WEIGHT: 219.6 LBS | OXYGEN SATURATION: 92 % | DIASTOLIC BLOOD PRESSURE: 68 MMHG

## 2023-08-10 DIAGNOSIS — G89.4 CHRONIC PAIN SYNDROME: ICD-10-CM

## 2023-08-10 DIAGNOSIS — B35.1 ONYCHOMYCOSIS: ICD-10-CM

## 2023-08-10 DIAGNOSIS — F11.20 UNCOMPLICATED OPIOID DEPENDENCE (HCC): ICD-10-CM

## 2023-08-10 DIAGNOSIS — F13.20 ANXIOLYTIC DEPENDENCE (HCC): ICD-10-CM

## 2023-08-10 DIAGNOSIS — Z12.31 ENCOUNTER FOR SCREENING MAMMOGRAM FOR MALIGNANT NEOPLASM OF BREAST: ICD-10-CM

## 2023-08-10 DIAGNOSIS — D75.1 POLYCYTHEMIA: ICD-10-CM

## 2023-08-10 PROBLEM — R69 ILLNESS: Status: RESOLVED | Noted: 2023-07-12 | Resolved: 2023-08-10

## 2023-08-10 PROCEDURE — 3078F DIAST BP <80 MM HG: CPT | Performed by: FAMILY MEDICINE

## 2023-08-10 PROCEDURE — 3074F SYST BP LT 130 MM HG: CPT | Performed by: FAMILY MEDICINE

## 2023-08-10 PROCEDURE — 99215 OFFICE O/P EST HI 40 MIN: CPT | Performed by: FAMILY MEDICINE

## 2023-08-10 RX ORDER — ALBUTEROL SULFATE 90 UG/1
AEROSOL, METERED RESPIRATORY (INHALATION)
Qty: 36 G | Refills: 3 | Status: SHIPPED | OUTPATIENT
Start: 2023-08-10 | End: 2024-02-02 | Stop reason: SDUPTHER

## 2023-08-10 RX ORDER — ONDANSETRON 4 MG/1
4 TABLET, FILM COATED ORAL EVERY 4 HOURS PRN
Qty: 10 TABLET | Refills: 0 | Status: SHIPPED | OUTPATIENT
Start: 2023-08-10 | End: 2023-08-15

## 2023-08-10 RX ORDER — MORPHINE SULFATE 15 MG/1
TABLET, FILM COATED, EXTENDED RELEASE ORAL
Qty: 90 TABLET | Refills: 0 | Status: SHIPPED | OUTPATIENT
Start: 2023-08-10 | End: 2023-09-07 | Stop reason: SDUPTHER

## 2023-08-10 RX ORDER — NYSTATIN 100000 [USP'U]/G
1 POWDER TOPICAL 3 TIMES DAILY
Qty: 60 G | Refills: 11 | Status: SHIPPED | OUTPATIENT
Start: 2023-08-10

## 2023-08-10 RX ORDER — CICLOPIROX 80 MG/ML
SOLUTION TOPICAL
Qty: 18 ML | Refills: 2 | Status: SHIPPED | OUTPATIENT
Start: 2023-08-10 | End: 2023-09-29 | Stop reason: SDUPTHER

## 2023-08-10 RX ORDER — OXYCODONE HYDROCHLORIDE 30 MG/1
30 TABLET ORAL EVERY 8 HOURS PRN
Qty: 90 TABLET | Refills: 0 | Status: SHIPPED | OUTPATIENT
Start: 2023-08-10 | End: 2023-09-07 | Stop reason: SDUPTHER

## 2023-08-10 RX ORDER — ALPRAZOLAM 1 MG/1
1 TABLET ORAL 2 TIMES DAILY PRN
Qty: 60 TABLET | Refills: 0 | Status: SHIPPED | OUTPATIENT
Start: 2023-08-10 | End: 2023-09-07 | Stop reason: SDUPTHER

## 2023-08-10 RX ORDER — CARBOXYMETHYLCELLULOSE SODIUM 5 MG/ML
1 SOLUTION/ DROPS OPHTHALMIC PRN
Qty: 15 ML | Refills: 3 | Status: SHIPPED | OUTPATIENT
Start: 2023-08-10

## 2023-08-10 ASSESSMENT — FIBROSIS 4 INDEX: FIB4 SCORE: 1.74

## 2023-08-10 ASSESSMENT — ENCOUNTER SYMPTOMS: DIARRHEA: 0
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
## 2023-08-11 NOTE — TELEPHONE ENCOUNTER
DOCUMENTATION OF PAR STATUS:    1. Name of Medication & Dose: ciclopirox     2. Name of Prescription Coverage Company & phone #: [employer]    3. Date Prior Auth Submitted: 08/10/2023    4. What information was given to obtain insurance decision?     5. Prior Auth Status? Pending    6. Patient Notified: no

## 2023-08-11 NOTE — TELEPHONE ENCOUNTER
DOCUMENTATION OF PAR STATUS:    1. Name of Medication & Dose: Zofran     2. Name of Prescription Coverage Company & phone #: [employer]    3. Date Prior Auth Submitted: 08/10/2023    4. What information was given to obtain insurance decision?     5. Prior Auth Status? Pending    6. Patient Notified: no

## 2023-08-31 ENCOUNTER — HOSPITAL ENCOUNTER (OUTPATIENT)
Facility: MEDICAL CENTER | Age: 68
End: 2023-08-31
Attending: FAMILY MEDICINE
Payer: MEDICARE

## 2023-08-31 PROCEDURE — 82274 ASSAY TEST FOR BLOOD FECAL: CPT

## 2023-09-07 ENCOUNTER — TELEPHONE (OUTPATIENT)
Dept: MEDICAL GROUP | Facility: PHYSICIAN GROUP | Age: 68
End: 2023-09-07

## 2023-09-07 ENCOUNTER — OFFICE VISIT (OUTPATIENT)
Dept: MEDICAL GROUP | Facility: PHYSICIAN GROUP | Age: 68
End: 2023-09-07
Payer: MEDICARE

## 2023-09-07 VITALS
HEART RATE: 64 BPM | DIASTOLIC BLOOD PRESSURE: 78 MMHG | SYSTOLIC BLOOD PRESSURE: 128 MMHG | OXYGEN SATURATION: 98 % | HEIGHT: 68 IN | WEIGHT: 217.6 LBS | TEMPERATURE: 97.5 F | BODY MASS INDEX: 32.98 KG/M2

## 2023-09-07 DIAGNOSIS — E03.9 HYPOTHYROIDISM, UNSPECIFIED TYPE: Chronic | ICD-10-CM

## 2023-09-07 DIAGNOSIS — Z12.11 COLON CANCER SCREENING: ICD-10-CM

## 2023-09-07 DIAGNOSIS — F13.20 ANXIOLYTIC DEPENDENCE (HCC): ICD-10-CM

## 2023-09-07 DIAGNOSIS — G89.4 CHRONIC PAIN SYNDROME: ICD-10-CM

## 2023-09-07 DIAGNOSIS — F11.20 UNCOMPLICATED OPIOID DEPENDENCE (HCC): ICD-10-CM

## 2023-09-07 PROCEDURE — 99215 OFFICE O/P EST HI 40 MIN: CPT | Performed by: FAMILY MEDICINE

## 2023-09-07 PROCEDURE — 3078F DIAST BP <80 MM HG: CPT | Performed by: FAMILY MEDICINE

## 2023-09-07 PROCEDURE — 3074F SYST BP LT 130 MM HG: CPT | Performed by: FAMILY MEDICINE

## 2023-09-07 RX ORDER — ALPRAZOLAM 1 MG/1
1 TABLET ORAL 2 TIMES DAILY PRN
Qty: 60 TABLET | Refills: 0 | Status: SHIPPED | OUTPATIENT
Start: 2023-09-07 | End: 2023-09-29 | Stop reason: SDUPTHER

## 2023-09-07 RX ORDER — OXYCODONE HYDROCHLORIDE 30 MG/1
30 TABLET ORAL EVERY 8 HOURS PRN
Qty: 90 TABLET | Refills: 0 | Status: SHIPPED | OUTPATIENT
Start: 2023-09-07 | End: 2023-09-29 | Stop reason: SDUPTHER

## 2023-09-07 RX ORDER — MORPHINE SULFATE 15 MG/1
15 TABLET, FILM COATED, EXTENDED RELEASE ORAL EVERY 12 HOURS
Qty: 60 TABLET | Refills: 0 | Status: SHIPPED | OUTPATIENT
Start: 2023-09-07 | End: 2023-09-29 | Stop reason: SDUPTHER

## 2023-09-07 ASSESSMENT — FIBROSIS 4 INDEX: FIB4 SCORE: 1.74

## 2023-09-07 ASSESSMENT — ENCOUNTER SYMPTOMS
COUGH: 0
EYE DISCHARGE: 0

## 2023-09-07 NOTE — TELEPHONE ENCOUNTER
Phone Number Called:   [id number] - [place], [place] - [address] [place] [address]  Phone: [phone] Fax: [phone]      Call outcome:  Spoke to Pharmacy staff.    Message: Called on Dr. Richardson's behalf to let them know that Dr. Richardson said it was okay for Ashleigh to  her prescriptions today. The prescriptions were for Xanax, Morphine, Oxycodone.
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
## 2023-09-12 ENCOUNTER — APPOINTMENT (OUTPATIENT)
Dept: RADIOLOGY | Facility: MEDICAL CENTER | Age: 68
End: 2023-09-12
Attending: FAMILY MEDICINE
Payer: MEDICARE

## 2023-09-14 LAB — IMM ASSAY OCC BLD FITOB: POSITIVE

## 2023-09-20 NOTE — RESULT ENCOUNTER NOTE
Phone Number Called: There are no phone numbers on file.      Call outcome: Left detailed message for patient. Informed to call back with any additional questions.    Message: left 3rd VM for pt to call back pcp request pt to redo FIT test.

## 2023-09-29 ENCOUNTER — OFFICE VISIT (OUTPATIENT)
Dept: MEDICAL GROUP | Facility: PHYSICIAN GROUP | Age: 68
End: 2023-09-29
Payer: MEDICARE

## 2023-09-29 VITALS
WEIGHT: 213.8 LBS | OXYGEN SATURATION: 93 % | HEIGHT: 68 IN | TEMPERATURE: 98 F | DIASTOLIC BLOOD PRESSURE: 60 MMHG | HEART RATE: 77 BPM | SYSTOLIC BLOOD PRESSURE: 100 MMHG | BODY MASS INDEX: 32.4 KG/M2

## 2023-09-29 DIAGNOSIS — B35.1 ONYCHOMYCOSIS: ICD-10-CM

## 2023-09-29 DIAGNOSIS — G89.4 CHRONIC PAIN SYNDROME: ICD-10-CM

## 2023-09-29 DIAGNOSIS — F13.20 ANXIOLYTIC DEPENDENCE (HCC): ICD-10-CM

## 2023-09-29 DIAGNOSIS — F11.20 UNCOMPLICATED OPIOID DEPENDENCE (HCC): ICD-10-CM

## 2023-09-29 PROCEDURE — 99214 OFFICE O/P EST MOD 30 MIN: CPT

## 2023-09-29 PROCEDURE — 3074F SYST BP LT 130 MM HG: CPT

## 2023-09-29 PROCEDURE — 3078F DIAST BP <80 MM HG: CPT

## 2023-09-29 RX ORDER — MORPHINE SULFATE 15 MG/1
15 TABLET, FILM COATED, EXTENDED RELEASE ORAL EVERY 12 HOURS
Qty: 60 TABLET | Refills: 0 | Status: SHIPPED | OUTPATIENT
Start: 2023-10-07 | End: 2023-11-02 | Stop reason: SDUPTHER

## 2023-09-29 RX ORDER — OXYCODONE HYDROCHLORIDE 30 MG/1
30 TABLET ORAL EVERY 8 HOURS PRN
Qty: 90 TABLET | Refills: 0 | Status: SHIPPED | OUTPATIENT
Start: 2023-10-07 | End: 2023-11-02 | Stop reason: SDUPTHER

## 2023-09-29 RX ORDER — CICLOPIROX 80 MG/ML
SOLUTION TOPICAL
Qty: 18 ML | Refills: 2 | Status: SHIPPED | OUTPATIENT
Start: 2023-09-29

## 2023-09-29 RX ORDER — ALPRAZOLAM 1 MG/1
1 TABLET ORAL 2 TIMES DAILY PRN
Qty: 60 TABLET | Refills: 0 | Status: SHIPPED | OUTPATIENT
Start: 2023-10-07 | End: 2023-11-02 | Stop reason: SDUPTHER

## 2023-09-29 RX ORDER — OMEPRAZOLE 20 MG/1
20 CAPSULE, DELAYED RELEASE ORAL DAILY
COMMUNITY

## 2023-09-29 ASSESSMENT — FIBROSIS 4 INDEX: FIB4 SCORE: 1.74

## 2023-09-29 ASSESSMENT — ENCOUNTER SYMPTOMS
NECK PAIN: 1
BACK PAIN: 1

## 2023-09-29 NOTE — ASSESSMENT & PLAN NOTE
Chronic persistent condition therefore we will refill Penlac 8% solution  Apply to affect toenails and adjacent skin once daily. Remove with alcohol every 7 days.
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
## 2023-09-30 NOTE — ASSESSMENT & PLAN NOTE
"Controlled substance visit today for medication refill of morphine ER, oxycodone, Xanax.  Patient has been stable on Xanax, morphine ER, and oxycodone  shows no abnormal prescribing or filling behavior.  Controlled substance agreement up-to-date.  Urine drug screen up-to-date.  -Morphine ER 15 mg every 12 hours if needed for pain, oxycodone 30 mg IR every 8 hours if needed for pain, Xanax 1 mg twice daily as needed for anxiety, refilled on appropriate date which is 10/7/2023, risk benefits and side effects of this medication discussed   -Controlled substance abuse agreement contract signed and scanned into patient's chart  -Controlled substance discussed with client. Client agrees to abide by controlled substance contract.   -A Risk of Abuse assessment for opioid abuse was previously preformed and documented in the past medical history. The treatment plan was reviewed and discussed with the patient. The pharmacy monitoring report was requested and reviewed.  Patient is appropriate for refill of this medication.  -Patient informed no medication adjustments will be made to titrate up or add additional narcotics, benzodiazepines or other controlled substances to this regimen.  Referral to pain management or behavioral health will be made as appropriate.    Patient understands this prescription is a controlled substance which is potentially habit-forming and its use is regulated by the [place]. We also discussed the new \"black box\" warning regarding the lethal combination of opioids and benzodiazepines. Refills are subject to terms of a controlled substance agreement and patient has an updated one on file. Most recent UDS is appropriate. Any refill requires an office visit. Narcotics, benzodiazepines, stimulants, and sleep aids may have adverse effects and the risks of addiction, accidental overdose and death were emphasized. Provided prescriptions for the next 1 month.    "

## 2023-10-09 ENCOUNTER — TELEPHONE (OUTPATIENT)
Dept: MEDICAL GROUP | Facility: PHYSICIAN GROUP | Age: 68
End: 2023-10-09
Payer: MEDICARE

## 2023-10-09 NOTE — TELEPHONE ENCOUNTER
Phone Number Called: [phone] (home)       Call outcome: Did not leave a detailed message. Requested patient to call back.    Message: Pt did not answer.

## 2023-10-09 NOTE — TELEPHONE ENCOUNTER
VOICEMAIL  1. Caller Name: Ashleigh Garces  Call Back Number: [phone] (home)       2. Message: Pt LENCHO, voicemail was completely inaudible.     3. Patient approves office to leave a detailed voicemail/MyChart message: N\A

## 2023-10-30 RX ORDER — LEVOTHYROXINE SODIUM 0.07 MG/1
75 TABLET ORAL EVERY MORNING
Qty: 90 TABLET | Refills: 0 | Status: SHIPPED | OUTPATIENT
Start: 2023-10-30 | End: 2024-01-04

## 2023-11-03 ENCOUNTER — OFFICE VISIT (OUTPATIENT)
Dept: MEDICAL GROUP | Facility: PHYSICIAN GROUP | Age: 68
End: 2023-11-03
Payer: MEDICARE

## 2023-11-03 VITALS
BODY MASS INDEX: 32.77 KG/M2 | SYSTOLIC BLOOD PRESSURE: 122 MMHG | OXYGEN SATURATION: 98 % | DIASTOLIC BLOOD PRESSURE: 72 MMHG | HEIGHT: 68 IN | HEART RATE: 70 BPM | TEMPERATURE: 97.1 F | WEIGHT: 216.2 LBS

## 2023-11-03 DIAGNOSIS — F11.20 UNCOMPLICATED OPIOID DEPENDENCE (HCC): ICD-10-CM

## 2023-11-03 DIAGNOSIS — F13.20 ANXIOLYTIC DEPENDENCE (HCC): ICD-10-CM

## 2023-11-03 DIAGNOSIS — R58 BLEEDING: ICD-10-CM
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
## 2023-11-03 DIAGNOSIS — G89.4 CHRONIC PAIN SYNDROME: Primary | ICD-10-CM

## 2023-11-03 PROCEDURE — 3074F SYST BP LT 130 MM HG: CPT | Performed by: FAMILY MEDICINE

## 2023-11-03 PROCEDURE — 3078F DIAST BP <80 MM HG: CPT | Performed by: FAMILY MEDICINE

## 2023-11-03 PROCEDURE — 99215 OFFICE O/P EST HI 40 MIN: CPT | Performed by: FAMILY MEDICINE

## 2023-11-03 RX ORDER — CEPHALEXIN 250 MG/1
250 CAPSULE ORAL
Qty: 90 CAPSULE | Refills: 1 | Status: SHIPPED | OUTPATIENT
Start: 2023-11-03 | End: 2024-01-03 | Stop reason: SDUPTHER

## 2023-11-03 RX ORDER — ALPRAZOLAM 1 MG/1
1 TABLET ORAL 2 TIMES DAILY PRN
Qty: 60 TABLET | Refills: 0 | Status: SHIPPED | OUTPATIENT
Start: 2023-11-03 | End: 2023-12-03

## 2023-11-03 RX ORDER — MORPHINE SULFATE 15 MG/1
15 TABLET, FILM COATED, EXTENDED RELEASE ORAL EVERY 12 HOURS
Qty: 60 TABLET | Refills: 0 | Status: SHIPPED | OUTPATIENT
Start: 2023-11-05 | End: 2023-12-05

## 2023-11-03 RX ORDER — OXYCODONE HYDROCHLORIDE 20 MG/1
20 TABLET ORAL EVERY 8 HOURS PRN
Qty: 90 TABLET | Refills: 0 | Status: SHIPPED | OUTPATIENT
Start: 2023-11-05 | End: 2023-12-05

## 2023-11-03 ASSESSMENT — FIBROSIS 4 INDEX: FIB4 SCORE: 1.74

## 2023-11-03 ASSESSMENT — ENCOUNTER SYMPTOMS
CHILLS: 0
COUGH: 0
FEVER: 0

## 2023-11-03 NOTE — PROGRESS NOTES
Subjective:     CC: med refills    HPI:   Ashleigh presents today with    Problem   Bleeding    Chronic. She is reporting episodes of bright red blood in the toilet bowl, but she isn't certain where it is coming from. She was seen at [facility] ER for this recently and she says a stool sample didn't show blood.      Chronic Pain    Chronic.  2/2 back pain, interstitial cystitis, left knee pain    She was following with Dr. Enrrique Santa but he has retired as of 12/31/2022. Reports the left knee needs surgery, but not ready for that discussion.    CS Agreement: 9/29/2023  UDS: 2/21/2023    Last fill  Morphine ER 15 mg tabs - 10/07/2023, #90, 30 days  Oxycodone IR 30 mg tabs - 10/07/2023, #90, 30 days    Previous pain management/referrals  Dr. Santa - retired  [facility] - discharged 2013  [facility] - won't take new patients from Dr. Santa  [facility] [place] - won't prescribe opiates if she continues xanax  [facility] - won't take over until we wean her down for them  [facility] - She did not agree with their treatment plan, reports they said she doesn't want to come stop the medication, therefore refuses to go back     Fall    Fell 1 month ago  She has some scrapes on her right foot and left knee       Anxiolytic dependence (HCC)    This was managed by her pain management doctor who has now retired. Reports she was told by a doctor in the past that she should always have benadryl & xanax available as needed for allergic reaction that causes the throat to close.    She uses this for anxiety attacks.     Current dose: alprazolam 1 mg bid    Last fill: 9/30/2023, #60, 30 days    Is the medication improving the patient’s symptoms: Yes  Any adverse effects: No    Alcohol or illicit drug use:   She  reports no history of alcohol use.  She  reports no history of drug use.     History of controlled substance used in a way other than prescribed? No  Any early  refills of a controlled substance: YES  History of lost or stolen controlled substance prescription: No  Any aberrant behavior or intoxication while on a controlled substance: No  Has the patient self-modified their dose or frequency of the medication :No  Compliant with treatment recommendations and plan: Yes  Any major health change to the patient: No  Concerns for misuse, abuse or addiction: No    /NarxCheck report reviewed: Yes  History of abnormal drug screening: No     Opiate dependence (CMS-HCC)    Morphine ER 15 mg bid (decreased 23)  Oxycodone 30 mg 3x/day   Total daily MME = 165 <-- 180 <-- 195 <-- 215 <-- 235 <-- 245 <--255 <-- 300 <-- 345    Chronic pain recheck for: back pain  Last dose of controlled substance: morphine - this AM, oxycodone - this AM  Chronic pain treated with morphine ER 15 mg bid and oxycodone 30 mg 3x/day.     She  reports no history of alcohol use.  She  reports no history of drug use.    Interval history:   Any major change in health since last appointment? No    Consequences of Chronic Opiate therapy:  (5 A's)  Analgesia: Compared to no treatment or prior treatment, pain is currently significantly improved  Activity: improved  Adverse Events: She denies constipation, dry mouth, itchy skin, nausea and sedation  Aberrant Behaviors: She reports she is taking medication as prescribed and is not veering from agreed treatment regimen or provider recommendations. There have been no inappropriate refills or lost/stolen meds reported.  Affect/Mood: Pain is not impacting patient's mood.  Patient denies depression/anxiety.    Nonnarcotic treatments that are being used: muscle relaxers and tricyclic antidepressants.     Last imagin - knee    Opioid Risk Score: 0    Interpretation of Opioid Risk Score   Score 0-3 = Low risk of abuse. Do UDS at least once per year.  Score 4-7 = Moderate risk of abuse. Do UDS 1-4 times per year.  Score 8+ = High risk of abuse. Refer to  "specialist.    UDS Summary       This patient has no relevant Health Maintenance data.            Most recent UDS reviewed today and is consistent with prescribed medications.     I have reviewed the medical records, the Prescription Monitoring Program and I have determined that controlled substance treatment is medically indicated.            Health Maintenance: Completed    ROS:  Review of Systems   Constitutional:  Negative for chills and fever.   Respiratory:  Negative for cough.        Objective:     Exam:  /72 (BP Location: Left arm, Patient Position: Sitting, BP Cuff Size: Adult)   Pulse 70   Temp 36.2 °C (97.1 °F) (Temporal)   Ht 1.715 m (5' 7.5\")   Wt 98.1 kg (216 lb 3.2 oz)   LMP 01/01/1988   SpO2 98%   BMI 33.36 kg/m²  Body mass index is 33.36 kg/m².    Physical Exam  Constitutional:       Appearance: Normal appearance.   Cardiovascular:      Rate and Rhythm: Normal rate and regular rhythm.      Heart sounds: Normal heart sounds.   Pulmonary:      Effort: Pulmonary effort is normal.      Breath sounds: Normal breath sounds.   Musculoskeletal:      Cervical back: Normal range of motion and neck supple.   Neurological:      Mental Status: She is alert.           Assessment & Plan:     68 y.o. female with the following -     1. Chronic pain syndrome  2. Uncomplicated opioid dependence (HCC)  Chronic, stable.  Her pain management provider retired in December, 2022, at which point the management of her prescriptions were transferred to me.  She was on 345 MME daily when the CDC recommends no more than 50-90 MME daily when managed by PCP.  Therefore we have been successfully weaning her down and as of today she is down to 165 MME daily.  She did make an appointment with a new pain management specialist but she reports that she overheard one of them say that she was interested in coming off the medications which she became very offended by that she refuses to go back.  We will place a new pain " management referral today to try to get her with a different group but we are slowly running out of groups in town.  In the meantime I will continue to taper down her dosing and follow-up with her monthly.  Informed consent and controlled substance treatment agreement on file.  Urine drug screen up-to-date and appropriate.Obtained and reviewed patient utilization report from [facility] pharmacy database on 11/3/2023 2:43 PM  prior to writing prescription for controlled substance II, III or IV per [place] bill . Based on assessment of the report, the prescription is medically necessary.   - morphine ER (MS CONTIN) 15 MG Tab CR tablet; Take 1 Tablet by mouth every 12 hours for 30 days.  Dispense: 60 Tablet; Refill: 0  - oxyCODONE 20 MG Tab; Take 1 Tablet by mouth every 8 hours as needed for Moderate Pain or Severe Pain for up to 30 days.  Dispense: 90 Tablet; Refill: 0  - Referral to Pain Management    3. Anxiolytic dependence (HCC)  Chronic, stable.  She continues to use alprazolam twice daily.  I am well aware of the black box warning for concurrent opiate and benzodiazepine use, however she reports that she never mixes the medications.  She is adamant that she will never come off the benzodiazepine as she was told by a doctor in the past that due to anaphylaxis she always has to have Xanax and Benadryl available.  We will continue current dosing for now.  Once we are able to manage her opiate prescriptions, either by tapering her to a manageable dose or getting her to a specialist, we will then consider either discussion of decreasing her alprazolam dose or sending her to a psychiatrist to manage that medication.  In the meantime I will continue to provide refills.  Informed consent and controlled substance treatment agreement on file.  Urine drug screen up-to-date and appropriate.Obtained and reviewed patient utilization report from [facility] pharmacy database on 11/3/2023 2:44 PM  prior to writing  prescription for controlled substance II, III or IV per [place] bill . Based on assessment of the report, the prescription is medically necessary.   - ALPRAZolam (XANAX) 1 MG Tab; Take 1 Tablet by mouth 2 times a day as needed for Sleep for up to 30 days.  Dispense: 60 Tablet; Refill: 0    4. Fall, initial encounter  Acute.  She did fall about a month ago and scraped up her right foot and left knee.  The scrapes look healthy today with no evidence of an infection.  I did offer wound care since she states they are not healing very quickly but she declined.  She reports that she will heal them herself.    5. Bleeding  Chronic, uncontrolled.  Recently she has had multiple episodes of bright red blood in the toilet bowl.  She cannot tell me the source of the blood, whether its rectal, vaginal, or bladder.  She did go to [facility] ER recently and she states that they did a vaginal exam and checked her stool and did not find any blood.  I will request records to evaluate their testing and determine the next steps for this bleeding.    I spent a total of 45 minutes with record review, exam, communication with the patient, and documentation of this encounter.    Return in about 4 weeks (around 12/1/2023) for Controlled substance.    Please note that this dictation was created using voice recognition software. I have made every reasonable attempt to correct obvious errors, but I expect that there are errors of grammar and possibly content that I did not discover before finalizing the note.

## 2023-11-03 NOTE — LETTER
[facility]  Nida Richardson M.D.  [address]  [place] [address]  Fax: [phone]   Authorization for Release/Disclosure of   Protected Health Information   Name: ASHLEIGH MORALES : 1955 SSN: [id number]   Address: [address] [facility] [place] [address] Phone:    [phone] (home)    I authorize the entity listed below to release/disclose the PHI below to:   [facility]/Nida Richardson M.D. and Nida Richardson M.D.   Provider or Entity Name:  [facility]   Address   City, State, Zip   Phone:      Fax:     Reason for request: continuity of care   Information to be released:    [  ] LAST COLONOSCOPY,  including any PATH REPORT and follow-up  [  ] LAST FIT/COLOGUARD RESULT [  ] LAST DEXA  [  ] LAST MAMMOGRAM  [  ] LAST PAP  [  ] LAST LABS [  ] RETINA EXAM REPORT  [  ] IMMUNIZATION RECORDS  [XX] Release all info      [  ] Check here and initial the line next to each item to release ALL health information INCLUDING  _____ Care and treatment for drug and / or alcohol abuse  _____ HIV testing, infection status, or AIDS  _____ Genetic Testing    DATES OF SERVICE OR TIME PERIOD TO BE DISCLOSED: _____________  I understand and acknowledge that:  * This Authorization may be revoked at any time by you in writing, except if your health information has already been used or disclosed.  * Your health information that will be used or disclosed as a result of you signing this authorization could be re-disclosed by the recipient. If this occurs, your re-disclosed health information may no longer be protected by State or Federal laws.  * You may refuse to sign this Authorization. Your refusal will not affect your ability to obtain treatment.  * This Authorization becomes effective upon signing and will  on (date) __________.      If no date is indicated, this Authorization will  one (1) year from the signature date.    Name: Ashleigh Morales  Signature: Date:   11/3/2023     PLEASE FAX  REQUESTED RECORDS BACK TO: [phone]

## 2023-11-09 NOTE — TELEPHONE ENCOUNTER
----- Message from Nida Richardson M.D. sent at 1/10/2019  8:00 AM PST -----  Her vaginal pathogens DNA panel shows she has Candida and bacterial vaginosis.  I have prescribed 2 medications to treat this.  
1. Caller Name: Ashleigh Garces                                           Call Back Number: [phone] (home)         Patient approves a detailed voicemail message: no    Patient called back and went over results and mediations with her, She was confused on way there was only a 3 day course of Bactrim, I explained in detail. Then the patient went on to say the infection is in her right breast. I said I would send a message to her provider, She they stated she is going to see a surgeon and will call us back.   
Phone Number Called: [phone] (home)       Message:   Left generic voice message for the pt to call back re results.and two medications sent over     Left Message for patient to call back: yes      
spinal/Neuraxial Block

## 2023-12-06 ENCOUNTER — OFFICE VISIT (OUTPATIENT)
Dept: MEDICAL GROUP | Facility: PHYSICIAN GROUP | Age: 68
End: 2023-12-06
Payer: MEDICARE

## 2023-12-06 VITALS
HEART RATE: 90 BPM | OXYGEN SATURATION: 97 % | TEMPERATURE: 98.2 F | HEIGHT: 68 IN | SYSTOLIC BLOOD PRESSURE: 128 MMHG | DIASTOLIC BLOOD PRESSURE: 64 MMHG | BODY MASS INDEX: 31.89 KG/M2 | WEIGHT: 210.4 LBS

## 2023-12-06 DIAGNOSIS — G89.4 CHRONIC PAIN SYNDROME: ICD-10-CM

## 2023-12-06 DIAGNOSIS — F13.20 ANXIOLYTIC DEPENDENCE (HCC): ICD-10-CM

## 2023-12-06 DIAGNOSIS — F11.20 UNCOMPLICATED OPIOID DEPENDENCE (HCC): ICD-10-CM

## 2023-12-06 PROCEDURE — 3078F DIAST BP <80 MM HG: CPT | Performed by: FAMILY MEDICINE

## 2023-12-06 PROCEDURE — 3074F SYST BP LT 130 MM HG: CPT | Performed by: FAMILY MEDICINE

## 2023-12-06 PROCEDURE — 99215 OFFICE O/P EST HI 40 MIN: CPT | Performed by: FAMILY MEDICINE

## 2023-12-06 RX ORDER — OXYCODONE AND ACETAMINOPHEN 10; 325 MG/1; MG/1
1 TABLET ORAL EVERY 8 HOURS PRN
Qty: 90 TABLET | Refills: 0 | Status: SHIPPED | OUTPATIENT
Start: 2023-12-06 | End: 2024-01-03

## 2023-12-06 RX ORDER — BACLOFEN 10 MG/1
10 TABLET ORAL EVERY 8 HOURS PRN
Qty: 90 TABLET | Refills: 5 | Status: SHIPPED | OUTPATIENT
Start: 2023-12-06

## 2023-12-06 RX ORDER — OXYCODONE HYDROCHLORIDE 10 MG/1
10 TABLET ORAL EVERY 8 HOURS PRN
Qty: 90 TABLET | Refills: 0 | Status: SHIPPED | OUTPATIENT
Start: 2023-12-06 | End: 2023-12-06

## 2023-12-06 RX ORDER — MORPHINE SULFATE 15 MG/1
15 TABLET, FILM COATED, EXTENDED RELEASE ORAL EVERY 12 HOURS
Qty: 60 TABLET | Refills: 0 | Status: SHIPPED | OUTPATIENT
Start: 2023-12-06 | End: 2024-01-03 | Stop reason: SDUPTHER

## 2023-12-06 RX ORDER — ALPRAZOLAM 1 MG/1
1 TABLET ORAL EVERY 12 HOURS PRN
Qty: 60 TABLET | Refills: 0 | Status: SHIPPED | OUTPATIENT
Start: 2023-12-06 | End: 2024-01-03 | Stop reason: SDUPTHER

## 2023-12-06 ASSESSMENT — ENCOUNTER SYMPTOMS
HEMOPTYSIS: 0
VOMITING: 0

## 2023-12-06 ASSESSMENT — FIBROSIS 4 INDEX: FIB4 SCORE: 1.74

## 2023-12-06 NOTE — PROGRESS NOTES
Subjective:     CC: controlled substance refill    HPI:   Ashleigh presents today with    Problem   Chronic Pain    Chronic.  2/2 back pain, interstitial cystitis, left knee pain    She was following with Dr. Enrrique Santa but he has retired as of 12/31/2022. Reports the left knee needs surgery, but not ready for that discussion.    CS Agreement: 9/29/2023  UDS: 2/21/2023    Last fill  Morphine ER 15 mg tabs - 11/5/2023, #60, 30 days  Oxycodone IR 20 mg tabs - 11/5/2023, #90, 30 days    Previous pain management/referrals  Dr. Santa - retired  [facility] - discharged 2013  [facility] - won't take new patients from Dr. Kiet Cani/Sunil - won't prescribe opiates if she continues xanax  [facility] - won't take over until we wean her down for them  [facility] - She did not agree with their treatment plan, reports they said she doesn't want to come stop the medication, therefore refuses to go back  [facility] - won't accept due to opiate dosing     Anxiolytic Dependence (Hcc)    This was managed by her pain management doctor who has now retired. Reports she was told by a doctor in the past that she should always have benadryl & xanax available as needed for allergic reaction that causes the throat to close.    She uses this for anxiety attacks.     Current dose: alprazolam 1 mg bid    Last fill: 11/3/2023, #60, 30 days    Is the medication improving the patient’s symptoms: Yes  Any adverse effects: No    Alcohol or illicit drug use:   She  reports no history of alcohol use.  She  reports no history of drug use.     History of controlled substance used in a way other than prescribed? No  Any early refills of a controlled substance: YES  History of lost or stolen controlled substance prescription: No  Any aberrant behavior or intoxication while on a controlled substance: No  Has the patient self-modified their dose or frequency of the medication :No  Compliant  with treatment recommendations and plan: Yes  Any major health change to the patient: No  Concerns for misuse, abuse or addiction: No    /NarxCheck report reviewed: Yes  History of abnormal drug screening: No     Opiate dependence (CMS-HCC)    Morphine ER 15 mg bid  Oxycodone 20 mg 3x/day (decreased 11/3/2023)  Total daily MME = 120 <-- 165 <-- 180 <-- 195 <-- 215 <-- 235 <-- 245 <--255 <-- 300 <-- 345    Chronic pain recheck for: back pain  Last dose of controlled substance: morphine - 2 days ago, oxycodone - 2 days ago  Chronic pain treated with morphine ER 15 mg bid and oxycodone 20 mg 3x/day.     She  reports no history of alcohol use.  She  reports no history of drug use.    Interval history:   Any major change in health since last appointment? No    Consequences of Chronic Opiate therapy:  (5 A's)  Analgesia: Compared to no treatment or prior treatment, pain is currently significantly improved  Activity: improved  Adverse Events: She denies constipation, dry mouth, itchy skin, nausea and sedation  Aberrant Behaviors: She reports she is taking medication as prescribed and is not veering from agreed treatment regimen or provider recommendations. There have been no inappropriate refills or lost/stolen meds reported.  Affect/Mood: Pain is not impacting patient's mood.  Patient denies depression/anxiety.    Nonnarcotic treatments that are being used: muscle relaxers and tricyclic antidepressants.     Last imagin - knee    Opioid Risk Score: 0    Interpretation of Opioid Risk Score   Score 0-3 = Low risk of abuse. Do UDS at least once per year.  Score 4-7 = Moderate risk of abuse. Do UDS 1-4 times per year.  Score 8+ = High risk of abuse. Refer to specialist.    UDS Summary       This patient has no relevant Health Maintenance data.          Most recent UDS reviewed today and is consistent with prescribed medications.     I have reviewed the medical records, the Prescription Monitoring Program and I have  "determined that controlled substance treatment is medically indicated.            Health Maintenance: Completed    ROS:  Review of Systems   Respiratory:  Negative for hemoptysis.    Gastrointestinal:  Negative for vomiting.       Objective:     Exam:  /64 (BP Location: Left arm, Patient Position: Sitting, BP Cuff Size: Adult)   Pulse 90   Temp 36.8 °C (98.2 °F) (Temporal)   Ht 1.715 m (5' 7.5\")   Wt 95.4 kg (210 lb 6.4 oz)   LMP 01/01/1988   SpO2 97%   BMI 32.47 kg/m²  Body mass index is 32.47 kg/m².    Physical Exam  Constitutional:       Appearance: Normal appearance.   Cardiovascular:      Rate and Rhythm: Normal rate and regular rhythm.      Heart sounds: Normal heart sounds.   Pulmonary:      Effort: Pulmonary effort is normal.      Breath sounds: Normal breath sounds.   Musculoskeletal:      Cervical back: Normal range of motion and neck supple.   Neurological:      Mental Status: She is alert.             Assessment & Plan:     68 y.o. female with the following -     1. Chronic pain syndrome  2. Uncomplicated opioid dependence (HCC)  Chronic, stable.  Her pain management provider retired in December, 2022, at which point I began to manage her pain medications.  I have been successfully weaning her down and as of today she is down to 120 MME daily.  More than 90 MME daily should be avoided and ideally she should not be above 50 MME daily.  Therefore, we are going to continue tapering her down today.  Initially it was good to taper her oxycodone but she decided she would like to transition to Percocet as there is a synergistic effect between oxycodone and acetaminophen which I think is a reasonable request as overall her MME dosing would be decreasing.  In the meantime, I will continue trying to get her to a pain management specialist.  Informed consent and controlled substance treatment on file.  Urine drug screen up-to-date and appropriate.Obtained and reviewed patient utilization report from " [facility] pharmacy database on 12/6/2023 2:46 PM  prior to writing prescription for controlled substance II, III or IV per [place] bill . Based on assessment of the report, the prescription is medically necessary.   - morphine ER (MS CONTIN) 15 MG Tab CR tablet; Take 1 Tablet by mouth every 12 hours for 30 days.  Dispense: 60 Tablet; Refill: 0  - Referral to Pain Management  - oxyCODONE-acetaminophen (PERCOCET)  MG Tab; Take 1 Tablet by mouth every 8 hours as needed for Severe Pain for up to 30 days.  Dispense: 90 Tablet; Refill: 0    3. Anxiolytic dependence (HCC)  Chronic, stable.  She continues to use alprazolam twice daily.  I am well aware of the black box warning for concurrent opiate and benzodiazepine use, however she reports that she never mixes the medications.  She is adamant that she will never come off the benzodiazepine as she was told by  The past that due to anaphylaxis she always has to have Xanax and Benadryl available.  We will continue her current dosing for now and once we are able to taper down her opiate prescriptions we may want to consider a referral to psychiatry to continue managing this prescription.  Informed consent and controlled substance treatment agreement on file.  Urine drug screen up-to-date and appropriate.Obtained and reviewed patient utilization report from [facility] pharmacy database on 12/6/2023 2:47 PM  prior to writing prescription for controlled substance II, III or IV per [place] bill . Based on assessment of the report, the prescription is medically necessary.   - ALPRAZolam (XANAX) 1 MG Tab; Take 1 Tablet by mouth every 12 hours as needed for Anxiety for up to 30 days.  Dispense: 60 Tablet; Refill: 0  - Referral to Pain Management    I spent a total of 40 minutes with record review, exam, communication with the patient, and documentation of this encounter.      Return in about 4 weeks (around 1/3/2024) for Controlled substance.    Please note  that this dictation was created using voice recognition software. I have made every reasonable attempt to correct obvious errors, but I expect that there are errors of grammar and possibly content that I did not discover before finalizing the note.

## 2023-12-20 NOTE — ASSESSMENT & PLAN NOTE
- suprapubic / lower abdominal pain with dysuria ++  - completed a course of antibiotics on 9/27  - UA: nitrite and leucocyte esterase positive   - high WBC --> trending down  - SIRS: 1/4 (leukocutosis) --> trending down  - qSOFA 0/3  - Continue levofloxacin.  - Urine culture  - Blood culture if temp spike     Discharged

## 2024-01-01 NOTE — TELEPHONE ENCOUNTER
Received request via: Pharmacy    Was the patient seen in the last year in this department? Yes    Does the patient have an active prescription (recently filled or refills available) for medication(s) requested? No    Does the patient have MCC Plus and need 100 day supply (blood pressure, diabetes and cholesterol meds only)? Patient does not have SCP  
None

## 2024-01-03 ENCOUNTER — TELEPHONE (OUTPATIENT)
Dept: MEDICAL GROUP | Facility: PHYSICIAN GROUP | Age: 69
End: 2024-01-03

## 2024-01-03 ENCOUNTER — OFFICE VISIT (OUTPATIENT)
Dept: MEDICAL GROUP | Facility: PHYSICIAN GROUP | Age: 69
End: 2024-01-03
Payer: MEDICARE

## 2024-01-03 VITALS
SYSTOLIC BLOOD PRESSURE: 124 MMHG | TEMPERATURE: 97.6 F | HEART RATE: 70 BPM | DIASTOLIC BLOOD PRESSURE: 70 MMHG | BODY MASS INDEX: 32.13 KG/M2 | WEIGHT: 212 LBS | OXYGEN SATURATION: 98 % | HEIGHT: 68 IN

## 2024-01-03 DIAGNOSIS — F13.20 ANXIOLYTIC DEPENDENCE (HCC): ICD-10-CM

## 2024-01-03 DIAGNOSIS — F41.9 ANXIETY: ICD-10-CM

## 2024-01-03 DIAGNOSIS — E66.9 OBESITY (BMI 30.0-34.9): ICD-10-CM

## 2024-01-03 DIAGNOSIS — F11.20 UNCOMPLICATED OPIOID DEPENDENCE (HCC): ICD-10-CM

## 2024-01-03 DIAGNOSIS — G89.4 CHRONIC PAIN SYNDROME: ICD-10-CM

## 2024-01-03 PROBLEM — E66.811 OBESITY (BMI 30.0-34.9): Status: ACTIVE | Noted: 2019-01-09

## 2024-01-03 PROCEDURE — 99215 OFFICE O/P EST HI 40 MIN: CPT | Performed by: FAMILY MEDICINE

## 2024-01-03 PROCEDURE — 3074F SYST BP LT 130 MM HG: CPT | Performed by: FAMILY MEDICINE

## 2024-01-03 PROCEDURE — 3078F DIAST BP <80 MM HG: CPT | Performed by: FAMILY MEDICINE

## 2024-01-03 RX ORDER — OXYCODONE HYDROCHLORIDE 10 MG/1
10 TABLET ORAL EVERY 8 HOURS PRN
Qty: 90 TABLET | Refills: 0 | Status: SHIPPED | OUTPATIENT
Start: 2024-01-04 | End: 2024-02-02 | Stop reason: SDUPTHER

## 2024-01-03 RX ORDER — OXYCODONE AND ACETAMINOPHEN 10; 325 MG/1; MG/1
1 TABLET ORAL EVERY 8 HOURS PRN
Qty: 90 TABLET | Refills: 0 | Status: CANCELLED | OUTPATIENT
Start: 2024-01-04 | End: 2024-02-03

## 2024-01-03 RX ORDER — CEPHALEXIN 250 MG/1
250 CAPSULE ORAL
Qty: 90 CAPSULE | Refills: 1 | Status: SHIPPED | OUTPATIENT
Start: 2024-01-03

## 2024-01-03 RX ORDER — MORPHINE SULFATE 15 MG/1
15 TABLET, FILM COATED, EXTENDED RELEASE ORAL EVERY 12 HOURS
Qty: 60 TABLET | Refills: 0 | Status: SHIPPED | OUTPATIENT
Start: 2024-01-04 | End: 2024-02-02 | Stop reason: SDUPTHER

## 2024-01-03 RX ORDER — ALPRAZOLAM 1 MG/1
1 TABLET ORAL EVERY 12 HOURS PRN
Qty: 60 TABLET | Refills: 0 | Status: SHIPPED | OUTPATIENT
Start: 2024-01-04 | End: 2024-02-02 | Stop reason: SDUPTHER

## 2024-01-03 RX ORDER — ONDANSETRON 4 MG/1
4 TABLET, FILM COATED ORAL EVERY 4 HOURS PRN
Qty: 10 TABLET | Refills: 0 | Status: SHIPPED | OUTPATIENT
Start: 2024-01-03 | End: 2024-01-13

## 2024-01-03 ASSESSMENT — ENCOUNTER SYMPTOMS
NAUSEA: 0
CHILLS: 1
VOMITING: 0
FEVER: 0

## 2024-01-03 ASSESSMENT — FIBROSIS 4 INDEX: FIB4 SCORE: 1.74

## 2024-01-03 ASSESSMENT — PATIENT HEALTH QUESTIONNAIRE - PHQ9: CLINICAL INTERPRETATION OF PHQ2 SCORE: 0

## 2024-01-03 NOTE — TELEPHONE ENCOUNTER
Phone Number Called: [phone]    Call outcome:  spoke with [employer] representative     Message: Called to see if this patients insurance covers either percocet 10 or oxycodone 10. [employer] Rep. Stated that the percocet 10 is not covered, however the oxycodone 10 is covered. [employer] Rep. Looked it up at stated that a 30 day supply of oxycodone 10 at [facility] will have a copay of $4.50.

## 2024-01-03 NOTE — PROGRESS NOTES
Subjective:     CC: controlled substance refill    HPI:   Ashleigh presents today with    Problem   Chronic Pain    Chronic.  2/2 back pain, interstitial cystitis, left knee pain    She was following with Dr. Enrrique Santa but he has retired as of 12/31/2022. Reports the left knee needs surgery, but not ready for that discussion.    She reports she had to pay $200 out of pocket for the percocet.    CS Agreement: 9/29/2023  UDS: 2/21/2023    Last fill  Morphine ER 15 mg tabs - 12/6/2023, #60, 30 days  Percocet 10/325 mg tabs - 12/6/2023, #90, 30 days    Previous pain management/referrals  Dr. Santa - retired  [facility] - discharged 2013  [facility] - won't take new patients from Dr. Santa  [facility] - won't prescribe opiates if she continues xanax  [facility] - won't take over until we wean her down for them  [facility] - She did not agree with their treatment plan, reports they said she doesn't want to come stop the medication, therefore refuses to go back  [facility] - won't accept due to opiate dosing  [facility] - **     Obesity (Bmi 30.0-34.9)    This is a chronic condition.  Max weight: 299 lbs (10/2017)  Current weight: 232 lbs  Weight change: -16 lbs since 7/2022  BMI: 35.8  Diet: watching diet  Exercise: more walking       Anxiety    Chronic. Previously managed by her pain management provider.  History of anxiety and panic attacks.  Reports she was told by a doctor in the past that she should always have benadryl & xanax available as needed for allergic reaction that causes the throat to close.    Current meds  Fluoxetine 20 mg daily  Alprazolam 1 mg bid prn    Last fill: 12/6/2023, #60, 30 days  Tabs left: ~5  Last dose: this AM       Anxiolytic Dependence (Hcc)    Is the medication improving the patient’s symptoms: Yes  Any adverse effects: No    Alcohol or illicit drug use:   She  reports no history of alcohol use.  She  reports no  history of drug use.     History of controlled substance used in a way other than prescribed? No  Any early refills of a controlled substance: YES  History of lost or stolen controlled substance prescription: No  Any aberrant behavior or intoxication while on a controlled substance: No  Has the patient self-modified their dose or frequency of the medication :No  Compliant with treatment recommendations and plan: Yes  Any major health change to the patient: No  Concerns for misuse, abuse or addiction: No    /NarxCheck report reviewed: Yes  History of abnormal drug screening: No     Opiate dependence (CMS-HCC)    Morphine ER 15 mg bid  Percocet 10/325 mg 3x/day (transitioned from oxycodone IR on 23)  Total daily MME = 75 <-- 120 <-- 165 <-- 180 <-- 195 <-- 215 <-- 235 <-- 245 <--255 <-- 300 <-- 345    Chronic pain recheck for: back pain  Last dose of controlled substance: morphine - this morning, percocet - this morning  Chronic pain treated with morphine ER 15 mg bid and percocet 10/325 mg 3x/day.     She  reports no history of alcohol use.  She  reports no history of drug use.    Interval history:   Any major change in health since last appointment? No    Consequences of Chronic Opiate therapy:  (5 A's)  Analgesia: Compared to no treatment or prior treatment, pain is currently significantly improved  Activity: improved  Adverse Events: She denies constipation, dry mouth, itchy skin, nausea and sedation  Aberrant Behaviors: She reports she is taking medication as prescribed and is not veering from agreed treatment regimen or provider recommendations. There have been no inappropriate refills or lost/stolen meds reported.  Affect/Mood: Pain is not impacting patient's mood.  Patient denies depression/anxiety.    Nonnarcotic treatments that are being used: muscle relaxers and tricyclic antidepressants.     Last imagin - knee    Opioid Risk Score: 0    Interpretation of Opioid Risk Score   Score 0-3 = Low  "risk of abuse. Do UDS at least once per year.  Score 4-7 = Moderate risk of abuse. Do UDS 1-4 times per year.  Score 8+ = High risk of abuse. Refer to specialist.    UDS Summary       This patient has no relevant Health Maintenance data.          Most recent UDS reviewed today and is consistent with prescribed medications.     I have reviewed the medical records, the Prescription Monitoring Program and I have determined that controlled substance treatment is medically indicated.            Health Maintenance: Completed    ROS:  Review of Systems   Constitutional:  Positive for chills. Negative for fever.   Gastrointestinal:  Negative for nausea and vomiting.       Objective:     Exam:  /70 (BP Location: Right arm, Patient Position: Sitting, BP Cuff Size: Adult)   Pulse 70   Temp 36.4 °C (97.6 °F) (Temporal)   Ht 1.715 m (5' 7.5\")   Wt 96.2 kg (212 lb)   LMP 01/01/1988   SpO2 98%   BMI 32.71 kg/m²  Body mass index is 32.71 kg/m².    Physical Exam  Constitutional:       Appearance: Normal appearance.   Cardiovascular:      Rate and Rhythm: Normal rate and regular rhythm.      Heart sounds: Normal heart sounds.   Pulmonary:      Effort: Pulmonary effort is normal.      Breath sounds: Normal breath sounds.   Musculoskeletal:      Cervical back: Normal range of motion and neck supple.   Neurological:      Mental Status: She is alert.             Assessment & Plan:     68 y.o. female with the following -     1. Anxiety  2. Anxiolytic dependence (HCC)  Chronic, stable.  She has a longstanding history of anxiety and getting panic attacks.  She is currently on fluoxetine which she reports helps but she is not interested in adjusting the dose further to mitigate some of her Xanax use.  She also reports that she can never come off the Xanax as a doctor told her in the past she always has to have Xanax available for when her throat closes.  She does need a refill of her Xanax today.  She reports it continues to " work well with no side effects.  I am well aware of the black box warning of mixing benzodiazepines and opiates.  She is also aware of the risks but reports that the risks are very low for her she has been on this regimen for many years without any detrimental effects.  Informed consent a controlled substance treatment agreement up-to-date.  Urine drug screen up-to-date and appropriate.Obtained and reviewed patient utilization report from [facility] pharmacy database on 1/3/2024 1:57 PM  prior to writing prescription for controlled substance II, III or IV per [place] bill . Based on assessment of the report, the prescription is medically necessary.   - ALPRAZolam (XANAX) 1 MG Tab; Take 1 Tablet by mouth every 12 hours as needed for Anxiety for up to 30 days.  Dispense: 60 Tablet; Refill: 0    3. Chronic pain syndrome  4. Uncomplicated opioid dependence (HCC)  Chronic, stable.  Her pain management provider retired in December, 2022 at which point I began to manage her pain medications.  I have been successfully weaning her down and as of today she is at 75 MME daily.  At this point we will not wean any further as she started at 255 MME.  I feel getting her MME under 90/day is extremely successful and pushing her further would be extremely difficult.  We will continue her current regimen with oxycodone instead of Percocet as her insurance does not cover Percocet.  We will continue to attempt to get her to a pain management specialist in case she wants to take more breakthrough medication.  Informed consent and controlled substance treatment agreement on file.  Urine drug screen up-to-date and appropriate.Obtained and reviewed patient utilization report from [facility] pharmacy database on 1/3/2024 1:59 PM  prior to writing prescription for controlled substance II, III or IV per [place] bill . Based on assessment of the report, the prescription is medically necessary.   - morphine ER (MS CONTIN) 15 MG Tab  CR tablet; Take 1 Tablet by mouth every 12 hours for 30 days.  Dispense: 60 Tablet; Refill: 0  - CBC WITHOUT DIFFERENTIAL; Future  - Comp Metabolic Panel; Future  - oxyCODONE immediate release (ROXICODONE) 10 MG immediate release tablet; Take 1 Tablet by mouth every 8 hours as needed for Severe Pain for up to 30 days.  Dispense: 90 Tablet; Refill: 0    5. Obesity (BMI 30.0-34.9)  Chronic, stable.  She reports that her goal is to lose 50 pounds.  She has been working with the [employer] meal replacement shakes and other supplements.  She is also been working on portion control.  I have congratulated her on her current progress and encouraged her to continue working on healthy habits.  - Patient identified as having weight management issue.  Appropriate orders and counseling given.    I spent a total of 42 minutes with record review, exam, communication with the patient, and documentation of this encounter.    Return in about 4 weeks (around 1/31/2024) for Controlled substance.    Please note that this dictation was created using voice recognition software. I have made every reasonable attempt to correct obvious errors, but I expect that there are errors of grammar and possibly content that I did not discover before finalizing the note.

## 2024-02-02 ENCOUNTER — OFFICE VISIT (OUTPATIENT)
Dept: MEDICAL GROUP | Facility: PHYSICIAN GROUP | Age: 69
End: 2024-02-02
Payer: MEDICARE

## 2024-02-02 VITALS
HEART RATE: 79 BPM | DIASTOLIC BLOOD PRESSURE: 76 MMHG | SYSTOLIC BLOOD PRESSURE: 110 MMHG | OXYGEN SATURATION: 95 % | BODY MASS INDEX: 34.68 KG/M2 | HEIGHT: 68 IN | TEMPERATURE: 96.5 F | WEIGHT: 228.8 LBS

## 2024-02-02 DIAGNOSIS — F11.20 UNCOMPLICATED OPIOID DEPENDENCE (HCC): ICD-10-CM

## 2024-02-02 DIAGNOSIS — G89.4 CHRONIC PAIN SYNDROME: ICD-10-CM

## 2024-02-02 DIAGNOSIS — F13.20 ANXIOLYTIC DEPENDENCE (HCC): ICD-10-CM

## 2024-02-02 DIAGNOSIS — F17.200 CURRENT SMOKER: ICD-10-CM

## 2024-02-02 PROCEDURE — 3078F DIAST BP <80 MM HG: CPT | Performed by: STUDENT IN AN ORGANIZED HEALTH CARE EDUCATION/TRAINING PROGRAM

## 2024-02-02 PROCEDURE — 3074F SYST BP LT 130 MM HG: CPT | Performed by: STUDENT IN AN ORGANIZED HEALTH CARE EDUCATION/TRAINING PROGRAM

## 2024-02-02 PROCEDURE — 99214 OFFICE O/P EST MOD 30 MIN: CPT | Performed by: STUDENT IN AN ORGANIZED HEALTH CARE EDUCATION/TRAINING PROGRAM

## 2024-02-02 RX ORDER — OXYCODONE HYDROCHLORIDE 10 MG/1
10 TABLET ORAL EVERY 8 HOURS PRN
Qty: 90 TABLET | Refills: 0 | Status: SHIPPED | OUTPATIENT
Start: 2024-02-02 | End: 2024-03-01 | Stop reason: SDUPTHER

## 2024-02-02 RX ORDER — ALPRAZOLAM 1 MG/1
1 TABLET ORAL EVERY 12 HOURS PRN
Qty: 60 TABLET | Refills: 0 | Status: SHIPPED | OUTPATIENT
Start: 2024-02-02 | End: 2024-03-01 | Stop reason: SDUPTHER

## 2024-02-02 RX ORDER — TERBINAFINE HYDROCHLORIDE 250 MG/1
1 TABLET ORAL
COMMUNITY

## 2024-02-02 RX ORDER — MORPHINE SULFATE 15 MG/1
15 TABLET, FILM COATED, EXTENDED RELEASE ORAL EVERY 12 HOURS
Qty: 60 TABLET | Refills: 0 | Status: SHIPPED | OUTPATIENT
Start: 2024-02-02 | End: 2024-03-01 | Stop reason: SDUPTHER

## 2024-02-02 RX ORDER — ONDANSETRON 4 MG/1
TABLET, FILM COATED ORAL
COMMUNITY

## 2024-02-02 RX ORDER — ALBUTEROL SULFATE 90 UG/1
AEROSOL, METERED RESPIRATORY (INHALATION)
Qty: 36 G | Refills: 3 | Status: SHIPPED | OUTPATIENT
Start: 2024-02-02

## 2024-02-02 RX ORDER — SULFAMETHOXAZOLE AND TRIMETHOPRIM 800; 160 MG/1; MG/1
TABLET ORAL
COMMUNITY

## 2024-02-02 RX ORDER — METHENAMINE, SODIUM PHOSPHATE, MONOBASIC, MONOHYDRATE, PHENYL SALICYLATE, METHYLENE BLUE, AND HYOSCYAMINE SULFATE 118; 40.8; 36; 10; .12 MG/1; MG/1; MG/1; MG/1; MG/1
CAPSULE ORAL
COMMUNITY

## 2024-02-02 RX ORDER — NALOXONE HYDROCHLORIDE 4 MG/.1ML
SPRAY NASAL
COMMUNITY

## 2024-02-02 RX ORDER — SULFAMETHOXAZOLE AND TRIMETHOPRIM 400; 80 MG/1; MG/1
TABLET ORAL
COMMUNITY

## 2024-02-02 RX ORDER — ATORVASTATIN CALCIUM 20 MG/1
1 TABLET, FILM COATED ORAL
COMMUNITY

## 2024-02-02 ASSESSMENT — ENCOUNTER SYMPTOMS
VOMITING: 0
SHORTNESS OF BREATH: 0
CONSTIPATION: 0
FEVER: 0
NAUSEA: 0
CHILLS: 0

## 2024-02-02 ASSESSMENT — FIBROSIS 4 INDEX: FIB4 SCORE: 1.74

## 2024-03-01 ENCOUNTER — OFFICE VISIT (OUTPATIENT)
Dept: MEDICAL GROUP | Facility: PHYSICIAN GROUP | Age: 69
End: 2024-03-01
Payer: MEDICARE

## 2024-03-01 ENCOUNTER — HOSPITAL ENCOUNTER (OUTPATIENT)
Facility: MEDICAL CENTER | Age: 69
End: 2024-03-01
Attending: NURSE PRACTITIONER
Payer: MEDICARE

## 2024-03-01 VITALS
DIASTOLIC BLOOD PRESSURE: 70 MMHG | HEIGHT: 68 IN | HEART RATE: 78 BPM | SYSTOLIC BLOOD PRESSURE: 110 MMHG | TEMPERATURE: 98 F | WEIGHT: 227 LBS | OXYGEN SATURATION: 96 % | BODY MASS INDEX: 34.4 KG/M2

## 2024-03-01 DIAGNOSIS — F11.20 UNCOMPLICATED OPIOID DEPENDENCE (HCC): ICD-10-CM

## 2024-03-01 DIAGNOSIS — F13.20 ANXIOLYTIC DEPENDENCE (HCC): ICD-10-CM

## 2024-03-01 DIAGNOSIS — G89.4 CHRONIC PAIN SYNDROME: ICD-10-CM

## 2024-03-01 DIAGNOSIS — I10 ESSENTIAL HYPERTENSION: ICD-10-CM

## 2024-03-01 PROCEDURE — G0480 DRUG TEST DEF 1-7 CLASSES: HCPCS

## 2024-03-01 PROCEDURE — 99214 OFFICE O/P EST MOD 30 MIN: CPT | Performed by: NURSE PRACTITIONER

## 2024-03-01 PROCEDURE — 80307 DRUG TEST PRSMV CHEM ANLYZR: CPT

## 2024-03-01 PROCEDURE — 3078F DIAST BP <80 MM HG: CPT | Performed by: NURSE PRACTITIONER

## 2024-03-01 PROCEDURE — 3074F SYST BP LT 130 MM HG: CPT | Performed by: NURSE PRACTITIONER

## 2024-03-01 RX ORDER — MORPHINE SULFATE 15 MG/1
15 TABLET, FILM COATED, EXTENDED RELEASE ORAL EVERY 12 HOURS
Qty: 60 TABLET | Refills: 0 | Status: SHIPPED | OUTPATIENT
Start: 2024-03-03 | End: 2024-03-27 | Stop reason: SDUPTHER

## 2024-03-01 RX ORDER — OXYCODONE HYDROCHLORIDE 10 MG/1
10 TABLET ORAL EVERY 8 HOURS PRN
Qty: 90 TABLET | Refills: 0 | Status: SHIPPED | OUTPATIENT
Start: 2024-03-03 | End: 2024-03-27 | Stop reason: SDUPTHER

## 2024-03-01 RX ORDER — FUROSEMIDE 20 MG/1
20 TABLET ORAL
Qty: 90 TABLET | Refills: 1 | Status: SHIPPED | OUTPATIENT
Start: 2024-03-01

## 2024-03-01 RX ORDER — ALPRAZOLAM 1 MG/1
1 TABLET ORAL EVERY 12 HOURS PRN
Qty: 60 TABLET | Refills: 0 | Status: SHIPPED | OUTPATIENT
Start: 2024-03-03 | End: 2024-03-27 | Stop reason: SDUPTHER

## 2024-03-01 ASSESSMENT — FIBROSIS 4 INDEX: FIB4 SCORE: 1.74

## 2024-03-01 ASSESSMENT — ENCOUNTER SYMPTOMS
BACK PAIN: 1
HEADACHES: 1
SHORTNESS OF BREATH: 0
DIZZINESS: 0
FEVER: 0
CHILLS: 0
WEIGHT LOSS: 0

## 2024-03-01 NOTE — PROGRESS NOTES
Subjective:     PCP: Dr. Richardson    CC: Controlled substance refill    HPI:   Ashleigh presents today for a medication refill.     Of note, she was referred to pain management by her PCP. She informs me that she called [facility] and was told that they do not take her health insurance.     She is on morphine ER 15 mg twice daily and Oxycodone 10mg 3 times daily for chronic pain. She tells me that this is a significant decrease than what she was originally on. She also takes amitriptyline and baclofen. She has successfully worked with her PCP to reduce her opioid dosages. She denies history of drug abuse or alcohol abuse.  Consequences of Chronic Opiate therapy:  (5 A's)  Analgesia: significantly improved  Activity:  improved  Adverse Events: none  Aberrant Behaviors: taking medication as prescribed  Last CMP: 1/9/23, she will be completing lab work prior to her next appointment with PCP  CS Agreement: updated with myself today  UDS: 02/21/2023, will be updated today  Appropriate Imaging done: 12/11/22 knee x-ray     She is also on chronic alprazolam for anxiety and has been on this regimen for years. She understands the risks of being on a benzodiazepine and opiate therapy concurrently. Her anxiety is also controlled by fluoxetine. Anxiety symptoms are controlled at this time.    Health Maintenance: Completed    Current Outpatient Medications   Medication Sig Dispense Refill    [START ON 3/3/2024] ALPRAZolam (XANAX) 1 MG Tab Take 1 Tablet by mouth every 12 hours as needed for Anxiety for up to 30 days. 60 Tablet 0    [START ON 3/3/2024] morphine ER (MS CONTIN) 15 MG Tab CR tablet Take 1 Tablet by mouth every 12 hours for 30 days. 60 Tablet 0    [START ON 3/3/2024] oxyCODONE immediate release (ROXICODONE) 10 MG immediate release tablet Take 1 Tablet by mouth every 8 hours as needed for Severe Pain for up to 30 days. 90 Tablet 0    furosemide (LASIX) 20 MG Tab Take 1 Tablet by mouth every day. 90 Tablet 1     atorvastatin (LIPITOR) 20 MG Tab Take 1 Tablet by mouth at bedtime.      Meth-Hyo-M Bl-Na Phos-Ph Sal (URIBEL) 118 MG Cap       Naloxone (NARCAN) 4 MG/0.1ML Liquid       ondansetron (ZOFRAN) 4 MG Tab tablet TAKE 1 TABLET BY MOUTH EVERY 4 HOURS AS NEEDED FOR NAUSEA OR VOMITING      sulfamethoxazole-trimethoprim (BACTRIM) 400-80 MG Tab       sulfamethoxazole-trimethoprim (BACTRIM DS) 800-160 MG tablet TAKE 1 TABLET BY MOUTH EVERY 12 HOURS FOR 5 DAYS      terbinafine (LAMISIL) 250 MG Tab Take 1 Tablet by mouth every day.      albuterol 108 (90 Base) MCG/ACT Aero Soln inhalation aerosol INHALE 2 PUFFS BY MOUTH EVERY 6 HOURS AS NEEDED FOR SHORTNESS OF BREATH. GENERIC FOR VENTOLIN 36 g 3    levothyroxine (SYNTHROID) 75 MCG Tab Take 1 Tablet by mouth every morning. NEEDS TO COMPLETE LABS FOR FURTHER REFILLS 90 Tablet 0    mupirocin (BACTROBAN) 2 % Ointment APPLY TOPICALLY TO THE AFFECTED AREA DAILY 22 g 0    nystatin (MYCOSTATIN) 906653 UNIT/GM Cream topical cream APPLY TOPICALLY TO THE AFFECTED AREA TWICE DAILY 30 g 0    cephALEXin (KEFLEX) 250 MG Cap Take 1 Capsule by mouth every day. 90 Capsule 1    baclofen (LIORESAL) 10 MG Tab Take 1 Tablet by mouth every 8 hours as needed (muscle spasms). 90 Tablet 5    omeprazole (PRILOSEC) 20 MG delayed-release capsule Take 20 mg by mouth every day.      ciclopirox (PENLAC) 8 % solution Apply to affected toenail(s) and adjacent skin once daily in combination with weekly nail trimming and periodic nail debridement. Remove with alcohol every 7 days. 18 mL 2    amitriptyline (ELAVIL) 25 MG Tab TAKE 1 TABLET BY MOUTH TWICE DAILY AS NEEDED FOR SLEEP OR MILD PAIN 180 Tablet 1    nystatin (MYCOSTATIN) powder Apply 1 g topically 3 times a day. 60 g 11    esomeprazole (NEXIUM) 20 MG capsule Take 1 Capsule by mouth every day. 90 Capsule 3    carboxymethylcellulose (REFRESH TEARS) 0.5 % Solution Administer 1 Drop into both eyes as needed (dry eyes). 15 mL 3    estradiol (ESTRACE) 0.5 MG  "tablet TAKE 1 TABLET BY MOUTH EVERY DAY 90 Tablet 0    cetirizine (ZYRTEC) 10 MG Tab Take 1 Tablet by mouth every day. 90 Tablet 1    FLUoxetine (PROZAC) 20 MG Cap Take 1 Capsule by mouth every day. 90 Capsule 3    rosuvastatin (CRESTOR) 20 MG Tab Take 1 Tablet by mouth every evening. 90 Tablet 1    fluticasone (FLOVENT HFA) 110 MCG/ACT Aerosol Inhale 1 Puff 2 times a day. 12 g 3     No current facility-administered medications for this visit.     ROS:  Review of Systems   Constitutional:  Negative for chills, fever and weight loss.   Respiratory:  Negative for shortness of breath.    Cardiovascular:  Negative for chest pain.   Musculoskeletal:  Positive for back pain.   Neurological:  Positive for headaches. Negative for dizziness.     Objective:     Exam:  /70 (BP Location: Right arm, Patient Position: Sitting, BP Cuff Size: Adult)   Pulse 78   Temp 36.7 °C (98 °F) (Temporal)   Ht 1.715 m (5' 7.5\")   Wt 103 kg (227 lb)   LMP 01/01/1988   SpO2 96%   BMI 35.03 kg/m²  Body mass index is 35.03 kg/m².    Wt Readings from Last 4 Encounters:   03/01/24 103 kg (227 lb)   02/02/24 104 kg (228 lb 12.8 oz)   01/03/24 96.2 kg (212 lb)   12/06/23 95.4 kg (210 lb 6.4 oz)     Physical Exam  Constitutional:       Appearance: Normal appearance.   Eyes:      Conjunctiva/sclera: Conjunctivae normal.      Pupils: Pupils are equal, round, and reactive to light.   Cardiovascular:      Rate and Rhythm: Normal rate and regular rhythm.      Heart sounds: Normal heart sounds. No murmur heard.  Pulmonary:      Effort: Pulmonary effort is normal. No respiratory distress.      Breath sounds: Normal breath sounds.   Musculoskeletal:      Right lower leg: No edema.      Left lower leg: No edema.   Lymphadenopathy:      Cervical: No cervical adenopathy.   Skin:     General: Skin is warm and dry.   Neurological:      General: No focal deficit present.      Mental Status: She is alert and oriented to person, place, and time. "   Psychiatric:         Mood and Affect: Mood normal.         Behavior: Behavior normal.       Assessment & Plan:     68 y.o. female with the following -     1. Anxiolytic dependence (HCC)  Chronic, stable. She has a history of debilitating panic attacks. Continue with fluoxetine. I will refill Xanax today as well, reports it is effective at treating her panic attacks. Discussed the black box warning of concurrent benzodiazepines and opiate use. She is aware of the risks but reports she has been on this regimen for many years without any problems. Controlled substance treatment agreement updated today. Urine drug screen updated today. Obtained and reviewed patient utilization report from [facility] pharmacy database on 3/1/2024 2:59 PM  prior to writing prescription for controlled substance [facility] . Based on assessment of the report, the prescription is medically necessary.   - Controlled Substance Treatment Agreement  - PAIN MANAGEMENT PANEL, SCRN W/ RFLX TO QNT; Future  - ALPRAZolam (XANAX) 1 MG Tab; Take 1 Tablet by mouth every 12 hours as needed for Anxiety for up to 30 days.  Dispense: 60 Tablet; Refill: 0    2. Uncomplicated opioid dependence (HCC)  3. Chronic pain syndrome  Chronic, stable. She was previously managed by Dr. Enrrique Santa, but he has since retired. Her PCP has diligently been decreasing her opiate therapy since taking over her pain management. She is currently at 75 MME (down from 245 MME initially).  I will continue her current regimen today and have her follow up with her PCP in 30 days for refills. Urine drug screen updated today. Controlled substance agreement with myself updated today.  Obtained and reviewed patient utilization report from [facility] pharmacy database on 3/1/2024 2:54 PM  prior to writing prescription for controlled substance II, III or IV [facility] . Based on assessment of the report, the prescription is medically necessary.   -  Controlled Substance Treatment Agreement  - PAIN MANAGEMENT PANEL, SCRN W/ RFLX TO QNT; Future  - morphine ER (MS CONTIN) 15 MG Tab CR tablet; Take 1 Tablet by mouth every 12 hours for 30 days.  Dispense: 60 Tablet; Refill: 0  - oxyCODONE immediate release (ROXICODONE) 10 MG immediate release tablet; Take 1 Tablet by mouth every 8 hours as needed for Severe Pain for up to 30 days.  Dispense: 90 Tablet; Refill: 0    4. Essential hypertension  Chronic, stable. Her blood pressure is normal today. Recommended completion of lab work.   - furosemide (LASIX) 20 MG Tab; Take 1 Tablet by mouth every day.  Dispense: 90 Tablet; Refill: 1    HCC Gap Form    Comorbidity Diagnosis: Essential hypertension  Assessment and plan: Chronic, worsening. Encouraged healthy diet and physical activity changes with a goal of weight loss. Follow up at least annually. The comorbid condition is stable  based on today's assessment. Continue current treatment plan. Follow up at least yearly.  Last edited 03/01/24 14:45 PST by MICHAEL Morton.       Return in about 4 weeks (around 3/29/2024) for Pain management and anxiety.    Please note that this dictation was created using voice recognition software. I have made every reasonable attempt to correct obvious errors, but I expect that there are errors of grammar and possibly content that I did not discover before finalizing the note.

## 2024-03-03 LAB
AMPHET CTO UR CFM-MCNC: NEGATIVE NG/ML
BARBITURATES CTO UR CFM-MCNC: NEGATIVE NG/ML
BENZODIAZ CTO UR CFM-MCNC: NORMAL NG/ML
BUPRENORPHINE UR-MCNC: NEGATIVE NG/ML
CANNABINOIDS CTO UR CFM-MCNC: NEGATIVE NG/ML
CARISOPRODOL UR-MCNC: NEGATIVE NG/ML
COCAINE CTO UR CFM-MCNC: NEGATIVE NG/ML
CREAT UR-MCNC: 27.9 MG/DL (ref 20–400)
DRUG SCREEN COMMENT UR-IMP: NORMAL
ETHYL GLUCURONIDE UR QL SCN: NEGATIVE NG/ML
FENTANYL UR-MCNC: NEGATIVE NG/ML
MEPERIDINE CTO UR SCN-MCNC: NEGATIVE NG/ML
METHADONE CTO UR CFM-MCNC: NEGATIVE NG/ML
OPIATES UR QL SCN: NORMAL NG/ML
OXYCDOXYM URSCRN 2005102: NORMAL NG/ML
PCP CTO UR CFM-MCNC: NEGATIVE NG/ML
PROPOXYPH CTO UR CFM-MCNC: NEGATIVE NG/ML
TAPENTADOL UR-MCNC: NEGATIVE NG/ML
TRAMADOL CTO UR SCN-MCNC: NEGATIVE NG/ML
ZOLPIDEM UR-MCNC: NEGATIVE NG/ML

## 2024-03-05 LAB
1OH-MIDAZOLAM UR CFM-MCNC: <20 NG/ML
6MAM UR CFM-MCNC: <10 NG/ML
7AMINOCLONAZEPAM UR CFM-MCNC: <5 NG/ML
A-OH ALPRAZ UR CFM-MCNC: 145 NG/ML
ALPRAZ UR CFM-MCNC: 110 NG/ML
CHLORDIAZEP UR CFM-MCNC: <20 NG/ML
CLONAZEPAM UR CFM-MCNC: <5 NG/ML
CODEINE UR CFM-MCNC: <20 NG/ML
DIAZEPAM UR CFM-MCNC: <20 NG/ML
HYDROCODONE UR CFM-MCNC: <20 NG/ML
HYDROMORPHONE UR CFM-MCNC: <20 NG/ML
LORAZEPAM UR CFM-MCNC: <20 NG/ML
MIDAZOLAM UR CFM-MCNC: <20 NG/ML
MORPHINE UR CFM-MCNC: 213 NG/ML
NORDIAZEPAM UR CFM-MCNC: <20 NG/ML
NORHYDROCODONE UR CFM-MCNC: <20 NG/ML
NOROXYCODONE UR CFM-MCNC: 473 NG/ML
OPIATES UR NOROXYM Q0836: <20 NG/ML
OXAZEPAM UR CFM-MCNC: <20 NG/ML
OXYCODONE UR CFM-MCNC: 253 NG/ML
OXYMORPHONE UR CFM-MCNC: <20 NG/ML
TEMAZEPAM UR CFM-MCNC: <20 NG/ML

## 2024-03-05 PROCEDURE — G0480 DRUG TEST DEF 1-7 CLASSES: HCPCS

## 2024-03-11 RX ORDER — AMITRIPTYLINE HYDROCHLORIDE 25 MG/1
TABLET, FILM COATED ORAL
Qty: 180 TABLET | Refills: 1 | Status: SHIPPED | OUTPATIENT
Start: 2024-03-11

## 2024-03-27 ENCOUNTER — HOSPITAL ENCOUNTER (OUTPATIENT)
Facility: MEDICAL CENTER | Age: 69
End: 2024-03-27
Attending: FAMILY MEDICINE
Payer: MEDICARE

## 2024-03-27 ENCOUNTER — OFFICE VISIT (OUTPATIENT)
Dept: MEDICAL GROUP | Facility: PHYSICIAN GROUP | Age: 69
End: 2024-03-27
Payer: MEDICARE

## 2024-03-27 VITALS
TEMPERATURE: 97.3 F | SYSTOLIC BLOOD PRESSURE: 122 MMHG | WEIGHT: 232.8 LBS | OXYGEN SATURATION: 96 % | DIASTOLIC BLOOD PRESSURE: 60 MMHG | BODY MASS INDEX: 37.41 KG/M2 | HEART RATE: 70 BPM | HEIGHT: 66 IN

## 2024-03-27 DIAGNOSIS — N30.10 CHRONIC INTERSTITIAL CYSTITIS: ICD-10-CM

## 2024-03-27 DIAGNOSIS — F11.20 UNCOMPLICATED OPIOID DEPENDENCE (HCC): ICD-10-CM

## 2024-03-27 DIAGNOSIS — F13.20 ANXIOLYTIC DEPENDENCE (HCC): ICD-10-CM

## 2024-03-27 DIAGNOSIS — E66.01 SEVERE OBESITY (BMI 35.0-39.9) WITH COMORBIDITY (HCC): ICD-10-CM

## 2024-03-27 DIAGNOSIS — G89.4 CHRONIC PAIN SYNDROME: Primary | ICD-10-CM

## 2024-03-27 DIAGNOSIS — K59.03 DRUG-INDUCED CONSTIPATION: ICD-10-CM

## 2024-03-27 DIAGNOSIS — I10 ESSENTIAL HYPERTENSION: ICD-10-CM

## 2024-03-27 DIAGNOSIS — G89.4 CHRONIC PAIN SYNDROME: ICD-10-CM

## 2024-03-27 DIAGNOSIS — F41.9 ANXIETY: ICD-10-CM

## 2024-03-27 PROBLEM — Z91.81 HISTORY OF FALL: Status: ACTIVE | Noted: 2023-01-09

## 2024-03-27 LAB
APPEARANCE UR: CLEAR
BACTERIA #/AREA URNS HPF: ABNORMAL /HPF
BILIRUB UR QL STRIP.AUTO: NEGATIVE
COLOR UR: YELLOW
EPI CELLS #/AREA URNS HPF: ABNORMAL /HPF
GLUCOSE UR STRIP.AUTO-MCNC: NEGATIVE MG/DL
HYALINE CASTS #/AREA URNS LPF: ABNORMAL /LPF
KETONES UR STRIP.AUTO-MCNC: NEGATIVE MG/DL
LEUKOCYTE ESTERASE UR QL STRIP.AUTO: ABNORMAL
MICRO URNS: ABNORMAL
NITRITE UR QL STRIP.AUTO: NEGATIVE
PH UR STRIP.AUTO: 6 [PH] (ref 5–8)
PROT UR QL STRIP: NEGATIVE MG/DL
RBC # URNS HPF: ABNORMAL /HPF
RBC UR QL AUTO: NEGATIVE
SP GR UR STRIP.AUTO: 1.01
UROBILINOGEN UR STRIP.AUTO-MCNC: 0.2 MG/DL
WBC #/AREA URNS HPF: ABNORMAL /HPF

## 2024-03-27 PROCEDURE — 80307 DRUG TEST PRSMV CHEM ANLYZR: CPT

## 2024-03-27 PROCEDURE — 3074F SYST BP LT 130 MM HG: CPT | Performed by: FAMILY MEDICINE

## 2024-03-27 PROCEDURE — 81001 URINALYSIS AUTO W/SCOPE: CPT

## 2024-03-27 PROCEDURE — 99214 OFFICE O/P EST MOD 30 MIN: CPT | Performed by: FAMILY MEDICINE

## 2024-03-27 PROCEDURE — 3078F DIAST BP <80 MM HG: CPT | Performed by: FAMILY MEDICINE

## 2024-03-27 PROCEDURE — G0480 DRUG TEST DEF 1-7 CLASSES: HCPCS

## 2024-03-27 RX ORDER — ALPRAZOLAM 1 MG/1
1 TABLET ORAL EVERY 12 HOURS PRN
Qty: 60 TABLET | Refills: 0 | Status: SHIPPED | OUTPATIENT
Start: 2024-04-01 | End: 2024-05-01

## 2024-03-27 RX ORDER — MORPHINE SULFATE 15 MG/1
15 TABLET, FILM COATED, EXTENDED RELEASE ORAL EVERY 12 HOURS
Qty: 60 TABLET | Refills: 0 | Status: SHIPPED | OUTPATIENT
Start: 2024-03-31 | End: 2024-04-30

## 2024-03-27 RX ORDER — OXYCODONE HYDROCHLORIDE 10 MG/1
10 TABLET ORAL EVERY 8 HOURS PRN
Qty: 90 TABLET | Refills: 0 | Status: SHIPPED | OUTPATIENT
Start: 2024-03-31 | End: 2024-04-30

## 2024-03-27 RX ORDER — LEVOTHYROXINE SODIUM 0.07 MG/1
75 TABLET ORAL EVERY MORNING
Qty: 90 TABLET | Refills: 0 | Status: SHIPPED | OUTPATIENT
Start: 2024-03-27

## 2024-03-27 ASSESSMENT — ENCOUNTER SYMPTOMS
NAUSEA: 1
VOMITING: 0
DOUBLE VISION: 0

## 2024-03-27 ASSESSMENT — FIBROSIS 4 INDEX: FIB4 SCORE: 1.74

## 2024-03-27 NOTE — PROGRESS NOTES
Subjective:     CC: med refill, interstitial cystitis, constipation    HPI:   Ashleigh presents today with    Problem   Anxiety    Chronic. Been using alprazolam for 25+ years.  She reports that she was told by  The past that she always have to have Xanax available because her throat will close.    Current med: xanax 1 mg bid  Last dose: this morning    Last fill: 3/3/2024, #60, 30 days    CS Agreement: 9/29/2023  UDS: 3/1/2024     Chronic Pain    Chronic.  2/2 back pain, interstitial cystitis, left knee pain    She was following with Dr. Enrrique Santa but he has retired as of 12/31/2022. Reports the left knee needs surgery, but not ready for that discussion.    CS Agreement: 9/29/2023  UDS: 3/1/2024    Last fill  Morphine ER 15 mg tabs - 3/2/2024, #60, 30 days  Oxycodone 10 mg tabs - 3/2/2024, #90, 30 days    Previous pain management/referrals  Dr. Santa - retired  [facility] - discharged 2013  [facility] - won't take new patients from Dr. Santa  [facility] [place] - won't prescribe opiates if she continues xanax  [facility] - won't take over until we wean her down for them  [facility] - She did not agree with their treatment plan, reports they said she doesn't want to come stop the medication, therefore refuses to go back  [facility] - won't accept due to opiate dosing  [facility] - not accepting new patients     History of Fall    Fell 1 month ago  She has some scrapes on her right foot and left knee       Severe Obesity (Bmi 35.0-39.9) With Comorbidity (Hcc)    This is a chronic condition.  Max weight: 299 lbs (10/2017)  Current weight: 232 lbs  Weight change: -16 lbs since 7/2022  BMI: 35.8  Diet: watching diet  Exercise: more walking       Anxiolytic Dependence (Hcc)    Is the medication improving the patient’s symptoms: Yes  Any adverse effects: No    Alcohol or illicit drug use:   She  reports no history of alcohol use.  She  reports no  history of drug use.     History of controlled substance used in a way other than prescribed? No  Any early refills of a controlled substance: YES  History of lost or stolen controlled substance prescription: No  Any aberrant behavior or intoxication while on a controlled substance: No  Has the patient self-modified their dose or frequency of the medication :No  Compliant with treatment recommendations and plan: Yes  Any major health change to the patient: No  Concerns for misuse, abuse or addiction: No    /NarxCheck report reviewed: Yes  History of abnormal drug screening: No     Opiate dependence ([facility])    Morphine ER 15 mg bid  Oxycodone 10 mg tid  Total daily MME = 75 <-- 120 <-- 165 <-- 180 <-- 195 <-- 215 <-- 235 <-- 245 <--255 <-- 300 <-- 345    Chronic pain recheck for: back pain  Last dose of controlled substance: morphine - this morning, percocet - this morning  Chronic pain treated with morphine ER 15 mg bid and percocet 10/325 mg 3x/day.     She  reports no history of alcohol use.  She  reports no history of drug use.    Interval history:   Any major change in health since last appointment? No    Consequences of Chronic Opiate therapy:  (5 A's)  Analgesia: Compared to no treatment or prior treatment, pain is currently significantly improved  Activity: improved  Adverse Events: She denies constipation, dry mouth, itchy skin, nausea and sedation  Aberrant Behaviors: She reports she is taking medication as prescribed and is not veering from agreed treatment regimen or provider recommendations. There have been no inappropriate refills or lost/stolen meds reported.  Affect/Mood: Pain is not impacting patient's mood.  Patient denies depression/anxiety.    Nonnarcotic treatments that are being used: muscle relaxers and tricyclic antidepressants.     Last imagin - knee    Opioid Risk Score: 0    Interpretation of Opioid Risk Score   Score 0-3 = Low risk of abuse. Do UDS at least once per  "year.  Score 4-7 = Moderate risk of abuse. Do UDS 1-4 times per year.  Score 8+ = High risk of abuse. Refer to specialist.    UDS Summary       This patient has no relevant Health Maintenance data.          Most recent UDS reviewed today and is consistent with prescribed medications.     I have reviewed the medical records, the Prescription Monitoring Program and I have determined that controlled substance treatment is medically indicated.            Health Maintenance: Completed    ROS:  Review of Systems   Eyes:  Negative for double vision.   Gastrointestinal:  Positive for nausea. Negative for vomiting.       Objective:     Exam:  /60 (BP Location: Right arm, Patient Position: Sitting, BP Cuff Size: Adult)   Pulse 70   Temp 36.3 °C (97.3 °F) (Temporal)   Ht 1.676 m (5' 6\")   Wt 106 kg (232 lb 12.8 oz)   LMP 01/01/1988   SpO2 96%   BMI 37.57 kg/m²  Body mass index is 37.57 kg/m².    Physical Exam  Constitutional:       Appearance: Normal appearance.   Cardiovascular:      Rate and Rhythm: Normal rate and regular rhythm.      Heart sounds: Murmur heard.      Crescendo decrescendo systolic murmur is present with a grade of 1/6.   Pulmonary:      Effort: Pulmonary effort is normal.      Breath sounds: Normal breath sounds.   Musculoskeletal:      Cervical back: Normal range of motion and neck supple.   Lymphadenopathy:      Cervical: No cervical adenopathy.   Neurological:      Mental Status: She is alert.             Assessment & Plan:     68 y.o. female with the following -     1. Chronic pain syndrome  2. Uncomplicated opioid dependence (HCC)  Chronic, stable.  She is currently on 75 MME per day.  We will no longer taper off the medication she had no interest in increasing her doses so she has no interest in pain management specialist at this time.  We will continue her current regimen.  Informed consent a controlled substance agreement on file.  Urine obtained today for drug screen.Obtained and " reviewed patient utilization report from [facility] [place] pharmacy database on 3/27/2024 1:54 PM  prior to writing prescription for controlled substance II, III or IV per [place] bill . Based on assessment of the report, the prescription is medically necessary.   - morphine ER (MS CONTIN) 15 MG Tab CR tablet; Take 1 Tablet by mouth every 12 hours for 30 days.  Dispense: 60 Tablet; Refill: 0  - oxyCODONE immediate release (ROXICODONE) 10 MG immediate release tablet; Take 1 Tablet by mouth every 8 hours as needed for Severe Pain for up to 30 days.  Dispense: 90 Tablet; Refill: 0  - PAIN MANAGEMENT SCRN, W/ RFLX TO QNT; Future    3. Anxiety  4. Anxiolytic dependence (HCC)  Chronic, stable.  She has been using Xanax twice daily for 25 or more years to manage anxiety and panic attacks.  She continues to not be interested in changing the medication.  Informed consent a controlled substance agreement on file.  Urine obtained today for drug screen.Obtained and reviewed patient utilization report from [facility] [place] pharmacy database on 3/27/2024 1:54 PM  prior to writing prescription for controlled substance II, III or IV per [place] bill . Based on assessment of the report, the prescription is medically necessary.   - ALPRAZolam (XANAX) 1 MG Tab; Take 1 Tablet by mouth every 12 hours as needed for Anxiety for up to 30 days.  Dispense: 60 Tablet; Refill: 0  - PAIN MANAGEMENT SCRN, W/ RFLX TO QNT; Future    5. Severe obesity (BMI 35.0-39.9) with comorbidity (HCC)  6. Essential hypertension  Chronic, stable.  Her BMI is 37 indicating severe obesity that is comorbid with hypertension.  Hypertension is well-controlled in the office today.  She will continue trying to work on healthy lifestyle and we will continue her current regimen for blood pressure.  - Furosemide 20 mg daily    7. Drug-induced constipation  Chronic, stable.  She reports that she has been constipated for 4-6 days today.  I suspect this is secondary to her  opioid use.  She already has a plan to get some Dulcolax as has worked well in the past for her.  Will continue to monitor.    8. Interstitial cystitis  Chronic, stable.  She does have a history of interstitial cystitis.  She is noticing pain and she is wonder if she is having a urinary tract infection.  Will check her urine obtained for drug screen today to see if there is urinary tract infection.  For now she will continue her daily Keflex for prophylaxis.  - URINALYSIS,CULTURE IF INDICATED; Future    I spent a total of 38 minutes with record review, exam, communication with the patient, communication with other providers, and documentation of this encounter.    Return in about 4 weeks (around 4/24/2024) for Controlled substance.    Please note that this dictation was created using voice recognition software. I have made every reasonable attempt to correct obvious errors, but I expect that there are errors of grammar and possibly content that I did not discover before finalizing the note.

## 2024-03-29 LAB
AMPHET CTO UR CFM-MCNC: NEGATIVE NG/ML
BARBITURATES CTO UR CFM-MCNC: NEGATIVE NG/ML
BENZODIAZ CTO UR CFM-MCNC: NORMAL NG/ML
BUPRENORPHINE UR-MCNC: NEGATIVE NG/ML
CANNABINOIDS CTO UR CFM-MCNC: NEGATIVE NG/ML
CARISOPRODOL UR-MCNC: NEGATIVE NG/ML
COCAINE CTO UR CFM-MCNC: NEGATIVE NG/ML
CREAT UR-MCNC: 51.2 MG/DL (ref 20–400)
DRUG SCREEN COMMENT UR-IMP: NORMAL
ETHYL GLUCURONIDE UR QL SCN: NEGATIVE NG/ML
FENTANYL UR-MCNC: NEGATIVE NG/ML
MEPERIDINE CTO UR SCN-MCNC: NEGATIVE NG/ML
METHADONE CTO UR CFM-MCNC: NEGATIVE NG/ML
OPIATES UR QL SCN: NORMAL NG/ML
OXYCDOXYM URSCRN 2005102: NORMAL NG/ML
PCP CTO UR CFM-MCNC: NEGATIVE NG/ML
PROPOXYPH CTO UR CFM-MCNC: NEGATIVE NG/ML
TAPENTADOL UR-MCNC: NEGATIVE NG/ML
TRAMADOL CTO UR SCN-MCNC: NEGATIVE NG/ML
ZOLPIDEM UR-MCNC: NEGATIVE NG/ML

## 2024-03-31 LAB
1OH-MIDAZOLAM UR CFM-MCNC: <20 NG/ML
6MAM UR CFM-MCNC: <10 NG/ML
7AMINOCLONAZEPAM UR CFM-MCNC: <5 NG/ML
A-OH ALPRAZ UR CFM-MCNC: 170 NG/ML
ALPRAZ UR CFM-MCNC: 144 NG/ML
CHLORDIAZEP UR CFM-MCNC: <20 NG/ML
CLONAZEPAM UR CFM-MCNC: <5 NG/ML
CODEINE UR CFM-MCNC: <20 NG/ML
DIAZEPAM UR CFM-MCNC: <20 NG/ML
HYDROCODONE UR CFM-MCNC: <20 NG/ML
HYDROMORPHONE UR CFM-MCNC: <20 NG/ML
LORAZEPAM UR CFM-MCNC: <20 NG/ML
MIDAZOLAM UR CFM-MCNC: <20 NG/ML
MORPHINE UR CFM-MCNC: 585 NG/ML
NORDIAZEPAM UR CFM-MCNC: <20 NG/ML
NORHYDROCODONE UR CFM-MCNC: <20 NG/ML
NOROXYCODONE UR CFM-MCNC: 3028 NG/ML
OPIATES UR NOROXYM Q0836: 35 NG/ML
OXAZEPAM UR CFM-MCNC: <20 NG/ML
OXYCODONE UR CFM-MCNC: 1833 NG/ML
OXYMORPHONE UR CFM-MCNC: <20 NG/ML
TEMAZEPAM UR CFM-MCNC: <20 NG/ML

## 2024-04-01 NOTE — TELEPHONE ENCOUNTER
Received request via: Pharmacy    Was the patient seen in the last year in this department? Yes    Does the patient have an active prescription (recently filled or refills available) for medication(s) requested? No    [facility]     Does the patient have custodial Plus and need 100 day supply (blood pressure, diabetes and cholesterol meds only)? Patient does not have SCP

## 2024-04-05 NOTE — TELEPHONE ENCOUNTER
PDMP Monitoring:    Last PDMP Tonio as Reviewed:  Review User Review Instant Review Result   NARAYAN ARREDONDO 4/5/2024 11:27 AM Reviewed PDMP [1]     [unfilled]  Urine Drug Screenings (1 yr)       POCT Rapid Drug Screen  Resulted: 5/5/2017 (Final result)              Urine Drug Screen  Collected: 10/12/2023  7:55 AM (Final result)              Urine Drug Screen  Resulted: 9/27/2018 (Final result)                  Medication Contract and Consent for Opioid Use Documents Filed        No documents found                     Phone Number Called: [phone] (home)     Message: Patient informed letter has been faxed.     Left Message for patient to call back: no

## 2024-04-12 DIAGNOSIS — Z79.890 POSTMENOPAUSAL HRT (HORMONE REPLACEMENT THERAPY): ICD-10-CM

## 2024-04-12 RX ORDER — ESTRADIOL 0.5 MG/1
TABLET ORAL
Qty: 90 TABLET | Refills: 1 | Status: SHIPPED | OUTPATIENT
Start: 2024-04-12

## 2024-04-12 NOTE — TELEPHONE ENCOUNTER
Received request via: Pharmacy    Was the patient seen in the last year in this department? Yes    Does the patient have an active prescription (recently filled or refills available) for medication(s) requested? No    [facility]    Does the patient have [facility] Plus and need 100 day supply (blood pressure, diabetes and cholesterol meds only)? Patient does not have SCP

## 2024-04-26 ENCOUNTER — OFFICE VISIT (OUTPATIENT)
Dept: MEDICAL GROUP | Facility: PHYSICIAN GROUP | Age: 69
End: 2024-04-26
Payer: MEDICARE

## 2024-04-26 VITALS
HEIGHT: 66 IN | DIASTOLIC BLOOD PRESSURE: 60 MMHG | WEIGHT: 233.6 LBS | BODY MASS INDEX: 37.54 KG/M2 | HEART RATE: 64 BPM | OXYGEN SATURATION: 94 % | TEMPERATURE: 97.3 F | SYSTOLIC BLOOD PRESSURE: 122 MMHG

## 2024-04-26 DIAGNOSIS — F11.20 UNCOMPLICATED OPIOID DEPENDENCE (HCC): ICD-10-CM

## 2024-04-26 DIAGNOSIS — I10 ESSENTIAL HYPERTENSION: ICD-10-CM

## 2024-04-26 DIAGNOSIS — E66.01 SEVERE OBESITY (BMI 35.0-39.9) WITH COMORBIDITY (HCC): ICD-10-CM

## 2024-04-26 DIAGNOSIS — F13.20 ANXIOLYTIC DEPENDENCE (HCC): ICD-10-CM

## 2024-04-26 DIAGNOSIS — F41.9 ANXIETY: Primary | ICD-10-CM

## 2024-04-26 DIAGNOSIS — G89.4 CHRONIC PAIN SYNDROME: ICD-10-CM

## 2024-04-26 PROCEDURE — 99214 OFFICE O/P EST MOD 30 MIN: CPT | Performed by: FAMILY MEDICINE

## 2024-04-26 PROCEDURE — 3074F SYST BP LT 130 MM HG: CPT | Performed by: FAMILY MEDICINE

## 2024-04-26 PROCEDURE — 3078F DIAST BP <80 MM HG: CPT | Performed by: FAMILY MEDICINE

## 2024-04-26 RX ORDER — OXYCODONE HYDROCHLORIDE 10 MG/1
10 TABLET ORAL EVERY 8 HOURS PRN
Qty: 90 TABLET | Refills: 0 | Status: SHIPPED | OUTPATIENT
Start: 2024-05-01 | End: 2024-05-31

## 2024-04-26 RX ORDER — MORPHINE SULFATE 15 MG/1
15 TABLET, FILM COATED, EXTENDED RELEASE ORAL EVERY 12 HOURS
Qty: 60 TABLET | Refills: 0 | Status: SHIPPED | OUTPATIENT
Start: 2024-05-01 | End: 2024-05-31

## 2024-04-26 RX ORDER — ALPRAZOLAM 1 MG/1
1 TABLET ORAL EVERY 12 HOURS PRN
Qty: 60 TABLET | Refills: 0 | Status: SHIPPED | OUTPATIENT
Start: 2024-05-01 | End: 2024-05-31

## 2024-04-26 ASSESSMENT — FIBROSIS 4 INDEX: FIB4 SCORE: 1.74

## 2024-04-26 NOTE — PROGRESS NOTES
Verbal consent was acquired by the patient to use writewith ambient listening note generation during this visit     Subjective:     HPI:   History of Present Illness  The patient is a 68-year-old woman who presents for evaluation of multiple medical concerns.    The patient suspects that amitriptyline may be contributing to her weight gain, citing numerous side effects. Despite not consuming large quantities of food, she maintains a high fluid intake and suspects fluid retention. She is scheduled for a colonoscopy and reports occasional bowel irregularities.    The patient last refilled her controlled substance prescriptions on 04/01/2024. She currently has a supply of oxycodone, which she takes thrice daily. This morning, she took her morphine and oxycodone. She reports less severe leg pain in the morning, which impedes her ability to rise from bed. She describes severe bone pain and infection, for which she takes Keflex. She denies any episodes of vomiting. She reports severe neck pain and tightness. She continues to take baclofen, but expresses concern about potential side effects of amitriptyline. She has scoliosis, which exacerbates her back pain. She expresses a preference for an additional 10 mg oxycodone.    Oxycodone 10 mg tabs  Last fill: 4/1/24, #90, 30 days  Tabs left: ~12  Rx on file: 0  CS Agreement: 4/26/2024  UDS: 3/27/2024, appropriate    Morphine ER 15 mg tabs  Last fill: 4/1/24, #60, 30 days  Tabs left: ~8  Rx on file: 0  CS Agreement: 4/26/2024  UDS: 3/27/2024, appropriate    Alprazolam 1 mg tabs  Last fill: 4/1/24  Tabs left: ~8  Rx on file: 0   CS Agreement: 4/26/2024  UDS: 3/27/2024, appropriate    Chronic pain recheck for: back pain  Last dose of controlled substance: morphine - this morning, oxycodone - this afternoon  Chronic pain treated with morphine ER 15 mg bid and oxycodone 10 mg 3x/day.     She  reports no history of alcohol use.  She  reports no history of drug use.    Interval  history:   Any major change in health since last appointment? No    Consequences of Chronic Opiate therapy:  (5 A's)  Analgesia: Compared to no treatment or prior treatment, pain is currently significantly improved  Activity: improved  Adverse Events: She denies dry mouth, itchy skin, nausea, and sedation  Aberrant Behaviors: She reports she is taking medication as prescribed and is not veering from agreed treatment regimen or provider recommendations. There have been no inappropriate refills or lost/stolen meds reported.  Affect/Mood: Pain is not impacting patient's mood.  Patient denies depression/anxiety.    Nonnarcotic treatments that are being used: NSAIDs/COOPER-2 and tricyclic antidepressants.     Last imagin - knee    Opioid Risk Score: 0    Interpretation of Opioid Risk Score   Score 0-3 = Low risk of abuse. Do UDS at least once per year.  Score 4-7 = Moderate risk of abuse. Do UDS 1-4 times per year.  Score 8+ = High risk of abuse. Refer to specialist.    I have reviewed the medical records, the Prescription Monitoring Program and I have determined that controlled substance treatment is medically indicated.     Is the medication improving the patient’s symptoms: Yes  Any adverse effects: No    Alcohol or illicit drug use:   She  reports no history of alcohol use.  She  reports no history of drug use.     History of controlled substance used in a way other than prescribed? No  Any early refills of a controlled substance: Yes  History of lost or stolen controlled substance prescription: No  Any aberrant behavior or intoxication while on a controlled substance: No  Has the patient self-modified their dose or frequency of the medication :No  Compliant with treatment recommendations and plan: Yes  Any major health change to the patient: No  Concerns for misuse, abuse or addiction: No    /NarxCheck report reviewed: Yes  History of abnormal drug screening: No    Health Maintenance: Completed    Objective:  "    Exam:  /60 (BP Location: Right arm, Patient Position: Sitting, BP Cuff Size: Adult)   Pulse 64   Temp 36.3 °C (97.3 °F) (Temporal)   Ht 1.676 m (5' 6\")   Wt 106 kg (233 lb 9.6 oz)   LMP 01/01/1988   SpO2 94%   BMI 37.70 kg/m²  Body mass index is 37.7 kg/m².    Physical Exam  Constitutional:       Appearance: Normal appearance.   Cardiovascular:      Rate and Rhythm: Normal rate and regular rhythm.      Heart sounds: Normal heart sounds.   Pulmonary:      Effort: Pulmonary effort is normal.      Breath sounds: Examination of the right-upper field reveals rhonchi. Examination of the left-upper field reveals rhonchi. Examination of the right-lower field reveals rhonchi. Examination of the left-lower field reveals rhonchi. Rhonchi present.   Neurological:      Mental Status: She is alert.             Results      Assessment & Plan:     1. Anxiety  Controlled Substance Treatment Agreement      2. Anxiolytic dependence (HCC)  Controlled Substance Treatment Agreement    ALPRAZolam (XANAX) 1 MG Tab      3. Chronic pain syndrome  Controlled Substance Treatment Agreement    morphine ER (MS CONTIN) 15 MG Tab CR tablet    oxyCODONE immediate release (ROXICODONE) 10 MG immediate release tablet      4. Uncomplicated opioid dependence (HCC)  Controlled Substance Treatment Agreement    morphine ER (MS CONTIN) 15 MG Tab CR tablet    oxyCODONE immediate release (ROXICODONE) 10 MG immediate release tablet      5. Severe obesity (BMI 35.0-39.9) with comorbidity (HCC)        6. Essential hypertension            Assessment & Plan  1. Chronic anxiety with anxiolytic dependence.  The patient's condition is chronic and has been stable for over 25 years. The patient has been prescribed alprazolam to manage her anxiety, with a dosage of 1 mg twice daily. She has expressed no interest in exploring other medications. Informed consent and controlled substance agreement were updated today, and her urine drug screen is current and " appropriate. Alprazolam 1 mg tablets were refilled to take every 12 hours as needed. Obtained and reviewed patient utilization report from [facility] pharmacy database on 4/26/2024 1:58 PM  prior to writing prescription for controlled substance II, III or IV per [place] bill . Based on assessment of the report, the prescription is medically necessary.     2. Chronic pain syndrome with uncomplicated opioid dependence.  The patient's condition is chronic and remains stable for several years. She has struggled with chronic back pain, interstitial cystitis, and left knee pain. She has no longer been under the care of pain management, and we have successfully weaned her down to 75 MME daily. Her current regimen will be maintained as it has been effective in managing her pain without any side effects. The controlled substance agreement was obtained today, and her urine screen is current and appropriate. She will continue with morphine ER 50 mg twice daily and oxycodone 10 mg 3 times daily as needed for her pain. Obtained and reviewed patient utilization report from [facility] pharmacy database on 4/26/2024 1:58 PM  prior to writing prescription for controlled substance II, III or IV per [place] bill . Based on assessment of the report, the prescription is medically necessary.     3. Severe obesity with comorbidity with hypertension.  The patient's condition is chronic and stable. She has noticed some weight gain, which could be due to her amitriptyline use. It is also likely secondary to a more sedentary lifestyle due to her chronic pain. She admits to occasionally overeating, but she is trying to use Aayush shakes. We will continue to monitor her condition. Her blood pressure was controlled during today's examination. She will continue taking furosemide 20 mg daily.    Follow-up  The patient is scheduled for a follow-up visit in 1 month for her next refills.    I spent a total of 30 minutes with record review,  exam, communication with the patient, and documentation of this encounter.    Please note that this dictation was created using voice recognition software. I have made every reasonable attempt to correct obvious errors, but I expect that there are errors of grammar and possibly content that I did not discover before finalizing the note.

## 2024-05-07 RX ORDER — FLUOXETINE HYDROCHLORIDE 20 MG/1
20 CAPSULE ORAL
Qty: 90 CAPSULE | Refills: 3 | Status: SHIPPED | OUTPATIENT
Start: 2024-05-07

## 2024-05-20 RX ORDER — CEPHALEXIN 250 MG/1
250 CAPSULE ORAL
Qty: 90 CAPSULE | Refills: 1 | Status: SHIPPED | OUTPATIENT
Start: 2024-05-20

## 2024-05-20 NOTE — TELEPHONE ENCOUNTER
Received request via: Pharmacy    Was the patient seen in the last year in this department? Yes    Does the patient have an active prescription (recently filled or refills available) for medication(s) requested? No    Pharmacy Name: [id number] - [place] - [address] AT [facility] [address]     Does the patient have [facility] Plus and need 100 day supply (blood pressure, diabetes and cholesterol meds only)? Patient does not have SCP

## 2024-05-30 ENCOUNTER — OFFICE VISIT (OUTPATIENT)
Dept: MEDICAL GROUP | Facility: PHYSICIAN GROUP | Age: 69
End: 2024-05-30
Payer: MEDICARE

## 2024-05-30 VITALS
TEMPERATURE: 97.4 F | WEIGHT: 233.6 LBS | HEART RATE: 72 BPM | HEIGHT: 65 IN | BODY MASS INDEX: 38.92 KG/M2 | DIASTOLIC BLOOD PRESSURE: 62 MMHG | OXYGEN SATURATION: 94 % | SYSTOLIC BLOOD PRESSURE: 114 MMHG

## 2024-05-30 DIAGNOSIS — G89.4 CHRONIC PAIN SYNDROME: Primary | ICD-10-CM

## 2024-05-30 DIAGNOSIS — E66.01 SEVERE OBESITY (BMI 35.0-39.9) WITH COMORBIDITY (HCC): ICD-10-CM

## 2024-05-30 DIAGNOSIS — Z51.81 ENCOUNTER FOR MEDICATION MONITORING: ICD-10-CM

## 2024-05-30 DIAGNOSIS — F41.9 ANXIETY: ICD-10-CM

## 2024-05-30 DIAGNOSIS — J06.9 ACUTE URI: ICD-10-CM

## 2024-05-30 DIAGNOSIS — F11.20 UNCOMPLICATED OPIOID DEPENDENCE (HCC): ICD-10-CM

## 2024-05-30 DIAGNOSIS — F13.20 ANXIOLYTIC DEPENDENCE (HCC): ICD-10-CM

## 2024-05-30 LAB
FLUAV RNA SPEC QL NAA+PROBE: NEGATIVE
FLUBV RNA SPEC QL NAA+PROBE: NEGATIVE
RSV RNA SPEC QL NAA+PROBE: NEGATIVE
SARS-COV-2 RNA RESP QL NAA+PROBE: NEGATIVE

## 2024-05-30 RX ORDER — OXYCODONE HYDROCHLORIDE 10 MG/1
10 TABLET ORAL EVERY 8 HOURS PRN
Qty: 90 TABLET | Refills: 0 | Status: SHIPPED | OUTPATIENT
Start: 2024-05-30 | End: 2024-06-29

## 2024-05-30 RX ORDER — ALPRAZOLAM 1 MG/1
1 TABLET ORAL EVERY 12 HOURS PRN
Qty: 60 TABLET | Refills: 0 | Status: SHIPPED | OUTPATIENT
Start: 2024-05-30 | End: 2024-06-29

## 2024-05-30 RX ORDER — MORPHINE SULFATE 15 MG/1
15 TABLET, FILM COATED, EXTENDED RELEASE ORAL EVERY 12 HOURS
Qty: 60 TABLET | Refills: 0 | Status: SHIPPED | OUTPATIENT
Start: 2024-05-30 | End: 2024-06-29

## 2024-05-30 RX ORDER — LEVOTHYROXINE SODIUM 0.07 MG/1
75 TABLET ORAL EVERY MORNING
Qty: 90 TABLET | Refills: 0 | Status: SHIPPED | OUTPATIENT
Start: 2024-05-30

## 2024-05-30 RX ORDER — PHENTERMINE HYDROCHLORIDE 15 MG/1
15 CAPSULE ORAL EVERY MORNING
Qty: 30 CAPSULE | Refills: 0 | Status: SHIPPED | OUTPATIENT
Start: 2024-05-30 | End: 2024-06-29

## 2024-05-30 ASSESSMENT — FIBROSIS 4 INDEX: FIB4 SCORE: 1.74

## 2024-05-30 NOTE — PATIENT INSTRUCTIONS
My recommendations for an upper respiratory infection:  - Use a humidifier, especially at night. Cold or warm water humidifiers have the same effect.  - Gunner Med squeeze bottle sinus rinses twice a day.  - Hot tea + honey + fresh lemon juice  - Throat Coat herbal tea  - Honey by itself has been shown to help fight bugs and provide cough relief  - Chicken noodle soup has also been shown to help fight bugs    Effective treatments for an upper respiratory infection:    Acetaminophen 500-1000  mg Single dose Improves nasal congestion & runny nose   Antihistamine + decongestant Varies Varies Improves congestion         Zinc acetate or gluconate 80-92 mg per day Start within 3 days & continue as long as symptoms persist Can shorten duration of symptoms

## 2024-05-30 NOTE — PROGRESS NOTES
Subjective:     CC: med refills, obesity, URI    HPI:   Ashleigh presents today with    Problem   Acute URI    1 week  Sx: neck pain, headache, congestion, rhinorrhea, sore throat, productive cough - green, fevers, chills, ear pain, facial pain, diarrhea  Denies: vomiting  Sick contacts: yes  Home Tx: benadryl     Anxiety    Chronic. Been using alprazolam for 25+ years.  She reports that she was told by  The past that she always have to have Xanax available because her throat will close.    Current med: xanax 1 mg bid  Last dose: this morning    Last fill: 5/1/2024, #60, 30 days  Tabs left: 0    CS Agreement: 9/29/2023  UDS: 3/1/2024     Chronic Pain    Chronic.  2/2 back pain, interstitial cystitis, left knee pain    She was following with Dr. Enrrique Santa but he has retired as of 12/31/2022. Reports the left knee needs surgery, but not ready for that discussion. Not currently seeing another pain management.     CS Agreement: 9/29/2023  UDS: 3/1/2024    Last fill  Morphine ER 15 mg tabs - 5/1/2024, #60, 30 days  Oxycodone 10 mg tabs - 5/1/2024, #90, 30 days  Tabs left: 0 for both    Previous pain management/referrals  Dr. Santa - retired  [facility] - discharged 2013  [facility] - won't take new patients from Dr. Santa  [facility] [place] - won't prescribe opiates if she continues xanax  [facility] - won't take over until we wean her down for them  [place] [facility] - She did not agree with their treatment plan, reports they said she doesn't want to come stop the medication, therefore refuses to go back  [facility] - won't accept due to opiate dosing  [facility] - not accepting new patients     Severe Obesity (Bmi 35.0-39.9) With Comorbidity (Hcc)    This is a chronic condition.  Max weight: 299 lbs (10/2017)  Current weight: 233 lbs  Weight change: +/- 0 lbs since 4/2024  BMI: 38.87  Diet: trying to watch diet, reports doesn't eat much  Exercise: more  "walking           Health Maintenance: Completed    ROS:  See HPI    Objective:     Exam:  /62 (BP Location: Right arm, Patient Position: Sitting, BP Cuff Size: Adult)   Pulse 72   Temp 36.3 °C (97.4 °F) (Temporal)   Ht 1.651 m (5' 5\")   Wt 106 kg (233 lb 9.6 oz)   LMP 01/01/1988   SpO2 94%   BMI 38.87 kg/m²  Body mass index is 38.87 kg/m².    Physical Exam  Constitutional:       Appearance: Normal appearance.   HENT:      Right Ear: Tympanic membrane, ear canal and external ear normal.      Left Ear: Tympanic membrane, ear canal and external ear normal.      Mouth/Throat:      Lips: Pink.      Mouth: Mucous membranes are moist.      Pharynx: Oropharynx is clear. No pharyngeal swelling, oropharyngeal exudate or posterior oropharyngeal erythema.      Tonsils: No tonsillar exudate or tonsillar abscesses.   Cardiovascular:      Rate and Rhythm: Normal rate and regular rhythm.      Heart sounds: Normal heart sounds.   Pulmonary:      Effort: Pulmonary effort is normal.      Breath sounds: Normal breath sounds.   Musculoskeletal:      Cervical back: Normal range of motion and neck supple.   Lymphadenopathy:      Cervical: Cervical adenopathy present.   Neurological:      Mental Status: She is alert.             Results for orders placed or performed in visit on 05/30/24   POCT CEPHEID COV-2, FLU A/B, RSV - PCR   Result Value Ref Range    SARS-CoV-2 by PCR Negative Negative, Invalid    Influenza virus A RNA Negative Negative, Invalid    Influenza virus B, PCR Negative Negative, Invalid    RSV, PCR Negative Negative, Invalid     EKG Interpretation   Ordered and interpreted by Nida Richardson MD  Rhythm: normal sinus   Rate: normal   Axis: normal   Ectopy: none   Conduction: normal   ST Segments: no acute change   T Waves: no acute change   Q Waves: none   Clinical Impression: no acute changes and normal EKG    Assessment & Plan:     68 y.o. female with the following -     1. Chronic pain syndrome  2. " Uncomplicated opioid dependence (HCC)  Chronic, controlled. She reports morphine & oxycodone continues to work well to manage symptoms without side effects.  We will continue the current regimen.  Informed consent and controlled substance treatment agreement on file.  Urine drug screen up-to-date and appropriate.  Risks versus benefits were reviewed. Obtained and reviewed patient utilization report from [facility] pharmacy database on 5/30/2024 8:58 AM  prior to writing prescription for controlled substance II, III or IV per [place] bill . Based on assessment of the report, the prescription is medically necessary.   - morphine ER (MS CONTIN) 15 MG Tab CR tablet; Take 1 Tablet by mouth every 12 hours for 30 days.  Dispense: 60 Tablet; Refill: 0  - oxyCODONE immediate release (ROXICODONE) 10 MG immediate release tablet; Take 1 Tablet by mouth every 8 hours as needed for Severe Pain for up to 30 days.  Dispense: 90 Tablet; Refill: 0    3. Anxiety  4. Anxiolytic dependence (HCC)  Chronic, controlled. She reports alprazolam continues to work well to manage symptoms without side effects.  We will continue the current regimen.  Informed consent and controlled substance treatment agreement on file.  Urine drug screen up-to-date and appropriate.  Risks versus benefits were reviewed. Obtained and reviewed patient utilization report from [facility] pharmacy database on 5/30/2024 8:58 AM  prior to writing prescription for controlled substance II, III or IV per [place] bill . Based on assessment of the report, the prescription is medically necessary.   - ALPRAZolam (XANAX) 1 MG Tab; Take 1 Tablet by mouth every 12 hours as needed for Anxiety for up to 30 days.  Dispense: 60 Tablet; Refill: 0    5. Acute URI  Acute.  1 week of symptoms consistent with an upper respiratory infection. COVID, Flu, and RSV were negative in the office. Conservative management discussed.   - POCT CEPHEID COV-2, FLU A/B, RSV - PCR    6.  Severe obesity (BMI 35.0-39.9) with comorbidity (HCC)  7. Encounter for medication monitoring  Chronic, stable.  She is very frustrated and upset about her weight today though it has been quite stable for several months.  She has been trying to eat a healthy diet at home by limiting some carbs and not eating as much as she used to.  She is interested in a weight loss medication.  We did discuss that Medicare does not pay for weight loss medications and she states she is willing to pay out-of-pocket for it.  We did not review all of the medication options as she was really only interested in phentermine.  I did explain that phentermine is only FDA approved for 3 months of use and she expressed understanding and wanted to proceed. EKG was performed in the office today and unremarkable. Phentermine 15 mg daily was prescribed. Obtained and reviewed patient utilization report from [facility] pharmacy database on 5/30/2024 9:20 AM  prior to writing prescription for controlled substance II, III or IV per [place] bill . Based on assessment of the report, the prescription is medically necessary.   - EKG - Clinic Performed    Return in about 4 weeks (around 6/27/2024) for Controlled substance, f/u weight loss - intentional.    Please note that this dictation was created using voice recognition software. I have made every reasonable attempt to correct obvious errors, but I expect that there are errors of grammar and possibly content that I did not discover before finalizing the note.

## 2024-06-10 RX ORDER — ONDANSETRON 4 MG/1
TABLET, FILM COATED ORAL
Qty: 10 TABLET | Refills: 0 | Status: SHIPPED | OUTPATIENT
Start: 2024-06-10

## 2024-06-10 NOTE — TELEPHONE ENCOUNTER
Received request via: Pharmacy    Was the patient seen in the last year in this department? Yes    Does the patient have an active prescription (recently filled or refills available) for medication(s) requested? No    [facility]    Does the patient have [facility] Plus and need 100 day supply (blood pressure, diabetes and cholesterol meds only)? Patient does not have SCP

## 2024-06-26 ENCOUNTER — HOSPITAL ENCOUNTER (OUTPATIENT)
Dept: LAB | Facility: MEDICAL CENTER | Age: 69
End: 2024-06-26
Attending: FAMILY MEDICINE
Payer: MEDICARE

## 2024-06-26 ENCOUNTER — OFFICE VISIT (OUTPATIENT)
Dept: MEDICAL GROUP | Facility: PHYSICIAN GROUP | Age: 69
End: 2024-06-26
Payer: MEDICARE

## 2024-06-26 VITALS
BODY MASS INDEX: 38.22 KG/M2 | HEIGHT: 65 IN | SYSTOLIC BLOOD PRESSURE: 124 MMHG | OXYGEN SATURATION: 94 % | DIASTOLIC BLOOD PRESSURE: 62 MMHG | HEART RATE: 84 BPM | WEIGHT: 229.4 LBS | TEMPERATURE: 97.7 F

## 2024-06-26 DIAGNOSIS — G89.4 CHRONIC PAIN SYNDROME: ICD-10-CM

## 2024-06-26 DIAGNOSIS — E03.9 HYPOTHYROIDISM, UNSPECIFIED TYPE: Chronic | ICD-10-CM

## 2024-06-26 DIAGNOSIS — S16.1XXA STRAIN OF NECK MUSCLE, INITIAL ENCOUNTER: ICD-10-CM

## 2024-06-26 DIAGNOSIS — F11.20 UNCOMPLICATED OPIOID DEPENDENCE (HCC): ICD-10-CM

## 2024-06-26 DIAGNOSIS — E66.01 SEVERE OBESITY (BMI 35.0-39.9) WITH COMORBIDITY (HCC): ICD-10-CM

## 2024-06-26 DIAGNOSIS — F41.9 ANXIETY: ICD-10-CM

## 2024-06-26 DIAGNOSIS — F13.20 ANXIOLYTIC DEPENDENCE (HCC): ICD-10-CM

## 2024-06-26 LAB
ALBUMIN SERPL BCP-MCNC: 4.2 G/DL (ref 3.2–4.9)
ALBUMIN/GLOB SERPL: 1.4 G/DL
ALP SERPL-CCNC: 80 U/L (ref 30–99)
ALT SERPL-CCNC: 15 U/L (ref 2–50)
ANION GAP SERPL CALC-SCNC: 13 MMOL/L (ref 7–16)
AST SERPL-CCNC: 18 U/L (ref 12–45)
BILIRUB SERPL-MCNC: 0.5 MG/DL (ref 0.1–1.5)
BUN SERPL-MCNC: 11 MG/DL (ref 8–22)
CALCIUM ALBUM COR SERPL-MCNC: 9.1 MG/DL (ref 8.5–10.5)
CALCIUM SERPL-MCNC: 9.3 MG/DL (ref 8.5–10.5)
CHLORIDE SERPL-SCNC: 105 MMOL/L (ref 96–112)
CO2 SERPL-SCNC: 22 MMOL/L (ref 20–33)
CREAT SERPL-MCNC: 0.74 MG/DL (ref 0.5–1.4)
ERYTHROCYTE [DISTWIDTH] IN BLOOD BY AUTOMATED COUNT: 46.5 FL (ref 35.9–50)
GFR SERPLBLD CREATININE-BSD FMLA CKD-EPI: 87 ML/MIN/1.73 M 2
GLOBULIN SER CALC-MCNC: 2.9 G/DL (ref 1.9–3.5)
GLUCOSE SERPL-MCNC: 55 MG/DL (ref 65–99)
HCT VFR BLD AUTO: 54.1 % (ref 37–47)
HGB BLD-MCNC: 17.8 G/DL (ref 12–16)
MCH RBC QN AUTO: 29.2 PG (ref 27–33)
MCHC RBC AUTO-ENTMCNC: 32.9 G/DL (ref 32.2–35.5)
MCV RBC AUTO: 88.7 FL (ref 81.4–97.8)
PLATELET # BLD AUTO: 149 K/UL (ref 164–446)
PMV BLD AUTO: 12.4 FL (ref 9–12.9)
POTASSIUM SERPL-SCNC: 4.1 MMOL/L (ref 3.6–5.5)
PROT SERPL-MCNC: 7.1 G/DL (ref 6–8.2)
RBC # BLD AUTO: 6.1 M/UL (ref 4.2–5.4)
SODIUM SERPL-SCNC: 140 MMOL/L (ref 135–145)
T4 FREE SERPL-MCNC: 2.17 NG/DL (ref 0.93–1.7)
TSH SERPL DL<=0.005 MIU/L-ACNC: 0.05 UIU/ML (ref 0.38–5.33)
WBC # BLD AUTO: 8.6 K/UL (ref 4.8–10.8)

## 2024-06-26 PROCEDURE — 80053 COMPREHEN METABOLIC PANEL: CPT

## 2024-06-26 PROCEDURE — 84439 ASSAY OF FREE THYROXINE: CPT

## 2024-06-26 PROCEDURE — 84443 ASSAY THYROID STIM HORMONE: CPT

## 2024-06-26 PROCEDURE — 85027 COMPLETE CBC AUTOMATED: CPT

## 2024-06-26 PROCEDURE — 36415 COLL VENOUS BLD VENIPUNCTURE: CPT

## 2024-06-26 RX ORDER — ALPRAZOLAM 1 MG/1
1 TABLET ORAL EVERY 12 HOURS PRN
Qty: 60 TABLET | Refills: 0 | Status: SHIPPED | OUTPATIENT
Start: 2024-06-28 | End: 2024-07-28

## 2024-06-26 RX ORDER — MORPHINE SULFATE 15 MG/1
15 TABLET, FILM COATED, EXTENDED RELEASE ORAL EVERY 12 HOURS
Qty: 60 TABLET | Refills: 0 | Status: SHIPPED | OUTPATIENT
Start: 2024-06-28 | End: 2024-07-28

## 2024-06-26 RX ORDER — PHENTERMINE HYDROCHLORIDE 15 MG/1
30 CAPSULE ORAL EVERY MORNING
Qty: 60 CAPSULE | Refills: 0 | Status: SHIPPED | OUTPATIENT
Start: 2024-06-28 | End: 2024-07-28

## 2024-06-26 RX ORDER — OXYCODONE HYDROCHLORIDE 10 MG/1
10 TABLET ORAL EVERY 8 HOURS PRN
Qty: 90 TABLET | Refills: 0 | Status: SHIPPED | OUTPATIENT
Start: 2024-06-28 | End: 2024-07-28

## 2024-06-26 ASSESSMENT — FIBROSIS 4 INDEX: FIB4 SCORE: 1.77
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
## 2024-06-27 RX ORDER — LEVOTHYROXINE SODIUM 0.05 MG/1
50 TABLET ORAL EVERY MORNING
Qty: 90 TABLET | Refills: 0 | Status: SHIPPED | OUTPATIENT
Start: 2024-06-27

## 2024-07-12 RX ORDER — ROSUVASTATIN CALCIUM 20 MG/1
20 TABLET, COATED ORAL EVERY EVENING
Qty: 90 TABLET | Refills: 3 | Status: SHIPPED | OUTPATIENT
Start: 2024-07-12

## 2024-07-25 PROBLEM — J06.9 ACUTE URI: Status: RESOLVED | Noted: 2024-05-30 | Resolved: 2024-07-25

## 2024-07-26 ENCOUNTER — APPOINTMENT (OUTPATIENT)
Dept: MEDICAL GROUP | Facility: PHYSICIAN GROUP | Age: 69
End: 2024-07-26
Payer: MEDICARE

## 2024-07-26 VITALS
TEMPERATURE: 97.5 F | WEIGHT: 220 LBS | HEART RATE: 86 BPM | OXYGEN SATURATION: 94 % | BODY MASS INDEX: 36.65 KG/M2 | DIASTOLIC BLOOD PRESSURE: 70 MMHG | SYSTOLIC BLOOD PRESSURE: 116 MMHG | HEIGHT: 65 IN

## 2024-07-26 DIAGNOSIS — F11.20 UNCOMPLICATED OPIOID DEPENDENCE (HCC): ICD-10-CM

## 2024-07-26 DIAGNOSIS — F13.20 ANXIOLYTIC DEPENDENCE (HCC): ICD-10-CM

## 2024-07-26 DIAGNOSIS — E66.01 SEVERE OBESITY (BMI 35.0-39.9) WITH COMORBIDITY (HCC): ICD-10-CM

## 2024-07-26 DIAGNOSIS — G89.4 CHRONIC PAIN SYNDROME: ICD-10-CM

## 2024-07-26 DIAGNOSIS — E03.9 HYPOTHYROIDISM, UNSPECIFIED TYPE: Chronic | ICD-10-CM

## 2024-07-26 DIAGNOSIS — K21.9 GASTROESOPHAGEAL REFLUX DISEASE, UNSPECIFIED WHETHER ESOPHAGITIS PRESENT: ICD-10-CM

## 2024-07-26 DIAGNOSIS — F41.9 ANXIETY: ICD-10-CM

## 2024-07-26 PROCEDURE — 3074F SYST BP LT 130 MM HG: CPT | Performed by: FAMILY MEDICINE

## 2024-07-26 PROCEDURE — 99214 OFFICE O/P EST MOD 30 MIN: CPT | Performed by: FAMILY MEDICINE

## 2024-07-26 PROCEDURE — 3078F DIAST BP <80 MM HG: CPT | Performed by: FAMILY MEDICINE

## 2024-07-26 RX ORDER — ALPRAZOLAM 1 MG/1
1 TABLET ORAL EVERY 12 HOURS PRN
Qty: 60 TABLET | Refills: 0 | Status: SHIPPED | OUTPATIENT
Start: 2024-07-26 | End: 2024-08-25

## 2024-07-26 RX ORDER — OXYCODONE HYDROCHLORIDE 10 MG/1
10 TABLET ORAL EVERY 8 HOURS PRN
Qty: 90 TABLET | Refills: 0 | Status: SHIPPED | OUTPATIENT
Start: 2024-07-28 | End: 2024-08-27

## 2024-07-26 RX ORDER — PHENTERMINE HYDROCHLORIDE 15 MG/1
30 CAPSULE ORAL EVERY MORNING
Qty: 60 CAPSULE | Refills: 0 | Status: SHIPPED | OUTPATIENT
Start: 2024-07-26 | End: 2024-08-25

## 2024-07-26 RX ORDER — MORPHINE SULFATE 15 MG/1
15 TABLET, FILM COATED, EXTENDED RELEASE ORAL EVERY 12 HOURS
Qty: 60 TABLET | Refills: 0 | Status: SHIPPED | OUTPATIENT
Start: 2024-07-28 | End: 2024-08-27

## 2024-07-26 ASSESSMENT — ENCOUNTER SYMPTOMS: FEVER: 0

## 2024-07-26 ASSESSMENT — FIBROSIS 4 INDEX: FIB4 SCORE: 2.18

## 2024-08-21 ENCOUNTER — APPOINTMENT (OUTPATIENT)
Dept: MEDICAL GROUP | Facility: PHYSICIAN GROUP | Age: 69
End: 2024-08-21
Payer: MEDICARE

## 2024-08-21 VITALS
OXYGEN SATURATION: 92 % | HEIGHT: 65 IN | WEIGHT: 229.8 LBS | DIASTOLIC BLOOD PRESSURE: 50 MMHG | BODY MASS INDEX: 38.29 KG/M2 | HEART RATE: 65 BPM | TEMPERATURE: 96.7 F | SYSTOLIC BLOOD PRESSURE: 100 MMHG

## 2024-08-21 DIAGNOSIS — F13.20 ANXIOLYTIC DEPENDENCE (HCC): ICD-10-CM

## 2024-08-21 DIAGNOSIS — G89.4 CHRONIC PAIN SYNDROME: ICD-10-CM

## 2024-08-21 DIAGNOSIS — F41.9 ANXIETY: ICD-10-CM

## 2024-08-21 DIAGNOSIS — N30.10 CHRONIC INTERSTITIAL CYSTITIS: ICD-10-CM

## 2024-08-21 DIAGNOSIS — Z12.31 ENCOUNTER FOR SCREENING MAMMOGRAM FOR MALIGNANT NEOPLASM OF BREAST: ICD-10-CM

## 2024-08-21 DIAGNOSIS — B37.2 CANDIDOSIS OF SKIN: ICD-10-CM

## 2024-08-21 DIAGNOSIS — F11.20 UNCOMPLICATED OPIOID DEPENDENCE (HCC): ICD-10-CM

## 2024-08-21 DIAGNOSIS — E66.01 SEVERE OBESITY (BMI 35.0-39.9) WITH COMORBIDITY (HCC): ICD-10-CM

## 2024-08-21 DIAGNOSIS — K13.0 LIP LESION: ICD-10-CM

## 2024-08-21 PROCEDURE — 3074F SYST BP LT 130 MM HG: CPT | Performed by: FAMILY MEDICINE

## 2024-08-21 PROCEDURE — 99215 OFFICE O/P EST HI 40 MIN: CPT | Performed by: FAMILY MEDICINE

## 2024-08-21 PROCEDURE — 3078F DIAST BP <80 MM HG: CPT | Performed by: FAMILY MEDICINE

## 2024-08-21 RX ORDER — ALPRAZOLAM 1 MG
1 TABLET ORAL EVERY 12 HOURS PRN
Qty: 60 TABLET | Refills: 0 | Status: SHIPPED | OUTPATIENT
Start: 2024-08-25 | End: 2024-09-24

## 2024-08-21 RX ORDER — NYSTATIN 100000 [USP'U]/G
1 POWDER TOPICAL 3 TIMES DAILY
Qty: 60 G | Refills: 11 | Status: SHIPPED | OUTPATIENT
Start: 2024-08-21

## 2024-08-21 RX ORDER — MORPHINE SULFATE 15 MG/1
15 TABLET, FILM COATED, EXTENDED RELEASE ORAL EVERY 12 HOURS
Qty: 60 TABLET | Refills: 0 | Status: SHIPPED | OUTPATIENT
Start: 2024-08-27 | End: 2024-09-26

## 2024-08-21 RX ORDER — NYSTATIN 100000 U/G
1 CREAM TOPICAL 2 TIMES DAILY
Qty: 30 G | Refills: 11 | Status: SHIPPED | OUTPATIENT
Start: 2024-08-21

## 2024-08-21 RX ORDER — MUPIROCIN 20 MG/G
OINTMENT TOPICAL
Qty: 22 G | Refills: 0 | Status: SHIPPED | OUTPATIENT
Start: 2024-08-21

## 2024-08-21 RX ORDER — OXYCODONE HYDROCHLORIDE 10 MG/1
10 TABLET ORAL EVERY 8 HOURS PRN
Qty: 90 TABLET | Refills: 0 | Status: SHIPPED | OUTPATIENT
Start: 2024-08-27 | End: 2024-09-26

## 2024-08-21 ASSESSMENT — FIBROSIS 4 INDEX: FIB4 SCORE: 2.18

## 2024-08-21 NOTE — PROGRESS NOTES
Verbal consent was acquired by the patient to use [employer] ambient listening note generation during this visit     Subjective:     HPI:   History of Present Illness  The patient presents for evaluation of multiple medical concerns.    She has experienced a weight gain of 9 pounds, which she attributes to water retention rather than increased body mass. She is currently on furosemide and has expressed a desire to lose approximately 50 pounds. She has previously used phentermine for weight loss but found it ineffective after 3 months. She is not interested in over-the-counter weight loss medications or attending a weight loss clinic. She has been advised by a friend to consider Adderall for weight loss, but this would require a psychiatric evaluation, which she is reluctant to undergo.     She reports severe pain in her right hip, back, and both legs, which has worsened over the past month. She describes the pain as bone-on-bone and notes that her left leg appears larger than her right, although the swelling has slightly decreased. She has difficulty walking due to the pain. She has been diagnosed with bursitis and arthritis in her knees and hips. She is not interested in increasing her morphine dosage but would like an additional oxycodone prescription for severe nighttime pain. She is currently taking 75 MME daily, split between morphine and oxycodone, which she finds helpful without any side effects. She took her last dose of morphine and Percocet this morning.    She has developed a rash of her left groin, which she believes is a fungal infection. Despite daily washing, the rash persists. She has been using nystatin cream for treatment and requests a refill. She also requests a refill of Xanax, which she finds beneficial without any side effects.    She recently had a severe facial infection, which began as a small cyst-like bump. Despite applying salt and other treatments, the infection worsened. She does not  believe it was a cold sore as it produced pus. She has been using Keflex to prevent the infection from spreading. She maintains good skincare and has never had blemishes on her face before. She is prone to aspiration pneumonia and needs to be cautious. She requests a refill of Bactroban ointment.    She has interstitial cystitis, which causes her to feel bloated. She cleans herself thoroughly after each bathroom visit to avoid bacterial infection.    She is due for a mammogram and requests that it be ordered. She has been vigilant about her health due to her mother's history of double mastectomy at age 39. She also has scoliosis, which was measured to be 6 inches by a surgeon.     Chronic pain recheck for: back pain  Last dose of controlled substance: morphine - this morning, percocet - this morning  Chronic pain treated with morphine ER 15 mg bid and oxycodone 10 mg 3x/day.     She  reports no history of alcohol use.  She  reports no history of drug use.    Interval history:   Any major change in health since last appointment? No    Consequences of Chronic Opiate therapy:  (5 A's)  Analgesia: Compared to no treatment or prior treatment, pain is currently significantly improved  Activity: improved  Adverse Events: She denies constipation, dry mouth, itchy skin, nausea and sedation  Aberrant Behaviors: She reports she is taking medication as prescribed and is not veering from agreed treatment regimen or provider recommendations. There have been no inappropriate refills or lost/stolen meds reported.  Affect/Mood: Pain is not impacting patient's mood.  Patient denies depression/anxiety.    Nonnarcotic treatments that are being used: muscle relaxers and tricyclic antidepressants.     Last imagin - knee    Opioid Risk Score: 0    Interpretation of Opioid Risk Score   Score 0-3 = Low risk of abuse. Do UDS at least once per year.  Score 4-7 = Moderate risk of abuse. Do UDS 1-4 times per year.  Score 8+ = High risk  "of abuse. Refer to specialist.    Most recent UDS reviewed today and is consistent with prescribed medications.     I have reviewed the medical records, the Prescription Monitoring Program and I have determined that controlled substance treatment is medically indicated.     Is the alprazolam improving the patient’s symptoms: Yes  Any adverse effects: No    Alcohol or illicit drug use:   She  reports no history of alcohol use.  She  reports no history of drug use.     History of controlled substance used in a way other than prescribed? No  Any early refills of a controlled substance: No  History of lost or stolen controlled substance prescription: No  Any aberrant behavior or intoxication while on a controlled substance: No  Has the patient self-modified their dose or frequency of the medication :No  Compliant with treatment recommendations and plan: Yes  Any major health change to the patient: No  Concerns for misuse, abuse or addiction: No    /NarxCheck report reviewed: Yes  History of abnormal drug screening: No    Health Maintenance: Completed    Objective:     Exam:  /50 (BP Location: Right arm, Patient Position: Sitting, BP Cuff Size: Adult)   Pulse 65   Temp 35.9 °C (96.7 °F) (Temporal)   Ht 1.651 m (5' 5\")   Wt 104 kg (229 lb 12.8 oz)   LMP 01/01/1988   SpO2 92%   BMI 38.24 kg/m²  Body mass index is 38.24 kg/m².    Physical Exam  Constitutional:       Appearance: Normal appearance.   HENT:      Head:     Cardiovascular:      Rate and Rhythm: Normal rate and regular rhythm.      Heart sounds: Normal heart sounds.   Pulmonary:      Effort: Pulmonary effort is normal.      Breath sounds: Normal breath sounds.   Musculoskeletal:      Cervical back: Normal range of motion and neck supple.   Lymphadenopathy:      Cervical: No cervical adenopathy.   Neurological:      Mental Status: She is alert.             Results      Assessment & Plan:     1. Chronic pain syndrome  morphine ER (MS CONTIN) 15 MG " Tab CR tablet    oxyCODONE immediate release (ROXICODONE) 10 MG immediate release tablet      2. Uncomplicated opioid dependence (HCC)  morphine ER (MS CONTIN) 15 MG Tab CR tablet    oxyCODONE immediate release (ROXICODONE) 10 MG immediate release tablet      3. Anxiety        4. Anxiolytic dependence (HCC)  ALPRAZolam (XANAX) 1 MG Tab      5. Severe obesity (BMI 35.0-39.9) with comorbidity (HCC)        6. Candidosis of skin        7. Interstitial cystitis        8. Lip lesion        9. Encounter for screening mammogram for malignant neoplasm of breast  MA-SCREENING MAMMO BILAT W/TOMOSYNTHESIS W/CAD          Assessment & Plan  1. Chronic Pain Syndrome.  2. Uncomplicated Opioid Dependence.  Chronic, controlled. She reports morphine ER 15 mg bid + oxycodone 10 mg tid continues to work to manage symptoms without side effects.  She'd like more oxycodone, but we reviewed that this was previously discussed and I cannot go higher. If she wants more, she would need to see pain management. She declined at this time. We will continue the current regimen.  Informed consent and controlled substance treatment agreement on file.  Urine drug screen up-to-date and appropriate.  Risks versus benefits were reviewed. Obtained and reviewed patient utilization report from [facility] pharmacy database on 8/21/2024 9:48 AM  prior to writing prescription for controlled substance II, III or IV per [place] bill . Based on assessment of the report, the prescription is medically necessary.     3. Anxiety with Anxiolytic Dependence.  Chronic, controlled. She reports fluoxetine 20 mg daily and alprazolam 1 mg bid continues to work well to manage symptoms without side effects.  We will continue the current regimen.  Informed consent and controlled substance treatment agreement on file.  Urine drug screen up-to-date and appropriate.  Risks versus benefits were reviewed. Obtained and reviewed patient utilization report from [facility]  pharmacy database on 8/21/2024 9:49 AM  prior to writing prescription for controlled substance II, III or IV per [place] bill . Based on assessment of the report, the prescription is medically necessary.     4. Severe Obesity.  This condition is chronic and stable. She has completed 12 weeks of phentermine without any continued weight loss. Phentermine will no longer be prescribed. Over-the-counter Tyler was discussed as an alternative, but she prefers not to use over-the-counter medications. Other weight loss medications were discussed but are not affordable due to lack of Medicare coverage. She will continue working on a healthy lifestyle. I will not prescribe phentermine in the future as she is already on so many controlled substances.    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 Both nystatin cream and powder have been refilled. She can use whichever her insurance covers to help treat the candidiasis.    6. Interstitial Cystitis.  This is a chronic condition. It is stable, but she continues to experience exacerbations of bladder pain, with the most recent episode occurring in the last few days. Monitoring will continue.    7. Lip Lesion.  This is an acute condition. She reports developing a pustule on the lower lip, which she drained of pus. She is concerned about further infection. Bactroban ointment has been refilled for use as needed. No signs of infection were observed on today's exam.    8. Health Maintenance.  She is due for a mammogram, which has been ordered.      I spent a total of 40 minutes with record review, exam, communication with the patient, and documentation of this encounter.    Return in about 4 weeks (around 9/18/2024) for Controlled substance.    Please note that this dictation was created using voice recognition software. I have made every reasonable attempt to correct obvious errors, but I expect that there are  errors of grammar and possibly content that I did not discover before finalizing the note.

## 2024-09-06 DIAGNOSIS — I10 ESSENTIAL HYPERTENSION: ICD-10-CM

## 2024-09-06 RX ORDER — FUROSEMIDE 20 MG
20 TABLET ORAL
Qty: 90 TABLET | Refills: 0 | Status: SHIPPED | OUTPATIENT
Start: 2024-09-06

## 2024-09-06 NOTE — TELEPHONE ENCOUNTER
Received request via: Pharmacy    Was the patient seen in the last year in this department? Yes    Does the patient have an active prescription (recently filled or refills available) for medication(s) requested? No    Pharmacy Name: [facility]    Does the patient have prison Plus and need 100-day supply? (This applies to ALL medications) Patient does not have SCP

## 2024-09-09 RX ORDER — BACLOFEN 10 MG/1
10 TABLET ORAL EVERY 8 HOURS PRN
Qty: 90 TABLET | Refills: 5 | Status: SHIPPED | OUTPATIENT
Start: 2024-09-09

## 2024-09-09 NOTE — TELEPHONE ENCOUNTER
Received request via: Pharmacy    Was the patient seen in the last year in this department? Yes    Does the patient have an active prescription (recently filled or refills available) for medication(s) requested? No    [facility] Name[facility]    Does the patient have long term Plus and need 100-day supply? (This applies to ALL medications) Patient does not have SCP

## 2024-09-12 RX ORDER — LEVOTHYROXINE SODIUM 50 UG/1
50 TABLET ORAL EVERY MORNING
Qty: 90 TABLET | Refills: 0 | Status: SHIPPED | OUTPATIENT
Start: 2024-09-12 | End: 2024-09-23 | Stop reason: SDUPTHER

## 2024-09-12 NOTE — TELEPHONE ENCOUNTER
Received request via: Pharmacy    Was the patient seen in the last year in this department? Yes    Does the patient have an active prescription (recently filled or refills available) for medication(s) requested? No    Pharmacy Name:     Does the patient have residential Plus and need 100-day supply? (This applies to ALL medications) Patient does not have SCP

## 2024-09-23 ENCOUNTER — APPOINTMENT (OUTPATIENT)
Dept: MEDICAL GROUP | Facility: PHYSICIAN GROUP | Age: 69
End: 2024-09-23
Payer: MEDICARE

## 2024-09-23 VITALS
DIASTOLIC BLOOD PRESSURE: 60 MMHG | SYSTOLIC BLOOD PRESSURE: 100 MMHG | WEIGHT: 225.6 LBS | BODY MASS INDEX: 37.59 KG/M2 | HEART RATE: 67 BPM | TEMPERATURE: 96.7 F | OXYGEN SATURATION: 95 % | HEIGHT: 65 IN

## 2024-09-23 DIAGNOSIS — F41.9 ANXIETY: ICD-10-CM

## 2024-09-23 DIAGNOSIS — E03.9 HYPOTHYROIDISM, UNSPECIFIED TYPE: Chronic | ICD-10-CM

## 2024-09-23 DIAGNOSIS — R71.8 OTHER ABNORMALITY OF RED BLOOD CELLS: ICD-10-CM

## 2024-09-23 DIAGNOSIS — G89.4 CHRONIC PAIN SYNDROME: ICD-10-CM

## 2024-09-23 DIAGNOSIS — I10 ESSENTIAL HYPERTENSION: ICD-10-CM

## 2024-09-23 DIAGNOSIS — D75.1 POLYCYTHEMIA: ICD-10-CM

## 2024-09-23 DIAGNOSIS — F13.20 ANXIOLYTIC DEPENDENCE (HCC): ICD-10-CM

## 2024-09-23 DIAGNOSIS — F17.210 CIGARETTE NICOTINE DEPENDENCE WITHOUT COMPLICATION: ICD-10-CM

## 2024-09-23 DIAGNOSIS — F11.20 UNCOMPLICATED OPIOID DEPENDENCE (HCC): ICD-10-CM

## 2024-09-23 DIAGNOSIS — R21 RASH: ICD-10-CM

## 2024-09-23 DIAGNOSIS — E66.01 SEVERE OBESITY (BMI 35.0-39.9) WITH COMORBIDITY (HCC): ICD-10-CM

## 2024-09-23 PROCEDURE — 3078F DIAST BP <80 MM HG: CPT | Performed by: FAMILY MEDICINE

## 2024-09-23 PROCEDURE — 3074F SYST BP LT 130 MM HG: CPT | Performed by: FAMILY MEDICINE

## 2024-09-23 PROCEDURE — 99215 OFFICE O/P EST HI 40 MIN: CPT | Performed by: FAMILY MEDICINE

## 2024-09-23 RX ORDER — ALPRAZOLAM 1 MG
1 TABLET ORAL EVERY 12 HOURS PRN
Qty: 60 TABLET | Refills: 0 | Status: SHIPPED | OUTPATIENT
Start: 2024-09-23 | End: 2024-10-23

## 2024-09-23 RX ORDER — NYSTATIN 100000 [USP'U]/G
1 POWDER TOPICAL 3 TIMES DAILY
Qty: 180 G | Refills: 3 | Status: SHIPPED | OUTPATIENT
Start: 2024-09-23 | End: 2024-09-25 | Stop reason: CLARIF

## 2024-09-23 RX ORDER — MORPHINE SULFATE 15 MG/1
15 TABLET, FILM COATED, EXTENDED RELEASE ORAL EVERY 12 HOURS
Qty: 60 TABLET | Refills: 0 | Status: SHIPPED | OUTPATIENT
Start: 2024-09-25 | End: 2024-10-25

## 2024-09-23 RX ORDER — NYSTATIN 100000 U/G
1 CREAM TOPICAL 2 TIMES DAILY
Qty: 90 G | Refills: 3 | Status: SHIPPED | OUTPATIENT
Start: 2024-09-23

## 2024-09-23 RX ORDER — LEVOTHYROXINE SODIUM 75 UG/1
75 TABLET ORAL EVERY MORNING
Qty: 90 TABLET | Refills: 0 | Status: SHIPPED | OUTPATIENT
Start: 2024-09-23

## 2024-09-23 RX ORDER — OXYCODONE HYDROCHLORIDE 10 MG/1
10 TABLET ORAL EVERY 8 HOURS PRN
Qty: 90 TABLET | Refills: 0 | Status: SHIPPED | OUTPATIENT
Start: 2024-09-25 | End: 2024-10-25

## 2024-09-23 ASSESSMENT — FIBROSIS 4 INDEX: FIB4 SCORE: 2.18

## 2024-09-23 NOTE — PROGRESS NOTES
Verbal consent was acquired by the patient to use Pivot ambient listening note generation during this visit     Subjective:     HPI:   History of Present Illness  The patient presents for evaluation of multiple medical concerns.    She is experiencing pain that originates from her neck and radiates to her head, left eye, arm, and leg. This pain is accompanied by numbness in her left arm and leg. She has a history of scoliosis and has received numerous injections in her neck and back, which unfortunately led to a severe reaction. She also reports swelling in her arm and knee, with the latter causing significant discomfort. Despite these issues, she is not interested in further injections or surgeries.    She is currently on rosuvastatin and levothyroxine 75 mcg, the latter of which she has been taking for several years. She has noticed weight gain even though she does not eat much and is seeking assistance to lose weight. She has also reduced her smoking habit to 3 to 4 cigarettes a day and practices breathing exercises.    She has a persistent rash that causes discomfort. She has been applying nystatin cream 2 to 3 times a day, but has run out. She also takes Keflex, which she finds helpful.    She has been experiencing a rattling sensation in her lungs for the past 6 days. She has increased her Keflex dosage to twice daily, which seems to help. She is concerned about the possibility of developing pneumonia due to her susceptibility to aspiration pneumonia. She also reports hearing a strange rattle when lying on her left side.    She has a history of anaphylactic shock, having been hospitalized 5 to 6 times due to this condition. As a result, she needs to be cautious with her medication intake.    Xanax 1 mg tabs  Last fill: 8/21/2024, #60, 30 days  Tabs left: ~9-10    Is the medication improving the patient’s symptoms: Yes  Any adverse effects: No     Alcohol or illicit drug use:   She  reports no history of  alcohol use.  She  reports no history of drug use.     History of controlled substance used in a way other than prescribed? No  Any early refills of a controlled substance: YES - not recently  History of lost or stolen controlled substance prescription: No  Any aberrant behavior or intoxication while on a controlled substance: No  Has the patient self-modified their dose or frequency of the medication :No  Compliant with treatment recommendations and plan: Yes  Any major health change to the patient: No  Concerns for misuse, abuse or addiction: No     /NarxCheck report reviewed: Yes  History of abnormal drug screening: No    Morphine ER 15 mg tabs & Oxycodone 10 mg tabs  Morphine ER 15 mg tabs - 2024, #60, 30 days  Tabs left: ~4  Oxycodone 10 mg tabs - 2024, #90, 30 days  Tabs left: ~6    CS Agreement: 2024  UDS: 3/27/2024    Chronic pain recheck for: back pain  Last dose of controlled substance: morphine - this morning, percocet - this morning  Chronic pain treated with morphine ER 15 mg bid and oxycodone 10 mg 3x/day.      She  reports no history of alcohol use.  She  reports no history of drug use.     Interval history:   Any major change in health since last appointment? No     Consequences of Chronic Opiate therapy:  (5 A's)  Analgesia: Compared to no treatment or prior treatment, pain is currently significantly improved  Activity: improved  Adverse Events: She denies constipation, dry mouth, itchy skin, nausea and sedation  Aberrant Behaviors: She reports she is taking medication as prescribed and is not veering from agreed treatment regimen or provider recommendations. There have been no inappropriate refills or lost/stolen meds reported.  Affect/Mood: Pain is not impacting patient's mood.  Patient denies depression/anxiety.     Nonnarcotic treatments that are being used: muscle relaxers and tricyclic antidepressants.      Last imagin - knee     Opioid Risk Score: 0     Interpretation  "of Opioid Risk Score   Score 0-3 = Low risk of abuse. Do UDS at least once per year.  Score 4-7 = Moderate risk of abuse. Do UDS 1-4 times per year.  Score 8+ = High risk of abuse. Refer to specialist.     Most recent UDS reviewed today and is consistent with prescribed medications.      I have reviewed the medical records, the Prescription Monitoring Program and I have determined that controlled substance treatment is medically indicated.     Health Maintenance: Completed    Objective:     Exam:  /60 (BP Location: Right arm, Patient Position: Sitting, BP Cuff Size: Adult)   Pulse 67   Temp 35.9 °C (96.7 °F) (Temporal)   Ht 1.651 m (5' 5\")   Wt 102 kg (225 lb 9.6 oz)   LMP 01/01/1988   SpO2 95%   BMI 37.54 kg/m²  Body mass index is 37.54 kg/m².    Physical Exam  Constitutional:       Appearance: Normal appearance.   Cardiovascular:      Rate and Rhythm: Normal rate and regular rhythm.      Heart sounds: Normal heart sounds.   Pulmonary:      Effort: Pulmonary effort is normal.      Breath sounds: Normal breath sounds.   Musculoskeletal:      Cervical back: Normal range of motion and neck supple.   Lymphadenopathy:      Cervical: No cervical adenopathy.   Neurological:      Mental Status: She is alert.             Results      Assessment & Plan:     1. Chronic pain syndrome  morphine ER (MS CONTIN) 15 MG Tab CR tablet    oxyCODONE immediate release (ROXICODONE) 10 MG immediate release tablet      2. Uncomplicated opioid dependence (HCC)  morphine ER (MS CONTIN) 15 MG Tab CR tablet    oxyCODONE immediate release (ROXICODONE) 10 MG immediate release tablet      3. Anxiety        4. Anxiolytic dependence (HCC)  ALPRAZolam (XANAX) 1 MG Tab      5. Hypothyroidism, unspecified type  TSH WITH REFLEX TO FT4      6. Polycythemia  FERRITIN    IRON/TOTAL IRON BIND    ERYTHROPOIETIN    JAK2 GENE MUT RFLX CALR RFLX MPL      7. Other abnormality of red blood cells  FERRITIN    IRON/TOTAL IRON BIND      8. Rash      "   9. Cigarette nicotine dependence without complication        10. Severe obesity (BMI 35.0-39.9) with comorbidity (HCC)        11. Essential hypertension            Assessment & Plan  1. Chronic pain syndrome and uncomplicated opioid dependence.  Chronic, controlled. She reports morphine ER 15 mg bid & oxycodone 10 mg tid continues to work well to manage symptoms without side effects.  Currently struggling with pain, but declined pain management referral today. We will continue the current regimen.  Informed consent and controlled substance treatment agreement on file.  Urine drug screen up-to-date and appropriate.  Risks versus benefits were reviewed. Obtained and reviewed patient utilization report from [facility] pharmacy database on 9/23/2024 10:40 AM  prior to writing prescription for controlled substance II, [facility] . Based on assessment of the report, the prescription is medically necessary.     2. Anxiety with anxiolytic dependence.  Chronic, controlled. She reports alprazolam 1 mg bid continues to work well to manage symptoms without side effects.  We will continue the current regimen.  Informed consent and controlled substance treatment agreement on file.  Urine drug screen up-to-date and appropriate.  Risks versus benefits were reviewed. Obtained and reviewed patient utilization report from [facility] pharmacy database on 9/23/2024 10:40 AM  prior to writing prescription for controlled substance II, III or IV per [place] bill . Based on assessment of the report, the prescription is medically necessary.     3. Hypothyroidism.  This is chronic and status unknown. Previous labs in June 2024 showed she was on too much levothyroxine. Her dose was decreased to 50 mcg daily, but she had self-increased her dose to 1-1/2 of the 75 mcg tablets. A prescription for levothyroxine 75 mcg has been sent to [facility] [address]. Labs will be repeated before her next visit to assess thyroid  levels on 75 mcg alone per day.    4. Polycythemia.  This is chronic and uncontrolled since 2007, with consistent findings since 2017. She is at intermediate risk for sleep apnea and continues to smoke cigarettes, both of which are risk factors for polycythemia. A lab work-up for other causes will be conducted. If the lab work-up is unremarkable, a sleep study may be considered.    5. Rash.  This is chronic, recurrent, and acutely exacerbated. She frequently gets fungal skin infections of her groin. She uses nystatin powder and cream, which help but she often runs out due to the large surface area she has to treat. Refills for nystatin cream and powder have been provided.    6. Cigarette dependence.  This is chronic and stable. She continues to smoke about 3 to 4 cigarettes/day. She reports cutting back, but this is consistent with past reports. Monitoring will continue.    7. Severe obesity with comorbidity.  She has comorbid hypertension which is controlled today. She was on phentermine for a short time and experienced some weight loss but is no longer successful with weight loss off phentermine. Medicare will not cover other weight loss medications. Xanax, amitriptyline, and estradiol have weight gain listed as side effects. She believes estradiol is causing her weight gain and will taper off estradiol.    I spent a total of 52 minutes with record review, exam, communication with the patient, and documentation of this encounter.    Return in about 4 weeks (around 10/21/2024) for Controlled substance.    Please note that this dictation was created using voice recognition software. I have made every reasonable attempt to correct obvious errors, but I expect that there are errors of grammar and possibly content that I did not discover before finalizing the note.

## 2024-10-09 DIAGNOSIS — Z79.890 POSTMENOPAUSAL HRT (HORMONE REPLACEMENT THERAPY): ICD-10-CM

## 2024-10-09 RX ORDER — ESTRADIOL 0.5 MG/1
TABLET ORAL
Qty: 90 TABLET | Refills: 0 | Status: SHIPPED | OUTPATIENT
Start: 2024-10-09

## 2024-10-11 DIAGNOSIS — I10 ESSENTIAL HYPERTENSION: ICD-10-CM

## 2024-10-11 RX ORDER — FUROSEMIDE 20 MG/1
20 TABLET ORAL
Qty: 90 TABLET | Refills: 0 | Status: SHIPPED | OUTPATIENT
Start: 2024-10-11

## 2024-10-21 ENCOUNTER — OFFICE VISIT (OUTPATIENT)
Dept: MEDICAL GROUP | Facility: PHYSICIAN GROUP | Age: 69
End: 2024-10-21
Payer: MEDICARE

## 2024-10-21 VITALS
HEART RATE: 78 BPM | WEIGHT: 224.6 LBS | DIASTOLIC BLOOD PRESSURE: 64 MMHG | TEMPERATURE: 97 F | BODY MASS INDEX: 37.42 KG/M2 | HEIGHT: 65 IN | SYSTOLIC BLOOD PRESSURE: 118 MMHG | OXYGEN SATURATION: 94 %

## 2024-10-21 DIAGNOSIS — G89.4 CHRONIC PAIN SYNDROME: ICD-10-CM

## 2024-10-21 DIAGNOSIS — F11.20 UNCOMPLICATED OPIOID DEPENDENCE (HCC): ICD-10-CM

## 2024-10-21 DIAGNOSIS — N30.10 CHRONIC INTERSTITIAL CYSTITIS: ICD-10-CM

## 2024-10-21 DIAGNOSIS — F41.9 ANXIETY: ICD-10-CM

## 2024-10-21 DIAGNOSIS — Z12.11 COLON CANCER SCREENING: ICD-10-CM

## 2024-10-21 DIAGNOSIS — F13.20 ANXIOLYTIC DEPENDENCE (HCC): ICD-10-CM

## 2024-10-21 PROCEDURE — 99215 OFFICE O/P EST HI 40 MIN: CPT | Performed by: FAMILY MEDICINE

## 2024-10-21 PROCEDURE — 3074F SYST BP LT 130 MM HG: CPT | Performed by: FAMILY MEDICINE

## 2024-10-21 PROCEDURE — 3078F DIAST BP <80 MM HG: CPT | Performed by: FAMILY MEDICINE

## 2024-10-21 RX ORDER — CYCLOBENZAPRINE HCL 5 MG
5 TABLET ORAL 3 TIMES DAILY PRN
Qty: 90 TABLET | Refills: 5 | Status: SHIPPED | OUTPATIENT
Start: 2024-10-21

## 2024-10-21 RX ORDER — OXYCODONE HYDROCHLORIDE 10 MG/1
10 TABLET ORAL EVERY 8 HOURS PRN
Qty: 90 TABLET | Refills: 0 | Status: SHIPPED | OUTPATIENT
Start: 2024-10-24 | End: 2024-11-23

## 2024-10-21 RX ORDER — MORPHINE SULFATE 15 MG/1
15 TABLET, FILM COATED, EXTENDED RELEASE ORAL EVERY 12 HOURS
Qty: 60 TABLET | Refills: 0 | Status: SHIPPED | OUTPATIENT
Start: 2024-10-24 | End: 2024-11-23

## 2024-10-21 RX ORDER — ALPRAZOLAM 1 MG/1
1 TABLET ORAL EVERY 12 HOURS PRN
Qty: 60 TABLET | Refills: 0 | Status: SHIPPED | OUTPATIENT
Start: 2024-10-22 | End: 2024-11-21

## 2024-10-21 ASSESSMENT — FIBROSIS 4 INDEX: FIB4 SCORE: 2.18

## 2024-11-20 ENCOUNTER — OFFICE VISIT (OUTPATIENT)
Dept: MEDICAL GROUP | Facility: PHYSICIAN GROUP | Age: 69
End: 2024-11-20
Payer: MEDICARE

## 2024-11-20 VITALS
BODY MASS INDEX: 37.34 KG/M2 | TEMPERATURE: 97.6 F | OXYGEN SATURATION: 92 % | WEIGHT: 224.1 LBS | HEIGHT: 65 IN | DIASTOLIC BLOOD PRESSURE: 60 MMHG | HEART RATE: 65 BPM | SYSTOLIC BLOOD PRESSURE: 112 MMHG

## 2024-11-20 DIAGNOSIS — F13.20 ANXIOLYTIC DEPENDENCE (HCC): ICD-10-CM

## 2024-11-20 DIAGNOSIS — B37.2 CANDIDOSIS OF SKIN: ICD-10-CM

## 2024-11-20 DIAGNOSIS — R19.8 GI SYMPTOMS: ICD-10-CM

## 2024-11-20 DIAGNOSIS — F11.20 UNCOMPLICATED OPIOID DEPENDENCE (HCC): ICD-10-CM

## 2024-11-20 DIAGNOSIS — R19.5 POSITIVE FIT (FECAL IMMUNOCHEMICAL TEST): ICD-10-CM

## 2024-11-20 DIAGNOSIS — G89.4 CHRONIC PAIN SYNDROME: ICD-10-CM

## 2024-11-20 DIAGNOSIS — F41.9 ANXIETY: ICD-10-CM

## 2024-11-20 PROCEDURE — 99215 OFFICE O/P EST HI 40 MIN: CPT | Performed by: FAMILY MEDICINE

## 2024-11-20 PROCEDURE — 3074F SYST BP LT 130 MM HG: CPT | Performed by: FAMILY MEDICINE

## 2024-11-20 PROCEDURE — 3078F DIAST BP <80 MM HG: CPT | Performed by: FAMILY MEDICINE

## 2024-11-20 RX ORDER — OXYCODONE HYDROCHLORIDE 10 MG/1
10 TABLET ORAL EVERY 8 HOURS PRN
Qty: 90 TABLET | Refills: 0 | Status: SHIPPED | OUTPATIENT
Start: 2024-11-21 | End: 2024-12-21

## 2024-11-20 RX ORDER — MORPHINE SULFATE 15 MG/1
15 TABLET, FILM COATED, EXTENDED RELEASE ORAL EVERY 12 HOURS
Qty: 60 TABLET | Refills: 0 | Status: SHIPPED | OUTPATIENT
Start: 2024-11-21 | End: 2024-11-27 | Stop reason: SDUPTHER

## 2024-11-20 RX ORDER — CLOTRIMAZOLE 1 %
1 CREAM (GRAM) TOPICAL 2 TIMES DAILY
Qty: 60 G | Refills: 1 | Status: SHIPPED | OUTPATIENT
Start: 2024-11-20

## 2024-11-20 RX ORDER — ALPRAZOLAM 1 MG/1
1 TABLET ORAL EVERY 12 HOURS PRN
Qty: 60 TABLET | Refills: 0 | Status: SHIPPED | OUTPATIENT
Start: 2024-11-22 | End: 2024-12-22

## 2024-11-20 ASSESSMENT — FIBROSIS 4 INDEX: FIB4 SCORE: 2.18

## 2024-11-20 ASSESSMENT — ENCOUNTER SYMPTOMS
FEVER: 1
DIARRHEA: 1
ABDOMINAL PAIN: 1

## 2024-11-21 NOTE — PROGRESS NOTES
Subjective:     CC: GI symptoms, med refills    HPI:   Ashleigh presents today with    Problem   Anxiety    Chronic. Been using alprazolam for 25+ years.  She reports that she was told by a doctor in the past that she always have to have Xanax available because her throat will close.    Current med: xanax 1 mg bid (4 LME daily <-- 6 LME)  Last dose: this morning    Last fill: 10/24/2024, #60, 30 days  Tabs left: 5    CS Agreement: 4/26/2024  UDS: 3/27/2024     GI Symptoms    1 month  Nausea and diarrhea for last year, worse in last couple of days  Facial swelling, headaches, neck pain  +abd pain, +bloating, +heartburn  Also reports bleeding out of the rectum    Tooth fell out while brushing 5 days ago  She's been doubling up her keflex.   Taking esomeprazole daily on empty stomach. She recently increased to 2 tabs daily.      Chronic Pain    Chronic.  2/2 back pain, interstitial cystitis, left knee pain    She was following with Dr. Enrrique Santa but he has retired as of 12/31/2022. Reports the left knee needs surgery, but not ready for that discussion. Not currently seeing another pain management.     Pain has been worse within the last month in multiple areas - mouth, face, back, knees, abdomen.    CS Agreement: 4/26/2024  UDS: 3/27/2024    Last fill  Morphine ER 15 mg tabs - 10/24/2024, #60, 30 days  Tabs left: 2  Oxycodone 10 mg tabs - 10/24/2024, #90, 30 days  Tabs left: 0    Previous pain management/referrals  Dr. Santa - retired  [facility] - discharged 2013  [facility] - won't take new patients from Dr. Santa  [facility] - won't prescribe opiates if she continues xanax  [facility] - won't take over until we wean her down for them  [facility] - She did not agree with their treatment plan, reports they said she doesn't want to come stop the medication, therefore refuses to go back  [facility] - won't accept due to opiate dosing  [facility] - not accepting new patients     Anxiolytic Dependence (Hcc)    Is the medication improving the patient’s symptoms: Yes  Any adverse effects: No     Alcohol or illicit drug use:   She  reports no history of alcohol use.  She  reports no history of drug use.     History of controlled substance used in a way other than prescribed? No  Any early refills of a controlled substance: YES - not recently  History of lost or stolen controlled substance prescription: No  Any aberrant behavior or intoxication while on a controlled substance: No  Has the patient self-modified their dose or frequency of the medication :No  Compliant with treatment recommendations and plan: Yes  Any major health change to the patient: No  Concerns for misuse, abuse or addiction: No    /NarxCheck report reviewed: Yes  History of abnormal drug screening: No     Opiate dependence (CMS-HCC)    Morphine ER 15 mg bid  Oxycodone 10 mg tid  Total daily MME = 75 <-- 120 <-- 165 <-- 180 <-- 195 <-- 215 <-- 235 <-- 245 <--255 <-- 300 <-- 345    Chronic pain recheck for: back pain  Last dose of controlled substance: morphine - this morning, oxycodone - this morning  Chronic pain treated with morphine ER 15 mg bid and oxycodone 10 mg 3x/day.     She  reports no history of alcohol use.  She  reports no history of drug use.    Interval history:   Any major change in health since last appointment? No    Consequences of Chronic Opiate therapy:  (5 A's)  Analgesia: Compared to no treatment or prior treatment, pain is currently significantly improved  Activity: improved  Adverse Events: She denies constipation, dry mouth, itchy skin, nausea and sedation  Aberrant Behaviors: She reports she is taking medication as prescribed and is not veering from agreed treatment regimen or provider recommendations. There have been no inappropriate refills or lost/stolen meds reported.  Affect/Mood: Pain is not impacting patient's mood.  Patient denies  "depression/anxiety.    Nonnarcotic treatments that are being used: muscle relaxers and tricyclic antidepressants.     Last imagin - knee    Opioid Risk Score: 0    Interpretation of Opioid Risk Score   Score 0-3 = Low risk of abuse. Do UDS at least once per year.  Score 4-7 = Moderate risk of abuse. Do UDS 1-4 times per year.  Score 8+ = High risk of abuse. Refer to specialist.    UDS Summary       This patient has no relevant Health Maintenance data.            Most recent UDS reviewed today and is consistent with prescribed medications.     I have reviewed the medical records, the Prescription Monitoring Program and I have determined that controlled substance treatment is medically indicated.            Health Maintenance: Completed    ROS:  Review of Systems   Constitutional:  Positive for fever.   Gastrointestinal:  Positive for abdominal pain and diarrhea.       Objective:     Exam:  /60 (BP Location: Left arm, Patient Position: Sitting, BP Cuff Size: Adult)   Pulse 65   Temp 36.4 °C (97.6 °F) (Temporal)   Ht 1.651 m (5' 5\")   Wt 102 kg (224 lb 1.6 oz)   LMP 1988   SpO2 92%   BMI 37.29 kg/m²  Body mass index is 37.29 kg/m².    Physical Exam  Constitutional:       Appearance: Normal appearance.   Cardiovascular:      Rate and Rhythm: Normal rate and regular rhythm.      Heart sounds: Normal heart sounds.   Pulmonary:      Effort: Pulmonary effort is normal.      Breath sounds: Normal breath sounds.   Musculoskeletal:      Cervical back: Normal range of motion and neck supple.   Lymphadenopathy:      Cervical: No cervical adenopathy.   Neurological:      Mental Status: She is alert.             Assessment & Plan:     69 y.o. female with the following -     1. Anxiety  2. Anxiolytic dependence (HCC)  Chronic, controlled. She reports alprazolam continues to work well to manage symptoms without side effects.  We will continue the current regimen.  Informed consent and controlled substance " treatment agreement on file.  Urine drug screen up-to-date and appropriate.  Risks versus benefits were reviewed. Obtained and reviewed patient utilization report from [facility] pharmacy database on 11/20/2024 4:41 PM  prior to writing prescription for controlled substance II, III or IV per [place] bill . Based on assessment of the report, the prescription is medically necessary.   - ALPRAZolam (XANAX) 1 MG Tab; Take 1 Tablet by mouth every 12 hours as needed for Anxiety for up to 30 days.  Dispense: 60 Tablet; Refill: 0    3. Chronic pain syndrome  4. Uncomplicated opioid dependence (HCC)  Chronic, controlled. She reports morphine and oxycodone continues to work well to manage symptoms without side effects.  She has noticed a worsening of her pain in the last month and has been reconsidering seeing a pain management specialist. We will continue the current regimen.  Informed consent and controlled substance treatment agreement on file.  Urine drug screen up-to-date and appropriate.  Risks versus benefits were reviewed. Obtained and reviewed patient utilization report from [facility] pharmacy database on 11/20/2024 4:41 PM  prior to writing prescription for controlled substance II, III or IV per [place] bill . Based on assessment of the report, the prescription is medically necessary.   - morphine ER (MS CONTIN) 15 MG Tab CR tablet; Take 1 Tablet by mouth every 12 hours for 30 days.  Dispense: 60 Tablet; Refill: 0  - oxyCODONE immediate release (ROXICODONE) 10 MG immediate release tablet; Take 1 Tablet by mouth every 8 hours as needed for Severe Pain for up to 30 days.  Dispense: 90 Tablet; Refill: 0    5. GI symptoms  6. Positive FIT (fecal immunochemical test)  Chronic acutely exacerbated.  She reports for the last year she is been getting intermittent episodes of nausea and diarrhea.  I am unsure if there was associated vomiting as she would not answer the question.  Then it sounds like recently this  nausea and diarrhea got worse and it sounds like she had uncontrolled diarrhea and the taxi ride over and had to clean herself in our bathroom today before her visit.  There may have been another episode of diarrhea right before visit in the bathroom as well.  She is now also reporting blood out of her rectum which she is reported in the past with a positive fit.  I have tried refer to GI GI in the past regarding the positive fit test but I will place a new GI referral with these additional symptoms.  In the meantime I have told her if her symptoms get worse she should go to the emergency room to be evaluated more urgently.  - Referral to Gastroenterology    7. Candidosis of skin  Chronic, recurrent issue, uncontrolled.  She gets candidiasis in her groin and it is currently uncontrolled and she reports it spreading down her legs.  She has been using nystatin cream but I do not think it is working well so we are going to transition her to clotrimazole 1% cream twice daily.  I did like her to take a break after 2 weeks.      I spent a total of 40 minutes with record review, exam, communication with the patient, and documentation of this encounter.      Return in about 4 weeks (around 12/18/2024) for Controlled substance.    Please note that this dictation was created using voice recognition software. I have made every reasonable attempt to correct obvious errors, but I expect that there are errors of grammar and possibly content that I did not discover before finalizing the note.

## 2024-11-27 ENCOUNTER — TELEPHONE (OUTPATIENT)
Dept: MEDICAL GROUP | Facility: PHYSICIAN GROUP | Age: 69
End: 2024-11-27
Payer: MEDICARE

## 2024-11-27 DIAGNOSIS — F11.20 UNCOMPLICATED OPIOID DEPENDENCE (HCC): ICD-10-CM

## 2024-11-27 DIAGNOSIS — G89.4 CHRONIC PAIN SYNDROME: ICD-10-CM

## 2024-11-27 RX ORDER — MORPHINE SULFATE 15 MG/1
15 TABLET, FILM COATED, EXTENDED RELEASE ORAL EVERY 12 HOURS
Qty: 14 TABLET | Refills: 0 | Status: SHIPPED | OUTPATIENT
Start: 2024-11-27 | End: 2024-12-04

## 2024-11-28 NOTE — TELEPHONE ENCOUNTER
Phone Number Called: [phone]    Call outcome: Left detailed message for patient. Informed to call back with any additional questions.    Message: Called and LVM letting her know that [facility] is out of stock on of the Morphine and it is on back order. Stated that we did find 14 tablets at the [facility] on [address] and the order was sent there and that would help get her through the holiday and until something can be figured out on Monday

## 2024-11-28 NOTE — PROGRESS NOTES
Informed today that her usual pharmacy has no morphine in stock.  We did find [facility] on [address] has 1 week supply.  I have sent in a prescription to the [facility] on [address] for 1 week supply which is 14 tablets.  Next week we will see if there is another pharmacy that has it in stock.

## 2024-12-03 DIAGNOSIS — F11.20 UNCOMPLICATED OPIOID DEPENDENCE (HCC): ICD-10-CM

## 2024-12-03 DIAGNOSIS — G89.4 CHRONIC PAIN SYNDROME: ICD-10-CM

## 2024-12-03 RX ORDER — MORPHINE SULFATE 15 MG/1
15 TABLET, FILM COATED, EXTENDED RELEASE ORAL EVERY 12 HOURS
Qty: 46 TABLET | Refills: 0 | Status: SHIPPED | OUTPATIENT
Start: 2024-12-03 | End: 2024-12-20 | Stop reason: SDUPTHER

## 2024-12-03 NOTE — PROGRESS NOTES
Found [facility] in [place] has morphine in stock. Rx for the rest of her 30 days (23 days) was sent there today.

## 2024-12-10 ENCOUNTER — TELEPHONE (OUTPATIENT)
Dept: MEDICAL GROUP | Facility: PHYSICIAN GROUP | Age: 69
End: 2024-12-10
Payer: MEDICARE

## 2024-12-10 NOTE — TELEPHONE ENCOUNTER
Phone Number Called: [phone] (home)       Call outcome: Did not leave a detailed message. Requested patient to call back.    Message: left voicemail to call back

## 2024-12-19 ENCOUNTER — HOSPITAL ENCOUNTER (OUTPATIENT)
Dept: RADIOLOGY | Facility: MEDICAL CENTER | Age: 69
End: 2024-12-19
Attending: FAMILY MEDICINE
Payer: MEDICARE

## 2024-12-19 DIAGNOSIS — Z12.31 ENCOUNTER FOR SCREENING MAMMOGRAM FOR MALIGNANT NEOPLASM OF BREAST: ICD-10-CM

## 2024-12-19 PROCEDURE — 77067 SCR MAMMO BI INCL CAD: CPT

## 2024-12-20 ENCOUNTER — HOSPITAL ENCOUNTER (OUTPATIENT)
Dept: LAB | Facility: MEDICAL CENTER | Age: 69
End: 2024-12-20
Attending: FAMILY MEDICINE
Payer: MEDICARE

## 2024-12-20 ENCOUNTER — OFFICE VISIT (OUTPATIENT)
Dept: MEDICAL GROUP | Facility: PHYSICIAN GROUP | Age: 69
End: 2024-12-20
Payer: MEDICARE

## 2024-12-20 VITALS
OXYGEN SATURATION: 95 % | SYSTOLIC BLOOD PRESSURE: 128 MMHG | DIASTOLIC BLOOD PRESSURE: 64 MMHG | HEIGHT: 65 IN | WEIGHT: 233.4 LBS | HEART RATE: 68 BPM | TEMPERATURE: 97.9 F | BODY MASS INDEX: 38.89 KG/M2

## 2024-12-20 DIAGNOSIS — F41.9 ANXIETY: Primary | ICD-10-CM

## 2024-12-20 DIAGNOSIS — F11.20 UNCOMPLICATED OPIOID DEPENDENCE (HCC): ICD-10-CM

## 2024-12-20 DIAGNOSIS — M17.12 PRIMARY OSTEOARTHRITIS OF LEFT KNEE: ICD-10-CM

## 2024-12-20 DIAGNOSIS — R71.8 OTHER ABNORMALITY OF RED BLOOD CELLS: ICD-10-CM

## 2024-12-20 DIAGNOSIS — D75.1 POLYCYTHEMIA: ICD-10-CM

## 2024-12-20 DIAGNOSIS — I10 ESSENTIAL HYPERTENSION: ICD-10-CM

## 2024-12-20 DIAGNOSIS — E03.9 HYPOTHYROIDISM, UNSPECIFIED TYPE: Chronic | ICD-10-CM

## 2024-12-20 DIAGNOSIS — F13.20 ANXIOLYTIC DEPENDENCE (HCC): ICD-10-CM

## 2024-12-20 DIAGNOSIS — B37.2 CANDIDOSIS OF SKIN: ICD-10-CM

## 2024-12-20 DIAGNOSIS — G89.4 CHRONIC PAIN SYNDROME: ICD-10-CM

## 2024-12-20 LAB
FERRITIN SERPL-MCNC: 286 NG/ML (ref 10–291)
IRON SATN MFR SERPL: 30 % (ref 15–55)
IRON SERPL-MCNC: 88 UG/DL (ref 40–170)
T4 FREE SERPL-MCNC: 1.78 NG/DL (ref 0.93–1.7)
TIBC SERPL-MCNC: 296 UG/DL (ref 250–450)
TSH SERPL DL<=0.005 MIU/L-ACNC: 0.12 UIU/ML (ref 0.38–5.33)
UIBC SERPL-MCNC: 208 UG/DL (ref 110–370)

## 2024-12-20 PROCEDURE — 84439 ASSAY OF FREE THYROXINE: CPT

## 2024-12-20 PROCEDURE — 36415 COLL VENOUS BLD VENIPUNCTURE: CPT

## 2024-12-20 PROCEDURE — 83540 ASSAY OF IRON: CPT

## 2024-12-20 PROCEDURE — 81270 JAK2 GENE: CPT

## 2024-12-20 PROCEDURE — 83550 IRON BINDING TEST: CPT

## 2024-12-20 PROCEDURE — 3074F SYST BP LT 130 MM HG: CPT | Performed by: FAMILY MEDICINE

## 2024-12-20 PROCEDURE — 3078F DIAST BP <80 MM HG: CPT | Performed by: FAMILY MEDICINE

## 2024-12-20 PROCEDURE — 82728 ASSAY OF FERRITIN: CPT

## 2024-12-20 PROCEDURE — 99214 OFFICE O/P EST MOD 30 MIN: CPT | Performed by: FAMILY MEDICINE

## 2024-12-20 PROCEDURE — 84443 ASSAY THYROID STIM HORMONE: CPT

## 2024-12-20 PROCEDURE — 82668 ASSAY OF ERYTHROPOIETIN: CPT

## 2024-12-20 RX ORDER — MUPIROCIN 20 MG/G
OINTMENT TOPICAL
Qty: 22 G | Refills: 0 | Status: SHIPPED | OUTPATIENT
Start: 2024-12-20

## 2024-12-20 RX ORDER — ALPRAZOLAM 1 MG/1
1 TABLET ORAL EVERY 12 HOURS PRN
Qty: 60 TABLET | Refills: 0 | Status: SHIPPED | OUTPATIENT
Start: 2024-12-23 | End: 2025-01-22

## 2024-12-20 RX ORDER — CLOTRIMAZOLE 1 %
1 CREAM (GRAM) TOPICAL 2 TIMES DAILY
Qty: 60 G | Refills: 1 | Status: SHIPPED | OUTPATIENT
Start: 2024-12-20

## 2024-12-20 RX ORDER — MORPHINE SULFATE 15 MG/1
15 TABLET, FILM COATED, EXTENDED RELEASE ORAL EVERY 12 HOURS
Qty: 60 TABLET | Refills: 0 | Status: SHIPPED | OUTPATIENT
Start: 2024-12-30 | End: 2025-01-29

## 2024-12-20 RX ORDER — OXYCODONE HYDROCHLORIDE 10 MG/1
10 TABLET ORAL EVERY 8 HOURS PRN
Qty: 90 TABLET | Refills: 0 | Status: SHIPPED | OUTPATIENT
Start: 2024-12-21 | End: 2025-01-20

## 2024-12-20 RX ORDER — FUROSEMIDE 20 MG/1
20 TABLET ORAL
Qty: 90 TABLET | Refills: 1 | Status: SHIPPED | OUTPATIENT
Start: 2024-12-20

## 2024-12-20 ASSESSMENT — FIBROSIS 4 INDEX: FIB4 SCORE: 2.18

## 2024-12-21 NOTE — PROGRESS NOTES
Verbal consent was acquired by the patient to use Go800 ambient listening note generation during this visit     Subjective:     HPI:   History of Present Illness  The patient is here today for a medication refill. She is here to get her alprazolam, morphine, and oxycodone refilled. The alprazolam was last picked up on 11/24/2024 with 60 tablets for 30 days. The oxycodone was picked up on 11/24/2024 with 90 tablets for 30 days, and the morphine was picked up on 12/09/2024 with 45 tablets for 22 days as her original pharmacy did not have enough morphine in stock.    Severe Pain in Legs, Back, and Neck  She reports severe pain in her legs, back, and neck, which has been exacerbated due to a delay in obtaining her medications. She anticipates the need for surgery in the near future. She has been diagnosed with scoliosis and interstitial cystitis. She has been under our care for 4 years and has seen many other doctors before that. She reports no side effects from her medications. She has 1.5 tablets of oxycodone left and 16 tablets of morphine. She took her last dose of oxycodone this morning and also took morphine this morning. She has 13 tablets of Xanax left. She has been using extra doses of her 10 mg oxycodone to manage her pain. She plans to consult a pain specialist after the New Year.  - Onset: Exacerbated due to a delay in obtaining medications.  - Location: Legs, back, and neck.  - Character: Severe pain.  - Alleviating/Aggravating Factors: Delay in obtaining medications; using extra doses of oxycodone.  - Timing: Ongoing; last dose of oxycodone and morphine taken this morning.  - Severity: Severe, requiring extra doses of medication and potential surgery.    CS Agreement: 4/26/2024  UDS: 3/27/2024     Last fill  Morphine ER 15 mg tabs - 12/9/2024, #45, 22 days  Tabs left: 16  Oxycodone 10 mg tabs - 11/24/2024, #90, 30 days  Tabs left: 1.5    Chronic pain recheck for: back pain  Last dose of controlled  substance: morphine - this morning, oxycodone - this morning  Chronic pain treated with morphine ER 15 mg bid and oxycodone 10 mg 3x/day.      She  reports no history of alcohol use.  She  reports no history of drug use.     Interval history:   Any major change in health since last appointment? No     Consequences of Chronic Opiate therapy:  (5 A's)  Analgesia: Compared to no treatment or prior treatment, pain is currently significantly improved  Activity: improved  Adverse Events: She denies constipation, dry mouth, itchy skin, nausea and sedation  Aberrant Behaviors: She reports she is taking medication as prescribed and is not veering from agreed treatment regimen or provider recommendations. There have been no inappropriate refills or lost/stolen meds reported.  Affect/Mood: Pain is not impacting patient's mood.  Patient denies depression/anxiety.     Nonnarcotic treatments that are being used: muscle relaxers and tricyclic antidepressants.      Last imagin - knee     Opioid Risk Score: 0     Interpretation of Opioid Risk Score   Score 0-3 = Low risk of abuse. Do UDS at least once per year.  Score 4-7 = Moderate risk of abuse. Do UDS 1-4 times per year.  Score 8+ = High risk of abuse. Refer to specialist.     UDS Summary         This patient has no relevant Health Maintenance data.                Most recent UDS reviewed today and is consistent with prescribed medications.      I have reviewed the medical records, the Prescription Monitoring Program and I have determined that controlled substance treatment is medically indicated.      Anxiety  Current med: xanax 1 mg bid (4 LME daily <-- 6 LME)  Last dose: this morning     Last fill: 2024, #60, 30 days  Tabs left: 13     CS Agreement: 2024  UDS: 3/27/2024    Is the medication improving the patient’s symptoms: Yes  Any adverse effects: No      Alcohol or illicit drug use:   She  reports no history of alcohol use.  She  reports no history of drug  use.     History of controlled substance used in a way other than prescribed? No  Any early refills of a controlled substance: YES - not recently  History of lost or stolen controlled substance prescription: No  Any aberrant behavior or intoxication while on a controlled substance: No  Has the patient self-modified their dose or frequency of the medication :No  Compliant with treatment recommendations and plan: Yes  Any major health change to the patient: No  Concerns for misuse, abuse or addiction: No     /NarxCheck report reviewed: Yes  History of abnormal drug screening: No    Severe Sound in Lungs  She has been experiencing a severe sound in her lungs.    Dark Urine  She reports dark urine but no hematuria.    Unpleasant Smell  She takes a shower every day because she will smell and she does not know why it smells.    Weight Gain  She has been experiencing significant weight gain, increasing from 215 to 230 pounds within a month. She attributes this to water retention and constipation. She is requesting a refill of her furosemide.  - Onset: Within a month.  - Duration: Persistent for the past month.  - Character: Significant weight gain, water retention, and constipation.  - Severity: Increased weight from 215 to 230 pounds.    Severe Infection  She has been experiencing a severe infection. She is requesting a refill of her cephalexin.    Rash  She has been experiencing a rash. She is requesting a refill of her clotrimazole cream.    Bursitis  She has been experiencing bursitis. She is requesting a refill of her Bactroban cream.    Muscle Spasms  She has been experiencing muscle spasms. She is requesting a refill of her Flexeril.    SOCIAL HISTORY  She reports no history of alcohol intake, drug use, or gambling.    FAMILY HISTORY  Her great-granddaughter had cancer at a young age.    MEDICATIONS  Current: Alprazolam, morphine, oxycodone, cephalexin, furosemide, clotrimazole cream, Bactroban cream,  "Flexeril.    Health Maintenance: Completed    Objective:     Exam:  /64 (BP Location: Left arm, Patient Position: Sitting, BP Cuff Size: Adult)   Pulse 68   Temp 36.6 °C (97.9 °F) (Temporal)   Ht 1.651 m (5' 5\")   Wt 106 kg (233 lb 6.4 oz)   LMP 01/01/1988   SpO2 95%   BMI 38.84 kg/m²  Body mass index is 38.84 kg/m².    Physical Exam  Constitutional:       Appearance: Normal appearance.   Cardiovascular:      Rate and Rhythm: Normal rate and regular rhythm.      Heart sounds: Normal heart sounds.   Pulmonary:      Effort: Pulmonary effort is normal.      Breath sounds: Transmitted upper airway sounds present. Wheezing present.   Musculoskeletal:      Cervical back: Normal range of motion and neck supple.   Lymphadenopathy:      Cervical: No cervical adenopathy.   Neurological:      Mental Status: She is alert.             Results      Assessment & Plan:     1. Anxiety        2. Anxiolytic dependence (HCC)  ALPRAZolam (XANAX) 1 MG Tab      3. Chronic pain syndrome  morphine ER (MS CONTIN) 15 MG Tab CR tablet    oxyCODONE immediate release (ROXICODONE) 10 MG immediate release tablet      4. Uncomplicated opioid dependence (HCC)  morphine ER (MS CONTIN) 15 MG Tab CR tablet    oxyCODONE immediate release (ROXICODONE) 10 MG immediate release tablet      5. Primary osteoarthritis of left knee        6. Candidosis of skin        7. Essential hypertension  furosemide (LASIX) 20 MG Tab          Assessment & Plan  1.  Anxiety with anxiolytic dependence - Chronic, controlled. She reports alprazolam 1 mg twice daily continues to work well to manage symptoms without side effects.  We will continue the current regimen.  Informed consent and controlled substance treatment agreement on file.  Urine drug screen up-to-date and appropriate.  Risks versus benefits were reviewed. Obtained and reviewed patient utilization report from [facility] pharmacy database on 12/20/2024 4:53 PM  prior to writing prescription for " controlled substance II, III or IV per [place] bill . Based on assessment of the report, the prescription is medically necessary.     2.  Chronic pain with uncomplicated opioid dependence - Chronic, controlled. She reports morphine ER 15 mg twice daily and oxycodone 10 mg 3 times daily continues to work well to manage symptoms without side effects.  We will continue the current regimen.  Informed consent and controlled substance treatment agreement on file.  Urine drug screen up-to-date and appropriate.  Risks versus benefits were reviewed.  I also provided a refill of her Flexeril as muscle spasms contribute to her chronic pain.  Obtained and reviewed patient utilization report from [facility] pharmacy database on 12/20/2024 4:54 PM  prior to writing prescription for controlled substance II, III or IV per [place] bill . Based on assessment of the report, the prescription is medically necessary.     3. Arthritis - Patient reports severe arthritis in her knee, causing significant pain and mobility issues.  - Advised that Keflex will not help with the arthritis.  - She is considering surgery    4. Rash -chronic, recurrent.  She gets candidal dermatitis in skin folds frequently.  Patient requests a refill of her clotrimazole cream.  - Prescription for clotrimazole cream will be issued.      Return in about 4 weeks (around 1/17/2025) for Controlled substance.    Please note that this dictation was created using voice recognition software. I have made every reasonable attempt to correct obvious errors, but I expect that there are errors of grammar and possibly content that I did not discover before finalizing the note.

## 2024-12-23 LAB — EPO SERPL-ACNC: 6 MU/ML (ref 4–27)

## 2024-12-23 RX ORDER — CETIRIZINE HYDROCHLORIDE 10 MG/1
10 TABLET ORAL DAILY
Qty: 90 TABLET | Refills: 1 | Status: SHIPPED | OUTPATIENT
Start: 2024-12-23

## 2024-12-23 NOTE — TELEPHONE ENCOUNTER
Received request via: Pharmacy    Was the patient seen in the last year in this department? Yes    Does the patient have an active prescription (recently filled or refills available) for medication(s) requested? No    Pharmacy Name:     Does the patient have assisted Plus and need 100-day supply? (This applies to ALL medications) Patient does not have SCP

## 2024-12-30 LAB
JAK2 P.V617F BLD/T QL: NOT DETECTED
SPECIMEN SOURCE: NORMAL

## 2025-01-01 LAB
MPL P.W515 BLD/T QL: NOT DETECTED
SPECIMEN SOURCE: NORMAL

## 2025-01-02 LAB
GENE XXX MUT ANL BLD/T: NOT DETECTED
SPECIMEN SOURCE: NORMAL

## 2025-01-17 ENCOUNTER — OFFICE VISIT (OUTPATIENT)
Dept: MEDICAL GROUP | Facility: PHYSICIAN GROUP | Age: 70
End: 2025-01-17
Payer: MEDICARE

## 2025-01-17 VITALS
DIASTOLIC BLOOD PRESSURE: 60 MMHG | HEART RATE: 81 BPM | SYSTOLIC BLOOD PRESSURE: 118 MMHG | BODY MASS INDEX: 39.09 KG/M2 | WEIGHT: 234.6 LBS | TEMPERATURE: 97.9 F | OXYGEN SATURATION: 94 % | HEIGHT: 65 IN

## 2025-01-17 DIAGNOSIS — K21.9 GASTROESOPHAGEAL REFLUX DISEASE, UNSPECIFIED WHETHER ESOPHAGITIS PRESENT: ICD-10-CM

## 2025-01-17 DIAGNOSIS — F41.9 ANXIETY: ICD-10-CM

## 2025-01-17 DIAGNOSIS — F13.20 ANXIOLYTIC DEPENDENCE (HCC): ICD-10-CM

## 2025-01-17 DIAGNOSIS — G89.4 CHRONIC PAIN SYNDROME: ICD-10-CM

## 2025-01-17 DIAGNOSIS — F11.20 UNCOMPLICATED OPIOID DEPENDENCE (HCC): ICD-10-CM

## 2025-01-17 PROCEDURE — 3078F DIAST BP <80 MM HG: CPT | Performed by: FAMILY MEDICINE

## 2025-01-17 PROCEDURE — 3074F SYST BP LT 130 MM HG: CPT | Performed by: FAMILY MEDICINE

## 2025-01-17 PROCEDURE — 99215 OFFICE O/P EST HI 40 MIN: CPT | Performed by: FAMILY MEDICINE

## 2025-01-17 RX ORDER — FLUOXETINE 40 MG/1
40 CAPSULE ORAL
Qty: 90 CAPSULE | Refills: 1 | Status: SHIPPED | OUTPATIENT
Start: 2025-01-17

## 2025-01-17 RX ORDER — MORPHINE SULFATE 15 MG/1
15 TABLET, FILM COATED, EXTENDED RELEASE ORAL EVERY 12 HOURS
Qty: 60 TABLET | Refills: 0 | Status: SHIPPED | OUTPATIENT
Start: 2025-01-25 | End: 2025-02-24

## 2025-01-17 RX ORDER — ALPRAZOLAM 0.5 MG
TABLET ORAL
Qty: 90 TABLET | Refills: 0 | Status: SHIPPED | OUTPATIENT
Start: 2025-01-25 | End: 2025-02-24

## 2025-01-17 RX ORDER — OXYCODONE HYDROCHLORIDE 10 MG/1
10 TABLET ORAL EVERY 8 HOURS PRN
Qty: 90 TABLET | Refills: 0 | Status: SHIPPED | OUTPATIENT
Start: 2025-01-19 | End: 2025-02-18

## 2025-01-17 RX ORDER — BUSPIRONE HYDROCHLORIDE 10 MG/1
10 TABLET ORAL 2 TIMES DAILY
Qty: 180 TABLET | Refills: 0 | Status: CANCELLED | OUTPATIENT
Start: 2025-01-17

## 2025-01-17 ASSESSMENT — PATIENT HEALTH QUESTIONNAIRE - PHQ9: CLINICAL INTERPRETATION OF PHQ2 SCORE: 0

## 2025-01-17 ASSESSMENT — FIBROSIS 4 INDEX: FIB4 SCORE: 2.18

## 2025-01-17 NOTE — PROGRESS NOTES
Verbal consent was acquired by the patient to use [employer] ambient listening note generation during this visit     Subjective:     HPI:   History of Present Illness  The patient is a 69-year-old female who presents for evaluation of chronic pain syndrome with uncomplicated opioid dependence, anxiety with anxiolytic dependence, GERD, and interstitial cystitis.    Chronic Pain Syndrome  - Experiencing severe pain this month, significantly impacting mobility  - Difficulty in rising due to leg pain  - Scoliosis and knee pain  - Reports a growing cyst  - Underwent testing for bursitis and arthritis  - Managing pain with morphine and oxycodone, last doses taken this morning    CS Agreement: 2024  UDS: 3/27/2024     Last fill  Morphine ER 15 mg tabs - 2024, #60, 30 days  Tabs left: 10  Oxycodone 10 mg tabs - 2024, #90, 30 days  Tabs left: 3    Chronic pain recheck for: back pain  Last dose of controlled substance: morphine - this morning, oxycodone - this morning  Chronic pain treated with morphine ER 15 mg bid and oxycodone 10 mg 3x/day.      She  reports no history of alcohol use.  She  reports no history of drug use.     Interval history:   Any major change in health since last appointment? No     Consequences of Chronic Opiate therapy:  (5 A's)  Analgesia: Compared to no treatment or prior treatment, pain is currently significantly improved  Activity: improved  Adverse Events: She denies constipation, dry mouth, itchy skin, nausea and sedation  Aberrant Behaviors: She reports she is taking medication as prescribed and is not veering from agreed treatment regimen or provider recommendations. There have been no inappropriate refills or lost/stolen meds reported.  Affect/Mood: Pain is not impacting patient's mood.  Patient denies depression/anxiety.     Nonnarcotic treatments that are being used: muscle relaxers and tricyclic antidepressants.      Last imagin - knee     Opioid Risk Score: 0      Interpretation of Opioid Risk Score   Score 0-3 = Low risk of abuse. Do UDS at least once per year.  Score 4-7 = Moderate risk of abuse. Do UDS 1-4 times per year.  Score 8+ = High risk of abuse. Refer to specialist.     Most recent UDS reviewed today and is consistent with prescribed medications.      I have reviewed the medical records, the Prescription Monitoring Program and I have determined that controlled substance treatment is medically indicated.    Anxiety with Anxiolytic Dependence  - On Xanax for an extended period  - Fear of discontinuing Xanax due to past panic and anxiety attacks  - Allergies exacerbate anxiety, leading to panic attacks managed with Xanax  - Previously tried buspirone without success  - Self-administering two doses of Prozac as needed    Current med: xanax 1 mg bid (4 LME daily <-- 6 LME)  Last dose: this morning     Last fill: 12/27/2024, #60, 30 days  Tabs left: 21     CS Agreement: 4/26/2024  UDS: 3/27/2024    Is the medication improving the patient’s symptoms: Yes  Any adverse effects: No      Alcohol or illicit drug use:   She  reports no history of alcohol use.  She  reports no history of drug use.     History of controlled substance used in a way other than prescribed? No  Any early refills of a controlled substance: YES - not recently  History of lost or stolen controlled substance prescription: No  Any aberrant behavior or intoxication while on a controlled substance: No  Has the patient self-modified their dose or frequency of the medication :No  Compliant with treatment recommendations and plan: Yes  Any major health change to the patient: No  Concerns for misuse, abuse or addiction: No     /NarxCheck report reviewed: Yes  History of abnormal drug screening: No    GERD  - Continues to experience acid reflux and a bad taste in her mouth despite being on Nexium  - Reports bloating    Interstitial Cystitis  - Contributing to chronic pain syndrome  - No recent hematuria  -  "Noticed a change in urine color four days ago, which has since resolved    Supplemental information: She has dental issues and has not yet visited the dentist. She is susceptible to aspiration pneumonia. She reports low energy levels and occasional headaches.    SOCIAL HISTORY  She admits to smoking.    MEDICATIONS  Current: morphine, oxycodone, Xanax, fluoxetine, Nexium, Keflex    Health Maintenance: Completed    Objective:     Exam:  /60 (BP Location: Right arm, Patient Position: Sitting, BP Cuff Size: Adult)   Pulse 81   Temp 36.6 °C (97.9 °F) (Temporal)   Ht 1.651 m (5' 5\")   Wt 106 kg (234 lb 9.6 oz)   LMP 01/01/1988   SpO2 94%   BMI 39.04 kg/m²  Body mass index is 39.04 kg/m².    Physical Exam  Constitutional:       Appearance: Normal appearance.   Cardiovascular:      Rate and Rhythm: Normal rate and regular rhythm.      Heart sounds: Normal heart sounds.   Pulmonary:      Effort: Pulmonary effort is normal.      Breath sounds: Rhonchi present. No wheezing or rales.   Musculoskeletal:      Cervical back: Normal range of motion and neck supple.   Lymphadenopathy:      Cervical: No cervical adenopathy.   Neurological:      Mental Status: She is alert.             Results      Assessment & Plan:     1. Chronic pain syndrome  morphine ER (MS CONTIN) 15 MG Tab CR tablet    oxyCODONE immediate release (ROXICODONE) 10 MG immediate release tablet      2. Uncomplicated opioid dependence (HCC)  morphine ER (MS CONTIN) 15 MG Tab CR tablet    oxyCODONE immediate release (ROXICODONE) 10 MG immediate release tablet      3. Anxiety  ALPRAZolam (XANAX) 0.5 MG Tab      4. Anxiolytic dependence (HCC)  ALPRAZolam (XANAX) 0.5 MG Tab      5. Gastroesophageal reflux disease, unspecified whether esophagitis present            Assessment & Plan  1. Chronic pain syndrome with uncomplicated opioid dependence - Chronic, controlled. She reports morphine ER and oxycodone continues to manage symptoms without side effects. She " has been successfully tapered down to 75 MME daily from 345 MME. We will not taper further nor will the dose be increased. Informed consent and controlled substance treatment agreement on file.  Urine drug screen up-to-date and appropriate.  Risks versus benefits were reviewed. Obtained and reviewed patient utilization report from [facility] pharmacy database on 1/17/2025 4:20 PM  prior to writing prescription for controlled substance II, III or IV per [place] bill . Based on assessment of the report, the prescription is medically necessary.   - Continue current regimen of morphine ER 15 mg every 12 hours and oxycodone 10 mg every 8 hours  - If an increased dose is needed, patient will have to see a pain management specialist    2. Anxiety with anxiolytic dependence -chronic, stable.  She has been on Xanax for many years.  However, due to the significant risks of mixing benzodiazepines with opiates we are going to start tapering her Xanax down very slowly.  I offered to replace with the buspirone but she reports in the past that was not beneficial.  Xanax refill was provided today with the new dose.  Informed consent and controlled substance agreement on file.  Urine drug screen up-to-date and appropriate.  Risks versus benefits were reviewed. Obtained and reviewed patient utilization report from [facility] pharmacy database on 1/17/2025 4:22 PM  prior to writing prescription for controlled substance II, III or IV per [place] bill . Based on assessment of the report, the prescription is medically necessary.   - Taper down Xanax from 1 mg twice daily to 1 mg in the morning and 0.5 mg in the evening  - Taper will be done very slowly and gently, decreasing by 25% every 3 months  - Increase fluoxetine to 40 mg daily to compensate for the decreasing Xanax dose    3. Gastroesophageal reflux disease (GERD) - This condition is chronic and uncontrolled.  - Patient reported taking  two 20 mg Nexium tabs daily (40  mg daily) and still experiencing bad taste and acid reflux  - Recommended to try Gas-X or peppermint oil to alleviate bloating    4. Interstitial cystitis - This condition is chronic and stable.  - Contributes to chronic pain syndrome  - Continue Keflex 250 mg twice daily for UTI prophylaxis  - Continue pain regimen and antibiotic regimen as prescribed    I spent a total of 47 minutes with record review, exam, communication with the patient, and documentation of this encounter.    Return in about 4 weeks (around 2/14/2025) for Controlled substance - before 2/18/2025.    Please note that this dictation was created using voice recognition software. I have made every reasonable attempt to correct obvious errors, but I expect that there are errors of grammar and possibly content that I did not discover before finalizing the note.

## 2025-02-13 ENCOUNTER — APPOINTMENT (OUTPATIENT)
Dept: MEDICAL GROUP | Facility: PHYSICIAN GROUP | Age: 70
End: 2025-02-13
Payer: MEDICARE

## 2025-02-14 ENCOUNTER — OFFICE VISIT (OUTPATIENT)
Dept: MEDICAL GROUP | Facility: PHYSICIAN GROUP | Age: 70
End: 2025-02-14
Payer: MEDICARE

## 2025-02-14 VITALS
BODY MASS INDEX: 39.95 KG/M2 | SYSTOLIC BLOOD PRESSURE: 128 MMHG | OXYGEN SATURATION: 95 % | DIASTOLIC BLOOD PRESSURE: 64 MMHG | TEMPERATURE: 97.6 F | HEART RATE: 77 BPM | HEIGHT: 65 IN | WEIGHT: 239.8 LBS

## 2025-02-14 DIAGNOSIS — K59.03 DRUG-INDUCED CONSTIPATION: ICD-10-CM

## 2025-02-14 DIAGNOSIS — F11.20 UNCOMPLICATED OPIOID DEPENDENCE (HCC): ICD-10-CM

## 2025-02-14 DIAGNOSIS — B37.2 CANDIDOSIS OF SKIN: ICD-10-CM

## 2025-02-14 DIAGNOSIS — F41.9 ANXIETY: ICD-10-CM

## 2025-02-14 DIAGNOSIS — J06.9 ACUTE URI: ICD-10-CM

## 2025-02-14 DIAGNOSIS — G89.4 CHRONIC PAIN SYNDROME: ICD-10-CM

## 2025-02-14 DIAGNOSIS — F13.20 ANXIOLYTIC DEPENDENCE (HCC): ICD-10-CM

## 2025-02-14 PROCEDURE — 3078F DIAST BP <80 MM HG: CPT | Performed by: FAMILY MEDICINE

## 2025-02-14 PROCEDURE — 99215 OFFICE O/P EST HI 40 MIN: CPT | Performed by: FAMILY MEDICINE

## 2025-02-14 PROCEDURE — 3074F SYST BP LT 130 MM HG: CPT | Performed by: FAMILY MEDICINE

## 2025-02-14 RX ORDER — ALPRAZOLAM 0.5 MG
TABLET ORAL
Qty: 90 TABLET | Refills: 0 | Status: SHIPPED | OUTPATIENT
Start: 2025-02-26 | End: 2025-03-28

## 2025-02-14 RX ORDER — OXYCODONE HYDROCHLORIDE 10 MG/1
10 TABLET ORAL EVERY 8 HOURS PRN
Qty: 90 TABLET | Refills: 0 | Status: SHIPPED | OUTPATIENT
Start: 2025-02-14 | End: 2025-03-16

## 2025-02-14 RX ORDER — BENZONATATE 100 MG/1
100-200 CAPSULE ORAL 3 TIMES DAILY PRN
Qty: 60 CAPSULE | Refills: 0 | Status: SHIPPED | OUTPATIENT
Start: 2025-02-14

## 2025-02-14 RX ORDER — MORPHINE SULFATE 15 MG/1
15 TABLET, FILM COATED, EXTENDED RELEASE ORAL EVERY 12 HOURS
Qty: 60 TABLET | Refills: 0 | Status: SHIPPED | OUTPATIENT
Start: 2025-02-14 | End: 2025-03-16

## 2025-02-14 RX ORDER — NYSTATIN 100000 [USP'U]/G
1 POWDER TOPICAL 3 TIMES DAILY
Qty: 60 G | Refills: 1 | Status: SHIPPED | OUTPATIENT
Start: 2025-02-14

## 2025-02-14 ASSESSMENT — FIBROSIS 4 INDEX: FIB4 SCORE: 2.18

## 2025-02-15 NOTE — PROGRESS NOTES
"Subjective:     CC: xanax, morphine, oxycodone refills    HPI:   Ashleigh presents today with    Problem   Drug-Induced Constipation    Chronic. Likely secondary to long-term opioid use. She reports last BM was 5 days ago. She uses a stool softener and doesn't like to use laxative.      Acute URI    1 week  Sx: cough, sinus pressure, SOB  Denies: vomiting  Home Tx: albuterol     Anxiety    Chronic. Been using alprazolam for 25+ years.  She reports that she was told by a doctor in the past that she always have to have Xanax available because her throat will close. At her appointment 1/2025, we started tapering her down    Current med: xanax 1 mg qAM + 0.5 mg qPM (3 LME daily <-- 4 <-- 6)  Last dose: this morning    Alprazolam 0.5 mg tabs  Last fill: 1/27/2025, #90, 30 days  Tabs left: \"plenty\"    CS Agreement: 4/26/2024  UDS: 3/27/2024     Chronic Pain    Chronic.  2/2 back pain, interstitial cystitis, left knee pain    She was following with Dr. Enrrique Santa but he has retired as of 12/31/2022. Reports the left knee needs surgery, but not ready for that discussion. Not currently seeing another pain management.     2/14/2025  She has been struggling with pain in her right hip, left knee, and back. She has also been getting some swelling in her left foot.    CS Agreement: 4/26/2024  UDS: 3/27/2024    Last fill  Morphine ER 15 mg tabs - 1/26/2025, #60, 30 days  Tabs left: unknown  Oxycodone 10 mg tabs - 1/21/2025, #90, 30 days  Tabs left: 4    Previous pain management/referrals  Dr. Santa - retired  [facility] - discharged 2013  [facility] - won't take new patients from Dr. Santa  [facility] [place] - won't prescribe opiates if she continues xanax  [facility] - won't take over until we wean her down for them  [facility] - She did not agree with their treatment plan, reports they said she doesn't want to stop the medication, therefore refuses to go back  [facility] - " won't accept due to opiate dosing  [facility] - not accepting new patients     Candidosis of Skin    Chronic, recurrent. She will get candidal infections under her breasts and her in froin. She prefers nystatin powder, but it is not on formulary.     Anxiolytic Dependence (Hcc)    Is the medication improving the patient’s symptoms: Yes  Any adverse effects: No     Alcohol or illicit drug use:   She  reports no history of alcohol use.  She  reports no history of drug use.     History of controlled substance used in a way other than prescribed? No  Any early refills of a controlled substance: YES - not recently  History of lost or stolen controlled substance prescription: No  Any aberrant behavior or intoxication while on a controlled substance: No  Has the patient self-modified their dose or frequency of the medication :No  Compliant with treatment recommendations and plan: Yes  Any major health change to the patient: No  Concerns for misuse, abuse or addiction: No    /NarxCheck report reviewed: Yes  History of abnormal drug screening: No     Opiate dependence (CMS-HCC)    Morphine ER 15 mg bid  Oxycodone 10 mg tid  Total daily MME = 75 <-- 120 <-- 165 <-- 180 <-- 195 <-- 215 <-- 235 <-- 245 <--255 <-- 300 <-- 345    Chronic pain recheck for: back pain  Last dose of controlled substance: morphine - this morning, oxycodone - this morning  Chronic pain treated with morphine ER 15 mg bid and oxycodone 10 mg 3x/day.     She  reports no history of alcohol use.  She  reports no history of drug use.    Interval history:   Any major change in health since last appointment? No    Consequences of Chronic Opiate therapy:  (5 A's)  Analgesia: Compared to no treatment or prior treatment, pain is currently significantly improved  Activity: improved  Adverse Events: She denies constipation, dry mouth, itchy skin, nausea and sedation  Aberrant Behaviors: She reports she is taking medication as prescribed and is not  "veering from agreed treatment regimen or provider recommendations. There have been no inappropriate refills or lost/stolen meds reported.  Affect/Mood: Pain is not impacting patient's mood.  Patient denies depression/anxiety.    Nonnarcotic treatments that are being used: muscle relaxers and tricyclic antidepressants.     Last imagin - knee    Opioid Risk Score: 0    Interpretation of Opioid Risk Score   Score 0-3 = Low risk of abuse. Do UDS at least once per year.  Score 4-7 = Moderate risk of abuse. Do UDS 1-4 times per year.  Score 8+ = High risk of abuse. Refer to specialist.    Most recent UDS reviewed today and is consistent with prescribed medications.     I have reviewed the medical records, the Prescription Monitoring Program and I have determined that controlled substance treatment is medically indicated.            Health Maintenance: Completed    ROS:  See HPI    Objective:     Exam:  /64 (BP Location: Right arm, Patient Position: Sitting, BP Cuff Size: Adult)   Pulse 77   Temp 36.4 °C (97.6 °F) (Temporal)   Ht 1.651 m (5' 5\")   Wt 109 kg (239 lb 12.8 oz)   LMP 1988   SpO2 95%   BMI 39.90 kg/m²  Body mass index is 39.9 kg/m².    Physical Exam  Constitutional:       Appearance: Normal appearance.   Cardiovascular:      Rate and Rhythm: Normal rate and regular rhythm.      Heart sounds: Normal heart sounds.   Pulmonary:      Effort: Pulmonary effort is normal.      Breath sounds: No stridor or decreased air movement. Examination of the right-upper field reveals wheezing and rhonchi. Examination of the left-upper field reveals rhonchi. Examination of the right-lower field reveals rhonchi. Examination of the left-lower field reveals rhonchi. Wheezing and rhonchi present. No rales.   Musculoskeletal:      Cervical back: Normal range of motion and neck supple.   Lymphadenopathy:      Cervical: No cervical adenopathy.   Neurological:      Mental Status: She is alert. "             Assessment & Plan:     69 y.o. female with the following -     Assessment & Plan  Anxiety  Chronic, controlled. She reports Xanax continues to work well to manage symptoms without side effects.  At her last appointment we decreased the dose to initiate a taper.  We will very slowly taper her and decrease the dose every 3 months, so today we will continue the current regimen.  Informed consent and controlled substance treatment agreement on file.  Urine drug screen up-to-date and appropriate.  Risks versus benefits were reviewed. Obtained and reviewed patient utilization report from [facility] pharmacy database on 2/14/2025 4:54 PM  prior to writing prescription for controlled substance II, III or IV per [place] bill . Based on assessment of the report, the prescription is medically necessary.   - Xanax 0.5 mg tablets refilled, take 2 tablets every morning and 1 tablet every evening    Orders:    ALPRAZolam (XANAX) 0.5 MG Tab; Take 2 Tablets by mouth every morning AND 1 Tablet at bedtime. Do all this for 30 days.    Anxiolytic dependence (HCC)  Chronic, stable.  See anxiety.    Orders:    ALPRAZolam (XANAX) 0.5 MG Tab; Take 2 Tablets by mouth every morning AND 1 Tablet at bedtime. Do all this for 30 days.    Chronic pain syndrome  Chronic, controlled. She reports oxycodone and morphine continue to work well to manage symptoms without side effects.  We will continue the current regimen.  Informed consent and controlled substance treatment agreement on file.  Urine drug screen up-to-date and appropriate.  Risks versus benefits were reviewed. Obtained and reviewed patient utilization report from [facility] pharmacy database on 2/14/2025 4:55 PM  prior to writing prescription for controlled substance II, III or IV per [place] bill . Based on assessment of the report, the prescription is medically necessary.  - Morphine ER 15 mg tablets refilled.  Take 1 tablet twice daily.  - Oxycodone 10 mg  tablets refilled.  Take 1 tablet 3 times daily.    Orders:    morphine ER (MS CONTIN) 15 MG Tab CR tablet; Take 1 Tablet by mouth every 12 hours for 30 days.    oxyCODONE immediate release (ROXICODONE) 10 MG immediate release tablet; Take 1 Tablet by mouth every 8 hours as needed for Severe Pain for up to 30 days.    Uncomplicated opioid dependence (HCC)  Chronic, stable.  See chronic pain.    Orders:    morphine ER (MS CONTIN) 15 MG Tab CR tablet; Take 1 Tablet by mouth every 12 hours for 30 days.    oxyCODONE immediate release (ROXICODONE) 10 MG immediate release tablet; Take 1 Tablet by mouth every 8 hours as needed for Severe Pain for up to 30 days.    Candidosis of skin  Chronic, recurrent, acutely exacerbated.  She does appear to have a candidal infection of the skin into the groin and under the pannus.  She would prefer to use nystatin powder to treat it but this has not been on her formulary in the past.  She still requested that I send the prescription as she will try to get her insurance to pay for it otherwise she may have to use nystatin cream.  If she gets the powder she will use it 3 times daily until rash resolves.  If she gets the cream, she will use it twice daily until rash resolves.  If she is unable to get the nystatin powder I recommended she use over-the-counter Goldbond powder without any talcum to wick away moisture in the area to prevent recurrence of the rash.         Acute URI  Acute.  Symptoms for 1 week.  She may use over-the-counter medications as needed.  I have provided a prescription of benzonatate 100 mg capsules.  She can take 1-2 up to 3 times a day as needed to manage the cough.         Drug-induced constipation  Chronic, uncontrolled.  She reports her last bowel movement was 5 days ago.  I suspect this is secondary to her long-term opioid use though she disagrees.  She reports that secondary to mold in her apartment.  She is currently using a stool softener but not regularly.  I  did recommend she use a stool softener daily since she is on opioids.  I did offer a prescription for MiraLAX but she declined today.             I spent a total of 40 minutes with record review, exam, communication with the patient, and documentation of this encounter.      Return in about 4 weeks (around 3/14/2025) for Controlled substance.    Please note that this dictation was created using voice recognition software. I have made every reasonable attempt to correct obvious errors, but I expect that there are errors of grammar and possibly content that I did not discover before finalizing the note.

## 2025-02-15 NOTE — ASSESSMENT & PLAN NOTE
Chronic, controlled. She reports oxycodone and morphine continue to work well to manage symptoms without side effects.  We will continue the current regimen.  Informed consent and controlled substance treatment agreement on file.  Urine drug screen up-to-date and appropriate.  Risks versus benefits were reviewed. Obtained and reviewed patient utilization report from [facility] pharmacy database on 2/14/2025 4:55 PM  prior to writing prescription for controlled substance II, III or IV per [place] bill . Based on assessment of the report, the prescription is medically necessary.  - Morphine ER 15 mg tablets refilled.  Take 1 tablet twice daily.  - Oxycodone 10 mg tablets refilled.  Take 1 tablet 3 times daily.    Orders:    morphine ER (MS CONTIN) 15 MG Tab CR tablet; Take 1 Tablet by mouth every 12 hours for 30 days.    oxyCODONE immediate release (ROXICODONE) 10 MG immediate release tablet; Take 1 Tablet by mouth every 8 hours as needed for Severe Pain for up to 30 days.

## 2025-02-15 NOTE — ASSESSMENT & PLAN NOTE
Chronic, uncontrolled.  She reports her last bowel movement was 5 days ago.  I suspect this is secondary to her long-term opioid use though she disagrees.  She reports that secondary to mold in her apartment.  She is currently using a stool softener but not regularly.  I did recommend she use a stool softener daily since she is on opioids.  I did offer a prescription for MiraLAX but she declined today.

## 2025-02-15 NOTE — ASSESSMENT & PLAN NOTE
Chronic, stable.  See anxiety.    Orders:    ALPRAZolam (XANAX) 0.5 MG Tab; Take 2 Tablets by mouth every morning AND 1 Tablet at bedtime. Do all this for 30 days.

## 2025-02-15 NOTE — ASSESSMENT & PLAN NOTE
Chronic, stable.  See chronic pain.    Orders:    morphine ER (MS CONTIN) 15 MG Tab CR tablet; Take 1 Tablet by mouth every 12 hours for 30 days.    oxyCODONE immediate release (ROXICODONE) 10 MG immediate release tablet; Take 1 Tablet by mouth every 8 hours as needed for Severe Pain for up to 30 days.

## 2025-02-15 NOTE — ASSESSMENT & PLAN NOTE
Chronic, recurrent, acutely exacerbated.  She does appear to have a candidal infection of the skin into the groin and under the pannus.  She would prefer to use nystatin powder to treat it but this has not been on her formulary in the past.  She still requested that I send the prescription as she will try to get her insurance to pay for it otherwise she may have to use nystatin cream.  If she gets the powder she will use it 3 times daily until rash resolves.  If she gets the cream, she will use it twice daily until rash resolves.  If she is unable to get the nystatin powder I recommended she use over-the-counter Goldbond powder without any talcum to wick away moisture in the area to prevent recurrence of the rash.

## 2025-02-15 NOTE — ASSESSMENT & PLAN NOTE
Chronic, controlled. She reports Xanax continues to work well to manage symptoms without side effects.  At her last appointment we decreased the dose to initiate a taper.  We will very slowly taper her and decrease the dose every 3 months, so today we will continue the current regimen.  Informed consent and controlled substance treatment agreement on file.  Urine drug screen up-to-date and appropriate.  Risks versus benefits were reviewed. Obtained and reviewed patient utilization report from [facility] pharmacy database on 2/14/2025 4:54 PM  prior to writing prescription for controlled substance II, III or IV per [place] bill . Based on assessment of the report, the prescription is medically necessary.   - Xanax 0.5 mg tablets refilled, take 2 tablets every morning and 1 tablet every evening    Orders:    ALPRAZolam (XANAX) 0.5 MG Tab; Take 2 Tablets by mouth every morning AND 1 Tablet at bedtime. Do all this for 30 days.

## 2025-02-15 NOTE — ASSESSMENT & PLAN NOTE
Acute.  Symptoms for 1 week.  She may use over-the-counter medications as needed.  I have provided a prescription of benzonatate 100 mg capsules.  She can take 1-2 up to 3 times a day as needed to manage the cough.

## 2025-02-19 RX ORDER — LEVOTHYROXINE SODIUM 50 UG/1
50 TABLET ORAL EVERY MORNING
Qty: 90 TABLET | Refills: 0 | Status: SHIPPED | OUTPATIENT
Start: 2025-02-19

## 2025-02-19 NOTE — TELEPHONE ENCOUNTER
Received request via: Patient    Was the patient seen in the last year in this department? Yes    Does the patient have an active prescription (recently filled or refills available) for medication(s) requested? No    Pharmacy Name: [id number] - [place], [place] - [address] AT [facility] [address]      Does the patient have half-way Plus and need 100-day supply? (This applies to ALL medications) Patient does not have SCP

## 2025-02-19 NOTE — TELEPHONE ENCOUNTER
Received request via: Patient    Was the patient seen in the last year in this department? Yes    Does the patient have an active prescription (recently filled or refills available) for medication(s) requested? No    Pharmacy Name: [facility] #[id number] - [place], [place] - [address] AT [facility] [phone]     Does the patient have USP Plus and need 100-day supply? (This applies to ALL medications) Patient does not have SCP

## 2025-02-24 DIAGNOSIS — F11.20 UNCOMPLICATED OPIOID DEPENDENCE (HCC): ICD-10-CM

## 2025-02-24 DIAGNOSIS — F41.9 ANXIETY: ICD-10-CM

## 2025-02-24 DIAGNOSIS — F13.20 ANXIOLYTIC DEPENDENCE (HCC): ICD-10-CM

## 2025-02-24 DIAGNOSIS — G89.4 CHRONIC PAIN SYNDROME: ICD-10-CM

## 2025-02-24 RX ORDER — ALPRAZOLAM 0.5 MG
TABLET ORAL
Qty: 90 TABLET | Refills: 0 | OUTPATIENT
Start: 2025-02-26 | End: 2025-03-28

## 2025-02-24 RX ORDER — MORPHINE SULFATE 15 MG/1
15 TABLET, FILM COATED, EXTENDED RELEASE ORAL EVERY 12 HOURS
Qty: 60 TABLET | Refills: 0 | OUTPATIENT
Start: 2025-02-24 | End: 2025-03-26

## 2025-02-25 NOTE — TELEPHONE ENCOUNTER
Received request via: Patient    Was the patient seen in the last year in this department? Yes    Does the patient have an active prescription (recently filled or refills available) for medication(s) requested? No    Ph[facility] Name: [facility]    Does the patient have alf Plus and need 100-day supply? (This applies to ALL medications) Patient does not have SCP

## 2025-02-28 DIAGNOSIS — F17.200 CURRENT SMOKER: ICD-10-CM

## 2025-02-28 NOTE — TELEPHONE ENCOUNTER
Received request via: Pharmacy    Was the patient seen in the last year in this department? Yes    Does the patient have an active prescription (recently filled or refills available) for medication(s) requested? No    [facility]    Does the patient have intermediate Plus and need 100-day supply? (This applies to ALL medications) Patient does not have SCP

## 2025-03-03 RX ORDER — ALBUTEROL SULFATE 90 UG/1
2 INHALANT RESPIRATORY (INHALATION) EVERY 6 HOURS PRN
Qty: 25.5 G | Refills: 0 | Status: SHIPPED | OUTPATIENT
Start: 2025-03-03

## 2025-03-03 NOTE — TELEPHONE ENCOUNTER
Received request via: Pharmacy    Was the patient seen in the last year in this department? Yes    Does the patient have an active prescription (recently filled or refills available) for medication(s) requested? No    [facility]    Does the patient have FDC Plus and need 100-day supply? (This applies to ALL medications) Patient does not have SCP

## 2025-03-06 ENCOUNTER — TELEPHONE (OUTPATIENT)
Dept: MEDICAL GROUP | Facility: PHYSICIAN GROUP | Age: 70
End: 2025-03-06
Payer: MEDICARE

## 2025-03-07 NOTE — TELEPHONE ENCOUNTER
Ruben called and stated that she is very sick and has an infection coming on and would really really like her antibiotic refilled.

## 2025-03-07 NOTE — TELEPHONE ENCOUNTER
Pt left voicemail. She said that her urine is reddish orange and she's having pain when urinating. She also said that her leg is swollen really bad and infected. She wants antibiotics sent in. She can be reached at [phone].  [facility]ants [facility] [address].

## 2025-03-07 NOTE — TELEPHONE ENCOUNTER
The antibiotic was refilled last week. She should be seen by urgent care to get her concerns addressed.

## 2025-03-13 ENCOUNTER — OFFICE VISIT (OUTPATIENT)
Dept: MEDICAL GROUP | Facility: PHYSICIAN GROUP | Age: 70
End: 2025-03-13
Payer: MEDICARE

## 2025-03-13 ENCOUNTER — HOSPITAL ENCOUNTER (OUTPATIENT)
Facility: MEDICAL CENTER | Age: 70
End: 2025-03-13
Attending: FAMILY MEDICINE
Payer: MEDICARE

## 2025-03-13 VITALS
DIASTOLIC BLOOD PRESSURE: 64 MMHG | TEMPERATURE: 97.6 F | HEIGHT: 65 IN | SYSTOLIC BLOOD PRESSURE: 122 MMHG | BODY MASS INDEX: 39.92 KG/M2 | HEART RATE: 88 BPM | WEIGHT: 239.6 LBS | OXYGEN SATURATION: 94 %

## 2025-03-13 DIAGNOSIS — G89.4 CHRONIC PAIN SYNDROME: ICD-10-CM

## 2025-03-13 DIAGNOSIS — F13.20 ANXIOLYTIC DEPENDENCE (HCC): ICD-10-CM

## 2025-03-13 DIAGNOSIS — F41.9 ANXIETY: ICD-10-CM

## 2025-03-13 DIAGNOSIS — E03.9 HYPOTHYROIDISM, UNSPECIFIED TYPE: Chronic | ICD-10-CM

## 2025-03-13 DIAGNOSIS — F11.20 UNCOMPLICATED OPIOID DEPENDENCE (HCC): ICD-10-CM

## 2025-03-13 DIAGNOSIS — R51.9 CHRONIC NONINTRACTABLE HEADACHE, UNSPECIFIED HEADACHE TYPE: ICD-10-CM

## 2025-03-13 DIAGNOSIS — G89.29 CHRONIC NONINTRACTABLE HEADACHE, UNSPECIFIED HEADACHE TYPE: ICD-10-CM

## 2025-03-13 DIAGNOSIS — G89.29 CHRONIC PAIN OF LEFT KNEE: ICD-10-CM

## 2025-03-13 DIAGNOSIS — M25.562 CHRONIC PAIN OF LEFT KNEE: ICD-10-CM

## 2025-03-13 PROCEDURE — 3074F SYST BP LT 130 MM HG: CPT | Performed by: FAMILY MEDICINE

## 2025-03-13 PROCEDURE — G0482 DRUG TEST DEF 15-21 CLASSES: HCPCS

## 2025-03-13 PROCEDURE — 99215 OFFICE O/P EST HI 40 MIN: CPT | Performed by: FAMILY MEDICINE

## 2025-03-13 PROCEDURE — G2212 PROLONG OUTPT/OFFICE VIS: HCPCS | Performed by: FAMILY MEDICINE

## 2025-03-13 PROCEDURE — 3078F DIAST BP <80 MM HG: CPT | Performed by: FAMILY MEDICINE

## 2025-03-13 RX ORDER — OXYCODONE HYDROCHLORIDE 10 MG/1
10 TABLET ORAL EVERY 8 HOURS PRN
Qty: 108 TABLET | Refills: 0 | Status: SHIPPED | OUTPATIENT
Start: 2025-03-20 | End: 2025-04-25

## 2025-03-13 RX ORDER — ALPRAZOLAM 0.5 MG
TABLET ORAL
Qty: 90 TABLET | Refills: 0 | Status: SHIPPED | OUTPATIENT
Start: 2025-03-26 | End: 2025-04-25

## 2025-03-13 RX ORDER — MORPHINE SULFATE 15 MG/1
15 TABLET, FILM COATED, EXTENDED RELEASE ORAL EVERY 12 HOURS
Qty: 60 TABLET | Refills: 0 | Status: SHIPPED | OUTPATIENT
Start: 2025-03-26 | End: 2025-04-25

## 2025-03-13 ASSESSMENT — FIBROSIS 4 INDEX: FIB4 SCORE: 2.18
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
## 2025-03-17 ENCOUNTER — RESULTS FOLLOW-UP (OUTPATIENT)
Dept: MEDICAL GROUP | Facility: PHYSICIAN GROUP | Age: 70
End: 2025-03-17
Payer: MEDICARE

## 2025-03-17 LAB
1OH-MIDAZOLAM UR QL SCN: NOT DETECTED
6MAM UR QL: NOT DETECTED
7AMINOCLONAZEPAM UR QL: NOT DETECTED
A-OH ALPRAZ UR QL: PRESENT
ALPRAZ UR QL: PRESENT
AMPHET UR QL SCN: NOT DETECTED
ANNOTATION COMMENT IMP: NORMAL
BARBITURATES UR QL: NEGATIVE
BUPRENORPHINE UR QL: NOT DETECTED
BZE UR QL: NEGATIVE
CARBOXYTHC UR QL: NEGATIVE
CARISOPRODOL UR QL: NEGATIVE
CLONAZEPAM UR QL: NOT DETECTED
CODEINE UR QL: NOT DETECTED
CREAT UR-MCNC: <20 MG/DL (ref 20–400)
DIAZEPAM UR QL: NOT DETECTED
ETHYL GLUCURONIDE UR QL: NEGATIVE
FENTANYL UR QL: NOT DETECTED
GABAPENTIN UR QL CFM: NOT DETECTED
HYDROCODONE UR QL: NOT DETECTED
HYDROMORPHONE UR QL: PRESENT
LORAZEPAM UR QL: NOT DETECTED
MDA UR QL: NOT DETECTED
MDEA UR QL: NOT DETECTED
MDMA UR QL: NOT DETECTED
ME-PHENIDATE UR QL: NOT DETECTED
METHADONE UR QL: NEGATIVE
METHAMPHET UR QL: NOT DETECTED
MIDAZOLAM UR QL SCN: NOT DETECTED
MORPHINE UR QL: PRESENT
NALOXONE UR QL CFM: NOT DETECTED
NORBUPRENORPHINE UR QL CFM: NOT DETECTED
NORDIAZEPAM UR QL: NOT DETECTED
NORFENTANYL UR QL: NOT DETECTED
NORHYDROCODONE UR QL CFM: NOT DETECTED
NORMEPERIDINE UR QL CFM: NOT DETECTED
NOROXYCODONE UR QL CFM: PRESENT
NOROXYMORPHONE UR QL SCN: NOT DETECTED
OXAZEPAM UR QL: NOT DETECTED
OXYCODONE UR QL: PRESENT
OXYMORPHONE UR QL: PRESENT
PATHOLOGY STUDY: NORMAL
PCP UR QL: NEGATIVE
PHENTERMINE UR QL: NOT DETECTED
PREGABALIN UR QL CFM: NOT DETECTED
SERVICE CMNT-IMP: NORMAL
TAPENTADOL UR QL SCN: NOT DETECTED
TAPENTADOL UR QL SCN: NOT DETECTED
TEMAZEPAM UR QL: NOT DETECTED
TRAMADOL UR QL: NEGATIVE
ZOLPIDEM PHENYL-4-CARB UR QL SCN: NOT DETECTED
ZOLPIDEM UR QL: NOT DETECTED

## 2025-03-25 ENCOUNTER — TELEPHONE (OUTPATIENT)
Dept: MEDICAL GROUP | Facility: PHYSICIAN GROUP | Age: 70
End: 2025-03-25
Payer: MEDICARE

## 2025-03-25 DIAGNOSIS — G89.4 CHRONIC PAIN SYNDROME: ICD-10-CM

## 2025-03-25 DIAGNOSIS — F11.20 UNCOMPLICATED OPIOID DEPENDENCE (HCC): ICD-10-CM

## 2025-03-25 NOTE — TELEPHONE ENCOUNTER
Pt left a message about her morphine and xanax. She stated that her usual pharmacy is out of morphine so she would like that and [facility] [address]

## 2025-03-25 NOTE — TELEPHONE ENCOUNTER
Is her pharmacy out of xanax too? If not, then it will stay at her usual pharmacy.    I'll send the morphine prescription but [facility]'t see a [facility] [address]. There is one on [address] or [address]. Where is the prescription going?

## 2025-03-26 ENCOUNTER — TELEPHONE (OUTPATIENT)
Dept: MEDICAL GROUP | Facility: PHYSICIAN GROUP | Age: 70
End: 2025-03-26
Payer: MEDICARE

## 2025-03-26 NOTE — TELEPHONE ENCOUNTER
Phone Number Called: [phone]    Call outcome: Left detailed message for patient. Informed to call back with any additional questions.    Message: Called and LVM for her to call us back to let us know what [facility] she needs [facility] medication sent to

## 2025-04-11 ENCOUNTER — OFFICE VISIT (OUTPATIENT)
Dept: MEDICAL GROUP | Facility: PHYSICIAN GROUP | Age: 70
End: 2025-04-11
Payer: MEDICARE

## 2025-04-11 VITALS
DIASTOLIC BLOOD PRESSURE: 82 MMHG | HEIGHT: 65 IN | BODY MASS INDEX: 40.92 KG/M2 | SYSTOLIC BLOOD PRESSURE: 138 MMHG | HEART RATE: 79 BPM | OXYGEN SATURATION: 92 % | TEMPERATURE: 97.2 F | WEIGHT: 245.6 LBS

## 2025-04-11 DIAGNOSIS — I10 ESSENTIAL HYPERTENSION: ICD-10-CM

## 2025-04-11 DIAGNOSIS — F13.20 ANXIOLYTIC DEPENDENCE (HCC): ICD-10-CM

## 2025-04-11 DIAGNOSIS — F41.9 ANXIETY: ICD-10-CM

## 2025-04-11 DIAGNOSIS — F11.20 UNCOMPLICATED OPIOID DEPENDENCE (HCC): ICD-10-CM

## 2025-04-11 DIAGNOSIS — E66.01 MORBID OBESITY (HCC): ICD-10-CM

## 2025-04-11 DIAGNOSIS — G89.4 CHRONIC PAIN SYNDROME: ICD-10-CM

## 2025-04-11 PROCEDURE — 3079F DIAST BP 80-89 MM HG: CPT | Performed by: FAMILY MEDICINE

## 2025-04-11 PROCEDURE — 99215 OFFICE O/P EST HI 40 MIN: CPT | Performed by: FAMILY MEDICINE

## 2025-04-11 PROCEDURE — 3075F SYST BP GE 130 - 139MM HG: CPT | Performed by: FAMILY MEDICINE

## 2025-04-11 RX ORDER — MORPHINE SULFATE 15 MG/1
15 TABLET, FILM COATED, EXTENDED RELEASE ORAL EVERY 12 HOURS
Qty: 60 TABLET | Refills: 0 | Status: SHIPPED | OUTPATIENT
Start: 2025-04-24 | End: 2025-05-24

## 2025-04-11 RX ORDER — ALPRAZOLAM 0.5 MG
0.5 TABLET ORAL 2 TIMES DAILY
Qty: 60 TABLET | Refills: 0 | Status: SHIPPED | OUTPATIENT
Start: 2025-04-24 | End: 2025-05-24

## 2025-04-11 RX ORDER — OXYCODONE HYDROCHLORIDE 10 MG/1
10 TABLET ORAL EVERY 8 HOURS PRN
Qty: 90 TABLET | Refills: 0 | Status: SHIPPED | OUTPATIENT
Start: 2025-04-24 | End: 2025-05-24

## 2025-04-11 RX ORDER — FUROSEMIDE 20 MG/1
20 TABLET ORAL
Qty: 90 TABLET | Refills: 1 | Status: SHIPPED | OUTPATIENT
Start: 2025-04-11

## 2025-04-11 ASSESSMENT — FIBROSIS 4 INDEX: FIB4 SCORE: 2.18

## 2025-04-11 NOTE — PROGRESS NOTES
"Verbal consent was acquired by the patient to use [employer] ambient listening note generation during this visit     Subjective:     HPI:   History of Present Illness  The patient presents for evaluation of anxiety, chronic pain syndrome, morbid obesity, and hypertension.    Chronic Pain Syndrome  - Significant pain in her back, bones, and neck, potentially due to stiff neck syndrome  - Prescribed 18 oxycodone tablets, reports as insufficient  - Considering consulting a pain management specialist but hesitant due to family's satisfaction with current treatment  - Difficulty rising from a seated position due to pain  - Has not undergone recommended knee x-ray  - Authorized to have an assistant for heavy lifting tasks  - Takes cyclobenzaprine for neck pain, reports as effective    CS Agreement: 4/26/2024  UDS: 3/13/2025     Last fill  Morphine ER 15 mg tabs - 3/26/2025, #60, 30 days  Tabs left: \"enough until 4/26\"  Oxycodone 10 mg tabs - 4/7/2025, #18, 6 days  Tabs left: \"enough until 4/26\"    Chronic pain recheck for: back pain  Last dose of controlled substance: morphine - this morning, oxycodone - this morning  Chronic pain treated with morphine ER 15 mg bid and oxycodone 10 mg 3x/day.      She  reports no history of alcohol use.  She  reports no history of drug use.     Interval history:   Any major change in health since last appointment? No     Consequences of Chronic Opiate therapy:  (5 A's)  Analgesia: Compared to no treatment or prior treatment, pain is currently significantly improved  Activity: improved  Adverse Events: She denies constipation, dry mouth, itchy skin, nausea and sedation  Aberrant Behaviors: She reports she is taking medication as prescribed and is not veering from agreed treatment regimen or provider recommendations. There have been no inappropriate refills or lost/stolen meds reported.  Affect/Mood: Pain is not impacting patient's mood.  Patient denies depression/anxiety.     Nonnarcotic " "treatments that are being used: muscle relaxers and tricyclic antidepressants.      Last imagin - knee, updated imaging ordered     Opioid Risk Score: 0     Interpretation of Opioid Risk Score   Score 0-3 = Low risk of abuse. Do UDS at least once per year.  Score 4-7 = Moderate risk of abuse. Do UDS 1-4 times per year.  Score 8+ = High risk of abuse. Refer to specialist.     Most recent UDS reviewed today and is consistent with prescribed medications.      I have reviewed the medical records, the Prescription Monitoring Program and I have determined that controlled substance treatment is medically indicated.     Morbid Obesity  - Dissatisfaction with current weight, attributes to recent dietary indulgences (carrot cake and apple pie)  - Plans to implement a strict diet to manage weight  - Reports experiencing constipation, seeking a diuretic for relief  - Currently on Lasix but has run out of supply    Anxiety  - Experiencing anxiety attacks, believes exacerbated by current dosage of Prozac  - Requesting reduction in Prozac dosage from 40 mg to 20 mg  - Prescribed Xanax for anxiety, finds beneficial  - Took last dose of Xanax approximately an hour ago  - Has enough Xanax medication to last until 2025    Alprazolam 0.5 mg tabs  Last fill: 3/26/2025, #90, 30 days  Tabs left: \"enough until \"  Last dose: 1 hr ago     CS Agreement: 2024  UDS: 3/13/2025    Is the medication improving the patient’s symptoms: Yes  Any adverse effects: No      Alcohol or illicit drug use:   She  reports no history of alcohol use.  She  reports no history of drug use.     History of controlled substance used in a way other than prescribed? No  Any early refills of a controlled substance: YES - not recently  History of lost or stolen controlled substance prescription: No  Any aberrant behavior or intoxication while on a controlled substance: No  Has the patient self-modified their dose or frequency of the medication " ":No  Compliant with treatment recommendations and plan: Yes  Any major health change to the patient: No  Concerns for misuse, abuse or addiction: No     /[employer] report reviewed: Yes  History of abnormal drug screening: No    Supplemental information: She is currently on Lasix but has run out of her supply.    MEDICATIONS  Current: Lasix, oxycodone, Xanax, Benadryl, Prozac, cyclobenzaprine, morphine ER    Health Maintenance: Completed    Objective:     Exam:  /82 (BP Location: Left arm, Patient Position: Sitting, BP Cuff Size: Adult)   Pulse 79   Temp 36.2 °C (97.2 °F) (Temporal)   Ht 1.651 m (5' 5\")   Wt 111 kg (245 lb 9.6 oz)   LMP 01/01/1988   SpO2 92%   BMI 40.87 kg/m²  Body mass index is 40.87 kg/m².    Physical Exam  Constitutional:       Appearance: Normal appearance.   Cardiovascular:      Rate and Rhythm: Normal rate and regular rhythm.      Heart sounds: Normal heart sounds.   Pulmonary:      Effort: Pulmonary effort is normal.      Breath sounds: Normal breath sounds.   Musculoskeletal:      Cervical back: Normal range of motion and neck supple.   Lymphadenopathy:      Cervical: No cervical adenopathy.   Neurological:      Mental Status: She is alert.             Results      Assessment & Plan:     1. Anxiety  ALPRAZolam (XANAX) 0.5 MG Tab      2. Anxiolytic dependence (HCC)  ALPRAZolam (XANAX) 0.5 MG Tab      3. Chronic pain syndrome  oxyCODONE immediate release (ROXICODONE) 10 MG immediate release tablet    morphine ER (MS CONTIN) 15 MG Tab CR tablet    Referral to Pain Management      4. Uncomplicated opioid dependence (HCC)  oxyCODONE immediate release (ROXICODONE) 10 MG immediate release tablet    morphine ER (MS CONTIN) 15 MG Tab CR tablet    Referral to Pain Management      5. Morbid obesity (HCC)        6. Essential hypertension  furosemide (LASIX) 20 MG Tab          Assessment & Plan  1. Anxiety with anxiolytic dependence: Chronic and stable. Gradually reducing Xanax dosage due " to potential risks associated with combining Xanax and opiates.  She is very hesitant with decreasing the dose and is resistant to any idea of coming off the medication entirely or even going to 1 pill a day.  For now we are going to decrease the dose to alprazolam 0.5 mg twice daily.  We will readdress in 3 months.  Informed consent and controlled substance treatment agreement on file.  Urine drug screen is up-to-date and appropriate.Obtained and reviewed patient utilization report from [facility] pharmacy database on 4/11/2025 3:36 PM  prior to writing prescription for controlled substance II, III or IV per [place] bill . Based on assessment of the report, the prescription is medically necessary.     2. Chronic pain syndrome with uncomplicated opioid dependence: Chronic and unstable.  She used to be on much higher doses until her pain management provider retired and I took over the prescription at which point he had to taper down.  She is currently on 75 MME daily but for the last several months she has frequently asked for a little bit more oxycodone.  Repeatedly I have told her that she would need to see specialist for more oxycodone and today she is willing to see a specialist.  She is going to continue morphine ER 15 mg twice daily and oxycodone 10 mg every 8 hours as needed.  Informed consent and controlled substance treatment agreement on file.  Urine drug screen up-to-date and appropriate.Obtained and reviewed patient utilization report from [facility] pharmacy database on 4/11/2025 3:37 PM  prior to writing prescription for controlled substance II, III or IV per [place] bill . Based on assessment of the report, the prescription is medically necessary.   - Referral to [facility].  - Refills provided.    3. Morbid obesity: Chronic and unstable. Steady weight gain with BMI now exceeding 40. High intake of carbohydrates acknowledged.  - Focus on adopting a healthier diet.    4.  Hypertension: Chronic and controlled.  - Blood pressure under 140/90 today.  - Continue current regimen of furosemide 20 mg daily.  - Refills provided.    I spent a total of 42 minutes with record review, exam, communication with the patient, and documentation of this encounter.    Return in about 4 weeks (around 5/9/2025) for Controlled substance.    Please note that this dictation was created using voice recognition software. I have made every reasonable attempt to correct obvious errors, but I expect that there are errors of grammar and possibly content that I did not discover before finalizing the note.

## 2025-04-22 NOTE — Clinical Note
REFERRAL APPROVAL NOTICE         Sent on April 22, 2025                   [place]  [address] [facility] [address]                   Dear Ms. Garces,    After a careful review of the medical information and benefit coverage, [facility] has processed your referral. See below for additional details.    If applicable, you must be actively enrolled with your insurance for coverage of the authorized service. If you have any questions regarding your coverage, please contact your insurance directly.    REFERRAL INFORMATION   Referral #:  [id number]  Referred-To Provider    Referred-By Provider:  Physical Medicine & Rehabilitation Pain Medicine    Nida Richardson M.D.   [facility]      [address]  [place] [address]  [phone] [address]  [facility] [address]  [phone]    Referral Start Date:  04/11/2025  Referral End Date:   04/11/2026             SCHEDULING  If you do not already have an appointment, please call [phone] to make an appointment.     MORE INFORMATION  If you do not already have a [employer] account, sign up at: [email or web address]  You can access your medical information, make appointments, see lab results, billing information, and more.  If you have questions regarding this referral, please contact  the [facility] [place] Referrals department at:             [phone]. Monday - Friday 8:00AM - 5:00PM.     Sincerely,    [facility]

## 2025-04-24 RX ORDER — MUPIROCIN 20 MG/G
OINTMENT TOPICAL
Qty: 22 G | Refills: 0 | Status: SHIPPED | OUTPATIENT
Start: 2025-04-24

## 2025-04-24 NOTE — TELEPHONE ENCOUNTER
Received request via: Patient    Was the patient seen in the last year in this department? Yes    Does the patient have an active prescription (recently filled or refills available) for medication(s) requested? No    Pharmacy Name: [id number] - [place], [place] - [address]     Does the patient have longterm Plus and need 100-day supply? (This applies to ALL medications) Patient does not have SCP

## 2025-05-12 ENCOUNTER — TELEPHONE (OUTPATIENT)
Dept: MEDICAL GROUP | Facility: PHYSICIAN GROUP | Age: 70
End: 2025-05-12

## 2025-05-12 ENCOUNTER — APPOINTMENT (OUTPATIENT)
Dept: MEDICAL GROUP | Facility: PHYSICIAN GROUP | Age: 70
End: 2025-05-12
Payer: MEDICARE

## 2025-05-12 DIAGNOSIS — F17.200 CURRENT SMOKER: ICD-10-CM

## 2025-05-12 RX ORDER — ALBUTEROL SULFATE 90 UG/1
2 INHALANT RESPIRATORY (INHALATION) EVERY 6 HOURS PRN
Qty: 25.5 G | Refills: 0 | Status: SHIPPED | OUTPATIENT
Start: 2025-05-12 | End: 2025-05-29

## 2025-05-12 NOTE — TELEPHONE ENCOUNTER
Received request via: Pharmacy    Was the patient seen in the last year in this department? Yes    Does the patient have an active prescription (recently filled or refills available) for medication(s) requested? No    [facility]    Does the patient have USP Plus and need 100-day supply? (This applies to ALL medications) Patient does not have SCP

## 2025-05-16 ENCOUNTER — OFFICE VISIT (OUTPATIENT)
Dept: MEDICAL GROUP | Facility: PHYSICIAN GROUP | Age: 70
End: 2025-05-16
Payer: MEDICARE

## 2025-05-16 ENCOUNTER — HOSPITAL ENCOUNTER (OUTPATIENT)
Facility: MEDICAL CENTER | Age: 70
End: 2025-05-16
Attending: FAMILY MEDICINE
Payer: MEDICARE

## 2025-05-16 VITALS
DIASTOLIC BLOOD PRESSURE: 82 MMHG | HEART RATE: 77 BPM | SYSTOLIC BLOOD PRESSURE: 138 MMHG | TEMPERATURE: 97.2 F | WEIGHT: 239.2 LBS | HEIGHT: 65 IN | OXYGEN SATURATION: 94 % | BODY MASS INDEX: 39.85 KG/M2

## 2025-05-16 DIAGNOSIS — T14.8XXA PUNCTURE WOUND: ICD-10-CM

## 2025-05-16 DIAGNOSIS — R30.0 DYSURIA: ICD-10-CM

## 2025-05-16 DIAGNOSIS — T14.8XXA ANIMAL BITE: Primary | ICD-10-CM

## 2025-05-16 DIAGNOSIS — F11.20 UNCOMPLICATED OPIOID DEPENDENCE (HCC): ICD-10-CM

## 2025-05-16 DIAGNOSIS — F41.9 ANXIETY: ICD-10-CM

## 2025-05-16 DIAGNOSIS — G89.4 CHRONIC PAIN SYNDROME: ICD-10-CM

## 2025-05-16 DIAGNOSIS — F13.20 ANXIOLYTIC DEPENDENCE (HCC): ICD-10-CM

## 2025-05-16 PROBLEM — J06.9 ACUTE URI: Status: RESOLVED | Noted: 2024-05-30 | Resolved: 2025-05-16

## 2025-05-16 PROBLEM — R58 BLEEDING: Status: RESOLVED | Noted: 2023-11-03 | Resolved: 2025-05-16

## 2025-05-16 PROCEDURE — 3075F SYST BP GE 130 - 139MM HG: CPT | Performed by: FAMILY MEDICINE

## 2025-05-16 PROCEDURE — 90471 IMMUNIZATION ADMIN: CPT | Performed by: FAMILY MEDICINE

## 2025-05-16 PROCEDURE — 99215 OFFICE O/P EST HI 40 MIN: CPT | Mod: 25 | Performed by: FAMILY MEDICINE

## 2025-05-16 PROCEDURE — 87086 URINE CULTURE/COLONY COUNT: CPT

## 2025-05-16 PROCEDURE — 90715 TDAP VACCINE 7 YRS/> IM: CPT | Performed by: FAMILY MEDICINE

## 2025-05-16 PROCEDURE — 3079F DIAST BP 80-89 MM HG: CPT | Performed by: FAMILY MEDICINE

## 2025-05-16 RX ORDER — MORPHINE SULFATE 15 MG/1
15 TABLET, FILM COATED, EXTENDED RELEASE ORAL EVERY 12 HOURS
Qty: 60 TABLET | Refills: 0 | Status: CANCELLED | OUTPATIENT
Start: 2025-05-24 | End: 2025-06-23

## 2025-05-16 RX ORDER — ALPRAZOLAM 0.5 MG
0.5 TABLET ORAL 2 TIMES DAILY
Qty: 60 TABLET | Refills: 0 | Status: CANCELLED | OUTPATIENT
Start: 2025-05-24 | End: 2025-06-23

## 2025-05-16 RX ORDER — MUPIROCIN 20 MG/G
OINTMENT TOPICAL
Qty: 22 G | Refills: 0 | Status: SHIPPED | OUTPATIENT
Start: 2025-05-16

## 2025-05-16 RX ORDER — OXYCODONE HYDROCHLORIDE 10 MG/1
10 TABLET ORAL EVERY 8 HOURS PRN
Qty: 90 TABLET | Refills: 0 | Status: CANCELLED | OUTPATIENT
Start: 2025-05-24 | End: 2025-06-23

## 2025-05-16 ASSESSMENT — FIBROSIS 4 INDEX: FIB4 SCORE: 2.18

## 2025-05-16 NOTE — PROGRESS NOTES
Verbal consent was acquired by the patient to use [employer] ambient listening note generation during this visit     Subjective:     HPI:   History of Present Illness  The patient is a 69-year-old female who presents for evaluation of an animal bite with puncture wound, dysuria, chronic pain syndrome with uncomplicated opioid dependence, and anxiety with anxiolytics dependence.    Animal Bite  - Bitten by a dog approximately 2 weeks ago, resulting in severe infection and significant mobility impairment  - Self-managing condition with antibiotics, taking 2 doses concurrently  - Concerned about compromised immune system and potential need for euthanizing the dog  - Reports the dog has been vaccinated against distemper and rabies  - Experiencing fever following the bite    Dysuria  - Persistent dysuria, describing urine as having an orange hue  - Not currently on Pyridium for pain management  - Admits to taking additional analgesics for leg pain    Chronic Pain Syndrome  - Seeking refills for morphine and oxycodone prescriptions, last taken this morning  - Took Xanax a few hours prior to the appointment  - Reports no side effects from these medications  - Has 6 tablets of morphine left and only half a tablet of oxycodone  - Experiencing severe pain this month  - Advised by granddaughter not to endure the pain  - Does not misuse pain medications  - Considering consulting an orthopedic surgeon due to inability to walk and severe leg pain, attributed to bursitis and arthritis  - Believes infection is spreading down her legs  - Recalls previous incident of leg swelling and infection, necessitating use of a wheelchair  - Experiencing intermittent fever  - Carries 2 or 3 oxycodone tablets with her at all times  Alprazolam 0.5 mg tabs  Last fill: 4/24/2025, #60, 30 days  Tabs left: ~6-7  Last dose: couple of hours ago     CS Agreement: 4/26/2024  UDS: 3/13/2025    Anxiety  - Seeking refill for Xanax prescription, taken just  "before coming to the clinic today  - Has 6 or 7 tablets of Xanax left  Alprazolam 0.5 mg tabs  Last fill: 4/24/2025, #60, 30 days  Tabs left: ~6-7  Last dose: couple of hours ago     CS Agreement: 4/26/2024  UDS: 3/13/2025    Supplemental information: None      Objective:     Exam:  /82 (BP Location: Right arm, Patient Position: Sitting, BP Cuff Size: Adult)   Pulse 77   Temp 36.2 °C (97.2 °F) (Temporal)   Ht 1.651 m (5' 5\")   Wt 109 kg (239 lb 3.2 oz)   LMP 01/01/1988   SpO2 94%   BMI 39.80 kg/m²  Body mass index is 39.8 kg/m².    Physical Exam  Constitutional:       Appearance: Normal appearance.   Cardiovascular:      Rate and Rhythm: Normal rate and regular rhythm.      Heart sounds: Normal heart sounds.   Pulmonary:      Effort: Pulmonary effort is normal.      Breath sounds: Normal breath sounds.   Musculoskeletal:      Cervical back: Normal range of motion and neck supple.   Lymphadenopathy:      Cervical: No cervical adenopathy.   Neurological:      Mental Status: She is alert.           Results      Assessment & Plan:     1. Chronic pain syndrome  Controlled Substance Treatment Agreement      2. Uncomplicated opioid dependence (HCC)  Controlled Substance Treatment Agreement      3. Anxiety  Controlled Substance Treatment Agreement      4. Anxiolytic dependence (HCC)  Controlled Substance Treatment Agreement          Assessment & Plan  1. Animal bite with puncture wound: Acute.  - Reports being attacked by a dog 2 weeks ago, resulting in puncture bite wounds on the right upper arm.  - Self-managing with Keflex and mupirocin antibiotic ointment.  - Bites appear healthy today with no signs of infection.  - Tdap vaccine provided in the office today.    2. Dysuria: Chronic, recurrent, acutely exacerbated.   - Chronic recurrent issue with a history of recurrent UTI.  - Currently on prophylactic antibiotics.  - Urine sample obtained for culture to check for bacterial resistance.  - Antibiotics to be " adjusted based on culture results.    3. Chronic pain syndrome with uncomplicated opioid dependence: Chronic, controlled. She reports morphine ER 15 mg twice daily and oxycodone 10 mg 3 times daily generally continues to work well to manage symptoms without side effects.  However, she has had to increase her pain medication use due to the recent dog bite.  We did discuss that I cannot provide early refills of the medication when she takes more than the prescribed amount and she expressed understanding.  She continues to struggle significantly with left knee pain likely from arthritis but she again declined an orthopedics referral.  She reports that she will never see a pain management doctor again as she has no interest in ever doing any injections for her pain.  We will continue the current regimen.  Informed consent and controlled substance treatment agreement updated today.  Urine drug screen up-to-date and appropriate.  Risks versus benefits were reviewed. Obtained and reviewed patient utilization report from [facility] pharmacy database on 5/16/2025 4:24 PM  prior to writing prescription for controlled substance II, III or IV per [place] bill . Based on assessment of the report, the prescription is medically necessary.   - Morphine ER 50 mg tablets, #60, 30 days prescription provided  - Oxycodone 10 mg tabs, #90, 30 days prescription provided    4. Anxiety with anxiolytics dependence: Chronic, controlled. She reports alprazolam 0.5 mg twice daily continues to manage symptoms without side effects. We have been doing a slow taper and in July, 2025 would like to decrease the dose further.  She is incredibly hesitant about decreasing the dose further despite me reassuring her that I am not taking her off the medication, I am just trying to decrease the dose so it is safer for her. For now we will continue the current regimen. Informed consent and controlled substance treatment agreement updated today.  Urine  drug screen up-to-date and appropriate.  Risks versus benefits were reviewed. Obtained and reviewed patient utilization report from [facility] pharmacy database on 5/16/2025 4:26 PM  prior to writing prescription for controlled substance II, III or IV per [place] bill . Based on assessment of the report, the prescription is medically necessary.   - Alprazolam 0.5 mg tablets, #60, 30 days prescription provided    Follow-up  - Follow up on 06/13/2025.      I spent a total of 53 minutes with record review, exam, communication with the patient, and documentation of this encounter.    Return in about 4 weeks (around 6/13/2025) for Controlled substance.    Please note that this dictation was created using voice recognition software. I have made every reasonable attempt to correct obvious errors, but I expect that there are errors of grammar and possibly content that I did not discover before finalizing the note.

## 2025-05-18 LAB
BACTERIA UR CULT: NORMAL
SIGNIFICANT IND 70042: NORMAL
SITE SITE: NORMAL
SOURCE SOURCE: NORMAL

## 2025-05-19 ENCOUNTER — RESULTS FOLLOW-UP (OUTPATIENT)
Dept: MEDICAL GROUP | Facility: PHYSICIAN GROUP | Age: 70
End: 2025-05-19
Payer: MEDICARE

## 2025-05-19 ENCOUNTER — TELEPHONE (OUTPATIENT)
Dept: MEDICAL GROUP | Facility: PHYSICIAN GROUP | Age: 70
End: 2025-05-19
Payer: MEDICARE

## 2025-05-20 NOTE — TELEPHONE ENCOUNTER
Patient called and Left voicemail. Patient stated that she has gone to the bathroom and she feels that she still has the infection. Burning and pain while she urinates. I see you sent a message regarding her urine culture.

## 2025-05-20 NOTE — TELEPHONE ENCOUNTER
Please call her back and inform her that her urine culture shows the keflex is the right antibiotic and it will clear. Thank you.

## 2025-05-23 ENCOUNTER — TELEPHONE (OUTPATIENT)
Dept: MEDICAL GROUP | Facility: PHYSICIAN GROUP | Age: 70
End: 2025-05-23
Payer: MEDICARE

## 2025-05-27 ENCOUNTER — TELEPHONE (OUTPATIENT)
Dept: MEDICAL GROUP | Facility: PHYSICIAN GROUP | Age: 70
End: 2025-05-27
Payer: MEDICARE

## 2025-05-27 DIAGNOSIS — F11.20 UNCOMPLICATED OPIOID DEPENDENCE (HCC): ICD-10-CM

## 2025-05-27 DIAGNOSIS — F41.9 ANXIETY: ICD-10-CM

## 2025-05-27 DIAGNOSIS — F13.20 ANXIOLYTIC DEPENDENCE (HCC): ICD-10-CM

## 2025-05-27 DIAGNOSIS — G89.4 CHRONIC PAIN SYNDROME: ICD-10-CM

## 2025-05-27 RX ORDER — OXYCODONE HYDROCHLORIDE 10 MG/1
10 TABLET ORAL EVERY 8 HOURS PRN
Qty: 90 TABLET | Refills: 0 | OUTPATIENT
Start: 2025-05-27 | End: 2025-06-26

## 2025-05-27 RX ORDER — ALPRAZOLAM 0.5 MG
0.5 TABLET ORAL 2 TIMES DAILY
Qty: 60 TABLET | Refills: 0 | OUTPATIENT
Start: 2025-05-27 | End: 2025-06-26

## 2025-05-27 RX ORDER — MORPHINE SULFATE 15 MG/1
15 TABLET, FILM COATED, EXTENDED RELEASE ORAL EVERY 12 HOURS
Qty: 60 TABLET | Refills: 0 | OUTPATIENT
Start: 2025-05-27 | End: 2025-06-26

## 2025-05-27 NOTE — TELEPHONE ENCOUNTER
Received request via: Pharmacy    Was the patient seen in the last year in this department? Yes    Does the patient have an active prescription (recently filled or refills available) for medication(s) requested? No    [facility]    Does the patient have FPC Plus and need 100-day supply? (This applies to ALL medications) Patient does not have SCP

## 2025-05-28 ENCOUNTER — OFFICE VISIT (OUTPATIENT)
Dept: MEDICAL GROUP | Facility: PHYSICIAN GROUP | Age: 70
End: 2025-05-28
Payer: MEDICARE

## 2025-05-28 ENCOUNTER — TELEPHONE (OUTPATIENT)
Dept: MEDICAL GROUP | Facility: PHYSICIAN GROUP | Age: 70
End: 2025-05-28

## 2025-05-28 VITALS
OXYGEN SATURATION: 95 % | HEART RATE: 83 BPM | TEMPERATURE: 98.3 F | SYSTOLIC BLOOD PRESSURE: 128 MMHG | WEIGHT: 231.8 LBS | DIASTOLIC BLOOD PRESSURE: 74 MMHG | BODY MASS INDEX: 38.62 KG/M2 | HEIGHT: 65 IN

## 2025-05-28 DIAGNOSIS — F41.9 ANXIETY: ICD-10-CM

## 2025-05-28 DIAGNOSIS — F11.20 UNCOMPLICATED OPIOID DEPENDENCE (HCC): ICD-10-CM

## 2025-05-28 DIAGNOSIS — F13.20 ANXIOLYTIC DEPENDENCE (HCC): ICD-10-CM

## 2025-05-28 DIAGNOSIS — N30.10 CHRONIC INTERSTITIAL CYSTITIS: ICD-10-CM

## 2025-05-28 DIAGNOSIS — Z78.0 POSTMENOPAUSAL: ICD-10-CM

## 2025-05-28 DIAGNOSIS — R06.9 BREATHING PROBLEM: ICD-10-CM

## 2025-05-28 DIAGNOSIS — G89.4 CHRONIC PAIN SYNDROME: Primary | ICD-10-CM

## 2025-05-28 DIAGNOSIS — K52.9 CHRONIC DIARRHEA: ICD-10-CM

## 2025-05-28 DIAGNOSIS — G89.4 CHRONIC PAIN SYNDROME: ICD-10-CM

## 2025-05-28 RX ORDER — OXYCODONE HYDROCHLORIDE 10 MG/1
10 TABLET ORAL 3 TIMES DAILY
Qty: 90 TABLET | Refills: 0 | Status: SHIPPED | OUTPATIENT
Start: 2025-05-28 | End: 2025-06-27

## 2025-05-28 RX ORDER — ALPRAZOLAM 0.5 MG
0.5 TABLET ORAL 2 TIMES DAILY
Qty: 60 TABLET | Refills: 0 | Status: SHIPPED | OUTPATIENT
Start: 2025-05-28 | End: 2025-06-27

## 2025-05-28 RX ORDER — MORPHINE SULFATE 15 MG/1
15 TABLET, FILM COATED, EXTENDED RELEASE ORAL EVERY 12 HOURS
Qty: 60 TABLET | Refills: 0 | Status: SHIPPED | OUTPATIENT
Start: 2025-05-28 | End: 2025-06-27

## 2025-05-28 ASSESSMENT — FIBROSIS 4 INDEX: FIB4 SCORE: 2.18

## 2025-05-28 NOTE — TELEPHONE ENCOUNTER
Received request via: Pharmacy    Was the patient seen in the last year in this department? Yes    Does the patient have an active prescription (recently filled or refills available) for medication(s) requested? No    [facility]armacy Name: [email or web address]    Does the patient have snf Plus and need 100-day supply? (This applies to ALL medications) Patient does not have SCP

## 2025-05-28 NOTE — PROGRESS NOTES
"Verbal consent was acquired by the patient to use [employer] ambient listening note generation during this visit     Subjective:     HPI:   History of Present Illness  The patient presents for evaluation of interstitial cystitis, diarrhea, and respiratory concerns. She is accompanied by her granddaughter.    Interstitial Cystitis  - Severe abdominal pain leading to an emergency room visit last week  - Pain has persisted for the past 17 months  - Reports a foul odor in her urine  - Has not consulted a urologist recently  - Currently on a daily antibiotic regimen  - Takes two amitriptyline tablets at night    Diarrhea  - Severe diarrhea since November 2024  - Attributes diarrhea to antibiotic use  - Has had three colonoscopies in the past    Respiratory Concerns  - Concern about respiratory function and does not wish to be dependent on oxygen  - Efforts made to reduce smoking habit  - Uses an inhaler for respiratory issues  - Oxygen levels have fluctuated  - Difficulty maintaining levels above 90% without supplemental oxygen    Arthritis and Bursitis  - Diagnosed with arthritis and bursitis in her left knee    Supplemental information: She has received a tetanus injection in her arm and reports that the injection was administered effectively with minimal pain. She has undergone a mammogram and blood work. She recalls having a colonoscopy approximately 20 years ago, during which her blood pressure dropped significantly. She also underwent an esophagogastroduodenoscopy (EGD) at that time. She has scoliosis.    SOCIAL HISTORY  The patient admits to smoking but has cut down.    Health Maintenance: Completed    Objective:     Exam:  /74 (BP Location: Left arm, Patient Position: Sitting, BP Cuff Size: Adult)   Pulse 83   Temp 36.8 °C (98.3 °F) (Temporal)   Ht 1.651 m (5' 5\")   Wt 105 kg (231 lb 12.8 oz)   LMP 01/01/1988   SpO2 95%   BMI 38.57 kg/m²  Body mass index is 38.57 kg/m².    Physical " Exam  Constitutional:       Appearance: Normal appearance.   Cardiovascular:      Rate and Rhythm: Normal rate and regular rhythm.      Heart sounds: Normal heart sounds.   Pulmonary:      Effort: Pulmonary effort is normal.      Breath sounds: Normal breath sounds.   Musculoskeletal:      Cervical back: Normal range of motion and neck supple.   Lymphadenopathy:      Cervical: No cervical adenopathy.   Neurological:      Mental Status: She is alert.             Results  Labs   - Urine culture: No infection    Imaging   - CT scan of the abdomen: Bladder looked normal, adrenal glands looked normal, kidneys looked normal, and a small cyst on the heart which hasn't changed    Assessment & Plan:     1. Chronic pain syndrome        2. Interstitial cystitis        3. Chronic diarrhea        4. Anxiety        5. Breathing problem        6. Postmenopausal  DS-BONE DENSITY STUDY (DEXA)          Assessment & Plan  1. Interstitial cystitis: Chronic. Urine culture results have returned negative, indicating the absence of an infection. CT scan revealed a normal bladder appearance.  Continued bladder pain is likely secondary to her interstitial cystitis.  - Continue Keflex 250 mg daily for recurrent UTI  - Prescription refill for amitriptyline 50 mg sent to pharmacy. Consideration will be given to increasing the dosage if bladder pain persists.    2. Diarrhea: Chronic. Severe diarrhea has been ongoing since 11/2024, potentially related to antibiotic use.  She does frequently increase her daily Keflex for perceived infections.  - Referral for a colonoscopy will be made once previous colonoscopy records are obtained to determine if there is a treatable condition causing the diarrhea.  - Reports frequent blood in stool, making stool tests unreliable for colon cancer screening.    3.  Pain and anxiety: Chronic, stable.  She was seen recently to get her controlled substances refilled for both of these conditions but for some reason in  the computer system there is no evidence that those prescriptions were ever sent and she has been out for the last few days.  I did resend the morphine, oxycodone, and alprazolam this morning.  She will continue to see me every month to continue getting refills.    4. Breathing concern: Chronic, stable.  She is concerned about her breathing.  She does continue to smoke cigarettes but states that she has decreased the frequency.  Granddaughter is concerned about possible COPD.  Last PFTs in our system showed no COPD but this was in 2007.  She will let me know if she wants to get updated PFTs.    5. Health maintenance.  - Up-to-date with mammogram screenings.  - Bone density scan has not been done since 2008; ordered today to check for osteoporosis.  - Advised to sign records for [facility] to obtain previous colonoscopy records.    I spent a total of 40 minutes with record review, exam, communication with the patient, and documentation of this encounter.    Return if symptoms worsen or fail to improve.    Please note that this dictation was created using voice recognition software. I have made every reasonable attempt to correct obvious errors, but I expect that there are errors of grammar and possibly content that I did not discover before finalizing the note.

## 2025-05-28 NOTE — LETTER
[facility]  Nida Richardson M.D.  [address]  [place] [address]  Fax: [phone]   Authorization for Release/Disclosure of   Protected Health Information   Name: ASHLEIGH ALSTNOWORTH : 1955 SSN: [id number]   Address: [address] [facility] [place] [address] Phone:    [phone] (home)    I authorize the entity listed below to release/disclose the PHI below to:   [facility]/Nida Richardson M.D. and Nida Richardson M.D.   Provider or Entity Name:  [facility]   Address   City, State, Zip   Phone:      Fax:     Reason for request: continuity of care   Information to be released:    [XX] LAST COLONOSCOPY,  including any PATH REPORT and follow-up  [  ] LAST FIT/COLOGUARD RESULT [  ] LAST DEXA  [  ] LAST MAMMOGRAM  [  ] LAST PAP  [  ] LAST LABS [  ] RETINA EXAM REPORT  [  ] IMMUNIZATION RECORDS  [  ] Release all info      [  ] Check here and initial the line next to each item to release ALL health information INCLUDING  _____ Care and treatment for drug and / or alcohol abuse  _____ HIV testing, infection status, or AIDS  _____ Genetic Testing    DATES OF SERVICE OR TIME PERIOD TO BE DISCLOSED: _____________  I understand and acknowledge that:  * This Authorization may be revoked at any time by you in writing, except if your health information has already been used or disclosed.  * Your health information that will be used or disclosed as a result of you signing this authorization could be re-disclosed by the recipient. If this occurs, your re-disclosed health information may no longer be protected by State or Federal laws.  * You may refuse to sign this Authorization. Your refusal will not affect your ability to obtain treatment.  * This Authorization becomes effective upon signing and will  on (date) __________.      If no date is indicated, this Authorization will  one (1) year from the signature date.    Name: Ashleigh Baldwin Dez  Signature: Date:   2025     PLEASE FAX  REQUESTED RECORDS BACK TO: [phone]

## 2025-05-28 NOTE — TELEPHONE ENCOUNTER
Caller Name: Ashleigh Garces  Call Back Number: [phone]      How would the patient prefer to be contacted with a response: Phone call do NOT leave a detailed message    Pt  called to make sure her appointment was confirmed at 11:20 on 5/28/2025. Pt was advised of her appointment time and check in time.

## 2025-05-28 NOTE — PROGRESS NOTES
For unknown reason, med refills at last appt didn't go through. Resent Rx for morphine, oxycodone, and alprazolam.

## 2025-05-28 NOTE — LETTER
[facility]  Nida Richardson M.D.  [address]  [place] [address]  Fax: [phone]   Authorization for Release/Disclosure of   Protected Health Information   Name: ASHLEIGH ALSTONWORTH : 1955 SSN: [id number]   Address: [address] [facility] [address] Phone:    [phone] (home)    I authorize the entity listed below to release/disclose the PHI below to:   [facility]/Nida Richardson M.D. and Nida Richardson M.D.   Provider or Entity Name:  [facility]   Address   City, State, Zip   Phone:      Fax:     Reason for request: continuity of care   Information to be released:    [XX] LAST COLONOSCOPY,  including any PATH REPORT and follow-up  [  ] LAST FIT/COLOGUARD RESULT [  ] LAST DEXA  [  ] LAST MAMMOGRAM  [  ] LAST PAP  [  ] LAST LABS [  ] RETINA EXAM REPORT  [  ] IMMUNIZATION RECORDS  [  ] Release all info      [  ] Check here and initial the line next to each item to release ALL health information INCLUDING  _____ Care and treatment for drug and / or alcohol abuse  _____ HIV testing, infection status, or AIDS  _____ Genetic Testing    DATES OF SERVICE OR TIME PERIOD TO BE DISCLOSED: _____________  I understand and acknowledge that:  * This Authorization may be revoked at any time by you in writing, except if your health information has already been used or disclosed.  * Your health information that will be used or disclosed as a result of you signing this authorization could be re-disclosed by the recipient. If this occurs, your re-disclosed health information may no longer be protected by State or Federal laws.  * You may refuse to sign this Authorization. Your refusal will not affect your ability to obtain treatment.  * This Authorization becomes effective upon signing and will  on (date) __________.      If no date is indicated, this Authorization will  one (1) year from the signature date.    Name: Ashleigh Baldwin Dez  Signature: Date:   2025      PLEASE FAX REQUESTED RECORDS BACK TO: [phone]

## 2025-05-29 DIAGNOSIS — F17.200 CURRENT SMOKER: ICD-10-CM

## 2025-05-29 RX ORDER — LEVOTHYROXINE SODIUM 50 UG/1
50 TABLET ORAL EVERY MORNING
Qty: 90 TABLET | Refills: 0 | Status: SHIPPED | OUTPATIENT
Start: 2025-05-29

## 2025-05-29 RX ORDER — ALBUTEROL SULFATE 90 UG/1
2 INHALANT RESPIRATORY (INHALATION) EVERY 6 HOURS PRN
Qty: 36 G | Refills: 0 | Status: SHIPPED | OUTPATIENT
Start: 2025-05-29

## 2025-05-29 NOTE — TELEPHONE ENCOUNTER
Received request via: Pharmacy    Was the patient seen in the last year in this department? Yes    Does the patient have an active prescription (recently filled or refills available) for medication(s) requested? No    [facility]    Does the patient have custodial Plus and need 100-day supply? (This applies to ALL medications) Patient does not have SCP  
Female

## 2025-05-30 DIAGNOSIS — R19.8 GI SYMPTOMS: Primary | ICD-10-CM

## 2025-05-30 DIAGNOSIS — Z12.11 COLON CANCER SCREENING: ICD-10-CM

## 2025-06-10 NOTE — Clinical Note
REFERRAL APPROVAL NOTICE         Sent on Anni 10, 2025                   Ashleighrupert H[place]  [address] [facility] [address]                   Dear Ms. Garces,    After a careful review of the medical information and benefit coverage, [facility] has processed your referral. See below for additional details.    If applicable, you must be actively enrolled with your insurance for coverage of the authorized service. If you have any questions regarding your coverage, please contact your insurance directly.    REFERRAL INFORMATION   Referral #:  [id number]  Referred-To Provider    Referred-By Provider:  Gastroenterology    Nida Richardson M.D.   [facility]      [address]  [facility] [address] [phone] [address] [facility] [place] [address]  [phone]    Referral Start Date:  06/05/2025  Referral End Date:   06/05/2026             SCHEDULING  If you do not already have an appointment, please call [phone] to make an appointment.     MORE INFORMATION  If you do not already have a [employer] account, sign up at: [email or web address]  You can access your medical information, make appointments, see lab results, billing information, and more.  If you have questions regarding this referral, please contact  the [facility] Referrals department at:             [phone]. Monday - Friday 8:00AM - 5:00PM.     Sincerely,    [facility]     yes

## 2025-06-13 ENCOUNTER — OFFICE VISIT (OUTPATIENT)
Dept: MEDICAL GROUP | Facility: PHYSICIAN GROUP | Age: 70
End: 2025-06-13
Payer: MEDICARE

## 2025-06-13 VITALS
HEIGHT: 65 IN | OXYGEN SATURATION: 94 % | SYSTOLIC BLOOD PRESSURE: 120 MMHG | HEART RATE: 86 BPM | BODY MASS INDEX: 40.02 KG/M2 | TEMPERATURE: 97.2 F | DIASTOLIC BLOOD PRESSURE: 70 MMHG | WEIGHT: 240.2 LBS

## 2025-06-13 DIAGNOSIS — F11.20 UNCOMPLICATED OPIOID DEPENDENCE (HCC): ICD-10-CM

## 2025-06-13 DIAGNOSIS — F13.20 ANXIOLYTIC DEPENDENCE (HCC): ICD-10-CM

## 2025-06-13 DIAGNOSIS — G89.4 CHRONIC PAIN SYNDROME: ICD-10-CM

## 2025-06-13 DIAGNOSIS — F41.9 ANXIETY: Primary | ICD-10-CM

## 2025-06-13 DIAGNOSIS — Z78.0 POSTMENOPAUSE: ICD-10-CM

## 2025-06-13 PROCEDURE — 3078F DIAST BP <80 MM HG: CPT | Performed by: FAMILY MEDICINE

## 2025-06-13 PROCEDURE — 99214 OFFICE O/P EST MOD 30 MIN: CPT | Performed by: FAMILY MEDICINE

## 2025-06-13 PROCEDURE — 3074F SYST BP LT 130 MM HG: CPT | Performed by: FAMILY MEDICINE

## 2025-06-13 RX ORDER — MORPHINE SULFATE 15 MG/1
15 TABLET, FILM COATED, EXTENDED RELEASE ORAL EVERY 12 HOURS
Qty: 60 TABLET | Refills: 0 | Status: SHIPPED | OUTPATIENT
Start: 2025-06-27 | End: 2025-07-27

## 2025-06-13 RX ORDER — ALPRAZOLAM 0.5 MG
0.5 TABLET ORAL 2 TIMES DAILY
Qty: 60 TABLET | Refills: 0 | Status: SHIPPED | OUTPATIENT
Start: 2025-06-27 | End: 2025-07-27

## 2025-06-13 RX ORDER — OXYCODONE HYDROCHLORIDE 10 MG/1
10 TABLET ORAL 3 TIMES DAILY
Qty: 90 TABLET | Refills: 0 | Status: SHIPPED | OUTPATIENT
Start: 2025-06-27 | End: 2025-07-27

## 2025-06-13 RX ORDER — ESTRADIOL 0.03 MG/D
1 FILM, EXTENDED RELEASE TRANSDERMAL
Qty: 24 PATCH | Refills: 1 | Status: SHIPPED | OUTPATIENT
Start: 2025-06-13

## 2025-06-13 ASSESSMENT — FIBROSIS 4 INDEX: FIB4 SCORE: 2.21

## 2025-06-13 NOTE — PROGRESS NOTES
Verbal consent was acquired by the patient to use [employer] ambient listening note generation during this visit     Subjective:     HPI:   History of Present Illness  The patient presents for evaluation of anxiety, weight gain, constipation, and postmenopausal symptoms.    Anxiety  - She reports experiencing significant anxiety, which she attributes to recent stressors such as issues with her bank account and mailbox.  - She is currently on Xanax, with her last dose taken at 1100 hours today.  - She has a 14-day supply remaining.    Weight Gain  - She has been experiencing weight gain despite a decrease in appetite and food intake.  - Her weight has increased from 229 pounds last month to 240 pounds currently.  - She suspects that this weight gain may be due to water retention.  - She is considering seeking consultation from a weight .  - She does not feel hungry and drinks a lot of liquids including protein shakes but not on an everyday basis.    Constipation  - She reports constipation, which she manages with stool softeners.    Postmenopausal Symptoms  - She has exhausted her supply of estradiol and is requesting a refill.  - She has previously used an estradiol patch and is considering returning to this form of treatment.    Recurrent Headaches  - She has been experiencing recurrent headaches, with the most recent episode occurring two nights ago, although it has since resolved.    Bladder Infections  - She reports no hematuria or recent episodes of vomiting.  - She takes antibiotics to manage her bladder infections, which she describes as extremely painful.    Diuretics  - She also takes diuretics, occasionally increasing the dose to twice daily.  - She consumes coconut water for its electrolyte content.    Soreness When Lying Flat  - She experiences soreness when lying flat and is considering having this evaluated further.    Health Maintenance: Completed    Objective:     Exam:  BP  "120/70 (BP Location: Right arm, Patient Position: Sitting, BP Cuff Size: Adult)   Pulse 86   Temp 36.2 °C (97.2 °F) (Temporal)   Ht 1.651 m (5' 5\")   Wt 109 kg (240 lb 3.2 oz)   LMP 01/01/1988   SpO2 94%   BMI 39.97 kg/m²  Body mass index is 39.97 kg/m².    Physical Exam  Constitutional:       Appearance: Normal appearance.   Cardiovascular:      Rate and Rhythm: Normal rate and regular rhythm.      Heart sounds: Normal heart sounds.   Pulmonary:      Effort: Pulmonary effort is normal.      Breath sounds: Normal breath sounds.   Musculoskeletal:      Cervical back: Normal range of motion and neck supple.   Lymphadenopathy:      Cervical: No cervical adenopathy.   Neurological:      Mental Status: She is alert.             Results  Imaging   - Mammogram: 12/2024, Showed some calcifications, but they are normal appearing and benign.    Assessment & Plan:     1. Anxiety  ALPRAZolam (XANAX) 0.5 MG Tab      2. Anxiolytic dependence ([facility])  Controlled Substance Treatment Agreement    ALPRAZolam (XANAX) 0.5 MG Tab      3. Chronic pain syndrome  morphine ER (MS CONTIN) 15 MG Tab CR tablet    oxyCODONE immediate release (ROXICODONE) 10 MG immediate release tablet      4. Uncomplicated opioid dependence ([facility])  Controlled Substance Treatment Agreement    morphine ER (MS CONTIN) 15 MG Tab CR tablet    oxyCODONE immediate release (ROXICODONE) 10 MG immediate release tablet      5. Postmenopause  Estradiol 0.025 MG/24HR PATCH BIWEEKLY          Assessment & Plan  1. Anxiety with anxiolytic dependence: Chronic, controlled. She reports alprazolam 0.5 mg bid continues to work well to manage symptoms without side effects.  The risks of taking with opiates are well-understood and we've recently reduced her benzodiazepine dose. We will continue the current regimen and refill was provided.  Informed consent and controlled substance treatment agreement on file.  Urine drug screen up-to-date and appropriate.  Risks versus benefits " were reviewed. Obtained and reviewed patient utilization report from [facility] pharmacy database on 6/13/2025 1:50 PM  prior to writing prescription for controlled substance II, III or IV per [place] bill . Based on assessment of the report, the prescription is medically necessary.     2. Chronic pain syndrome with uncomplicated opioid dependence: Chronic, controlled. She reports MS contin 15 mg bid and oxycodone 10 mg tid continues to work well to manage symptoms without side effects.  The risks of taking with a benzodiazepine are well-understood. She used to be on much higher doses and she has been weaned down to 75 MME daily, which is the lowest we can go for her pain. We will continue the current regimen.  Informed consent and controlled substance treatment agreement on file.  Urine drug screen up-to-date and appropriate.  Risks versus benefits were reviewed. Obtained and reviewed patient utilization report from [facility] pharmacy database on 6/13/2025 1:51 PM  prior to writing prescription for controlled substance II, III or IV per [place] bill . Based on assessment of the report, the prescription is medically necessary.     3. Postmenopausal symptoms: Chronic and stable condition. Has been on hormone replacement therapy for years; currently out of estradiol pills for a few months. Previously on estradiol patch and prefers to return to it.  - Treatment plan: Estradiol 0.025 mg patches prescribed, to be worn twice per week.    4. Health maintenance: Last mammogram in 12/2024 showed normal calcifications, which are benign. No immediate concerns from recent mammogram findings.  - Treatment plan: Continue regular health screenings.    Return in about 4 weeks (around 7/11/2025) for Controlled substance.    Please note that this dictation was created using voice recognition software. I have made every reasonable attempt to correct obvious errors, but I expect that there are errors of grammar and  possibly content that I did not discover before finalizing the note.

## 2025-07-14 ENCOUNTER — OFFICE VISIT (OUTPATIENT)
Dept: MEDICAL GROUP | Facility: PHYSICIAN GROUP | Age: 70
End: 2025-07-14
Payer: MEDICARE

## 2025-07-14 VITALS
WEIGHT: 241.2 LBS | OXYGEN SATURATION: 90 % | HEART RATE: 70 BPM | TEMPERATURE: 97.7 F | SYSTOLIC BLOOD PRESSURE: 112 MMHG | DIASTOLIC BLOOD PRESSURE: 64 MMHG | HEIGHT: 65 IN | BODY MASS INDEX: 40.19 KG/M2

## 2025-07-14 DIAGNOSIS — G89.4 CHRONIC PAIN SYNDROME: ICD-10-CM

## 2025-07-14 DIAGNOSIS — F11.20 UNCOMPLICATED OPIOID DEPENDENCE (HCC): ICD-10-CM

## 2025-07-14 DIAGNOSIS — E66.01 MORBID OBESITY (HCC): Primary | ICD-10-CM

## 2025-07-14 DIAGNOSIS — F13.20 ANXIOLYTIC DEPENDENCE (HCC): ICD-10-CM

## 2025-07-14 DIAGNOSIS — F41.9 ANXIETY: ICD-10-CM

## 2025-07-14 DIAGNOSIS — L72.3 SEBACEOUS CYST: ICD-10-CM

## 2025-07-14 DIAGNOSIS — I10 ESSENTIAL HYPERTENSION: ICD-10-CM

## 2025-07-14 DIAGNOSIS — B37.2 CANDIDOSIS OF SKIN: ICD-10-CM

## 2025-07-14 PROCEDURE — 3078F DIAST BP <80 MM HG: CPT | Performed by: FAMILY MEDICINE

## 2025-07-14 PROCEDURE — 99214 OFFICE O/P EST MOD 30 MIN: CPT | Performed by: FAMILY MEDICINE

## 2025-07-14 PROCEDURE — 3074F SYST BP LT 130 MM HG: CPT | Performed by: FAMILY MEDICINE

## 2025-07-14 RX ORDER — MORPHINE SULFATE 15 MG/1
15 TABLET, FILM COATED, EXTENDED RELEASE ORAL EVERY 12 HOURS
Qty: 60 TABLET | Refills: 0 | Status: SHIPPED | OUTPATIENT
Start: 2025-07-26 | End: 2025-08-25

## 2025-07-14 RX ORDER — FUROSEMIDE 40 MG/1
40 TABLET ORAL
Qty: 30 TABLET | Refills: 0 | Status: SHIPPED | OUTPATIENT
Start: 2025-07-14

## 2025-07-14 RX ORDER — OXYCODONE HYDROCHLORIDE 10 MG/1
10 TABLET ORAL 3 TIMES DAILY
Qty: 90 TABLET | Refills: 0 | Status: SHIPPED | OUTPATIENT
Start: 2025-07-26 | End: 2025-08-25

## 2025-07-14 RX ORDER — MUPIROCIN 2 %
OINTMENT (GRAM) TOPICAL
Qty: 22 G | Refills: 0 | Status: SHIPPED | OUTPATIENT
Start: 2025-07-14

## 2025-07-14 RX ORDER — NYSTATIN 100000 [USP'U]/G
1 POWDER TOPICAL 3 TIMES DAILY
Qty: 60 G | Refills: 1 | Status: SHIPPED | OUTPATIENT
Start: 2025-07-14

## 2025-07-14 RX ORDER — ALPRAZOLAM 0.5 MG
0.5 TABLET ORAL 2 TIMES DAILY
Qty: 60 TABLET | Refills: 0 | Status: SHIPPED | OUTPATIENT
Start: 2025-07-26 | End: 2025-08-25

## 2025-07-14 RX ORDER — NYSTATIN 100000 U/G
1 CREAM TOPICAL 2 TIMES DAILY
Qty: 30 G | Refills: 1 | Status: SHIPPED | OUTPATIENT
Start: 2025-07-14

## 2025-07-14 ASSESSMENT — FIBROSIS 4 INDEX: FIB4 SCORE: 2.21

## 2025-07-14 NOTE — PROGRESS NOTES
Verbal consent was acquired by the patient to use [employer] ambient listening note generation during this visit     Subjective:     HPI:   History of Present Illness  The patient presents for evaluation of multiple health concerns including morbid obesity, candidiasis of the skin, hypertension, chronic pain syndrome with uncomplicated opioid dependence, and anxiety with anxiolytic dependence.    Morbid Obesity  - She is concerned about her weight, noting that she consumes a lot of liquids and occasionally needs to take 2 water pills instead of 1.  - She has been doing squats and walking 30 to 40 miles a day but is unsure if she is building muscle in her legs.  - She reports not eating much but does consume protein shakes.  - She is considering seeing a nutritionist or weight loss doctor for help with weight loss.  - She is requesting a stronger diuretic.    Candidiasis of the Skin  - She has been experiencing pain in her breast, which she attributes to a rash or infection.  - She is aware of her family history of cancer and is considering seeking medical advice.  - She is currently using nystatin powder for the rash and is requesting a refill.    Hypertension  - No specific details provided.    Chronic Pain Syndrome with Uncomplicated Opioid Dependence  - She reports experiencing back pain due to scoliosis, which she feels is worsening.  - She has seen a surgeon for this issue.  - She is currently taking morphine and oxycodone for pain management and is requesting refills.    Anxiety with Anxiolytic Dependence  - She has been experiencing anxiety and has been taking Xanax, which she finds helpful.  - She is requesting a refill of Xanax.    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  - She mentions a flare-up of her interstitial cystitis,  "sinus issues, and a chipped tooth.  - Due to the frequency of infections this month, she believes she will need antibiotics.  - She recalls a similar incident in the past when she had a severe sinus infection that affected her ear and throat.  - She is requesting a refill of Keflex and Bactroban ointment.    Diet  - She reports not eating much but consumes protein shakes.    FAMILY HISTORY  - Mother had a mastectomy  - Family history of skin cancer    Health Maintenance: Completed    Objective:     Exam:  /64 (BP Location: Left arm, Patient Position: Sitting, BP Cuff Size: Adult)   Pulse 70   Temp 36.5 °C (97.7 °F) (Temporal)   Ht 1.651 m (5' 5\")   Wt 109 kg (241 lb 3.2 oz)   LMP 01/01/1988   SpO2 90%   BMI 40.14 kg/m²  Body mass index is 40.14 kg/m².    Physical Exam  Constitutional:       Appearance: Normal appearance.   HENT:      Head:     Cardiovascular:      Rate and Rhythm: Normal rate and regular rhythm.      Heart sounds: Normal heart sounds.   Pulmonary:      Effort: Pulmonary effort is normal.      Breath sounds: Normal breath sounds.   Musculoskeletal:      Cervical back: Normal range of motion and neck supple.   Lymphadenopathy:      Cervical: No cervical adenopathy.   Neurological:      Mental Status: She is alert.             Results  Imaging   - Mammogram: 12/2024, Showed some benign calcifications, no masses or tumors.    Assessment & Plan:     1. Chronic pain syndrome  morphine ER (MS CONTIN) 15 MG Tab CR tablet    oxyCODONE immediate release (ROXICODONE) 10 MG immediate release tablet      2. Morbid obesity (HCC)  Comp Metabolic Panel    CBC WITH DIFFERENTIAL      3. Candidosis of skin        4. Essential hypertension  Comp Metabolic Panel    CBC WITH DIFFERENTIAL    furosemide (LASIX) 40 MG Tab      5. Uncomplicated opioid dependence (HCC)  morphine ER (MS CONTIN) 15 MG Tab CR tablet    oxyCODONE immediate release (ROXICODONE) 10 MG immediate release tablet      6. Anxiety  " ALPRAZolam (XANAX) 0.5 MG Tab      7. Anxiolytic dependence (HCC)  ALPRAZolam (XANAX) 0.5 MG Tab      8. Sebaceous cyst            Assessment & Plan  1. Morbid obesity: Chronic and unstable condition with continued weight gain despite minimal food intake.  - Treatment plan: Declined nutrition referral; prefers to discuss weight loss medications. Provided a list of weight loss clinics in [facility] for further consultation. Will continue working on a healthy lifestyle.    2. Candidosis of skin: Chronic, recurrent issue with an acute exacerbation under the left breast.  - Treatment plan: Frequent candidal dermatitis noted. Nystatin powder and cream refilled for use 2-3 times a day as needed.    3. Hypertension: Chronic and well controlled with blood pressure <140/90 in the office today.  - Diagnostic plan: Labs ordered to monitor electrolytes and kidney function.  - Treatment plan: Currently on furosemide; patient believes weight gain is due to fluid retention. Increased furosemide to 40 mg daily. Provided a 30-day supply to ensure lab completion before refills; blood pressure to be monitored at the next visit.    4. Chronic pain syndrome with uncomplicated opioid dependence: Chronic, controlled. She reports MS contin 15 mg bid and oxycodone 10 mg tid continues to manage symptoms without side effects.  We will continue the current regimen, refills provided today.  Informed consent and controlled substance treatment agreement on file.  Urine drug screen up-to-date and appropriate.  Risks versus benefits were reviewed. Obtained and reviewed patient utilization report from [facility] pharmacy database on 7/14/2025 2:41 PM  prior to writing prescription for controlled substance II, III or IV per [place] bill . Based on assessment of the report, the prescription is medically necessary.     5. Anxiety with anxiolytic dependence: Chronic, controlled. She reports alprazolam 0.5 mg bid continues to manage symptoms without side  effects.  We will continue the current regimen, refills provided.  Informed consent and controlled substance treatment agreement on file.  Urine drug screen up-to-date and appropriate.  Risks versus benefits were reviewed. Obtained and reviewed patient utilization report from [facility] pharmacy database on 7/14/2025 2:41 PM  prior to writing prescription for controlled substance II, III or IV per [place] bill . Based on assessment of the report, the prescription is medically necessary.     6. Sebaceous cyst: Reports a large cyst on the scalp that has popped and is draining.  - Treatment plan: Explained that these cysts can sometimes get infected and drain on their own. Keflex refilled to manage any potential infection.      Return in about 4 weeks (around 8/11/2025) for Controlled substance.    Please note that this dictation was created using voice recognition software. I have made every reasonable attempt to correct obvious errors, but I expect that there are errors of grammar and possibly content that I did not discover before finalizing the note.

## 2025-07-14 NOTE — PATIENT INSTRUCTIONS
WEIGHT LOSS CLINICS IN [facility]  - [facility]  - [facility]  - [facility]    WEIGHT LOSS MEDICATION OPTIONS  - Tyler (orlistat)  - Contrave  - Zepbound  - Wegovy

## 2025-07-25 RX ORDER — LEVOTHYROXINE SODIUM 50 UG/1
50 TABLET ORAL EVERY MORNING
Qty: 90 TABLET | Refills: 0 | Status: SHIPPED | OUTPATIENT
Start: 2025-07-25

## 2025-07-25 NOTE — TELEPHONE ENCOUNTER
Received request via: Pharmacy    Was the patient seen in the last year in this department? Yes    Does the patient have an active prescription (recently filled or refills available) for medication(s) requested? No    [facility]    Does the patient have detention Plus and need 100-day supply? (This applies to ALL medications) Patient does not have SCP

## 2025-08-11 RX ORDER — FLUOXETINE HYDROCHLORIDE 40 MG/1
40 CAPSULE ORAL
Qty: 90 CAPSULE | Refills: 0 | Status: SHIPPED | OUTPATIENT
Start: 2025-08-11

## 2025-08-11 RX ORDER — ROSUVASTATIN CALCIUM 20 MG/1
20 TABLET, COATED ORAL EVERY EVENING
Qty: 90 TABLET | Refills: 3 | Status: SHIPPED | OUTPATIENT
Start: 2025-08-11

## 2025-08-13 ENCOUNTER — HOSPITAL ENCOUNTER (OUTPATIENT)
Dept: LAB | Facility: MEDICAL CENTER | Age: 70
End: 2025-08-13
Attending: FAMILY MEDICINE
Payer: MEDICARE

## 2025-08-13 ENCOUNTER — OFFICE VISIT (OUTPATIENT)
Dept: MEDICAL GROUP | Facility: PHYSICIAN GROUP | Age: 70
End: 2025-08-13
Payer: MEDICARE

## 2025-08-13 ENCOUNTER — RESULTS FOLLOW-UP (OUTPATIENT)
Dept: MEDICAL GROUP | Facility: PHYSICIAN GROUP | Age: 70
End: 2025-08-13

## 2025-08-13 VITALS
WEIGHT: 237.14 LBS | HEART RATE: 83 BPM | RESPIRATION RATE: 16 BRPM | BODY MASS INDEX: 39.51 KG/M2 | HEIGHT: 65 IN | TEMPERATURE: 96.2 F | DIASTOLIC BLOOD PRESSURE: 68 MMHG | OXYGEN SATURATION: 93 % | SYSTOLIC BLOOD PRESSURE: 106 MMHG

## 2025-08-13 DIAGNOSIS — F13.20 ANXIOLYTIC DEPENDENCE (HCC): ICD-10-CM

## 2025-08-13 DIAGNOSIS — F41.9 ANXIETY: ICD-10-CM

## 2025-08-13 DIAGNOSIS — F17.200 CURRENT SMOKER: ICD-10-CM

## 2025-08-13 DIAGNOSIS — E03.9 HYPOTHYROIDISM, UNSPECIFIED TYPE: Chronic | ICD-10-CM

## 2025-08-13 DIAGNOSIS — N30.10 CHRONIC INTERSTITIAL CYSTITIS: Primary | ICD-10-CM

## 2025-08-13 DIAGNOSIS — I10 ESSENTIAL HYPERTENSION: ICD-10-CM

## 2025-08-13 DIAGNOSIS — E66.01 MORBID OBESITY (HCC): ICD-10-CM

## 2025-08-13 DIAGNOSIS — F11.20 UNCOMPLICATED OPIOID DEPENDENCE (HCC): ICD-10-CM

## 2025-08-13 DIAGNOSIS — G89.4 CHRONIC PAIN SYNDROME: ICD-10-CM

## 2025-08-13 DIAGNOSIS — R14.0 BLOATING: ICD-10-CM

## 2025-08-13 DIAGNOSIS — G43.809 OTHER MIGRAINE WITHOUT STATUS MIGRAINOSUS, NOT INTRACTABLE: ICD-10-CM

## 2025-08-13 PROBLEM — G43.909 MIGRAINE: Status: ACTIVE | Noted: 2025-08-13

## 2025-08-13 LAB
ALBUMIN SERPL BCP-MCNC: 4.1 G/DL (ref 3.2–4.9)
ALBUMIN/GLOB SERPL: 1.4 G/DL
ALP SERPL-CCNC: 85 U/L (ref 30–99)
ALT SERPL-CCNC: 12 U/L (ref 2–50)
ANION GAP SERPL CALC-SCNC: 13 MMOL/L (ref 7–16)
AST SERPL-CCNC: 20 U/L (ref 12–45)
BASOPHILS # BLD AUTO: 0.6 % (ref 0–1.8)
BASOPHILS # BLD: 0.05 K/UL (ref 0–0.12)
BILIRUB SERPL-MCNC: 0.4 MG/DL (ref 0.1–1.5)
BUN SERPL-MCNC: 15 MG/DL (ref 8–22)
CALCIUM ALBUM COR SERPL-MCNC: 9.6 MG/DL (ref 8.5–10.5)
CALCIUM SERPL-MCNC: 9.7 MG/DL (ref 8.5–10.5)
CHLORIDE SERPL-SCNC: 99 MMOL/L (ref 96–112)
CO2 SERPL-SCNC: 23 MMOL/L (ref 20–33)
CREAT SERPL-MCNC: 0.88 MG/DL (ref 0.5–1.4)
EOSINOPHIL # BLD AUTO: 0.24 K/UL (ref 0–0.51)
EOSINOPHIL NFR BLD: 2.8 % (ref 0–6.9)
ERYTHROCYTE [DISTWIDTH] IN BLOOD BY AUTOMATED COUNT: 43.2 FL (ref 35.9–50)
GFR SERPLBLD CREATININE-BSD FMLA CKD-EPI: 70 ML/MIN/1.73 M 2
GLOBULIN SER CALC-MCNC: 2.9 G/DL (ref 1.9–3.5)
GLUCOSE SERPL-MCNC: 148 MG/DL (ref 65–99)
HCT VFR BLD AUTO: 49 % (ref 37–47)
HGB BLD-MCNC: 16.6 G/DL (ref 12–16)
IMM GRANULOCYTES # BLD AUTO: 0.03 K/UL (ref 0–0.11)
IMM GRANULOCYTES NFR BLD AUTO: 0.4 % (ref 0–0.9)
LYMPHOCYTES # BLD AUTO: 2.93 K/UL (ref 1–4.8)
LYMPHOCYTES NFR BLD: 34.5 % (ref 22–41)
MCH RBC QN AUTO: 28.8 PG (ref 27–33)
MCHC RBC AUTO-ENTMCNC: 33.9 G/DL (ref 32.2–35.5)
MCV RBC AUTO: 85.1 FL (ref 81.4–97.8)
MONOCYTES # BLD AUTO: 0.53 K/UL (ref 0–0.85)
MONOCYTES NFR BLD AUTO: 6.2 % (ref 0–13.4)
NEUTROPHILS # BLD AUTO: 4.72 K/UL (ref 1.82–7.42)
NEUTROPHILS NFR BLD: 55.5 % (ref 44–72)
NRBC # BLD AUTO: 0 K/UL
NRBC BLD-RTO: 0 /100 WBC (ref 0–0.2)
PLATELET # BLD AUTO: 163 K/UL (ref 164–446)
PMV BLD AUTO: 11.6 FL (ref 9–12.9)
POTASSIUM SERPL-SCNC: 3.3 MMOL/L (ref 3.6–5.5)
PROT SERPL-MCNC: 7 G/DL (ref 6–8.2)
RBC # BLD AUTO: 5.76 M/UL (ref 4.2–5.4)
SODIUM SERPL-SCNC: 135 MMOL/L (ref 135–145)
TSH SERPL DL<=0.005 MIU/L-ACNC: 3.28 UIU/ML (ref 0.38–5.33)
WBC # BLD AUTO: 8.5 K/UL (ref 4.8–10.8)

## 2025-08-13 PROCEDURE — 80053 COMPREHEN METABOLIC PANEL: CPT

## 2025-08-13 PROCEDURE — 99214 OFFICE O/P EST MOD 30 MIN: CPT | Performed by: FAMILY MEDICINE

## 2025-08-13 PROCEDURE — 36415 COLL VENOUS BLD VENIPUNCTURE: CPT

## 2025-08-13 PROCEDURE — 84443 ASSAY THYROID STIM HORMONE: CPT

## 2025-08-13 PROCEDURE — 85025 COMPLETE CBC W/AUTO DIFF WBC: CPT

## 2025-08-13 PROCEDURE — 3074F SYST BP LT 130 MM HG: CPT | Performed by: FAMILY MEDICINE

## 2025-08-13 PROCEDURE — 3078F DIAST BP <80 MM HG: CPT | Performed by: FAMILY MEDICINE

## 2025-08-13 RX ORDER — ALBUTEROL SULFATE 90 UG/1
2 INHALANT RESPIRATORY (INHALATION) EVERY 6 HOURS PRN
Qty: 36 G | Refills: 3 | Status: SHIPPED | OUTPATIENT
Start: 2025-08-13

## 2025-08-13 RX ORDER — MORPHINE SULFATE 15 MG/1
15 TABLET, FILM COATED, EXTENDED RELEASE ORAL EVERY 12 HOURS
Qty: 60 TABLET | Refills: 0 | Status: SHIPPED | OUTPATIENT
Start: 2025-08-25 | End: 2025-09-24

## 2025-08-13 RX ORDER — POTASSIUM CHLORIDE 1500 MG/1
20 TABLET, EXTENDED RELEASE ORAL 2 TIMES DAILY
Qty: 180 TABLET | Refills: 3 | Status: SHIPPED | OUTPATIENT
Start: 2025-08-13

## 2025-08-13 RX ORDER — OXYCODONE HYDROCHLORIDE 10 MG/1
10 TABLET ORAL 3 TIMES DAILY
Qty: 90 TABLET | Refills: 0 | Status: SHIPPED | OUTPATIENT
Start: 2025-08-25 | End: 2025-09-24

## 2025-08-13 RX ORDER — CYCLOBENZAPRINE HCL 5 MG
5 TABLET ORAL 3 TIMES DAILY PRN
Qty: 90 TABLET | Refills: 5 | Status: SHIPPED | OUTPATIENT
Start: 2025-08-13

## 2025-08-13 RX ORDER — FUROSEMIDE 40 MG/1
40 TABLET ORAL
Qty: 90 TABLET | Refills: 0 | Status: SHIPPED | OUTPATIENT
Start: 2025-08-13

## 2025-08-13 RX ORDER — ALPRAZOLAM 0.5 MG
0.5 TABLET ORAL 2 TIMES DAILY
Qty: 60 TABLET | Refills: 0 | Status: SHIPPED | OUTPATIENT
Start: 2025-08-25 | End: 2025-09-24

## 2025-08-13 ASSESSMENT — FIBROSIS 4 INDEX: FIB4 SCORE: 2.21

## 2025-11-13 ENCOUNTER — APPOINTMENT (OUTPATIENT)
Dept: MEDICAL GROUP | Facility: PHYSICIAN GROUP | Age: 70
End: 2025-11-13
Payer: MEDICARE